# Patient Record
Sex: FEMALE | Race: WHITE | NOT HISPANIC OR LATINO | Employment: OTHER | URBAN - METROPOLITAN AREA
[De-identification: names, ages, dates, MRNs, and addresses within clinical notes are randomized per-mention and may not be internally consistent; named-entity substitution may affect disease eponyms.]

---

## 2018-04-02 ENCOUNTER — RECORDS - HEALTHEAST (OUTPATIENT)
Dept: LAB | Facility: CLINIC | Age: 78
End: 2018-04-02

## 2018-04-02 LAB
ALBUMIN SERPL-MCNC: 3.6 G/DL (ref 3.5–5)
ALP SERPL-CCNC: 104 U/L (ref 45–120)
ALT SERPL W P-5'-P-CCNC: 10 U/L (ref 0–45)
ANION GAP SERPL CALCULATED.3IONS-SCNC: 11 MMOL/L (ref 5–18)
AST SERPL W P-5'-P-CCNC: 16 U/L (ref 0–40)
BILIRUB SERPL-MCNC: 0.3 MG/DL (ref 0–1)
BUN SERPL-MCNC: 19 MG/DL (ref 8–28)
CALCIUM SERPL-MCNC: 10.1 MG/DL (ref 8.5–10.5)
CHLORIDE BLD-SCNC: 103 MMOL/L (ref 98–107)
CHOLEST SERPL-MCNC: 205 MG/DL
CO2 SERPL-SCNC: 24 MMOL/L (ref 22–31)
CREAT SERPL-MCNC: 0.76 MG/DL (ref 0.6–1.1)
FASTING STATUS PATIENT QL REPORTED: ABNORMAL
GFR SERPL CREATININE-BSD FRML MDRD: >60 ML/MIN/1.73M2
GLUCOSE BLD-MCNC: 90 MG/DL (ref 70–125)
HDLC SERPL-MCNC: 62 MG/DL
LDLC SERPL CALC-MCNC: 116 MG/DL
POTASSIUM BLD-SCNC: 4.8 MMOL/L (ref 3.5–5)
PROT SERPL-MCNC: 7.3 G/DL (ref 6–8)
SODIUM SERPL-SCNC: 138 MMOL/L (ref 136–145)
TRIGL SERPL-MCNC: 135 MG/DL

## 2018-04-03 LAB — BACTERIA SPEC CULT: NO GROWTH

## 2018-04-16 ENCOUNTER — COMMUNICATION - HEALTHEAST (OUTPATIENT)
Dept: SCHEDULING | Facility: CLINIC | Age: 78
End: 2018-04-16

## 2018-06-18 ENCOUNTER — RECORDS - HEALTHEAST (OUTPATIENT)
Dept: ADMINISTRATIVE | Facility: OTHER | Age: 78
End: 2018-06-18

## 2018-06-18 ENCOUNTER — RECORDS - HEALTHEAST (OUTPATIENT)
Dept: LAB | Facility: CLINIC | Age: 78
End: 2018-06-18

## 2018-06-18 LAB — POTASSIUM BLD-SCNC: 5.8 MMOL/L (ref 3.5–5)

## 2018-06-20 LAB — BACTERIA SPEC CULT: ABNORMAL

## 2018-06-21 ENCOUNTER — ANESTHESIA - HEALTHEAST (OUTPATIENT)
Dept: SURGERY | Facility: AMBULATORY SURGERY CENTER | Age: 78
End: 2018-06-21

## 2018-06-21 ASSESSMENT — MIFFLIN-ST. JEOR
SCORE: 981.64
SCORE: 981.64

## 2018-06-25 ENCOUNTER — RECORDS - HEALTHEAST (OUTPATIENT)
Dept: ADMINISTRATIVE | Facility: OTHER | Age: 78
End: 2018-06-25

## 2018-06-25 ENCOUNTER — RECORDS - HEALTHEAST (OUTPATIENT)
Dept: LAB | Facility: CLINIC | Age: 78
End: 2018-06-25

## 2018-06-25 LAB — POTASSIUM BLD-SCNC: 5.5 MMOL/L (ref 3.5–5)

## 2018-06-27 ENCOUNTER — HOSPITAL ENCOUNTER (OUTPATIENT)
Dept: SURGERY | Facility: AMBULATORY SURGERY CENTER | Age: 78
Discharge: HOME | End: 2018-06-27
Attending: OBSTETRICS & GYNECOLOGY | Admitting: OBSTETRICS & GYNECOLOGY

## 2018-06-27 ENCOUNTER — SURGERY - HEALTHEAST (OUTPATIENT)
Dept: SURGERY | Facility: AMBULATORY SURGERY CENTER | Age: 78
End: 2018-06-27

## 2018-06-27 DIAGNOSIS — R10.2 PELVIC PAIN: ICD-10-CM

## 2018-06-27 LAB
HGB BLD-MCNC: 13.3 G/DL
POTASSIUM BLD-SCNC: 4.4 MMOL/L (ref 3.5–5)

## 2018-06-27 ASSESSMENT — MIFFLIN-ST. JEOR
SCORE: 981.64
SCORE: 981.64

## 2018-06-28 ENCOUNTER — RECORDS - HEALTHEAST (OUTPATIENT)
Dept: ADMINISTRATIVE | Facility: OTHER | Age: 78
End: 2018-06-28

## 2018-07-07 ENCOUNTER — NURSE TRIAGE (OUTPATIENT)
Dept: NURSING | Facility: CLINIC | Age: 78
End: 2018-07-07

## 2018-07-08 NOTE — TELEPHONE ENCOUNTER
Patient called reporting that starting yesterday the outside of her right foot has bruising with swelling. Patient is unsure how it happened and denies an injury to her foot. Patient reports it does not hurt it just feels tight when she tries to bend her toes. Denies redness, warm to the touch, pain, dizziness, lightheaded, raised bruise, and fever. Patient reports she is on a blood thinner. Reviewed guidelines below and advised for patient to be seen within 24 hours by PCP. Patient agreed with plan. RN advised to call back with any changes, worsening of symptoms, and questions or concerns.     Reason for Disposition    Taking Coumadin (warfarin) or other strong blood thinner, or known bleeding disorder (e.g., thrombocytopenia)    Additional Information    Negative: Shock suspected (e.g., cold/pale/clammy skin, too weak to stand, low BP, rapid pulse)    Negative: Sounds like a life-threatening emergency to the triager    Negative: Bruises with fever    Negative: Tiny bruises (spots or dots) of unknown cause    Negative: Bruise(s) of forehead or head    Negative: Bruise(s) of face or jaw    Negative: Followed an injury, and triager doesn't know which injury guideline to use first    Negative: Post-operative bruising    Negative: Dizziness or lightheadedness    Negative: [1] Bruise on head/face, chest, or abdomen AND [2]  taking Coumadin (warfarin) or other strong blood thinner, or known bleeding disorder (e.g., thrombocytopenia)    Negative: Unexplained bleeding from another site (e.g., gums, nose, urine) as well    Negative: Patient sounds very sick or weak to the triager    Negative: [1] Not caused by an injury AND [2] 5 or more bruises now    Negative: [1] Raised bruise AND [2] size > 2 inches (5 cm) AND [3] expanding    Negative: [1] SEVERE pain AND [2] not improved 2 hours after pain medicine/ice packs    Negative: Suspicious history for the injury    Protocols used: BRUISES-ADULT-AH    Holly Reeves RN/[facility]

## 2019-05-31 ENCOUNTER — RECORDS - HEALTHEAST (OUTPATIENT)
Dept: ADMINISTRATIVE | Facility: OTHER | Age: 79
End: 2019-05-31

## 2019-06-14 ENCOUNTER — RECORDS - HEALTHEAST (OUTPATIENT)
Dept: ADMINISTRATIVE | Facility: OTHER | Age: 79
End: 2019-06-14

## 2019-06-14 ENCOUNTER — RECORDS - HEALTHEAST (OUTPATIENT)
Dept: LAB | Facility: CLINIC | Age: 79
End: 2019-06-14

## 2019-06-14 LAB — URATE SERPL-MCNC: 4.9 MG/DL (ref 2–7.5)

## 2019-07-10 ENCOUNTER — RECORDS - HEALTHEAST (OUTPATIENT)
Dept: ADMINISTRATIVE | Facility: OTHER | Age: 79
End: 2019-07-10

## 2019-07-11 ENCOUNTER — RECORDS - HEALTHEAST (OUTPATIENT)
Dept: ADMINISTRATIVE | Facility: OTHER | Age: 79
End: 2019-07-11

## 2019-07-19 ENCOUNTER — COMMUNICATION - HEALTHEAST (OUTPATIENT)
Dept: LAB | Facility: CLINIC | Age: 79
End: 2019-07-19

## 2019-07-19 ENCOUNTER — OFFICE VISIT - HEALTHEAST (OUTPATIENT)
Dept: RHEUMATOLOGY | Facility: CLINIC | Age: 79
End: 2019-07-19

## 2019-07-19 DIAGNOSIS — M25.50 POLYARTHRALGIA: ICD-10-CM

## 2019-07-19 DIAGNOSIS — M15.0 PRIMARY GENERALIZED (OSTEO)ARTHRITIS: ICD-10-CM

## 2019-07-19 DIAGNOSIS — M1A.3721 CHRONIC GOUT DUE TO RENAL IMPAIRMENT INVOLVING TOE OF LEFT FOOT WITH TOPHUS: ICD-10-CM

## 2019-07-19 DIAGNOSIS — M15.0 PRIMARY OSTEOARTHRITIS INVOLVING MULTIPLE JOINTS: ICD-10-CM

## 2019-07-19 ASSESSMENT — MIFFLIN-ST. JEOR: SCORE: 992.52

## 2019-07-24 ENCOUNTER — AMBULATORY - HEALTHEAST (OUTPATIENT)
Dept: LAB | Facility: CLINIC | Age: 79
End: 2019-07-24

## 2019-07-24 DIAGNOSIS — M25.50 POLYARTHRALGIA: ICD-10-CM

## 2019-07-24 DIAGNOSIS — M1A.3721 CHRONIC GOUT DUE TO RENAL IMPAIRMENT INVOLVING TOE OF LEFT FOOT WITH TOPHUS: ICD-10-CM

## 2019-07-24 DIAGNOSIS — M15.0 PRIMARY OSTEOARTHRITIS INVOLVING MULTIPLE JOINTS: ICD-10-CM

## 2019-07-24 LAB
ALBUMIN SERPL-MCNC: 3.6 G/DL (ref 3.5–5)
ALT SERPL W P-5'-P-CCNC: 12 U/L (ref 0–45)
BASOPHILS # BLD AUTO: 0.1 THOU/UL (ref 0–0.2)
BASOPHILS NFR BLD AUTO: 0 % (ref 0–2)
CREAT SERPL-MCNC: 0.84 MG/DL (ref 0.6–1.1)
EOSINOPHIL # BLD AUTO: 0.1 THOU/UL (ref 0–0.4)
EOSINOPHIL NFR BLD AUTO: 1 % (ref 0–6)
ERYTHROCYTE [DISTWIDTH] IN BLOOD BY AUTOMATED COUNT: 16.7 % (ref 11–14.5)
GFR SERPL CREATININE-BSD FRML MDRD: >60 ML/MIN/1.73M2
HCT VFR BLD AUTO: 40.4 % (ref 35–47)
HGB BLD-MCNC: 12.9 G/DL (ref 12–16)
LYMPHOCYTES # BLD AUTO: 1.6 THOU/UL (ref 0.8–4.4)
LYMPHOCYTES NFR BLD AUTO: 11 % (ref 20–40)
MCH RBC QN AUTO: 29.7 PG (ref 27–34)
MCHC RBC AUTO-ENTMCNC: 31.9 G/DL (ref 32–36)
MCV RBC AUTO: 93 FL (ref 80–100)
MONOCYTES # BLD AUTO: 0.8 THOU/UL (ref 0–0.9)
MONOCYTES NFR BLD AUTO: 5 % (ref 2–10)
NEUTROPHILS # BLD AUTO: 12.7 THOU/UL (ref 2–7.7)
NEUTROPHILS NFR BLD AUTO: 83 % (ref 50–70)
PLATELET # BLD AUTO: 390 THOU/UL (ref 140–440)
PMV BLD AUTO: 10.2 FL (ref 8.5–12.5)
RBC # BLD AUTO: 4.35 MILL/UL (ref 3.8–5.4)
RHEUMATOID FACT SERPL-ACNC: <15 IU/ML (ref 0–30)
URATE SERPL-MCNC: 5.5 MG/DL (ref 2–7.5)
WBC: 15.4 THOU/UL (ref 4–11)

## 2019-07-25 LAB — CCP AB SER IA-ACNC: <0.5 U/ML

## 2019-08-02 ENCOUNTER — OFFICE VISIT - HEALTHEAST (OUTPATIENT)
Dept: RHEUMATOLOGY | Facility: CLINIC | Age: 79
End: 2019-08-02

## 2019-08-02 DIAGNOSIS — M1A.0721 CHRONIC IDIOPATHIC GOUT INVOLVING TOE OF LEFT FOOT WITH TOPHUS: ICD-10-CM

## 2019-08-02 DIAGNOSIS — M15.0 PRIMARY OSTEOARTHRITIS INVOLVING MULTIPLE JOINTS: ICD-10-CM

## 2019-08-02 DIAGNOSIS — M25.50 POLYARTHRALGIA: ICD-10-CM

## 2019-08-02 ASSESSMENT — MIFFLIN-ST. JEOR: SCORE: 995.24

## 2019-09-23 ENCOUNTER — RECORDS - HEALTHEAST (OUTPATIENT)
Dept: LAB | Facility: CLINIC | Age: 79
End: 2019-09-23

## 2019-09-24 LAB — BACTERIA SPEC CULT: NO GROWTH

## 2019-10-01 ENCOUNTER — OFFICE VISIT - HEALTHEAST (OUTPATIENT)
Dept: SURGERY | Facility: CLINIC | Age: 79
End: 2019-10-01

## 2019-10-01 DIAGNOSIS — R10.32 LLQ ABDOMINAL PAIN: ICD-10-CM

## 2019-10-01 ASSESSMENT — MIFFLIN-ST. JEOR: SCORE: 992.52

## 2019-11-01 ENCOUNTER — AMBULATORY - HEALTHEAST (OUTPATIENT)
Dept: LAB | Facility: CLINIC | Age: 79
End: 2019-11-01

## 2019-11-01 DIAGNOSIS — M25.50 POLYARTHRALGIA: ICD-10-CM

## 2019-11-01 LAB
ALT SERPL W P-5'-P-CCNC: 9 U/L (ref 0–45)
CREAT SERPL-MCNC: 0.84 MG/DL (ref 0.6–1.1)
ERYTHROCYTE [DISTWIDTH] IN BLOOD BY AUTOMATED COUNT: 11.9 % (ref 11–14.5)
GFR SERPL CREATININE-BSD FRML MDRD: >60 ML/MIN/1.73M2
HCT VFR BLD AUTO: 43.7 % (ref 35–47)
HGB BLD-MCNC: 14.2 G/DL (ref 12–16)
MCH RBC QN AUTO: 30.1 PG (ref 27–34)
MCHC RBC AUTO-ENTMCNC: 32.5 G/DL (ref 32–36)
MCV RBC AUTO: 93 FL (ref 80–100)
PLATELET # BLD AUTO: 362 THOU/UL (ref 140–440)
PMV BLD AUTO: 7 FL (ref 7–10)
RBC # BLD AUTO: 4.72 MILL/UL (ref 3.8–5.4)
URATE SERPL-MCNC: 5.3 MG/DL (ref 2–7.5)
WBC: 6.7 THOU/UL (ref 4–11)

## 2019-11-05 ENCOUNTER — OFFICE VISIT - HEALTHEAST (OUTPATIENT)
Dept: RHEUMATOLOGY | Facility: CLINIC | Age: 79
End: 2019-11-05

## 2019-11-05 DIAGNOSIS — M15.0 PRIMARY OSTEOARTHRITIS INVOLVING MULTIPLE JOINTS: ICD-10-CM

## 2019-11-05 DIAGNOSIS — M1A.0721 CHRONIC IDIOPATHIC GOUT INVOLVING TOE OF LEFT FOOT WITH TOPHUS: ICD-10-CM

## 2019-11-05 ASSESSMENT — MIFFLIN-ST. JEOR: SCORE: 990.71

## 2020-05-05 ENCOUNTER — COMMUNICATION - HEALTHEAST (OUTPATIENT)
Dept: RHEUMATOLOGY | Facility: CLINIC | Age: 80
End: 2020-05-05

## 2020-07-27 ENCOUNTER — RECORDS - HEALTHEAST (OUTPATIENT)
Dept: ADMINISTRATIVE | Facility: OTHER | Age: 80
End: 2020-07-27

## 2020-09-03 ENCOUNTER — COMMUNICATION - HEALTHEAST (OUTPATIENT)
Dept: ADMINISTRATIVE | Facility: CLINIC | Age: 80
End: 2020-09-03

## 2020-09-03 DIAGNOSIS — M10.9 GOUT: ICD-10-CM

## 2020-09-08 ENCOUNTER — AMBULATORY - HEALTHEAST (OUTPATIENT)
Dept: LAB | Facility: CLINIC | Age: 80
End: 2020-09-08

## 2020-09-08 DIAGNOSIS — M10.9 GOUT: ICD-10-CM

## 2020-09-08 LAB
ALBUMIN SERPL-MCNC: 3.4 G/DL (ref 3.5–5)
ALT SERPL W P-5'-P-CCNC: 17 U/L (ref 0–45)
CREAT SERPL-MCNC: 1.16 MG/DL (ref 0.6–1.1)
ERYTHROCYTE [DISTWIDTH] IN BLOOD BY AUTOMATED COUNT: 12.4 % (ref 11–14.5)
GFR SERPL CREATININE-BSD FRML MDRD: 45 ML/MIN/1.73M2
HCT VFR BLD AUTO: 38 % (ref 35–47)
HGB BLD-MCNC: 12.5 G/DL (ref 12–16)
MCH RBC QN AUTO: 29.1 PG (ref 27–34)
MCHC RBC AUTO-ENTMCNC: 32.8 G/DL (ref 32–36)
MCV RBC AUTO: 89 FL (ref 80–100)
PLATELET # BLD AUTO: 464 THOU/UL (ref 140–440)
PMV BLD AUTO: 6.9 FL (ref 7–10)
RBC # BLD AUTO: 4.29 MILL/UL (ref 3.8–5.4)
URATE SERPL-MCNC: 5.5 MG/DL (ref 2–7.5)
WBC: 8 THOU/UL (ref 4–11)

## 2020-09-11 ENCOUNTER — COMMUNICATION - HEALTHEAST (OUTPATIENT)
Dept: RHEUMATOLOGY | Facility: CLINIC | Age: 80
End: 2020-09-11

## 2020-09-11 DIAGNOSIS — M1A.0721 CHRONIC IDIOPATHIC GOUT INVOLVING TOE OF LEFT FOOT WITH TOPHUS: ICD-10-CM

## 2020-09-28 ENCOUNTER — OFFICE VISIT - HEALTHEAST (OUTPATIENT)
Dept: RHEUMATOLOGY | Facility: CLINIC | Age: 80
End: 2020-09-28

## 2020-09-28 DIAGNOSIS — M15.0 PRIMARY OSTEOARTHRITIS INVOLVING MULTIPLE JOINTS: ICD-10-CM

## 2020-09-28 DIAGNOSIS — M11.89 CALCIUM PYROPHOSPHATE ARTHROPATHY OF MULTIPLE SITES: ICD-10-CM

## 2020-09-28 DIAGNOSIS — M1A.0721 CHRONIC IDIOPATHIC GOUT INVOLVING TOE OF LEFT FOOT WITH TOPHUS: ICD-10-CM

## 2020-09-28 DIAGNOSIS — M25.80 PERIARTICULAR CALCIFICATION: ICD-10-CM

## 2020-10-16 ENCOUNTER — COMMUNICATION - HEALTHEAST (OUTPATIENT)
Dept: ADMINISTRATIVE | Facility: CLINIC | Age: 80
End: 2020-10-16

## 2020-10-28 ENCOUNTER — RECORDS - HEALTHEAST (OUTPATIENT)
Dept: LAB | Facility: CLINIC | Age: 80
End: 2020-10-28

## 2020-10-28 LAB
ALBUMIN SERPL-MCNC: 3.7 G/DL (ref 3.5–5)
ALP SERPL-CCNC: 96 U/L (ref 45–120)
ALT SERPL W P-5'-P-CCNC: 9 U/L (ref 0–45)
ANION GAP SERPL CALCULATED.3IONS-SCNC: 10 MMOL/L (ref 5–18)
AST SERPL W P-5'-P-CCNC: 15 U/L (ref 0–40)
BILIRUB SERPL-MCNC: 0.3 MG/DL (ref 0–1)
BUN SERPL-MCNC: 18 MG/DL (ref 8–28)
CALCIUM SERPL-MCNC: 9.7 MG/DL (ref 8.5–10.5)
CHLORIDE BLD-SCNC: 106 MMOL/L (ref 98–107)
CHOLEST SERPL-MCNC: 176 MG/DL
CO2 SERPL-SCNC: 24 MMOL/L (ref 22–31)
CREAT SERPL-MCNC: 0.84 MG/DL (ref 0.6–1.1)
FASTING STATUS PATIENT QL REPORTED: NORMAL
GFR SERPL CREATININE-BSD FRML MDRD: >60 ML/MIN/1.73M2
GLUCOSE BLD-MCNC: 92 MG/DL (ref 70–125)
HDLC SERPL-MCNC: 55 MG/DL
LDLC SERPL CALC-MCNC: 98 MG/DL
POTASSIUM BLD-SCNC: 4.5 MMOL/L (ref 3.5–5)
PROT SERPL-MCNC: 7 G/DL (ref 6–8)
SODIUM SERPL-SCNC: 140 MMOL/L (ref 136–145)
TRIGL SERPL-MCNC: 116 MG/DL

## 2020-10-29 LAB — BACTERIA SPEC CULT: NO GROWTH

## 2020-10-29 ASSESSMENT — MIFFLIN-ST. JEOR
SCORE: 965.77
SCORE: 965.77

## 2020-10-31 ENCOUNTER — SURGERY - HEALTHEAST (OUTPATIENT)
Dept: SURGERY | Facility: HOSPITAL | Age: 80
End: 2020-10-31

## 2020-10-31 ENCOUNTER — COMMUNICATION - HEALTHEAST (OUTPATIENT)
Dept: SCHEDULING | Facility: CLINIC | Age: 80
End: 2020-10-31

## 2020-10-31 ENCOUNTER — ANESTHESIA - HEALTHEAST (OUTPATIENT)
Dept: SURGERY | Facility: HOSPITAL | Age: 80
End: 2020-10-31

## 2020-10-31 ASSESSMENT — MIFFLIN-ST. JEOR: SCORE: 986.18

## 2020-11-01 ASSESSMENT — MIFFLIN-ST. JEOR: SCORE: 1007.5

## 2020-11-02 ASSESSMENT — MIFFLIN-ST. JEOR: SCORE: 1009.77

## 2020-11-03 ASSESSMENT — MIFFLIN-ST. JEOR
SCORE: 989.81
SCORE: 989.35

## 2020-11-04 ASSESSMENT — MIFFLIN-ST. JEOR: SCORE: 978.47

## 2020-11-05 ENCOUNTER — COMMUNICATION - HEALTHEAST (OUTPATIENT)
Dept: ONCOLOGY | Facility: HOSPITAL | Age: 80
End: 2020-11-05

## 2020-11-09 ENCOUNTER — AMBULATORY - HEALTHEAST (OUTPATIENT)
Dept: ONCOLOGY | Facility: CLINIC | Age: 80
End: 2020-11-09

## 2020-11-09 DIAGNOSIS — C18.7 MALIGNANT NEOPLASM OF SIGMOID COLON (H): ICD-10-CM

## 2020-11-10 ENCOUNTER — OFFICE VISIT - HEALTHEAST (OUTPATIENT)
Dept: SURGERY | Facility: CLINIC | Age: 80
End: 2020-11-10

## 2020-11-10 ENCOUNTER — COMMUNICATION - HEALTHEAST (OUTPATIENT)
Dept: ONCOLOGY | Facility: HOSPITAL | Age: 80
End: 2020-11-10

## 2020-11-10 DIAGNOSIS — C18.7 MALIGNANT NEOPLASM OF SIGMOID COLON (H): ICD-10-CM

## 2020-11-10 LAB — CEA SERPL-MCNC: 3.8 NG/ML (ref 0–3)

## 2020-11-12 ENCOUNTER — RECORDS - HEALTHEAST (OUTPATIENT)
Dept: RADIOLOGY | Facility: CLINIC | Age: 80
End: 2020-11-12

## 2020-11-18 ENCOUNTER — HOSPITAL ENCOUNTER (OUTPATIENT)
Dept: PET IMAGING | Facility: HOSPITAL | Age: 80
Discharge: HOME | End: 2020-11-18
Attending: INTERNAL MEDICINE

## 2020-11-18 DIAGNOSIS — C18.7 MALIGNANT NEOPLASM OF SIGMOID COLON (H): ICD-10-CM

## 2020-11-18 LAB — GLUCOSE BLDC GLUCOMTR-MCNC: 82 MG/DL (ref 70–139)

## 2020-11-18 ASSESSMENT — MIFFLIN-ST. JEOR: SCORE: 947.62

## 2020-11-24 ENCOUNTER — RECORDS - HEALTHEAST (OUTPATIENT)
Dept: ADMINISTRATIVE | Facility: OTHER | Age: 80
End: 2020-11-24

## 2020-11-24 ENCOUNTER — OFFICE VISIT - HEALTHEAST (OUTPATIENT)
Dept: ONCOLOGY | Facility: HOSPITAL | Age: 80
End: 2020-11-24

## 2020-11-24 DIAGNOSIS — C18.7 MALIGNANT NEOPLASM OF SIGMOID COLON (H): ICD-10-CM

## 2020-11-24 ASSESSMENT — MIFFLIN-ST. JEOR: SCORE: 936.28

## 2020-11-25 ENCOUNTER — AMBULATORY - HEALTHEAST (OUTPATIENT)
Dept: SURGERY | Facility: CLINIC | Age: 80
End: 2020-11-25

## 2020-11-25 ENCOUNTER — COMMUNICATION - HEALTHEAST (OUTPATIENT)
Dept: SURGERY | Facility: CLINIC | Age: 80
End: 2020-11-25

## 2020-11-25 ENCOUNTER — COMMUNICATION - HEALTHEAST (OUTPATIENT)
Dept: ONCOLOGY | Facility: HOSPITAL | Age: 80
End: 2020-11-25

## 2020-11-25 DIAGNOSIS — C18.7 MALIGNANT NEOPLASM OF SIGMOID COLON (H): ICD-10-CM

## 2020-11-25 DIAGNOSIS — Z11.59 ENCOUNTER FOR SCREENING FOR OTHER VIRAL DISEASES: ICD-10-CM

## 2020-11-29 ENCOUNTER — AMBULATORY - HEALTHEAST (OUTPATIENT)
Dept: FAMILY MEDICINE | Facility: CLINIC | Age: 80
End: 2020-11-29

## 2020-11-29 DIAGNOSIS — Z11.59 ENCOUNTER FOR SCREENING FOR OTHER VIRAL DISEASES: ICD-10-CM

## 2020-11-30 ENCOUNTER — COMMUNICATION - HEALTHEAST (OUTPATIENT)
Dept: SCHEDULING | Facility: CLINIC | Age: 80
End: 2020-11-30

## 2020-11-30 LAB
SARS-COV-2 PCR COMMENT: NORMAL
SARS-COV-2 RNA SPEC QL NAA+PROBE: NEGATIVE
SARS-COV-2 VIRUS SPECIMEN SOURCE: NORMAL

## 2020-12-01 ENCOUNTER — ANESTHESIA - HEALTHEAST (OUTPATIENT)
Dept: SURGERY | Facility: AMBULATORY SURGERY CENTER | Age: 80
End: 2020-12-01

## 2020-12-01 ASSESSMENT — MIFFLIN-ST. JEOR: SCORE: 936.28

## 2020-12-02 ENCOUNTER — SURGERY - HEALTHEAST (OUTPATIENT)
Dept: SURGERY | Facility: AMBULATORY SURGERY CENTER | Age: 80
End: 2020-12-02

## 2020-12-08 ENCOUNTER — INFUSION - HEALTHEAST (OUTPATIENT)
Dept: INFUSION THERAPY | Facility: HOSPITAL | Age: 80
End: 2020-12-08

## 2020-12-08 ENCOUNTER — OFFICE VISIT - HEALTHEAST (OUTPATIENT)
Dept: ONCOLOGY | Facility: HOSPITAL | Age: 80
End: 2020-12-08

## 2020-12-08 ENCOUNTER — AMBULATORY - HEALTHEAST (OUTPATIENT)
Dept: INFUSION THERAPY | Facility: HOSPITAL | Age: 80
End: 2020-12-08

## 2020-12-08 DIAGNOSIS — C18.7 MALIGNANT NEOPLASM OF SIGMOID COLON (H): ICD-10-CM

## 2020-12-08 LAB
ALBUMIN SERPL-MCNC: 3.2 G/DL (ref 3.5–5)
ALP SERPL-CCNC: 89 U/L (ref 45–120)
ALT SERPL W P-5'-P-CCNC: <9 U/L (ref 0–45)
ANION GAP SERPL CALCULATED.3IONS-SCNC: 4 MMOL/L (ref 5–18)
AST SERPL W P-5'-P-CCNC: 15 U/L (ref 0–40)
BASOPHILS # BLD AUTO: 0 THOU/UL (ref 0–0.2)
BASOPHILS NFR BLD AUTO: 0 % (ref 0–2)
BILIRUB SERPL-MCNC: 0.3 MG/DL (ref 0–1)
BUN SERPL-MCNC: 15 MG/DL (ref 8–28)
CALCIUM SERPL-MCNC: 8.7 MG/DL (ref 8.5–10.5)
CHLORIDE BLD-SCNC: 108 MMOL/L (ref 98–107)
CO2 SERPL-SCNC: 28 MMOL/L (ref 22–31)
CREAT SERPL-MCNC: 0.74 MG/DL (ref 0.6–1.1)
EOSINOPHIL # BLD AUTO: 0.2 THOU/UL (ref 0–0.4)
EOSINOPHIL NFR BLD AUTO: 3 % (ref 0–6)
ERYTHROCYTE [DISTWIDTH] IN BLOOD BY AUTOMATED COUNT: 17 % (ref 11–14.5)
GFR SERPL CREATININE-BSD FRML MDRD: >60 ML/MIN/1.73M2
GLUCOSE BLD-MCNC: 155 MG/DL (ref 70–125)
HCT VFR BLD AUTO: 36.1 % (ref 35–47)
HGB BLD-MCNC: 11.4 G/DL (ref 12–16)
IMM GRANULOCYTES # BLD: 0 THOU/UL
IMM GRANULOCYTES NFR BLD: 0 %
LYMPHOCYTES # BLD AUTO: 1.6 THOU/UL (ref 0.8–4.4)
LYMPHOCYTES NFR BLD AUTO: 23 % (ref 20–40)
MAGNESIUM SERPL-MCNC: 1.8 MG/DL (ref 1.8–2.6)
MCH RBC QN AUTO: 28.6 PG (ref 27–34)
MCHC RBC AUTO-ENTMCNC: 31.6 G/DL (ref 32–36)
MCV RBC AUTO: 91 FL (ref 80–100)
MONOCYTES # BLD AUTO: 0.9 THOU/UL (ref 0–0.9)
MONOCYTES NFR BLD AUTO: 12 % (ref 2–10)
NEUTROPHILS # BLD AUTO: 4.2 THOU/UL (ref 2–7.7)
NEUTROPHILS NFR BLD AUTO: 61 % (ref 50–70)
PLATELET # BLD AUTO: 381 THOU/UL (ref 140–440)
PMV BLD AUTO: 8.8 FL (ref 8.5–12.5)
POTASSIUM BLD-SCNC: 4.1 MMOL/L (ref 3.5–5)
PROT SERPL-MCNC: 6.4 G/DL (ref 6–8)
RBC # BLD AUTO: 3.99 MILL/UL (ref 3.8–5.4)
SODIUM SERPL-SCNC: 140 MMOL/L (ref 136–145)
WBC: 6.9 THOU/UL (ref 4–11)

## 2020-12-10 ENCOUNTER — INFUSION - HEALTHEAST (OUTPATIENT)
Dept: INFUSION THERAPY | Facility: HOSPITAL | Age: 80
End: 2020-12-10

## 2020-12-10 DIAGNOSIS — C18.7 MALIGNANT NEOPLASM OF SIGMOID COLON (H): ICD-10-CM

## 2020-12-22 ENCOUNTER — AMBULATORY - HEALTHEAST (OUTPATIENT)
Dept: INFUSION THERAPY | Facility: HOSPITAL | Age: 80
End: 2020-12-22

## 2020-12-22 ENCOUNTER — INFUSION - HEALTHEAST (OUTPATIENT)
Dept: INFUSION THERAPY | Facility: HOSPITAL | Age: 80
End: 2020-12-22

## 2020-12-22 ENCOUNTER — OFFICE VISIT - HEALTHEAST (OUTPATIENT)
Dept: ONCOLOGY | Facility: HOSPITAL | Age: 80
End: 2020-12-22

## 2020-12-22 ENCOUNTER — COMMUNICATION - HEALTHEAST (OUTPATIENT)
Dept: ONCOLOGY | Facility: HOSPITAL | Age: 80
End: 2020-12-22

## 2020-12-22 DIAGNOSIS — C18.7 MALIGNANT NEOPLASM OF SIGMOID COLON (H): ICD-10-CM

## 2020-12-22 LAB
ALBUMIN SERPL-MCNC: 3.1 G/DL (ref 3.5–5)
ALP SERPL-CCNC: 87 U/L (ref 45–120)
ALT SERPL W P-5'-P-CCNC: 11 U/L (ref 0–45)
ANION GAP SERPL CALCULATED.3IONS-SCNC: 7 MMOL/L (ref 5–18)
AST SERPL W P-5'-P-CCNC: 16 U/L (ref 0–40)
BASOPHILS # BLD AUTO: 0 THOU/UL (ref 0–0.2)
BASOPHILS NFR BLD AUTO: 1 % (ref 0–2)
BILIRUB SERPL-MCNC: 0.2 MG/DL (ref 0–1)
BUN SERPL-MCNC: 14 MG/DL (ref 8–28)
CALCIUM SERPL-MCNC: 8.6 MG/DL (ref 8.5–10.5)
CHLORIDE BLD-SCNC: 108 MMOL/L (ref 98–107)
CO2 SERPL-SCNC: 25 MMOL/L (ref 22–31)
CREAT SERPL-MCNC: 0.78 MG/DL (ref 0.6–1.1)
EOSINOPHIL # BLD AUTO: 0.2 THOU/UL (ref 0–0.4)
EOSINOPHIL NFR BLD AUTO: 3 % (ref 0–6)
ERYTHROCYTE [DISTWIDTH] IN BLOOD BY AUTOMATED COUNT: 16.8 % (ref 11–14.5)
GFR SERPL CREATININE-BSD FRML MDRD: >60 ML/MIN/1.73M2
GLUCOSE BLD-MCNC: 108 MG/DL (ref 70–125)
HCT VFR BLD AUTO: 35.8 % (ref 35–47)
HGB BLD-MCNC: 11.6 G/DL (ref 12–16)
IMM GRANULOCYTES # BLD: 0 THOU/UL
IMM GRANULOCYTES NFR BLD: 0 %
LYMPHOCYTES # BLD AUTO: 2 THOU/UL (ref 0.8–4.4)
LYMPHOCYTES NFR BLD AUTO: 31 % (ref 20–40)
MAGNESIUM SERPL-MCNC: 1.9 MG/DL (ref 1.8–2.6)
MCH RBC QN AUTO: 29.1 PG (ref 27–34)
MCHC RBC AUTO-ENTMCNC: 32.4 G/DL (ref 32–36)
MCV RBC AUTO: 90 FL (ref 80–100)
MONOCYTES # BLD AUTO: 0.7 THOU/UL (ref 0–0.9)
MONOCYTES NFR BLD AUTO: 11 % (ref 2–10)
NEUTROPHILS # BLD AUTO: 3.4 THOU/UL (ref 2–7.7)
NEUTROPHILS NFR BLD AUTO: 54 % (ref 50–70)
PLATELET # BLD AUTO: 348 THOU/UL (ref 140–440)
PMV BLD AUTO: 9 FL (ref 8.5–12.5)
POTASSIUM BLD-SCNC: 4.2 MMOL/L (ref 3.5–5)
PROT SERPL-MCNC: 6.2 G/DL (ref 6–8)
RBC # BLD AUTO: 3.99 MILL/UL (ref 3.8–5.4)
SODIUM SERPL-SCNC: 140 MMOL/L (ref 136–145)
WBC: 6.3 THOU/UL (ref 4–11)

## 2020-12-24 ENCOUNTER — INFUSION - HEALTHEAST (OUTPATIENT)
Dept: INFUSION THERAPY | Facility: HOSPITAL | Age: 80
End: 2020-12-24

## 2020-12-24 DIAGNOSIS — C18.7 MALIGNANT NEOPLASM OF SIGMOID COLON (H): ICD-10-CM

## 2020-12-28 ENCOUNTER — OFFICE VISIT - HEALTHEAST (OUTPATIENT)
Dept: RHEUMATOLOGY | Facility: CLINIC | Age: 80
End: 2020-12-28

## 2020-12-28 DIAGNOSIS — C18.7 MALIGNANT NEOPLASM OF SIGMOID COLON (H): ICD-10-CM

## 2020-12-28 DIAGNOSIS — M11.89 CALCIUM PYROPHOSPHATE ARTHROPATHY OF MULTIPLE SITES: ICD-10-CM

## 2020-12-28 DIAGNOSIS — M1A.00X1 IDIOPATHIC CHRONIC GOUT WITH TOPHUS, UNSPECIFIED SITE: ICD-10-CM

## 2020-12-28 DIAGNOSIS — M15.0 PRIMARY OSTEOARTHRITIS INVOLVING MULTIPLE JOINTS: ICD-10-CM

## 2021-01-05 ENCOUNTER — OFFICE VISIT - HEALTHEAST (OUTPATIENT)
Dept: ONCOLOGY | Facility: HOSPITAL | Age: 81
End: 2021-01-05

## 2021-01-05 ENCOUNTER — AMBULATORY - HEALTHEAST (OUTPATIENT)
Dept: ONCOLOGY | Facility: HOSPITAL | Age: 81
End: 2021-01-05

## 2021-01-05 ENCOUNTER — INFUSION - HEALTHEAST (OUTPATIENT)
Dept: INFUSION THERAPY | Facility: HOSPITAL | Age: 81
End: 2021-01-05

## 2021-01-05 DIAGNOSIS — C18.7 MALIGNANT NEOPLASM OF SIGMOID COLON (H): ICD-10-CM

## 2021-01-05 LAB
ALBUMIN SERPL-MCNC: 3.2 G/DL (ref 3.5–5)
ALP SERPL-CCNC: 77 U/L (ref 45–120)
ALT SERPL W P-5'-P-CCNC: 10 U/L (ref 0–45)
ANION GAP SERPL CALCULATED.3IONS-SCNC: 8 MMOL/L (ref 5–18)
AST SERPL W P-5'-P-CCNC: 15 U/L (ref 0–40)
BASOPHILS # BLD AUTO: 0.1 THOU/UL (ref 0–0.2)
BASOPHILS NFR BLD AUTO: 1 % (ref 0–2)
BILIRUB SERPL-MCNC: 0.2 MG/DL (ref 0–1)
BUN SERPL-MCNC: 15 MG/DL (ref 8–28)
CALCIUM SERPL-MCNC: 8.9 MG/DL (ref 8.5–10.5)
CHLORIDE BLD-SCNC: 108 MMOL/L (ref 98–107)
CO2 SERPL-SCNC: 26 MMOL/L (ref 22–31)
CREAT SERPL-MCNC: 0.77 MG/DL (ref 0.6–1.1)
EOSINOPHIL # BLD AUTO: 0.1 THOU/UL (ref 0–0.4)
EOSINOPHIL NFR BLD AUTO: 2 % (ref 0–6)
ERYTHROCYTE [DISTWIDTH] IN BLOOD BY AUTOMATED COUNT: 17.5 % (ref 11–14.5)
GFR SERPL CREATININE-BSD FRML MDRD: >60 ML/MIN/1.73M2
GLUCOSE BLD-MCNC: 106 MG/DL (ref 70–125)
HCT VFR BLD AUTO: 34.5 % (ref 35–47)
HGB BLD-MCNC: 10.9 G/DL (ref 12–16)
IMM GRANULOCYTES # BLD: 0 THOU/UL
IMM GRANULOCYTES NFR BLD: 0 %
LYMPHOCYTES # BLD AUTO: 1.8 THOU/UL (ref 0.8–4.4)
LYMPHOCYTES NFR BLD AUTO: 28 % (ref 20–40)
MAGNESIUM SERPL-MCNC: 1.9 MG/DL (ref 1.8–2.6)
MCH RBC QN AUTO: 28.5 PG (ref 27–34)
MCHC RBC AUTO-ENTMCNC: 31.6 G/DL (ref 32–36)
MCV RBC AUTO: 90 FL (ref 80–100)
MONOCYTES # BLD AUTO: 0.7 THOU/UL (ref 0–0.9)
MONOCYTES NFR BLD AUTO: 11 % (ref 2–10)
NEUTROPHILS # BLD AUTO: 3.6 THOU/UL (ref 2–7.7)
NEUTROPHILS NFR BLD AUTO: 58 % (ref 50–70)
PLATELET # BLD AUTO: 262 THOU/UL (ref 140–440)
PMV BLD AUTO: 8.7 FL (ref 8.5–12.5)
POTASSIUM BLD-SCNC: 4.4 MMOL/L (ref 3.5–5)
PROT SERPL-MCNC: 6.5 G/DL (ref 6–8)
RBC # BLD AUTO: 3.82 MILL/UL (ref 3.8–5.4)
SODIUM SERPL-SCNC: 142 MMOL/L (ref 136–145)
WBC: 6.3 THOU/UL (ref 4–11)

## 2021-01-07 ENCOUNTER — INFUSION - HEALTHEAST (OUTPATIENT)
Dept: INFUSION THERAPY | Facility: HOSPITAL | Age: 81
End: 2021-01-07

## 2021-01-07 DIAGNOSIS — C18.7 MALIGNANT NEOPLASM OF SIGMOID COLON (H): ICD-10-CM

## 2021-01-19 ENCOUNTER — INFUSION - HEALTHEAST (OUTPATIENT)
Dept: INFUSION THERAPY | Facility: HOSPITAL | Age: 81
End: 2021-01-19

## 2021-01-19 ENCOUNTER — AMBULATORY - HEALTHEAST (OUTPATIENT)
Dept: INFUSION THERAPY | Facility: HOSPITAL | Age: 81
End: 2021-01-19

## 2021-01-19 ENCOUNTER — OFFICE VISIT - HEALTHEAST (OUTPATIENT)
Dept: ONCOLOGY | Facility: HOSPITAL | Age: 81
End: 2021-01-19

## 2021-01-19 DIAGNOSIS — C18.7 MALIGNANT NEOPLASM OF SIGMOID COLON (H): ICD-10-CM

## 2021-01-19 LAB
ALBUMIN SERPL-MCNC: 3.4 G/DL (ref 3.5–5)
ALP SERPL-CCNC: 82 U/L (ref 45–120)
ALT SERPL W P-5'-P-CCNC: 14 U/L (ref 0–45)
ANION GAP SERPL CALCULATED.3IONS-SCNC: 10 MMOL/L (ref 5–18)
AST SERPL W P-5'-P-CCNC: 19 U/L (ref 0–40)
BASOPHILS # BLD AUTO: 0 THOU/UL (ref 0–0.2)
BASOPHILS NFR BLD AUTO: 1 % (ref 0–2)
BILIRUB SERPL-MCNC: 0.3 MG/DL (ref 0–1)
BUN SERPL-MCNC: 11 MG/DL (ref 8–28)
CALCIUM SERPL-MCNC: 8.6 MG/DL (ref 8.5–10.5)
CHLORIDE BLD-SCNC: 109 MMOL/L (ref 98–107)
CO2 SERPL-SCNC: 21 MMOL/L (ref 22–31)
CREAT SERPL-MCNC: 0.74 MG/DL (ref 0.6–1.1)
EOSINOPHIL # BLD AUTO: 0.2 THOU/UL (ref 0–0.4)
EOSINOPHIL NFR BLD AUTO: 3 % (ref 0–6)
ERYTHROCYTE [DISTWIDTH] IN BLOOD BY AUTOMATED COUNT: 18.4 % (ref 11–14.5)
GFR SERPL CREATININE-BSD FRML MDRD: >60 ML/MIN/1.73M2
GLUCOSE BLD-MCNC: 96 MG/DL (ref 70–125)
HCT VFR BLD AUTO: 35 % (ref 35–47)
HGB BLD-MCNC: 11.2 G/DL (ref 12–16)
IMM GRANULOCYTES # BLD: 0 THOU/UL
IMM GRANULOCYTES NFR BLD: 0 %
LYMPHOCYTES # BLD AUTO: 1.9 THOU/UL (ref 0.8–4.4)
LYMPHOCYTES NFR BLD AUTO: 30 % (ref 20–40)
MAGNESIUM SERPL-MCNC: 1.8 MG/DL (ref 1.8–2.6)
MCH RBC QN AUTO: 28.8 PG (ref 27–34)
MCHC RBC AUTO-ENTMCNC: 32 G/DL (ref 32–36)
MCV RBC AUTO: 90 FL (ref 80–100)
MONOCYTES # BLD AUTO: 0.8 THOU/UL (ref 0–0.9)
MONOCYTES NFR BLD AUTO: 12 % (ref 2–10)
NEUTROPHILS # BLD AUTO: 3.3 THOU/UL (ref 2–7.7)
NEUTROPHILS NFR BLD AUTO: 54 % (ref 50–70)
PLATELET # BLD AUTO: 264 THOU/UL (ref 140–440)
PMV BLD AUTO: 8.9 FL (ref 8.5–12.5)
POTASSIUM BLD-SCNC: 4.2 MMOL/L (ref 3.5–5)
PROT SERPL-MCNC: 6.7 G/DL (ref 6–8)
RBC # BLD AUTO: 3.89 MILL/UL (ref 3.8–5.4)
SODIUM SERPL-SCNC: 140 MMOL/L (ref 136–145)
WBC: 6.1 THOU/UL (ref 4–11)

## 2021-01-20 ENCOUNTER — COMMUNICATION - HEALTHEAST (OUTPATIENT)
Dept: ONCOLOGY | Facility: HOSPITAL | Age: 81
End: 2021-01-20

## 2021-01-21 ENCOUNTER — INFUSION - HEALTHEAST (OUTPATIENT)
Dept: INFUSION THERAPY | Facility: HOSPITAL | Age: 81
End: 2021-01-21

## 2021-01-21 ENCOUNTER — AMBULATORY - HEALTHEAST (OUTPATIENT)
Dept: OTHER | Facility: CLINIC | Age: 81
End: 2021-01-21

## 2021-01-21 ENCOUNTER — DOCUMENTATION ONLY (OUTPATIENT)
Dept: OTHER | Facility: CLINIC | Age: 81
End: 2021-01-21

## 2021-01-21 DIAGNOSIS — C18.7 MALIGNANT NEOPLASM OF SIGMOID COLON (H): ICD-10-CM

## 2021-02-01 ENCOUNTER — OFFICE VISIT - HEALTHEAST (OUTPATIENT)
Dept: ONCOLOGY | Facility: HOSPITAL | Age: 81
End: 2021-02-01

## 2021-02-01 ENCOUNTER — AMBULATORY - HEALTHEAST (OUTPATIENT)
Dept: INFUSION THERAPY | Facility: HOSPITAL | Age: 81
End: 2021-02-01

## 2021-02-01 ENCOUNTER — INFUSION - HEALTHEAST (OUTPATIENT)
Dept: INFUSION THERAPY | Facility: HOSPITAL | Age: 81
End: 2021-02-01

## 2021-02-01 DIAGNOSIS — C18.7 MALIGNANT NEOPLASM OF SIGMOID COLON (H): ICD-10-CM

## 2021-02-01 LAB
ALBUMIN SERPL-MCNC: 3.1 G/DL (ref 3.5–5)
ALP SERPL-CCNC: 76 U/L (ref 45–120)
ALT SERPL W P-5'-P-CCNC: 10 U/L (ref 0–45)
ANION GAP SERPL CALCULATED.3IONS-SCNC: 7 MMOL/L (ref 5–18)
AST SERPL W P-5'-P-CCNC: 15 U/L (ref 0–40)
BASOPHILS # BLD AUTO: 0 THOU/UL (ref 0–0.2)
BASOPHILS NFR BLD AUTO: 1 % (ref 0–2)
BILIRUB SERPL-MCNC: 0.3 MG/DL (ref 0–1)
BUN SERPL-MCNC: 13 MG/DL (ref 8–28)
CALCIUM SERPL-MCNC: 8.7 MG/DL (ref 8.5–10.5)
CHLORIDE BLD-SCNC: 108 MMOL/L (ref 98–107)
CO2 SERPL-SCNC: 25 MMOL/L (ref 22–31)
CREAT SERPL-MCNC: 0.76 MG/DL (ref 0.6–1.1)
EOSINOPHIL # BLD AUTO: 0.1 THOU/UL (ref 0–0.4)
EOSINOPHIL NFR BLD AUTO: 2 % (ref 0–6)
ERYTHROCYTE [DISTWIDTH] IN BLOOD BY AUTOMATED COUNT: 19.1 % (ref 11–14.5)
GFR SERPL CREATININE-BSD FRML MDRD: >60 ML/MIN/1.73M2
GLUCOSE BLD-MCNC: 104 MG/DL (ref 70–125)
HCT VFR BLD AUTO: 34.1 % (ref 35–47)
HGB BLD-MCNC: 10.9 G/DL (ref 12–16)
IMM GRANULOCYTES # BLD: 0 THOU/UL
IMM GRANULOCYTES NFR BLD: 0 %
LYMPHOCYTES # BLD AUTO: 1.6 THOU/UL (ref 0.8–4.4)
LYMPHOCYTES NFR BLD AUTO: 25 % (ref 20–40)
MAGNESIUM SERPL-MCNC: 1.8 MG/DL (ref 1.8–2.6)
MCH RBC QN AUTO: 29.2 PG (ref 27–34)
MCHC RBC AUTO-ENTMCNC: 32 G/DL (ref 32–36)
MCV RBC AUTO: 91 FL (ref 80–100)
MONOCYTES # BLD AUTO: 0.9 THOU/UL (ref 0–0.9)
MONOCYTES NFR BLD AUTO: 14 % (ref 2–10)
NEUTROPHILS # BLD AUTO: 3.7 THOU/UL (ref 2–7.7)
NEUTROPHILS NFR BLD AUTO: 58 % (ref 50–70)
PLATELET # BLD AUTO: 244 THOU/UL (ref 140–440)
PMV BLD AUTO: 9 FL (ref 8.5–12.5)
POTASSIUM BLD-SCNC: 4.4 MMOL/L (ref 3.5–5)
PROT SERPL-MCNC: 6.1 G/DL (ref 6–8)
RBC # BLD AUTO: 3.73 MILL/UL (ref 3.8–5.4)
SODIUM SERPL-SCNC: 140 MMOL/L (ref 136–145)
WBC: 6.3 THOU/UL (ref 4–11)

## 2021-02-04 ENCOUNTER — RECORDS - HEALTHEAST (OUTPATIENT)
Dept: ADMINISTRATIVE | Facility: OTHER | Age: 81
End: 2021-02-04

## 2021-02-09 ENCOUNTER — COMMUNICATION - HEALTHEAST (OUTPATIENT)
Dept: INFUSION THERAPY | Facility: HOSPITAL | Age: 81
End: 2021-02-09

## 2021-02-10 ENCOUNTER — INFUSION - HEALTHEAST (OUTPATIENT)
Dept: INFUSION THERAPY | Facility: HOSPITAL | Age: 81
End: 2021-02-10

## 2021-02-10 DIAGNOSIS — C18.7 MALIGNANT NEOPLASM OF SIGMOID COLON (H): ICD-10-CM

## 2021-02-10 LAB
ALBUMIN SERPL-MCNC: 3.3 G/DL (ref 3.5–5)
ALP SERPL-CCNC: 84 U/L (ref 45–120)
ALT SERPL W P-5'-P-CCNC: 10 U/L (ref 0–45)
ANION GAP SERPL CALCULATED.3IONS-SCNC: 8 MMOL/L (ref 5–18)
AST SERPL W P-5'-P-CCNC: 14 U/L (ref 0–40)
BASOPHILS # BLD AUTO: 0 THOU/UL (ref 0–0.2)
BASOPHILS NFR BLD AUTO: 1 % (ref 0–2)
BILIRUB SERPL-MCNC: 0.3 MG/DL (ref 0–1)
BUN SERPL-MCNC: 13 MG/DL (ref 8–28)
CALCIUM SERPL-MCNC: 8.9 MG/DL (ref 8.5–10.5)
CHLORIDE BLD-SCNC: 107 MMOL/L (ref 98–107)
CO2 SERPL-SCNC: 24 MMOL/L (ref 22–31)
CREAT SERPL-MCNC: 0.79 MG/DL (ref 0.6–1.1)
EOSINOPHIL # BLD AUTO: 0.1 THOU/UL (ref 0–0.4)
EOSINOPHIL NFR BLD AUTO: 1 % (ref 0–6)
ERYTHROCYTE [DISTWIDTH] IN BLOOD BY AUTOMATED COUNT: 19.9 % (ref 11–14.5)
GFR SERPL CREATININE-BSD FRML MDRD: >60 ML/MIN/1.73M2
GLUCOSE BLD-MCNC: 93 MG/DL (ref 70–125)
HCT VFR BLD AUTO: 34.3 % (ref 35–47)
HGB BLD-MCNC: 11.2 G/DL (ref 12–16)
IMM GRANULOCYTES # BLD: 0 THOU/UL
IMM GRANULOCYTES NFR BLD: 1 %
LYMPHOCYTES # BLD AUTO: 1.6 THOU/UL (ref 0.8–4.4)
LYMPHOCYTES NFR BLD AUTO: 29 % (ref 20–40)
MAGNESIUM SERPL-MCNC: 1.8 MG/DL (ref 1.8–2.6)
MCH RBC QN AUTO: 30 PG (ref 27–34)
MCHC RBC AUTO-ENTMCNC: 32.7 G/DL (ref 32–36)
MCV RBC AUTO: 92 FL (ref 80–100)
MONOCYTES # BLD AUTO: 0.9 THOU/UL (ref 0–0.9)
MONOCYTES NFR BLD AUTO: 15 % (ref 2–10)
NEUTROPHILS # BLD AUTO: 3 THOU/UL (ref 2–7.7)
NEUTROPHILS NFR BLD AUTO: 53 % (ref 50–70)
PLATELET # BLD AUTO: 291 THOU/UL (ref 140–440)
PMV BLD AUTO: 8.8 FL (ref 8.5–12.5)
POTASSIUM BLD-SCNC: 3.9 MMOL/L (ref 3.5–5)
PROT SERPL-MCNC: 6.1 G/DL (ref 6–8)
RBC # BLD AUTO: 3.73 MILL/UL (ref 3.8–5.4)
SODIUM SERPL-SCNC: 139 MMOL/L (ref 136–145)
WBC: 5.6 THOU/UL (ref 4–11)

## 2021-02-11 ENCOUNTER — AMBULATORY - HEALTHEAST (OUTPATIENT)
Dept: ADMINISTRATIVE | Facility: CLINIC | Age: 81
End: 2021-02-11

## 2021-02-11 DIAGNOSIS — D12.6 SERRATED ADENOMA OF COLON: ICD-10-CM

## 2021-02-12 ENCOUNTER — INFUSION - HEALTHEAST (OUTPATIENT)
Dept: INFUSION THERAPY | Facility: HOSPITAL | Age: 81
End: 2021-02-12

## 2021-02-12 DIAGNOSIS — C18.7 MALIGNANT NEOPLASM OF SIGMOID COLON (H): ICD-10-CM

## 2021-02-17 ENCOUNTER — OFFICE VISIT - HEALTHEAST (OUTPATIENT)
Dept: ONCOLOGY | Facility: HOSPITAL | Age: 81
End: 2021-02-17

## 2021-02-17 DIAGNOSIS — C18.7 MALIGNANT NEOPLASM OF SIGMOID COLON (H): ICD-10-CM

## 2021-02-22 ENCOUNTER — INFUSION - HEALTHEAST (OUTPATIENT)
Dept: INFUSION THERAPY | Facility: HOSPITAL | Age: 81
End: 2021-02-22

## 2021-02-22 ENCOUNTER — OFFICE VISIT - HEALTHEAST (OUTPATIENT)
Dept: ONCOLOGY | Facility: HOSPITAL | Age: 81
End: 2021-02-22

## 2021-02-22 ENCOUNTER — AMBULATORY - HEALTHEAST (OUTPATIENT)
Dept: INFUSION THERAPY | Facility: HOSPITAL | Age: 81
End: 2021-02-22

## 2021-02-22 ENCOUNTER — COMMUNICATION - HEALTHEAST (OUTPATIENT)
Dept: ADMINISTRATIVE | Facility: HOSPITAL | Age: 81
End: 2021-02-22

## 2021-02-22 DIAGNOSIS — C18.7 MALIGNANT NEOPLASM OF SIGMOID COLON (H): ICD-10-CM

## 2021-02-22 DIAGNOSIS — Z86.0100 HISTORY OF COLONIC POLYPS: ICD-10-CM

## 2021-02-22 LAB
ALBUMIN SERPL-MCNC: 3.2 G/DL (ref 3.5–5)
ALP SERPL-CCNC: 83 U/L (ref 45–120)
ALT SERPL W P-5'-P-CCNC: <9 U/L (ref 0–45)
ANION GAP SERPL CALCULATED.3IONS-SCNC: 8 MMOL/L (ref 5–18)
AST SERPL W P-5'-P-CCNC: 15 U/L (ref 0–40)
BASOPHILS # BLD AUTO: 0 THOU/UL (ref 0–0.2)
BASOPHILS NFR BLD AUTO: 0 % (ref 0–2)
BILIRUB SERPL-MCNC: 0.3 MG/DL (ref 0–1)
BUN SERPL-MCNC: 13 MG/DL (ref 8–28)
CALCIUM SERPL-MCNC: 9 MG/DL (ref 8.5–10.5)
CHLORIDE BLD-SCNC: 106 MMOL/L (ref 98–107)
CO2 SERPL-SCNC: 25 MMOL/L (ref 22–31)
CREAT SERPL-MCNC: 0.81 MG/DL (ref 0.6–1.1)
EOSINOPHIL # BLD AUTO: 0.1 THOU/UL (ref 0–0.4)
EOSINOPHIL NFR BLD AUTO: 1 % (ref 0–6)
ERYTHROCYTE [DISTWIDTH] IN BLOOD BY AUTOMATED COUNT: 19.5 % (ref 11–14.5)
GFR SERPL CREATININE-BSD FRML MDRD: >60 ML/MIN/1.73M2
GLUCOSE BLD-MCNC: 96 MG/DL (ref 70–125)
HCT VFR BLD AUTO: 33.1 % (ref 35–47)
HGB BLD-MCNC: 10.6 G/DL (ref 12–16)
IMM GRANULOCYTES # BLD: 0 THOU/UL
IMM GRANULOCYTES NFR BLD: 1 %
LYMPHOCYTES # BLD AUTO: 1.4 THOU/UL (ref 0.8–4.4)
LYMPHOCYTES NFR BLD AUTO: 25 % (ref 20–40)
MAGNESIUM SERPL-MCNC: 2 MG/DL (ref 1.8–2.6)
MCH RBC QN AUTO: 30.2 PG (ref 27–34)
MCHC RBC AUTO-ENTMCNC: 32 G/DL (ref 32–36)
MCV RBC AUTO: 94 FL (ref 80–100)
MONOCYTES # BLD AUTO: 0.7 THOU/UL (ref 0–0.9)
MONOCYTES NFR BLD AUTO: 12 % (ref 2–10)
NEUTROPHILS # BLD AUTO: 3.5 THOU/UL (ref 2–7.7)
NEUTROPHILS NFR BLD AUTO: 61 % (ref 50–70)
PLATELET # BLD AUTO: 300 THOU/UL (ref 140–440)
PMV BLD AUTO: 9.1 FL (ref 8.5–12.5)
POTASSIUM BLD-SCNC: 4.2 MMOL/L (ref 3.5–5)
PROT SERPL-MCNC: 6.4 G/DL (ref 6–8)
RBC # BLD AUTO: 3.51 MILL/UL (ref 3.8–5.4)
SODIUM SERPL-SCNC: 139 MMOL/L (ref 136–145)
WBC: 5.7 THOU/UL (ref 4–11)

## 2021-02-22 ASSESSMENT — MIFFLIN-ST. JEOR: SCORE: 946.71

## 2021-02-24 ENCOUNTER — INFUSION - HEALTHEAST (OUTPATIENT)
Dept: INFUSION THERAPY | Facility: HOSPITAL | Age: 81
End: 2021-02-24

## 2021-02-24 DIAGNOSIS — C18.7 MALIGNANT NEOPLASM OF SIGMOID COLON (H): ICD-10-CM

## 2021-03-09 ENCOUNTER — AMBULATORY - HEALTHEAST (OUTPATIENT)
Dept: INFUSION THERAPY | Facility: HOSPITAL | Age: 81
End: 2021-03-09

## 2021-03-09 ENCOUNTER — INFUSION - HEALTHEAST (OUTPATIENT)
Dept: INFUSION THERAPY | Facility: HOSPITAL | Age: 81
End: 2021-03-09

## 2021-03-09 ENCOUNTER — OFFICE VISIT - HEALTHEAST (OUTPATIENT)
Dept: ONCOLOGY | Facility: HOSPITAL | Age: 81
End: 2021-03-09

## 2021-03-09 DIAGNOSIS — C18.7 MALIGNANT NEOPLASM OF SIGMOID COLON (H): ICD-10-CM

## 2021-03-09 LAB
ALBUMIN SERPL-MCNC: 3.3 G/DL (ref 3.5–5)
ALP SERPL-CCNC: 85 U/L (ref 45–120)
ALT SERPL W P-5'-P-CCNC: <9 U/L (ref 0–45)
ANION GAP SERPL CALCULATED.3IONS-SCNC: 6 MMOL/L (ref 5–18)
AST SERPL W P-5'-P-CCNC: 18 U/L (ref 0–40)
BASOPHILS # BLD AUTO: 0 THOU/UL (ref 0–0.2)
BASOPHILS NFR BLD AUTO: 1 % (ref 0–2)
BILIRUB SERPL-MCNC: 0.3 MG/DL (ref 0–1)
BUN SERPL-MCNC: 12 MG/DL (ref 8–28)
CALCIUM SERPL-MCNC: 8.8 MG/DL (ref 8.5–10.5)
CHLORIDE BLD-SCNC: 112 MMOL/L (ref 98–107)
CO2 SERPL-SCNC: 24 MMOL/L (ref 22–31)
CREAT SERPL-MCNC: 0.8 MG/DL (ref 0.6–1.1)
EOSINOPHIL # BLD AUTO: 0.1 THOU/UL (ref 0–0.4)
EOSINOPHIL NFR BLD AUTO: 1 % (ref 0–6)
ERYTHROCYTE [DISTWIDTH] IN BLOOD BY AUTOMATED COUNT: 20 % (ref 11–14.5)
GFR SERPL CREATININE-BSD FRML MDRD: >60 ML/MIN/1.73M2
GLUCOSE BLD-MCNC: 107 MG/DL (ref 70–125)
HCT VFR BLD AUTO: 33.9 % (ref 35–47)
HGB BLD-MCNC: 10.8 G/DL (ref 12–16)
IMM GRANULOCYTES # BLD: 0 THOU/UL
IMM GRANULOCYTES NFR BLD: 0 %
LYMPHOCYTES # BLD AUTO: 1.5 THOU/UL (ref 0.8–4.4)
LYMPHOCYTES NFR BLD AUTO: 27 % (ref 20–40)
MAGNESIUM SERPL-MCNC: 1.9 MG/DL (ref 1.8–2.6)
MCH RBC QN AUTO: 30.6 PG (ref 27–34)
MCHC RBC AUTO-ENTMCNC: 31.9 G/DL (ref 32–36)
MCV RBC AUTO: 96 FL (ref 80–100)
MONOCYTES # BLD AUTO: 0.7 THOU/UL (ref 0–0.9)
MONOCYTES NFR BLD AUTO: 12 % (ref 2–10)
NEUTROPHILS # BLD AUTO: 3.2 THOU/UL (ref 2–7.7)
NEUTROPHILS NFR BLD AUTO: 59 % (ref 50–70)
PLATELET # BLD AUTO: 221 THOU/UL (ref 140–440)
PMV BLD AUTO: 8.8 FL (ref 8.5–12.5)
POTASSIUM BLD-SCNC: 3.9 MMOL/L (ref 3.5–5)
PROT SERPL-MCNC: 6.4 G/DL (ref 6–8)
RBC # BLD AUTO: 3.53 MILL/UL (ref 3.8–5.4)
SODIUM SERPL-SCNC: 142 MMOL/L (ref 136–145)
WBC: 5.4 THOU/UL (ref 4–11)

## 2021-03-11 ENCOUNTER — INFUSION - HEALTHEAST (OUTPATIENT)
Dept: INFUSION THERAPY | Facility: HOSPITAL | Age: 81
End: 2021-03-11

## 2021-03-11 DIAGNOSIS — C18.7 MALIGNANT NEOPLASM OF SIGMOID COLON (H): ICD-10-CM

## 2021-03-19 ENCOUNTER — RECORDS - HEALTHEAST (OUTPATIENT)
Dept: LAB | Facility: CLINIC | Age: 81
End: 2021-03-19

## 2021-03-19 LAB
CHOLEST SERPL-MCNC: 203 MG/DL
FASTING STATUS PATIENT QL REPORTED: ABNORMAL
HDLC SERPL-MCNC: 73 MG/DL
LDLC SERPL CALC-MCNC: 106 MG/DL
TRIGL SERPL-MCNC: 118 MG/DL

## 2021-03-23 ENCOUNTER — OFFICE VISIT - HEALTHEAST (OUTPATIENT)
Dept: ONCOLOGY | Facility: HOSPITAL | Age: 81
End: 2021-03-23

## 2021-03-23 ENCOUNTER — AMBULATORY - HEALTHEAST (OUTPATIENT)
Dept: INFUSION THERAPY | Facility: HOSPITAL | Age: 81
End: 2021-03-23

## 2021-03-23 ENCOUNTER — INFUSION - HEALTHEAST (OUTPATIENT)
Dept: INFUSION THERAPY | Facility: HOSPITAL | Age: 81
End: 2021-03-23

## 2021-03-23 DIAGNOSIS — C18.7 MALIGNANT NEOPLASM OF SIGMOID COLON (H): ICD-10-CM

## 2021-03-23 LAB
ALBUMIN SERPL-MCNC: 3.2 G/DL (ref 3.5–5)
ALP SERPL-CCNC: 75 U/L (ref 45–120)
ALT SERPL W P-5'-P-CCNC: 10 U/L (ref 0–45)
ANION GAP SERPL CALCULATED.3IONS-SCNC: 8 MMOL/L (ref 5–18)
AST SERPL W P-5'-P-CCNC: 17 U/L (ref 0–40)
BASOPHILS # BLD AUTO: 0 THOU/UL (ref 0–0.2)
BASOPHILS NFR BLD AUTO: 1 % (ref 0–2)
BILIRUB SERPL-MCNC: 0.3 MG/DL (ref 0–1)
BUN SERPL-MCNC: 15 MG/DL (ref 8–28)
CALCIUM SERPL-MCNC: 9 MG/DL (ref 8.5–10.5)
CHLORIDE BLD-SCNC: 108 MMOL/L (ref 98–107)
CO2 SERPL-SCNC: 26 MMOL/L (ref 22–31)
CREAT SERPL-MCNC: 0.91 MG/DL (ref 0.6–1.1)
EOSINOPHIL # BLD AUTO: 0.1 THOU/UL (ref 0–0.4)
EOSINOPHIL NFR BLD AUTO: 2 % (ref 0–6)
ERYTHROCYTE [DISTWIDTH] IN BLOOD BY AUTOMATED COUNT: 20.2 % (ref 11–14.5)
GFR SERPL CREATININE-BSD FRML MDRD: 60 ML/MIN/1.73M2
GLUCOSE BLD-MCNC: 155 MG/DL (ref 70–125)
HCT VFR BLD AUTO: 32.9 % (ref 35–47)
HGB BLD-MCNC: 10.6 G/DL (ref 12–16)
IMM GRANULOCYTES # BLD: 0 THOU/UL
IMM GRANULOCYTES NFR BLD: 0 %
LYMPHOCYTES # BLD AUTO: 1.5 THOU/UL (ref 0.8–4.4)
LYMPHOCYTES NFR BLD AUTO: 30 % (ref 20–40)
MAGNESIUM SERPL-MCNC: 1.8 MG/DL (ref 1.8–2.6)
MCH RBC QN AUTO: 31.1 PG (ref 27–34)
MCHC RBC AUTO-ENTMCNC: 32.2 G/DL (ref 32–36)
MCV RBC AUTO: 97 FL (ref 80–100)
MONOCYTES # BLD AUTO: 0.8 THOU/UL (ref 0–0.9)
MONOCYTES NFR BLD AUTO: 15 % (ref 2–10)
NEUTROPHILS # BLD AUTO: 2.6 THOU/UL (ref 2–7.7)
NEUTROPHILS NFR BLD AUTO: 52 % (ref 50–70)
PLATELET # BLD AUTO: 234 THOU/UL (ref 140–440)
PMV BLD AUTO: 9.3 FL (ref 8.5–12.5)
POTASSIUM BLD-SCNC: 4.1 MMOL/L (ref 3.5–5)
PROT SERPL-MCNC: 6.2 G/DL (ref 6–8)
RBC # BLD AUTO: 3.41 MILL/UL (ref 3.8–5.4)
SODIUM SERPL-SCNC: 142 MMOL/L (ref 136–145)
WBC: 5 THOU/UL (ref 4–11)

## 2021-03-25 ENCOUNTER — INFUSION - HEALTHEAST (OUTPATIENT)
Dept: INFUSION THERAPY | Facility: HOSPITAL | Age: 81
End: 2021-03-25

## 2021-03-25 DIAGNOSIS — C18.7 MALIGNANT NEOPLASM OF SIGMOID COLON (H): ICD-10-CM

## 2021-04-06 ENCOUNTER — OFFICE VISIT - HEALTHEAST (OUTPATIENT)
Dept: ONCOLOGY | Facility: HOSPITAL | Age: 81
End: 2021-04-06

## 2021-04-06 ENCOUNTER — INFUSION - HEALTHEAST (OUTPATIENT)
Dept: INFUSION THERAPY | Facility: HOSPITAL | Age: 81
End: 2021-04-06

## 2021-04-06 ENCOUNTER — AMBULATORY - HEALTHEAST (OUTPATIENT)
Dept: INFUSION THERAPY | Facility: HOSPITAL | Age: 81
End: 2021-04-06

## 2021-04-06 DIAGNOSIS — C18.7 MALIGNANT NEOPLASM OF SIGMOID COLON (H): ICD-10-CM

## 2021-04-06 LAB
ALBUMIN SERPL-MCNC: 3.2 G/DL (ref 3.5–5)
ALP SERPL-CCNC: 75 U/L (ref 45–120)
ALT SERPL W P-5'-P-CCNC: 12 U/L (ref 0–45)
ANION GAP SERPL CALCULATED.3IONS-SCNC: 9 MMOL/L (ref 5–18)
AST SERPL W P-5'-P-CCNC: 20 U/L (ref 0–40)
BASOPHILS # BLD AUTO: 0 THOU/UL (ref 0–0.2)
BASOPHILS NFR BLD AUTO: 0 % (ref 0–2)
BILIRUB SERPL-MCNC: 0.4 MG/DL (ref 0–1)
BUN SERPL-MCNC: 13 MG/DL (ref 8–28)
CALCIUM SERPL-MCNC: 8.5 MG/DL (ref 8.5–10.5)
CHLORIDE BLD-SCNC: 109 MMOL/L (ref 98–107)
CO2 SERPL-SCNC: 23 MMOL/L (ref 22–31)
CREAT SERPL-MCNC: 0.73 MG/DL (ref 0.6–1.1)
EOSINOPHIL # BLD AUTO: 0.1 THOU/UL (ref 0–0.4)
EOSINOPHIL NFR BLD AUTO: 1 % (ref 0–6)
ERYTHROCYTE [DISTWIDTH] IN BLOOD BY AUTOMATED COUNT: 19.9 % (ref 11–14.5)
GFR SERPL CREATININE-BSD FRML MDRD: >60 ML/MIN/1.73M2
GLUCOSE BLD-MCNC: 129 MG/DL (ref 70–125)
HCT VFR BLD AUTO: 32.3 % (ref 35–47)
HGB BLD-MCNC: 10.3 G/DL (ref 12–16)
IMM GRANULOCYTES # BLD: 0 THOU/UL
IMM GRANULOCYTES NFR BLD: 0 %
LYMPHOCYTES # BLD AUTO: 1.5 THOU/UL (ref 0.8–4.4)
LYMPHOCYTES NFR BLD AUTO: 26 % (ref 20–40)
MAGNESIUM SERPL-MCNC: 1.7 MG/DL (ref 1.8–2.6)
MCH RBC QN AUTO: 31.6 PG (ref 27–34)
MCHC RBC AUTO-ENTMCNC: 31.9 G/DL (ref 32–36)
MCV RBC AUTO: 99 FL (ref 80–100)
MONOCYTES # BLD AUTO: 0.9 THOU/UL (ref 0–0.9)
MONOCYTES NFR BLD AUTO: 15 % (ref 2–10)
NEUTROPHILS # BLD AUTO: 3.4 THOU/UL (ref 2–7.7)
NEUTROPHILS NFR BLD AUTO: 58 % (ref 50–70)
PLATELET # BLD AUTO: 179 THOU/UL (ref 140–440)
PMV BLD AUTO: 8.7 FL (ref 8.5–12.5)
POTASSIUM BLD-SCNC: 3.8 MMOL/L (ref 3.5–5)
PROT SERPL-MCNC: 6 G/DL (ref 6–8)
RBC # BLD AUTO: 3.26 MILL/UL (ref 3.8–5.4)
SODIUM SERPL-SCNC: 141 MMOL/L (ref 136–145)
WBC: 5.9 THOU/UL (ref 4–11)

## 2021-04-08 ENCOUNTER — INFUSION - HEALTHEAST (OUTPATIENT)
Dept: INFUSION THERAPY | Facility: HOSPITAL | Age: 81
End: 2021-04-08

## 2021-04-08 DIAGNOSIS — C18.7 MALIGNANT NEOPLASM OF SIGMOID COLON (H): ICD-10-CM

## 2021-04-20 ENCOUNTER — AMBULATORY - HEALTHEAST (OUTPATIENT)
Dept: INFUSION THERAPY | Facility: HOSPITAL | Age: 81
End: 2021-04-20

## 2021-04-20 ENCOUNTER — INFUSION - HEALTHEAST (OUTPATIENT)
Dept: INFUSION THERAPY | Facility: HOSPITAL | Age: 81
End: 2021-04-20

## 2021-04-20 ENCOUNTER — OFFICE VISIT - HEALTHEAST (OUTPATIENT)
Dept: ONCOLOGY | Facility: HOSPITAL | Age: 81
End: 2021-04-20

## 2021-04-20 DIAGNOSIS — C18.7 MALIGNANT NEOPLASM OF SIGMOID COLON (H): ICD-10-CM

## 2021-04-20 LAB
ALBUMIN SERPL-MCNC: 3.1 G/DL (ref 3.5–5)
ALP SERPL-CCNC: 78 U/L (ref 45–120)
ALT SERPL W P-5'-P-CCNC: 13 U/L (ref 0–45)
ANION GAP SERPL CALCULATED.3IONS-SCNC: 9 MMOL/L (ref 5–18)
AST SERPL W P-5'-P-CCNC: 20 U/L (ref 0–40)
BASOPHILS # BLD AUTO: 0 THOU/UL (ref 0–0.2)
BASOPHILS NFR BLD AUTO: 1 % (ref 0–2)
BILIRUB SERPL-MCNC: 0.3 MG/DL (ref 0–1)
BUN SERPL-MCNC: 14 MG/DL (ref 8–28)
CALCIUM SERPL-MCNC: 8.9 MG/DL (ref 8.5–10.5)
CHLORIDE BLD-SCNC: 108 MMOL/L (ref 98–107)
CO2 SERPL-SCNC: 26 MMOL/L (ref 22–31)
CREAT SERPL-MCNC: 0.8 MG/DL (ref 0.6–1.1)
EOSINOPHIL # BLD AUTO: 0.1 THOU/UL (ref 0–0.4)
EOSINOPHIL NFR BLD AUTO: 2 % (ref 0–6)
ERYTHROCYTE [DISTWIDTH] IN BLOOD BY AUTOMATED COUNT: 18.2 % (ref 11–14.5)
GFR SERPL CREATININE-BSD FRML MDRD: >60 ML/MIN/1.73M2
GLUCOSE BLD-MCNC: 153 MG/DL (ref 70–125)
HCT VFR BLD AUTO: 32.7 % (ref 35–47)
HGB BLD-MCNC: 10.7 G/DL (ref 12–16)
IMM GRANULOCYTES # BLD: 0 THOU/UL
IMM GRANULOCYTES NFR BLD: 0 %
LYMPHOCYTES # BLD AUTO: 1.5 THOU/UL (ref 0.8–4.4)
LYMPHOCYTES NFR BLD AUTO: 32 % (ref 20–40)
MAGNESIUM SERPL-MCNC: 1.8 MG/DL (ref 1.8–2.6)
MCH RBC QN AUTO: 32.7 PG (ref 27–34)
MCHC RBC AUTO-ENTMCNC: 32.7 G/DL (ref 32–36)
MCV RBC AUTO: 100 FL (ref 80–100)
MONOCYTES # BLD AUTO: 0.6 THOU/UL (ref 0–0.9)
MONOCYTES NFR BLD AUTO: 13 % (ref 2–10)
NEUTROPHILS # BLD AUTO: 2.5 THOU/UL (ref 2–7.7)
NEUTROPHILS NFR BLD AUTO: 52 % (ref 50–70)
PLATELET # BLD AUTO: 207 THOU/UL (ref 140–440)
PMV BLD AUTO: 8.9 FL (ref 8.5–12.5)
POTASSIUM BLD-SCNC: 3.8 MMOL/L (ref 3.5–5)
PROT SERPL-MCNC: 6.2 G/DL (ref 6–8)
RBC # BLD AUTO: 3.27 MILL/UL (ref 3.8–5.4)
SODIUM SERPL-SCNC: 143 MMOL/L (ref 136–145)
WBC: 4.8 THOU/UL (ref 4–11)

## 2021-04-22 ENCOUNTER — INFUSION - HEALTHEAST (OUTPATIENT)
Dept: INFUSION THERAPY | Facility: HOSPITAL | Age: 81
End: 2021-04-22

## 2021-04-22 DIAGNOSIS — C18.7 MALIGNANT NEOPLASM OF SIGMOID COLON (H): ICD-10-CM

## 2021-05-06 ENCOUNTER — COMMUNICATION - HEALTHEAST (OUTPATIENT)
Dept: ONCOLOGY | Facility: HOSPITAL | Age: 81
End: 2021-05-06

## 2021-05-06 DIAGNOSIS — L50.9 HIVES: ICD-10-CM

## 2021-05-25 ENCOUNTER — RECORDS - HEALTHEAST (OUTPATIENT)
Dept: ADMINISTRATIVE | Facility: CLINIC | Age: 81
End: 2021-05-25

## 2021-05-26 ENCOUNTER — RECORDS - HEALTHEAST (OUTPATIENT)
Dept: ADMINISTRATIVE | Facility: CLINIC | Age: 81
End: 2021-05-26

## 2021-05-27 VITALS
TEMPERATURE: 98.1 F | OXYGEN SATURATION: 94 % | DIASTOLIC BLOOD PRESSURE: 62 MMHG | SYSTOLIC BLOOD PRESSURE: 132 MMHG | HEART RATE: 75 BPM

## 2021-05-27 VITALS
DIASTOLIC BLOOD PRESSURE: 62 MMHG | SYSTOLIC BLOOD PRESSURE: 131 MMHG | OXYGEN SATURATION: 99 % | TEMPERATURE: 97.9 F | HEART RATE: 74 BPM

## 2021-05-27 VITALS
HEART RATE: 80 BPM | OXYGEN SATURATION: 99 % | DIASTOLIC BLOOD PRESSURE: 64 MMHG | SYSTOLIC BLOOD PRESSURE: 136 MMHG | TEMPERATURE: 97.7 F

## 2021-05-27 VITALS
DIASTOLIC BLOOD PRESSURE: 64 MMHG | HEART RATE: 79 BPM | TEMPERATURE: 98.6 F | SYSTOLIC BLOOD PRESSURE: 134 MMHG | OXYGEN SATURATION: 98 %

## 2021-05-27 VITALS
SYSTOLIC BLOOD PRESSURE: 140 MMHG | HEART RATE: 74 BPM | OXYGEN SATURATION: 98 % | DIASTOLIC BLOOD PRESSURE: 63 MMHG | TEMPERATURE: 98 F

## 2021-05-27 VITALS
DIASTOLIC BLOOD PRESSURE: 58 MMHG | HEART RATE: 74 BPM | OXYGEN SATURATION: 98 % | SYSTOLIC BLOOD PRESSURE: 117 MMHG | TEMPERATURE: 98.5 F

## 2021-05-27 VITALS
HEART RATE: 73 BPM | TEMPERATURE: 97.6 F | DIASTOLIC BLOOD PRESSURE: 68 MMHG | SYSTOLIC BLOOD PRESSURE: 135 MMHG | OXYGEN SATURATION: 95 %

## 2021-05-27 VITALS
OXYGEN SATURATION: 98 % | HEART RATE: 83 BPM | TEMPERATURE: 98.3 F | SYSTOLIC BLOOD PRESSURE: 136 MMHG | DIASTOLIC BLOOD PRESSURE: 67 MMHG

## 2021-05-27 VITALS
DIASTOLIC BLOOD PRESSURE: 66 MMHG | TEMPERATURE: 98.8 F | SYSTOLIC BLOOD PRESSURE: 145 MMHG | OXYGEN SATURATION: 99 % | HEART RATE: 83 BPM

## 2021-05-27 VITALS
DIASTOLIC BLOOD PRESSURE: 60 MMHG | TEMPERATURE: 99.1 F | SYSTOLIC BLOOD PRESSURE: 124 MMHG | OXYGEN SATURATION: 97 % | HEART RATE: 63 BPM

## 2021-05-27 VITALS
DIASTOLIC BLOOD PRESSURE: 61 MMHG | OXYGEN SATURATION: 98 % | HEART RATE: 81 BPM | TEMPERATURE: 98.4 F | SYSTOLIC BLOOD PRESSURE: 128 MMHG

## 2021-05-28 ENCOUNTER — RECORDS - HEALTHEAST (OUTPATIENT)
Dept: ADMINISTRATIVE | Facility: CLINIC | Age: 81
End: 2021-05-28

## 2021-05-29 ENCOUNTER — RECORDS - HEALTHEAST (OUTPATIENT)
Dept: ADMINISTRATIVE | Facility: CLINIC | Age: 81
End: 2021-05-29

## 2021-05-30 ENCOUNTER — RECORDS - HEALTHEAST (OUTPATIENT)
Dept: ADMINISTRATIVE | Facility: CLINIC | Age: 81
End: 2021-05-30

## 2021-05-30 NOTE — TELEPHONE ENCOUNTER
Reentered lab orders as future orders.     Please call pt to come back from lab only appt as pt left before having these drawn.

## 2021-05-30 NOTE — PROGRESS NOTES
"ASSESSMENT AND PLAN:  Katya Butler 79 y.o. female is seen here on 07/19/19 for evaluation of polyarthralgias.  She has several indicators of a crystalline arthropathy.  We discussed gout and pseudogout.  It would appear that she has had recurrent gout in her right first MTP.  X-rays of the feet were taken today.  Personally reviewed the films, findings: She has osteoarthritic, calcification around the right first MTP area, linear.  No definite gouty erosions.  She has chondrocalcinosis which was discovered in 2010 when she was in her late 60s.  We discussed pseudogout.  Today we reviewed the options including corticosteroid injection in the right first MTP.  Again with the prednisone she feels it has been sufficient improvement.  We both agreed to not proceed with that.  She is going to return for follow-up here in the next couple of weeks.  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  She reports that her first inkling of joint pain that happened nearly 40 years ago.  She recalls to date that she woke up with severe pain in her right foot, to the point where even the sheets used to bother her, she could not walk ended up going to the emergency she was given \"a gout pill, it took away the symptoms within a few days and she was back to normal self.  Her next " such episode was 15 years later in the same foot.  The main reason that she is here now is the recurrent pain she has had over the past 6 weeks in her right foot in the same area.  She has been taking prednisone.  She is feeling a bit better.  She does not feel that there is significant pain now.  However during the past few weeks she has been unable to go off.  In 2010 she recalls having had swelling pain which came on fairly quickly in her right knee.  She went to the aftercare clinic.  She tried to tell them that she wonders if this is gout but they told her otherwise though couple of days later they called her and did inform her that this may well have been gout.  I have reviewed those data.  She was found to have chondrocalcinosis on the x-rays of the knees on 7/6/2010.  Then January of this year her right wrist swelled up.  Again this was a sudden onset, lasted several days and then got better back to normal self.  She describes herself otherwise in good health.  There is no history of psoriasis ulcerative colitis Crohn's disease.  There is no family history the same.  She has not had kidney stones.  She is a smoker alcohol up to 12 drinks per week.  She has a history of hypertension.Further historical information and ADL limitations as noted in the multidimensional health assessment questionnaire attached in the EMR. Rest of the 13 system ROS is negative.     ALLERGIES:Dye; Ct [iohexol]; and Telithromycin    PAST MEDICAL/ACTIVE PROBLEMS/MEDICATION/ FAMILY HISTORY/SOCIAL DATA:  The patient has a family history of  Past Medical History:   Diagnosis Date     HTN (hypertension)      Social History     Tobacco Use   Smoking Status Current Every Day Smoker   Smokeless Tobacco Never Used     There is no problem list on file for this patient.    Current Outpatient Medications   Medication Sig Dispense Refill     amLODIPine (NORVASC) 5 MG tablet Take 5 mg by mouth 2 (two) times a day.       clopidogrel (PLAVIX) 75 mg  "tablet Take 75 mg by mouth daily.       lovastatin (ALTOPREV) 20 MG 24 hr tablet Take 60 mg by mouth at bedtime.       predniSONE (DELTASONE) 5 MG tablet Take 10 mg daily for 2 weeks and descrease to 5 mg daily for 2 weeks and stop. .       No current facility-administered medications for this visit.        COMPREHENSIVE EXAMINATION:  Vitals:    07/19/19 0934   BP: 130/64   Pulse: 72   Weight: 117 lb 6.4 oz (53.3 kg)   Height: 5' 4\" (1.626 m)     A well appearing alert oriented female. Vital data as noted above. Her eyes without inflammation/scleromalacia. ENTwithout oral mucositis, thrush, nasal deformity, external ear redness, deformity. Her neck is without lymphadenopathy and supple. Lungs normal sounds, no pleural rub. Heart auscultation normal rate, rhythm; no pericardial rub and murmurs. Abdomen soft, non tender, no organomegaly. Skin examined for heliotrope, malar area eruption, lupus pernio, periungual erythema, sclerodactyly, papules, erythema nodosum, purpura, nail pitting, onycholysis, and obvious psoriasis lesion. Neurological examination shows normal alertness, speech, facial symmetry, tone and power in upper and lower extremities. The joint examination is performed for swelling, tenderness, warmth, erythema, and range of motion in the following joints: DIPs, PIPs, MCPs, wrists, first CMC's, elbows, shoulders, hips, knees, ankles, feet; spine for range of motion and paraspinal muscles for tenderness. The salient  findings are: She has mild tenderness in her right first MTP which has subtle swelling compared with the left.  She has Heberden's, more prominent such as in the right ring finger right index.  She has minimal JLT of the knees.  She does not have dactylitis enthesitis none of the rest of the palpable joints of upper or lower extremities show signs of acute inflammation.  There are no visible tophi.    LAB / IMAGING DATA:  ALT   Date Value Ref Range Status   04/02/2018 10 0 - 45 U/L Final "   02/10/2016 10 0 - 45 U/L Final   01/20/2015 13 0 - 45 U/L Final     Albumin   Date Value Ref Range Status   04/02/2018 3.6 3.5 - 5.0 g/dL Final   02/10/2016 3.9 3.5 - 5.0 g/dL Final   01/20/2015 3.5 3.5 - 5.0 g/dL Final     Creatinine   Date Value Ref Range Status   04/02/2018 0.76 0.60 - 1.10 mg/dL Final   02/10/2016 0.70 0.60 - 1.10 mg/dL Final   01/20/2015 0.72 0.60 - 1.10 mg/dL Final       WBC   Date Value Ref Range Status   12/17/2009 7.0 4.0 - 11.0 thou/uL Final     Hemoglobin   Date Value Ref Range Status   09/07/2012 11.3 (L) 12.0 - 16.0 g/dL Final   09/06/2012 12.1 12.0 - 16.0 g/dL Final   09/06/2012 14.1 12.0 - 16.0 g/dL Final     Platelets   Date Value Ref Range Status   09/06/2012 351 140 - 440 thou/uL Final   03/15/2010 329 140 - 440 thou/uL Final   12/17/2009 354 140 - 440 thou/uL Final       No results found for: GAGANDEEP, RF, SEDRATE

## 2021-05-31 ENCOUNTER — RECORDS - HEALTHEAST (OUTPATIENT)
Dept: ADMINISTRATIVE | Facility: CLINIC | Age: 81
End: 2021-05-31

## 2021-05-31 NOTE — PROGRESS NOTES
"ASSESSMENT AND PLAN:  Katya Butler 79 y.o. female is seen here on 08/02/19 for evaluation of polyarthralgias.  While she has osteoarthritis there is some signal to suggest that she may have a crystalline arthropathy such as gout/pseudogout, her serum urate has been persistently normal to be checked again prior to her next visit.  Once she runs out of the course of prednisone 1 of the options is to make further observations, take colchicine for a short while part given the recurrence of pain recently, and need for further observations to further characterize the inflammatory arthropathy.  She is going to take 0.6 mg daily.  Will meet here in 3 months.  In the interim should there be flareup she will call for an urgent appointment.         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  Dispense: 90 tablet; Refill: 0    Primary osteoarthritis involving multiple joints    Polyarthralgia  -     Uric Acid; Future; Expected date: 11/02/2019  -     HM2(CBC w/o Differential); Future; Expected date: 11/02/2019  -     Creatinine; Future; Expected date: 11/02/2019  -     ALT (SGPT); Future; Expected date: 11/02/2019      HISTORY OF PRESENTING ILLNESS:  Katya Butler, 79 y.o., female is here for follow-up.  She has not had recurrence of symptoms.  She is on the prednisone taper.  She noted pain level to be 0 now.   She reports that her first inkling of joint pain that happened nearly 40 years ago.  She recalls to date that she woke up with severe pain in her right foot, to the point where even the sheets used to bother her, she could not walk ended up going to the emergency she was given \"a gout pill, it took away the symptoms within a few days and she was back to normal self.  Her next such episode was 15 years later in the same foot.  The main reason that she is here now is " the recurrent pain she has had over the past 6 weeks in her right foot in the same area.  She has been taking prednisone.  She is feeling a bit better.  She does not feel that there is significant pain now.  However during the past few weeks she has been unable to go off.  In 2010 she recalls having had swelling pain which came on fairly quickly in her right knee.  She went to the aftercare clinic.  She tried to tell them that she wonders if this is gout but they told her otherwise though couple of days later they called her and did inform her that this may well have been gout.  I have reviewed those data.  She was found to have chondrocalcinosis on the x-rays of the knees on 7/6/2010.  Then January of this year her right wrist swelled up.  Again this was a sudden onset, lasted several days and then got better back to normal self.  She describes herself otherwise in good health.  There is no history of psoriasis ulcerative colitis Crohn's disease.  There is no family history the same.  She has not had kidney stones.  She is a smoker alcohol up to 12 drinks per week.  She has a history of hypertension.Further historical information and ADL limitations as noted in the multidimensional health assessment questionnaire attached in the EMR.  He has not had any aLLERGIES:Dye; Ct [iohexol]; and Telithromycin    PAST MEDICAL/ACTIVE PROBLEMS/MEDICATION/ FAMILY HISTORY/SOCIAL DATA:  The patient has a family history of  Past Medical History:   Diagnosis Date     HTN (hypertension)      Social History     Tobacco Use   Smoking Status Current Every Day Smoker   Smokeless Tobacco Never Used     There is no problem list on file for this patient.    Current Outpatient Medications   Medication Sig Dispense Refill     amLODIPine (NORVASC) 5 MG tablet Take 5 mg by mouth 2 (two) times a day.       clopidogrel (PLAVIX) 75 mg tablet Take 75 mg by mouth daily.       lovastatin (ALTOPREV) 20 MG 24 hr tablet Take 60 mg by mouth at bedtime.    "    predniSONE (DELTASONE) 5 MG tablet Take 10 mg daily for 2 weeks and descrease to 5 mg daily for 2 weeks and stop. .       No current facility-administered medications for this visit.      DETAILED EXAMINATION  08/02/19  :  Vitals:    08/02/19 1213   BP: 102/64   Weight: 118 lb (53.5 kg)   Height: 5' 4\" (1.626 m)     Alert oriented. Head including the face is examined for malar rash, heliotropes, scarring, lupus pernio. Eyes examined for redness such as in episcleritis/scleritis, periorbital lesions.   Neck/ Face examined for parotid gland swelling, range of motion of neck.  Left upper and lower and right upper and lower extremities examined for tenderness, swelling, warmth of the appendicular joints, range of motion, edema, rash.  Some of the important findings included: she does not have evidence of synovitis in the palpable joints of the upper extremities or the ankles or feet.  No significant deformities of the digits.  Mild Heberden nodes.  Range of motion of the shoulders show full abduction.  No JLT effusion or warmth of the knees.     There are no visible tophi.    LAB / IMAGING DATA:  ALT   Date Value Ref Range Status   07/24/2019 12 0 - 45 U/L Final   04/02/2018 10 0 - 45 U/L Final   02/10/2016 10 0 - 45 U/L Final     Albumin   Date Value Ref Range Status   07/24/2019 3.6 3.5 - 5.0 g/dL Final   04/02/2018 3.6 3.5 - 5.0 g/dL Final   02/10/2016 3.9 3.5 - 5.0 g/dL Final     Creatinine   Date Value Ref Range Status   07/24/2019 0.84 0.60 - 1.10 mg/dL Final   04/02/2018 0.76 0.60 - 1.10 mg/dL Final   02/10/2016 0.70 0.60 - 1.10 mg/dL Final       WBC   Date Value Ref Range Status   07/24/2019 15.4 (H) 4.0 - 11.0 thou/uL Final   12/17/2009 7.0 4.0 - 11.0 thou/uL Final     Hemoglobin   Date Value Ref Range Status   07/24/2019 12.9 12.0 - 16.0 g/dL Final   09/07/2012 11.3 (L) 12.0 - 16.0 g/dL Final   09/06/2012 12.1 12.0 - 16.0 g/dL Final     Platelets   Date Value Ref Range Status   07/24/2019 390 140 - 440 " thou/uL Final   09/06/2012 351 140 - 440 thou/uL Final   03/15/2010 329 140 - 440 thou/uL Final       Lab Results   Component Value Date    RF <15.0 07/24/2019

## 2021-06-01 ENCOUNTER — HOSPITAL ENCOUNTER (OUTPATIENT)
Dept: CT IMAGING | Facility: HOSPITAL | Age: 81
Discharge: HOME | End: 2021-06-01
Attending: SPECIALIST
Payer: COMMERCIAL

## 2021-06-01 ENCOUNTER — RECORDS - HEALTHEAST (OUTPATIENT)
Dept: ADMINISTRATIVE | Facility: CLINIC | Age: 81
End: 2021-06-01

## 2021-06-01 VITALS
HEIGHT: 64 IN | HEIGHT: 64 IN | BODY MASS INDEX: 19.63 KG/M2 | BODY MASS INDEX: 19.63 KG/M2 | WEIGHT: 115 LBS | WEIGHT: 115 LBS

## 2021-06-01 DIAGNOSIS — C18.7 MALIGNANT NEOPLASM OF SIGMOID COLON (H): ICD-10-CM

## 2021-06-01 NOTE — PROGRESS NOTES
History of Present Illness:  This is a 79 y.o. y/o female who I am asked to see by Keith Bui MD for evaluation of possible left inguinal hernia.  The patient states that she has been complaining about low back pain and lower abdominal pain with her physician for about a week or so.  She called him yesterday complaining of what she describes as a bulge in her lower abdomen and then she was for evaluation of a possible hernia.  She has had previous hysterectomy and laparoscopic ovarian cystectomy.  She has had what sounds to be an incisional hernia repair in the past also.  She indicates her bulge to be her whole lower abdomen not just a small bulge in the groin.  She does state that she is felt a bit constipated but is still passing gas and having bowel movements although she states they have been very small.    PAST MEDICAL HISTORY:  Past Medical History:   Diagnosis Date     HTN (hypertension)      Primary osteoarthritis involving multiple joints 8/2/2019      has a past surgical history that includes Hysterectomy; Hernia repair; and Laparoscopic ovarian cystectomy (Bilateral, 6/27/2018).    Medications:    Current Outpatient Medications:      amLODIPine (NORVASC) 5 MG tablet, Take 5 mg by mouth 2 (two) times a day., Disp: , Rfl:      clopidogrel (PLAVIX) 75 mg tablet, Take 75 mg by mouth daily., Disp: , Rfl:      colchicine (COLCRYS) 0.6 mg tablet, Take 1 tablet (0.6 mg total) by mouth daily., Disp: 90 tablet, Rfl: 0     lovastatin (ALTOPREV) 20 MG 24 hr tablet, Take 60 mg by mouth at bedtime., Disp: , Rfl:      predniSONE (DELTASONE) 5 MG tablet, Take 10 mg daily for 2 weeks and descrease to 5 mg daily for 2 weeks and stop. ., Disp: , Rfl:     Allergies:  Allergies   Allergen Reactions     Dye Hives     All Red Dyes     Ct [Iohexol] Hives     Telithromycin Unknown       Social History:   reports that she has been smoking. She has never used smokeless tobacco. She reports current alcohol use. She reports  that she does not use drugs.    ROS:  A 12 point comprehensive review of systems was negative except as noted.    PHYSICAL EXAM:  Vitals:    10/01/19 0918   BP: 136/70   Pulse: 88   Resp: 14   SpO2: 95%     General: alert, appears stated age and cooperative  Lungs:  clear to auscultation bilaterally  Abdomen: soft, non-tender, without masses or organomegaly.  She does seem a little bit bloated he but there is no palpable mass.  There is definitely no areas of guarding or rebound.      Impression: Abdominal pain of unclear etiology.  She definitely does not have a clear-cut inguinal hernia.  Where she points her to her bulge, it is her whole left lower quadrant.  It is possible this is just obstipation.  However with her symptoms and the fact that they have been worse over the last few days and even week, I think a CT scan is warranted to make sure she does not have some underlying pathology.    Plan: We will get a CT scan of the abdomen and pelvis today.  I will get back to her with the results once they are available.    Addendum: Her CT scan does show a segment of mural thickening of her sigmoid colon consistent with past episodes of diverticulitis.  This has caused some stool burden behind it.  Tumor is less likely.    I called the patient with these results.  I suggested she start using MiraLAX on a daily basis.  I also feel that she needs a follow-up colonoscopy at some point just to be sure there is nothing else going on.  She should contact Dr. Bui to have that set up.  No evidence of active diverticulitis so there is no role for an.  She is not completely obstructed so stool softeners such as MiraLAX should help her out.

## 2021-06-02 ENCOUNTER — RECORDS - HEALTHEAST (OUTPATIENT)
Dept: ADMINISTRATIVE | Facility: CLINIC | Age: 81
End: 2021-06-02

## 2021-06-03 VITALS
WEIGHT: 117 LBS | BODY MASS INDEX: 19.97 KG/M2 | SYSTOLIC BLOOD PRESSURE: 136 MMHG | DIASTOLIC BLOOD PRESSURE: 70 MMHG | HEIGHT: 64 IN | HEART RATE: 88 BPM

## 2021-06-03 VITALS — BODY MASS INDEX: 20.14 KG/M2 | WEIGHT: 118 LBS | HEIGHT: 64 IN

## 2021-06-03 VITALS
RESPIRATION RATE: 14 BRPM | HEIGHT: 64 IN | BODY MASS INDEX: 20.04 KG/M2 | WEIGHT: 117.4 LBS | SYSTOLIC BLOOD PRESSURE: 136 MMHG | HEART RATE: 88 BPM | DIASTOLIC BLOOD PRESSURE: 70 MMHG | OXYGEN SATURATION: 95 %

## 2021-06-03 VITALS — HEIGHT: 64 IN | WEIGHT: 117.4 LBS | BODY MASS INDEX: 20.04 KG/M2

## 2021-06-03 NOTE — PROGRESS NOTES
ASSESSMENT AND PLAN:  Katya Butler 79 y.o. female is seen here on 11/05/19 for follow-up.  She has had recurrent episodes of arthralgias likely secondary to inflammatory polyarthritis the whole of the summer, however since starting colchicine she has not had a flareup.  1 of the differentials remains crystalline arthropathy such as gout or pseudogout.  Serum urate however remains steadfastly within the normal range away from her acute episodes.  She has osteoarthritis management principles of which were reviewed.  Of the various options the following plan is obtained.  She is going to continue colchicine 0.6 mg.  I will reduce her frequency down to every other day.  Should she start getting flareups again we may have to go back to once daily.  Will meet here in 6 months or sooner.              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  Dispense: 45 tablet; Refill: 0    Primary osteoarthritis involving multiple joints      HISTORY OF PRESENTING ILLNESS:  Katya Butler, 79 y.o., female is here for follow-up. She has had recurrent episodes of inflammatory polyarthritis the whole of the summer, however since starting colchicine she has not had a flareup.  1 of the differentials remains crystalline arthropathy such as gout or pseudogout.  Serum urate however remains steadfastly within the normal range away from her acute episodes.  She has osteoarthritis management principles of which were reviewed.  Of the various options the following plan is obtained.  She is going to continue colchicine 0.6 mg.  I will reduce her frequency down to every other day.  Should she start getting flareups again we may have to go back to once daily.  She is no longer on prednisone.  She noted pain level to be 0 now.   She reports that her first inkling of joint pain that  "happened nearly 40 years ago.  She recalls to date that she woke up with severe pain in her right foot, to the point where even the sheets used to bother her, she could not walk ended up going to the emergency she was given \"a gout pill, it took away the symptoms within a few days and she was back to normal self.  Her next such episode was 15 years later in the same foot.  The main reason that she is here now is the recurrent pain she has had over the past 6 weeks in her right foot in the same area.  She has been taking prednisone.  She is feeling a bit better.  She does not feel that there is significant pain now.  However during the past few weeks she has been unable to go off.  In 2010 she recalls having had swelling pain which came on fairly quickly in her right knee.  She went to the aftercare clinic.  She tried to tell them that she wonders if this is gout but they told her otherwise though couple of days later they called her and did inform her that this may well have been gout.  I have reviewed those data.  She was found to have chondrocalcinosis on the x-rays of the knees on 7/6/2010.  Then January of this year her right wrist swelled up.  Again this was a sudden onset, lasted several days and then got better back to normal self.  She describes herself otherwise in good health.  There is no history of psoriasis ulcerative colitis Crohn's disease.  There is no family history the same.  She has not had kidney stones.  She is a smoker alcohol up to 12 drinks per week.  She has a history of hypertension.Further historical information and ADL limitations as noted in the multidimensional health assessment questionnaire attached in the EMR.  He has not had any aLLERGIES:Dye; Ct [iohexol]; and Telithromycin    PAST MEDICAL/ACTIVE PROBLEMS/MEDICATION/ FAMILY HISTORY/SOCIAL DATA:  The patient has a family history of  Past Medical History:   Diagnosis Date     HTN (hypertension)      Primary osteoarthritis involving " "multiple joints 8/2/2019     Social History     Tobacco Use   Smoking Status Current Every Day Smoker   Smokeless Tobacco Never Used     Patient Active Problem List   Diagnosis     Primary osteoarthritis involving multiple joints     Current Outpatient Medications   Medication Sig Dispense Refill     amLODIPine (NORVASC) 5 MG tablet Take 5 mg by mouth 2 (two) times a day.       clopidogrel (PLAVIX) 75 mg tablet Take 75 mg by mouth daily.       lovastatin (ALTOPREV) 20 MG 24 hr tablet Take 60 mg by mouth at bedtime.       predniSONE (DELTASONE) 5 MG tablet Take 10 mg daily for 2 weeks and descrease to 5 mg daily for 2 weeks and stop. .       colchicine (COLCRYS) 0.6 mg tablet Take 1 tablet (0.6 mg total) by mouth daily. 90 tablet 0     No current facility-administered medications for this visit.      DETAILED EXAMINATION  11/05/19  :  Vitals:    11/05/19 1116   BP: 136/70   Patient Site: Right Arm   Patient Position: Sitting   Cuff Size: Adult Regular   Pulse: 88   Weight: 117 lb (53.1 kg)   Height: 5' 4\" (1.626 m)     Alert oriented. Head including the face is examined for malar rash, heliotropes, scarring, lupus pernio. Eyes examined for redness such as in episcleritis/scleritis, periorbital lesions.   Neck/ Face examined for parotid gland swelling, range of motion of neck.  Left upper and lower and right upper and lower extremities examined for tenderness, swelling, warmth of the appendicular joints, range of motion, edema, rash.  Some of the important findings included: No synovitis in any of the palpable joints of upper and lower extremities, early Heberden's.  Range of motion of the shoulders show full abduction.  No JLT effusion or warmth of the knees.     There are no visible tophi.    LAB / IMAGING DATA:  ALT   Date Value Ref Range Status   11/01/2019 9 0 - 45 U/L Final   07/24/2019 12 0 - 45 U/L Final   04/02/2018 10 0 - 45 U/L Final     Albumin   Date Value Ref Range Status   07/24/2019 3.6 3.5 - 5.0 g/dL " Final   04/02/2018 3.6 3.5 - 5.0 g/dL Final   02/10/2016 3.9 3.5 - 5.0 g/dL Final     Creatinine   Date Value Ref Range Status   11/01/2019 0.84 0.60 - 1.10 mg/dL Final   07/24/2019 0.84 0.60 - 1.10 mg/dL Final   04/02/2018 0.76 0.60 - 1.10 mg/dL Final       WBC   Date Value Ref Range Status   11/01/2019 6.7 4.0 - 11.0 thou/uL Final   07/24/2019 15.4 (H) 4.0 - 11.0 thou/uL Final     Hemoglobin   Date Value Ref Range Status   11/01/2019 14.2 12.0 - 16.0 g/dL Final   07/24/2019 12.9 12.0 - 16.0 g/dL Final   09/07/2012 11.3 (L) 12.0 - 16.0 g/dL Final     Platelets   Date Value Ref Range Status   11/01/2019 362 140 - 440 thou/uL Final   07/24/2019 390 140 - 440 thou/uL Final   09/06/2012 351 140 - 440 thou/uL Final       Lab Results   Component Value Date    RF <15.0 07/24/2019

## 2021-06-04 VITALS — BODY MASS INDEX: 20.87 KG/M2 | WEIGHT: 117.8 LBS | HEIGHT: 63 IN

## 2021-06-04 VITALS — BODY MASS INDEX: 19.67 KG/M2 | HEIGHT: 63 IN | WEIGHT: 111 LBS

## 2021-06-05 VITALS
BODY MASS INDEX: 20.85 KG/M2 | TEMPERATURE: 98.2 F | DIASTOLIC BLOOD PRESSURE: 72 MMHG | SYSTOLIC BLOOD PRESSURE: 123 MMHG | HEART RATE: 75 BPM | OXYGEN SATURATION: 97 % | WEIGHT: 114 LBS

## 2021-06-05 VITALS
WEIGHT: 113.6 LBS | BODY MASS INDEX: 20.78 KG/M2 | HEART RATE: 76 BPM | DIASTOLIC BLOOD PRESSURE: 77 MMHG | TEMPERATURE: 98.2 F | SYSTOLIC BLOOD PRESSURE: 179 MMHG | OXYGEN SATURATION: 96 %

## 2021-06-05 VITALS
DIASTOLIC BLOOD PRESSURE: 67 MMHG | TEMPERATURE: 98.3 F | OXYGEN SATURATION: 99 % | SYSTOLIC BLOOD PRESSURE: 143 MMHG | HEART RATE: 78 BPM | WEIGHT: 112 LBS | BODY MASS INDEX: 20.49 KG/M2

## 2021-06-05 VITALS
BODY MASS INDEX: 20.61 KG/M2 | DIASTOLIC BLOOD PRESSURE: 87 MMHG | SYSTOLIC BLOOD PRESSURE: 143 MMHG | HEIGHT: 62 IN | TEMPERATURE: 98.3 F | WEIGHT: 112 LBS | OXYGEN SATURATION: 98 % | HEART RATE: 80 BPM

## 2021-06-05 VITALS
TEMPERATURE: 98.5 F | SYSTOLIC BLOOD PRESSURE: 133 MMHG | HEART RATE: 75 BPM | OXYGEN SATURATION: 98 % | WEIGHT: 113 LBS | DIASTOLIC BLOOD PRESSURE: 60 MMHG | BODY MASS INDEX: 20.67 KG/M2

## 2021-06-05 VITALS — HEIGHT: 62 IN | WEIGHT: 112 LBS | BODY MASS INDEX: 20.61 KG/M2

## 2021-06-05 VITALS
HEART RATE: 76 BPM | TEMPERATURE: 98.3 F | OXYGEN SATURATION: 94 % | DIASTOLIC BLOOD PRESSURE: 63 MMHG | WEIGHT: 113.3 LBS | SYSTOLIC BLOOD PRESSURE: 139 MMHG | BODY MASS INDEX: 20.72 KG/M2

## 2021-06-05 VITALS
BODY MASS INDEX: 20.81 KG/M2 | HEART RATE: 76 BPM | TEMPERATURE: 98 F | OXYGEN SATURATION: 99 % | SYSTOLIC BLOOD PRESSURE: 186 MMHG | DIASTOLIC BLOOD PRESSURE: 79 MMHG | WEIGHT: 113.8 LBS

## 2021-06-05 VITALS
HEART RATE: 72 BPM | SYSTOLIC BLOOD PRESSURE: 138 MMHG | TEMPERATURE: 98.6 F | DIASTOLIC BLOOD PRESSURE: 65 MMHG | BODY MASS INDEX: 19.84 KG/M2 | WEIGHT: 108.5 LBS | OXYGEN SATURATION: 97 %

## 2021-06-05 VITALS
TEMPERATURE: 98.7 F | WEIGHT: 113 LBS | SYSTOLIC BLOOD PRESSURE: 139 MMHG | BODY MASS INDEX: 20.67 KG/M2 | DIASTOLIC BLOOD PRESSURE: 65 MMHG | HEART RATE: 72 BPM | OXYGEN SATURATION: 97 %

## 2021-06-05 VITALS
OXYGEN SATURATION: 96 % | DIASTOLIC BLOOD PRESSURE: 65 MMHG | TEMPERATURE: 98.2 F | SYSTOLIC BLOOD PRESSURE: 142 MMHG | HEART RATE: 80 BPM | BODY MASS INDEX: 21.04 KG/M2 | HEIGHT: 62 IN | WEIGHT: 114.3 LBS

## 2021-06-07 NOTE — TELEPHONE ENCOUNTER
Staff has been trying to get a hold of the pt. However, the phone # is invalid. appt today is canceled.     Janine Rojas CMA [facility] 5/5/2020 7:50 AM

## 2021-06-08 ENCOUNTER — AMBULATORY - HEALTHEAST (OUTPATIENT)
Dept: INFUSION THERAPY | Facility: HOSPITAL | Age: 81
End: 2021-06-08

## 2021-06-08 ENCOUNTER — COMMUNICATION - HEALTHEAST (OUTPATIENT)
Dept: ONCOLOGY | Facility: HOSPITAL | Age: 81
End: 2021-06-08

## 2021-06-08 ENCOUNTER — OFFICE VISIT - HEALTHEAST (OUTPATIENT)
Dept: ONCOLOGY | Facility: HOSPITAL | Age: 81
End: 2021-06-08

## 2021-06-08 DIAGNOSIS — C18.7 MALIGNANT NEOPLASM OF SIGMOID COLON (H): ICD-10-CM

## 2021-06-08 DIAGNOSIS — T45.1X5A CHEMOTHERAPY-INDUCED NEUROPATHY (H): ICD-10-CM

## 2021-06-08 DIAGNOSIS — G62.0 CHEMOTHERAPY-INDUCED NEUROPATHY (H): ICD-10-CM

## 2021-06-08 LAB
ALBUMIN SERPL-MCNC: 3.5 G/DL (ref 3.5–5)
ALP SERPL-CCNC: 94 U/L (ref 45–120)
ALT SERPL W P-5'-P-CCNC: 9 U/L (ref 0–45)
ANION GAP SERPL CALCULATED.3IONS-SCNC: 9 MMOL/L (ref 5–18)
AST SERPL W P-5'-P-CCNC: 16 U/L (ref 0–40)
BASOPHILS # BLD AUTO: 0 THOU/UL (ref 0–0.2)
BASOPHILS NFR BLD AUTO: 0 % (ref 0–2)
BILIRUB SERPL-MCNC: 0.3 MG/DL (ref 0–1)
BUN SERPL-MCNC: 17 MG/DL (ref 8–28)
CALCIUM SERPL-MCNC: 8.9 MG/DL (ref 8.5–10.5)
CEA SERPL-MCNC: 3.7 NG/ML (ref 0–3)
CHLORIDE BLD-SCNC: 108 MMOL/L (ref 98–107)
CO2 SERPL-SCNC: 23 MMOL/L (ref 22–31)
CREAT SERPL-MCNC: 0.81 MG/DL (ref 0.6–1.1)
EOSINOPHIL # BLD AUTO: 0.1 THOU/UL (ref 0–0.4)
EOSINOPHIL NFR BLD AUTO: 1 % (ref 0–6)
ERYTHROCYTE [DISTWIDTH] IN BLOOD BY AUTOMATED COUNT: 15.1 % (ref 11–14.5)
GFR SERPL CREATININE-BSD FRML MDRD: >60 ML/MIN/1.73M2
GLUCOSE BLD-MCNC: 91 MG/DL (ref 70–125)
HCT VFR BLD AUTO: 36.9 % (ref 35–47)
HGB BLD-MCNC: 12 G/DL (ref 12–16)
IMM GRANULOCYTES # BLD: 0 THOU/UL
IMM GRANULOCYTES NFR BLD: 0 %
LYMPHOCYTES # BLD AUTO: 2.1 THOU/UL (ref 0.8–4.4)
LYMPHOCYTES NFR BLD AUTO: 31 % (ref 20–40)
MCH RBC QN AUTO: 32.5 PG (ref 27–34)
MCHC RBC AUTO-ENTMCNC: 32.5 G/DL (ref 32–36)
MCV RBC AUTO: 100 FL (ref 80–100)
MONOCYTES # BLD AUTO: 0.8 THOU/UL (ref 0–0.9)
MONOCYTES NFR BLD AUTO: 11 % (ref 2–10)
NEUTROPHILS # BLD AUTO: 3.8 THOU/UL (ref 2–7.7)
NEUTROPHILS NFR BLD AUTO: 56 % (ref 50–70)
PLATELET # BLD AUTO: 315 THOU/UL (ref 140–440)
PMV BLD AUTO: 9.2 FL (ref 8.5–12.5)
POTASSIUM BLD-SCNC: 4.4 MMOL/L (ref 3.5–5)
PROT SERPL-MCNC: 6.7 G/DL (ref 6–8)
RBC # BLD AUTO: 3.69 MILL/UL (ref 3.8–5.4)
SODIUM SERPL-SCNC: 140 MMOL/L (ref 136–145)
WBC: 6.8 THOU/UL (ref 4–11)

## 2021-06-09 ENCOUNTER — COMMUNICATION - HEALTHEAST (OUTPATIENT)
Dept: ONCOLOGY | Facility: HOSPITAL | Age: 81
End: 2021-06-09

## 2021-06-09 DIAGNOSIS — G62.0 CHEMOTHERAPY-INDUCED NEUROPATHY (H): ICD-10-CM

## 2021-06-09 DIAGNOSIS — T45.1X5A CHEMOTHERAPY-INDUCED NEUROPATHY (H): ICD-10-CM

## 2021-06-11 NOTE — TELEPHONE ENCOUNTER
Refilled per Rheum RN refill protocol  Dr Raghav phillips'lidia 30 day supply but pt needs to be seen within next 30 days for future refills. Please call to schedule last week of Sept.

## 2021-06-11 NOTE — TELEPHONE ENCOUNTER
Pt is due for labs and follow up prior to med refills, please call to schedule lab and f/u appt with Dr Avilez    Lab orders entered in chart.

## 2021-06-11 NOTE — PROGRESS NOTES
"Katya Butler is a 80 y.o. female who is being evaluated via a billable video visit.      The patient has been notified of following:     \"This video visit will be conducted via a call between you and your physician/provider. We have found that certain health care needs can be provided without the need for an in-person physical exam.  This service lets us provide the care you need with a video conversation.  If a prescription is necessary we can send it directly to your pharmacy.  If lab work is needed we can place an order for that and you can then stop by our lab to have the test done at a later time.    Video visits are billed at different rates depending on your insurance coverage. Please reach out to your insurance provider with any questions.    If during the course of the call the physician/provider feels a video visit is not appropriate, you will not be charged for this service.\"    Patient has given verbal consent to a Video visit? Yes  How would you like to obtain your AVS? AVS Preference: [employer].  If dropped by the video visit, the video invitation should be sent to: Text to cell phone: [phone]  Will anyone else be joining your video visit? No      Video-Visit Details    Type of service:  Video Visit    Originating Location (pt. 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  Dispense: 45 tablet; Refill: 0    Calcium pyrophosphate arthropathy of multiple sites  -     XR Knees Bilateral 1 Or 2 VWS; Future; Expected date: " 09/28/2020  -     XR Knees Bilateral 1 Or 2 VWS    Periarticular calcification          HISTORY OF PRESENTING ILLNESS:  Katya Butler 80 y.o. is evaluated here via video link.  This patient with polyarthralgia in association with osteoarthritis, generalized, inflammatory joint disease low very likely crystalline such as calcium pyrophosphate disease, is here for follow-up.  She is on Colcrys.  She has not had an acute flareup.  However over the past several weeks she has been hurting.  This is in her knees.  This is bilateral.  This is associated with swelling.  She has been taking ibuprofen.  Recent labs reviewed.  She has renal impairment.  This is new.  This is discussed with her.  I have suggested that we meet in person check x-rays of the knees, consider corticosteroid injections.  We will do this in the near future.  Meanwhile continue Colcrys at the reduced dose.  We reviewed the difference being gout and pseudogout, I would appear that she has more likely the latter.  She is going to stop taking ibuprofen or other nonsteroidals.  Check labs in the next few weeks.  Acetaminophen instead meanwhile.   ROS enquiry held for fever, ocular symptoms, rash, headache,  GI issues.  Today we also discussed the issues related to the current pandemic, the pros and cons of the current treatment plan, the CDC guidelines such as social distancing washing the hands covering the cough.  ALLERGIES:Dye; Ct [iohexol]; and Telithromycin    PAST MEDICAL/ACTIVE PROBLEMS/MEDICATION/SOCIAL DATA  Past Medical History:   Diagnosis Date     HTN (hypertension)      Primary osteoarthritis involving multiple joints 8/2/2019     Social History     Tobacco Use   Smoking Status Current Every Day Smoker   Smokeless Tobacco Never Used     Patient Active Problem List   Diagnosis     Primary osteoarthritis involving multiple joints     Current Outpatient Medications   Medication Sig Dispense Refill     amLODIPine (NORVASC) 5 MG tablet Take 5 mg  by mouth 2 (two) times a day.       clopidogrel (PLAVIX) 75 mg tablet Take 75 mg by mouth daily.       colchicine (COLCRYS) 0.6 mg tablet Take 1 tablet (0.6 mg total) by mouth every other day. 15 tablet 0     lovastatin (ALTOPREV) 20 MG 24 hr tablet Take 60 mg by mouth at bedtime.       predniSONE (DELTASONE) 5 MG tablet Take 10 mg daily for 2 weeks and descrease to 5 mg daily for 2 weeks and stop. .       No current facility-administered medications for this visit.          EXAMINATION:    Using the audio and video link as best as possible the constitutional, neck, neurologic, psych, skin, both upper extremities areas/organ system were evaluated during this assessment.  Some of the important findings: Alert oriented speech is fluent, examination of the digits without swelling dactylitis fully able to flex the digit to fists, abduction of the shoulder is normal.      LAB / IMAGING DATA:  ALT   Date Value Ref Range Status   09/08/2020 17 0 - 45 U/L Final   11/01/2019 9 0 - 45 U/L Final   07/24/2019 12 0 - 45 U/L Final     Albumin   Date Value Ref Range Status   09/08/2020 3.4 (L) 3.5 - 5.0 g/dL Final   07/24/2019 3.6 3.5 - 5.0 g/dL Final   04/02/2018 3.6 3.5 - 5.0 g/dL Final     Creatinine   Date Value Ref Range Status   09/08/2020 1.16 (H) 0.60 - 1.10 mg/dL Final   11/01/2019 0.84 0.60 - 1.10 mg/dL Final   07/24/2019 0.84 0.60 - 1.10 mg/dL Final       WBC   Date Value Ref Range Status   09/08/2020 8.0 4.0 - 11.0 thou/uL Final   11/01/2019 6.7 4.0 - 11.0 thou/uL Final     Hemoglobin   Date Value Ref Range Status   09/08/2020 12.5 12.0 - 16.0 g/dL Final   11/01/2019 14.2 12.0 - 16.0 g/dL Final   07/24/2019 12.9 12.0 - 16.0 g/dL Final     Platelets   Date Value Ref Range Status   09/08/2020 464 (H) 140 - 440 thou/uL Final   11/01/2019 362 140 - 440 thou/uL Final   07/24/2019 390 140 - 440 thou/uL Final       Lab Results   Component Value Date    RF <15.0 07/24/2019     Duration of the call:12  Minutes  Call start:  1103am  Call end:   [id number]

## 2021-06-11 NOTE — TELEPHONE ENCOUNTER
Date: 9/28/2020 Status: [facility]   Time: 10:45 AM Length: 15   Visit Type: VIDEO VISIT RETURN [1703] Copay: $0.00   Provider: Nimco Avilez MBBS

## 2021-06-11 NOTE — TELEPHONE ENCOUNTER
I did call and speak with Rand to see if she could help me find some information from her visit yesterday. She did say that she will try and then she will call me back.   Please send a refill for her gout med to [facility] in [place].

## 2021-06-11 NOTE — TELEPHONE ENCOUNTER
Date: 9/8/2020 Status: [facility]   Time: 2:50 PM Length: 10   Visit Type: LAB [[id number]] Copay: $0.00   Provider: [facility]       Pt was heading out of the door, coundlt schedule ofv

## 2021-06-12 ENCOUNTER — RECORDS - HEALTHEAST (OUTPATIENT)
Dept: SCHEDULING | Facility: CLINIC | Age: 81
End: 2021-06-12

## 2021-06-12 NOTE — ANESTHESIA POSTPROCEDURE EVALUATION
Patient: Katya Butler  Procedure(s):  COLECTOMY, SIGMOID, OPEN  OOPHORECTOMY, OPEN (Left)  Anesthesia type: general    Patient location: PACU  Last vitals:   Vitals Value Taken Time   BP 94/68 10/31/20 1945   Temp 36.7  C (98.1  F) 10/31/20 1945   Pulse 78 10/31/20 1945   Resp 16 10/31/20 1945   SpO2 96 % 10/31/20 1945     Post vital signs: stable  Level of consciousness: awake and responds to simple questions  Post-anesthesia pain: pain controlled  Post-anesthesia nausea and vomiting: no  Pulmonary: unassisted, return to baseline  Cardiovascular: stable and blood pressure at baseline  Hydration: adequate  Anesthetic events: no    QCDR Measures:  ASA# 11 - Azucena-op Cardiac Arrest: ASA11B - Patient did NOT experience unanticipated cardiac arrest  ASA# 12 - Azucena-op Mortality Rate: ASA12B - Patient did NOT die  ASA# 13 - PACU Re-Intubation Rate: ASA13B - Patient did NOT require a new airway mgmt  ASA# 10 - Composite Anes Safety: ASA10A - No serious adverse event    Additional Notes:

## 2021-06-12 NOTE — ANESTHESIA CARE TRANSFER NOTE
Last vitals:   Vitals:    10/31/20 1615   BP: (P) 116/62   Pulse: (P) 80   Resp: (P) 16   Temp:    SpO2:      Patient's level of consciousness is drowsy  Spontaneous respirations: yes  Maintains airway independently: yes  Dentition unchanged: yes  Oropharynx: oropharynx clear of all foreign objects    QCDR Measures:  ASA# 20 - Surgical Safety Checklist: WHO surgical safety checklist completed prior to induction    PQRS# 430 - Adult PONV Prevention: 4558F - Pt received => 2 anti-emetic agents (different classes) preop & intraop  ASA# 8 - Peds PONV Prevention: NA - Not pediatric patient, not GA or 2 or more risk factors NOT present  PQRS# 424 - Azucena-op Temp Management: 4559F - At least one body temp DOCUMENTED => 35.5C or 95.9F within required timeframe  PQRS# 426 - PACU Transfer Protocol: - Transfer of care checklist used  ASA# 14 - Acute Post-op Pain: ASA14B - Patient did NOT experience pain >= 7 out of 10

## 2021-06-12 NOTE — TELEPHONE ENCOUNTER
Pt notified that Dr Avilez reviewed xray of knees- shows chondrocalcinosis and narrowing of joint spaces. Pt should continue Colcrys as rx'd. Pt verbalized understanding.

## 2021-06-12 NOTE — ANESTHESIA PROCEDURE NOTES
Peripheral Block    Patient location during procedure: OR  Start time: 10/31/2020 3:52 PM  End time: 10/31/2020 3:56 PM  post-op analgesia per surgeon order as noted in medical record  Staffing:  Performing  Anesthesiologist: Barbara Martinez MD  Preanesthetic Checklist  Completed: patient identified, site marked, risks, benefits, and alternatives discussed, timeout performed, consent obtained, airway assessed, oxygen available, suction available, emergency drugs available and hand hygiene performed  Peripheral Block  Block type: other, TAP  Prep: ChloraPrep  Patient position: supine  Patient monitoring: cardiac monitor, continuous pulse oximetry, heart rate and blood pressure  Anesthesia laterality: B/L.  Injection technique: ultrasound guided    Ultrasound used to visualize needle placement in proximity to nerve being blocked: yes   US used to visualize anesthetic spread  Visualized anatomic structures normal  No Pathological Findings  Permanent ultrasound image captured for medical record  Sterile gel and probe cover used for ultrasound.  Needle  Needle type: echogenic   Needle gauge: 20G  Needle length: 4 in  no peripheral nerve catheter placed  Assessment  Injection assessment: no difficulty with injection, negative aspiration for heme, no paresthesia on injection and incremental injection  Additional Notes      Confirmed amount of local anesthetic given by surgery team. Confirmed request for TAP block by surgery team. Bilateral transversus abdominis plane (TAP) blocks with plain bupivacaine 0.25% 10 cc per side, liposomal bupivacaine 0.65% 20 cc per side. Patient tolerated procedure well.    Barbara Martinez MD  Anesthesiologist  [facility]

## 2021-06-12 NOTE — TELEPHONE ENCOUNTER
..New Patient Oncology Nurse Navigator Note     Referring provider: Viry Villegas MD Surgeon     Referring Clinic/Organization: [facility] Surgery (PCP Keith Méndez MD; [facility], [facility].)     Referred to (specialty): Med Onc     Requested provider (if applicable):      Date Referral Received: 2020-11-05     Evaluation for: Colon Cancer perforated into Left Ovary     Clinical History (per Nurse review of records provided):       Encounter Location Date Diagnosis   [facility] [address] [place], [place] [id number] 30 Oct, 2020     [facility] [address] [place], [place] [id number] 29 Oct, 2020 Abdominal pain, acute, left lower quadrant R10.32   [facility] [address] [place], [place] [id number] 28 Oct, 2020     [facility] [address] [place], [place] [id number] 28 Oct, 2020 Abdominal pain, acute, left lower quadrant R10.32 ; Tobacco abuse counseling Z71.6 and Hyperlipidemia, unspecified E78.5     2020-10-29:  [facility]:  ED PROVIDER: Ludy Aranda M.D.  FINAL IMPRESSION:  1. Intra-abdominal abscess (H)    2. Nausea and vomiting, intractability of vomiting not specified, unspecified vomiting type    3. Lower abdominal pain      2020-10-29:  Db Madden MD   Physician   Hospitalist     Katya Butler is a 80 y.o. old female with a pertinent history of diverticulitis who presents with 5 days of left lower quadrant and left lower back pain.  She has a history of diverticulitis approximately a year ago and symptoms are very similar.  She was seen in clinic yesterday and started on Augmentin.  Since that time, she has been unable to tolerate any oral liquids, solids, or her medications.  They switched her antibiotics to ciprofloxacin and Flagyl thinking that perhaps it was the antibiotics causing the nausea/vomiting.  However, she has noticed no change in her ability to tolerate p.o.  She feels 10 out of  10 left lower quadrant abdominal pain.  She denies diarrhea or blood in her stool.  Her last bowel movement was yesterday and she states it was normal.  She has not noted any blood in her emesis.  She denies chest pain or shortness of breath.  She denies fever.  She has had a hysterectomy and a laparoscopic ovarian cyst removal.  She still has her appendix and gallbladder.    2020-10-30:   EXAM: CT ABDOMEN PELVIS W ORAL WO IV CONTRAST  LOCATION: [facility]  DATE/TIME: 10/30/2020 5:55 PM     INDICATION: Abdominal pain, acute, nonlocalized Question of an abscess;  COMPARISON: CT abdomen and pelvis 10/29/2020  TECHNIQUE: CT scan of the abdomen and pelvis was performed without IV contrast. Multiplanar reformats were obtained. Dose reduction techniques were used.  IMPRESSION:   1.  Complex left pelvic mass with central collection of gas may represent an abscess secondary to sigmoid diverticulitis. The lesion remains suboptimally characterized due to the lack of IV contrast. The relationship of the mass within the left ovary   remains indeterminate.  2.  Severe atherosclerotic disease lower thoracic and abdominal aorta with 3.1 x 2.4 cm abdominal aortic aneurysm.    2020-10-31:  Name:  Katya Butler  PCP:  Keith Bui MD  Procedure Date:  10/29/2020 - 10/31/2020  Procedure:    COLECTOMY, SIGMOID, OPEN  Pre-Procedure Diagnosis:    Mass of colon [K63.89]   Post-Procedure Diagnosis:      Same    Surgeon(s):    Viry Villegas MD  Findings:    Stricture of the sigmoid colon caused by either perforated diverticulitis or a perforated colon cancer.    2020-10-31:  Final Diagnosis   A) SIGMOID COLON, SIGMOID COLECTOMY:        1) INVASIVE MODERATELY DIFFERENTIATED ADENOCARCINOMA (SEE SYNOPTIC REPORT)            a) CONSTRICTING ULCERATED 3.5 X 2.5 X 1.3-CM TUMOR            b) TUMOR INVADES THROUGH MUSCULARIS PROPRIA AND INTO SUBSEROSAL ADIPOSE TISSUE;                EXTENSION TO INKED DEEP AND  "CIRCUMFERENTIAL MARGINS NOT IDENTIFIED            c) TUMOR 5.5 AND 6.5 CM FROM PROXIMAL AND DISTAL SURGICAL MARGINS AND 1.3 CM FROM                CIRCUMFERENTIAL MARGIN            d) NO EVIDENCE OF DYSPLASIA OR MALIGNANCY AT PROXIMAL AND DISTAL SURGICAL MARGINS;                VIABLE-APPEARING PROXIMAL AND DISTAL SURGICAL MARGINS            e) METASTATIC CARCINOMA IN FOUR OF 18 MESENTERIC LYMPH NODES (4 OF 18)            f) MISMATCH REPAIR PROTEIN ANALYSIS: INTACT            g)  STAGE: pT4b pN2a        2) PROXIMAL COLONIC DILATATION, CONSISTENT WITH OBSTRUCTION     B) LEFT OVARY AND FALLOPIAN TUBE, LEFT OOPHORECTOMY:        1) MODERATELY DIFFERENTIATED ADENOCARCINOMA, CONSISTENT WITH COLONIC PRIMARY SOURCE            a) 3.5 X 2 X 2-CM OVARIAN MASS            b) TUMOR INVOLVES OVARY AND PARAOVARIAN SOFT TISSUE            c) FOCAL TUMOR PRESENT AT PARAOVARIAN SOFT TISSUE MARGIN            d) IMMUNOHISTOCHEMICAL PROFILE AND MORPHOLOGY SIMILAR TO THAT SEEN IN COLONIC                PRIMARY        2) MODERATE CHRONIC SALPINGITIS AND CONGESTION OF FALLOPIAN TUBE        Clinical Assessment / Barriers to Care (Per Nurse):   Patient was reluctant to schedule appt with oncology provider, as she referred to \"waiting 4 - 6 weeks before doing anything\" after surgery. She indicated that she would like to wait until after her f/u appt with surgeon, Dr. Villegas, next Tuesday 11/10/20 and have navigator call her back following that.     Then, she gave phone to her . Writer offered to call back on Tuesday afternoon, but additionally indicated that this appt can be made for 2- 3 weeks out, and it is a consult - for information only. There will not be chemo or any other treatment happening at this appt. Justin reflected that this is probably best to know sooner than later, and agreed to set appointment for 2 1/2 weeks out. Writer assured him that this can be postponed or canceled if needed. Also, indicated that it is possible " that cancer doctor will want pt to get PET scan or other test prior to this appointment. He verbalized understanding.      Records Location (Care Everywhere, Media, etc.): PCP is at [facility]. Records in Care Everywhere and within [facility]     Records Needed: none aware of     Additional testing needed prior to consult: Possible PET scan, per Dr. Merrill.

## 2021-06-12 NOTE — ANESTHESIA PREPROCEDURE EVALUATION
Anesthesia Evaluation      Patient summary reviewed   No history of anesthetic complications     Airway   Mallampati: II  Neck ROM: full   Pulmonary     breath sounds clear to auscultation  (+) a smoker (>40 pk yrs)                         Cardiovascular   Exercise tolerance: > or = 4 METS  (+) hypertension, CABG/stent (Likely stent in 2009 - pt unsure of exact procedure, available documents from CV lab at that time but exact procedure unknown), ,     (-) angina  ECG reviewed (Unchanged since 2009)  Rhythm: regular   murmur Location:upper left sternal border      Neuro/Psych - negative ROS     Endo/Other    (+) arthritis,      GI/Hepatic/Renal      Comments: Mass on colon. Not causing obstruction.     Other findings:     NPO > 8 hrs     Results for LENORA LATIF (MRN [id number]) as of 10/31/2020    10/29/2020 22:57  SARS-CoV-2 PCR Result: NEGATIVE      Results for LENORA LATIF (MRN [id number]) as of 10/31/2020    10/29/2020 19:14  Sodium: 140  Potassium: 4.2  Chloride: 106  CO2: 23  Anion Gap, Calculation: 11  BUN: 14  Creatinine: 0.78  GFR MDRD Af Amer: >60  GFR MDRD Non Af Amer: >60  Calcium: 9.3  AST: 13  ALT: <9  ALBUMIN: 3.5  Protein, Total: 7.5  Alkaline Phosphatase: 87  Bilirubin, Total: 0.3  Bilirubin, Direct: 0.1  Glucose: 133 (H)  Lipase: 21  WBC: 13.0 (H)  RBC: 4.63  Hemoglobin: 13.3  Hematocrit: 41.2  MCV: 89  MCH: 28.7  MCHC: 32.3  RDW: 15.4 (H)  Platelets: 426        Dental    (+) poor dentition    Comment: No teeth on top                       Anesthesia Plan  Planned anesthetic: general endotracheal and peripheral nerve block      Mag gtt  Ketamine  Esmolol  Robinul  Zofran, decadron 10 mg  TAP blocks for post-op pain as requested by surgeon  Toradol if ok with surgeon        ASA 3 - emergent   Induction: intravenous   Anesthetic plan and risks discussed with: patient and spouse  Anesthesia plan special considerations: antiemetics, IV therapy two IVs,   Post-op plan: routine recovery

## 2021-06-13 NOTE — TELEPHONE ENCOUNTER
[facility] referral placed by NP on 12/8/20. Is faxed to [facility] on 12/22/20 at 11:09am  Ph: [phone]  Fax:[phone]

## 2021-06-13 NOTE — TELEPHONE ENCOUNTER
Left a voicemail for Katya to schedule her surgery with Nelly. Provided direct line for Katya to call back.    Barbie Green  Surgery Scheduler  [facility]  Surgery [facility]  Direct line: [phone]   Main Line: [phone]

## 2021-06-13 NOTE — TELEPHONE ENCOUNTER
Spoke with Katya's  Justin and went over surgery details:    We've received instruction to get you scheduled for port placement with Dr Villegas. We have that arranged as follows:     Surgery Date: Wednesday 12/2    Location: [facility]                 3rd Floor, [facility]                  [address] [place] [address]     Arrival Time: 8:00 AM (unless instructed otherwise by the preop nurse)    Prep:     1. COVID19 testing is required within 4 days of surgery. We have this scheduled for you at [facility], [address] [place] [address] on Sunday 11/29 at 2:00 PM. Follow the specific instructions you receive by Angelica. If your test is positive, your surgery will be canceled.     2. Nothing to eat or drink for 8 hours before surgery unless instructed differently by the preop nurse.    3. No blood thinners including aspirin for one week prior to surgery. Verify this is safe for you with your primary care doctor before stopping.     4. You need an adult to drive you home and stay with you 24 hours after surgery.     5. No visitors are allowed at the facility or in the building. Family/Friends who accompany the patient will need to wait in their vehicle or return home to be called when patient is ready for .    6. When you arrive to the surgery center, you will be screened for COVID19 symptoms. If you screen positive, your surgery will need to be postponed for your safety.    7. If the community sees a new surge in COVID19 hospital admissions, your procedure may need to be postponed. We will contact you if this happens.    8. We always encourage you to notify your insurance any time you have something scheduled including surgery. The number is usually right on the back of your insurance card.     Call our office if you have any questions! Thank you!       Barbie Green  Surgery Scheduler  [facility]  Surgery Clinic -  [place]  Direct line: [phone]   Main Line: [phone]

## 2021-06-13 NOTE — CONSULTS
[facility] Hematology and Oncology Consult Note    Patient: Katya Butler  MRN: [id number]  Date of Service: 11/24/2020      Reason for Visit:    1.  Colon cancer    Assessment/Plan:    1.  Adenocarcinoma of the sigmoid colon: I reviewed the pathologic findings with the patient.  She had direct invasion of the left ovary.  Also had 4 nodes positive.  I reviewed with her that she has stage III disease based on these findings.  We reviewed her PET scan images which did not show evidence of metastatic disease.  She is recovering pretty well from her surgery.  We talked about the role of adjuvant endocrine therapy and stage III colon cancer.  I am recommending that she received 12 cycles of FOLFOX chemotherapy.  We reviewed the schedule of this regimen.  We reviewed some of the more common side effects which include, but are not limited to, fatigue, loss of appetite, peripheral neuropathy, diarrhea, cold sensitivity, and cytopenias.  She was given written information on these medications to take, and review.  I also gave her some written information on colon cancer to review.  We talked about Port-A-Cath placement as well.  We will ask Dr. Villegas to follow him.  We will likely have her start a few weeks, December 8.  She had an opportunity have her questions answered.  We will see her back in clinic on cycle 1 day 1 of treatment.  She should also have a complete colonoscopy in the near future to make sure there are no synchronous colon tumors.    2.  Constipation: I asked her to hold her stool softeners.  She probably will not need them anymore.  She can restart if she develops or constipation.    ECOG Performance   ECOG Performance Status: 1    Distress Assessment  Distress Assessment Score: No distress    Problem List:    1. Malignant neoplasm of sigmoid colon (H)       Staging History:    Cancer Staging  Malignant neoplasm of sigmoid colon (H)  Staging form: Colon And Rectum, AJCC 8th Edition  - Clinical: No stage  assigned - Unsigned  - Pathologic stage from 11/24/2020: Stage IIIC (pT4b, pN2, cM0) - Signed by Walt Merrill MD on 11/24/2020    History:    Kirill is a 80-year-old woman who was recently found to have a colon cancer.  She states that she initially developed some symptoms a few months ago that were similar to previous episodes of diverticulitis.  She was treated with antibiotics with no improvement.  She then started vomiting and presented to the emergency room.  Imaging suggested an area of thickening in the sigmoid colon.  It was adherent to the left ovary.  She was taken to the operating room on October 31 and underwent an open sigmoid colectomy.  There is evidence of a small abscess and perforation of the colon at surgery.  Pathology showed a invasive moderately differentiated adenocarcinoma.  Constricting ulcerated measuring 35 x 25 x 13 mm.  4 of 18 lymph nodes were positive.  Mismatch repair intact.  There was some proximal colonic dilatation, consistent with obstruction.  Left ovary showed moderately differentiated adenocarcinoma.  Focal positive margin at the paraovarian soft tissue margin.  She presents today to discuss adjuvant therapy.  Overall she is feeling okay.  Minimal pain.  Appetite is okay.  She has 3 bowel movements a day but is continuing to take a stool softener.    Past History:    Past Medical History:   Diagnosis Date     HTN (hypertension)      Malignant neoplasm of sigmoid colon (H) 11/24/2020     Primary osteoarthritis involving multiple joints 8/2/2019    Family History   Problem Relation Age of Onset     Breast cancer Maternal Aunt      Breast cancer Cousin      Breast cancer Cousin      Breast cancer Cousin      Uterine cancer Mother 38     Lymphoma Son 53      [unfilled] Social History     Socioeconomic History     Marital status: Single     Spouse name: Not on file     Number of children: Not on file     Years of education: Not on file     Highest education level: Not on file    Occupational History     Not on file   Social Needs     Financial resource strain: Not on file     Food insecurity     Worry: Not on file     Inability: Not on file     Transportation needs     Medical: Not on file     Non-medical: Not on file   Tobacco Use     Smoking status: Current Every Day Smoker     Packs/day: 0.25     Smokeless tobacco: Never Used   Substance and Sexual Activity     Alcohol use: Yes     Alcohol/week: 5.0 standard drinks     Types: 5 Cans of beer per week     Drug use: No     Sexual activity: Not Currently   Lifestyle     Physical activity     Days per week: Not on file     Minutes per session: Not on file     Stress: Not on file   Relationships     Social connections     Talks on phone: Not on file     Gets together: Not on file     Attends Temple service: Not on file     Active member of club or organization: Not on file     Attends meetings of clubs or organizations: Not on file     Relationship status: Not on file     Intimate partner violence     Fear of current or ex partner: Not on file     Emotionally abused: Not on file     Physically abused: Not on file     Forced sexual activity: Not on file   Other Topics Concern     Not on file   Social History Narrative     Not on file        Allergies:    Allergies   Allergen Reactions     Dye Hives     All Red Dyes     Ct [Iohexol] Hives     Aspirin Other (See Comments)     Cephalexin Hives     Telithromycin Unknown     Review of Systems:    General  General (WDL): Exceptions to WDL  Weight Loss: Yes - Recent (Greater than 3 months)(4lbs)  ENT  ENT (WDL): Exceptions to WDL  Changes in vision: Yes - Recent (Less than 3 months)  Glasses or Contacts: Yes - Chronic (Greater than 3 months)  Dentures: Yes - Chronic (Greater than 3 months)  Respiratory  Respiratory (WDL): All respiratory elements are within defined limits  Cardiovascular  Cardiovascular (WDL): All cardiovascular elements are within defined limits  Endocrine  Endocrine (WDL): All  "endocrine elements are within defined limits  Gastrointestinal  Gastrointestinal (WDL): Exceptions to WDL  Constipation: Yes - Chronic (Greater than 3 months)  Musculoskeletal  Musculoskeletal (WDL): All musculoskeletal elements are within defined limits  Neurological  Neurological (WDL): Exceptions to WDL  Dominant Hand: Right  Psychological/Emotional  Psychological/Emotional (WDL): All psychological/emotional elements are within defined limits  Hematological/Lymphatic  Hematological/Lymphatic (WDL): All hematological/lymphatic elements are within defined limits  Dermatological  Dermatologic (WDL): Exceptions to WDL  Healing Incision: Yes - Recent (Less than 3 months)  Genitourinary/Reproductive  Genitourinary/Reproductive (WDL): All genitourinary/reproductive elements are within defined limits  Reproductive (Females only)     Pain  Currently in Pain: Yes  Pain Frequency: Intermittent  Location: abdomen, surgery area  Pain Characteristics : Aching  Pain Intervention(s): Medication (See MAR)  Response to Interventions: helpful    Physical Exam:    Recent Vitals 11/24/2020   Height 5' 2\"   Weight 112 lbs   BSA (m2) 1.49 m2   /87   Pulse 80   Temp 98.3   Temp src 1   SpO2 98   Some recent data might be hidden     General: patient appears stated age of 80 y.o.. Nontoxic and in no distress.   HEENT: Head: atraumatic, normocephalic. Sclerae anicteric.  Chest:  Normal respiratory effort  Cardiac:  No edema.   Abdomen: abdomen is non-distended  Extremities: normal tone and muscle bulk.  Skin: no lesions or rash. Warm and dry.   CNS: alert and oriented. Grossly non-focal.   Psychiatric: normal mood and affect.     Lab Results:    Recent Results (from the past 168 hour(s))   POCT Glucose    Specimen: Capillary; Blood   Result Value Ref Range    Glucose 82 70 - 139 mg/dL     Imaging Results:    I personally reviewed the PET scan images which showed no evidence of disease.    Ct Abdomen Pelvis Without Oral Without Iv " Contrast    Result Date: 10/29/2020  EXAM: CT ABDOMEN PELVIS WO ORAL WO IV CONTRAST LOCATION: [facility] DATE/TIME: 10/29/2020 9:09 PM INDICATION: Abdominal pain, acute, nonlocalized abdominal distension. History of diverticulitis. Status post hysterectomy and laparoscopic ovarian cyst removal. COMPARISON: 10/01/2019 TECHNIQUE: CT scan of the abdomen and pelvis was performed without oral or IV contrast. Multiplanar reformats were obtained. Dose reduction techniques were used. CONTRAST: None. FINDINGS: LOWER CHEST: There is atherosclerotic disease including coronary artery disease. HEPATOBILIARY: Normal. PANCREAS: Normal. SPLEEN: Normal. ADRENAL GLANDS: Normal. KIDNEY/BLADDER: Normal. BOWEL: Normal stomach. Normal caliber of the small bowel. Normal appendix. There is a 4.2 x 4.0 x 4.9 cm soft tissue mass in the left pelvis adjacent to the proximal sigmoid colon which exhibits mild adjacent stranding (series 2 image 138). There is an air locule in this soft tissue. LYMPH NODES: Normal. VASCULATURE: Atherosclerotic disease. The intra-abdominal aorta is 2.9 cm in maximum AP dimension (no significant change). PELVIC ORGANS: Hysterectomy. The left pelvic mass adjacent to the sigmoid colon lies in the region of the left ovary. MUSCULOSKELETAL: Normal.     1.  There is a soft tissue lesion in the left pelvis which exhibits mild adjacent stranding and contains an internal air locule. This abuts the proximal sigmoid colon and could represent a sigmoid abscess. Communication with the left ovary is suspected. This is not well assessed for suitability of drainage on this study due to lack of IV contrast. CT pelvis with contrast could be obtained to assess for an internal fluid pocket.    Ct Abdomen Pelvis With Oral Without Iv Contrast    Result Date: 10/30/2020  EXAM: CT ABDOMEN PELVIS W ORAL WO IV CONTRAST LOCATION: [facility] DATE/TIME: 10/30/2020 5:55 PM INDICATION: Abdominal  "pain, acute, nonlocalized Question of an abscess; COMPARISON: CT abdomen and pelvis 10/29/2020 TECHNIQUE: CT scan of the abdomen and pelvis was performed without IV contrast. Multiplanar reformats were obtained. Dose reduction techniques were used. CONTRAST: Oral contrast only. IV contrast was not administered. FINDINGS: LOWER CHEST: Linear fibrotic changes or atelectasis both lower lobes. Coronary artery calcifications. Severe atherosclerotic disease lower thoracic aorta. HEPATOBILIARY: Normal. PANCREAS: Normal. SPLEEN: Normal. ADRENAL GLANDS: Normal. KIDNEYS/BLADDER: Normal. BOWEL: Complex soft tissue mass again seen within the left pelvis measuring 3.4 x 3.7 cm on image 61 of series 2 with central small collection of gas. This abuts the adjacent sigmoid colon where there is some mild stranding and diverticula. No evidence for bowel obstruction. LYMPH NODES: Normal. VASCULATURE: Severe atherosclerotic disease abdominal aorta and iliac arteries. Mild aneurysmal dilatation infrarenal abdominal aorta measuring 3.1 x 2.4 cm. PELVIC ORGANS: Hysterectomy and right salpingooophorectomy. No ascites. MUSCULOSKELETAL: Normal.     1.  Complex left pelvic mass with central collection of gas may represent an abscess secondary to sigmoid diverticulitis. The lesion remains suboptimally characterized due to the lack of IV contrast. The relationship of the mass within the left ovary remains indeterminate. 2.  Severe atherosclerotic disease lower thoracic and abdominal aorta with 3.1 x 2.4 cm abdominal aortic aneurysm.     Poc Us Guidance Needle Placement    Result Date: 10/31/2020  Ultrasound was performed as guidance to an anesthesia procedure.  Click \"PACS images\" hyperlink below to view any stored images.  For specific procedure details, view procedure note authored by anesthesia.    Nm Pet Ct Skull To Mid Thigh    Result Date: 11/18/2020  EXAM: NM PET CT SKULL TO MID THIGH LOCATION: [facility] " DATE/TIME: 11/18/2020 10:06 AM INDICATION: Initial treatment planning and staging for malignant neoplasm of sigmoid colon. COMPARISON: CT of the abdomen pelvis dated 10/30/2020. TECHNIQUE: Serum glucose level 82 mg/dL. One hour post intravenous administration of 9.5 mCi F-18 FDG, PET imaging was performed from the skull base to the mid thighs utilizing attenuation correction with concurrent axial CT and PET/CT image fusion. Dose  reduction techniques were used. FINDINGS: Mildly FDG avid inflammatory stranding in the anterior abdominal wall and mesenteric fat likely representing inflammatory change from recent sigmoid colon resection without convincing evidence of active neoplasm. The remaining FDG uptake is physiologic from the skull base to mid thigh. Mild to moderate senescent intracranial changes. Moderate carotid artery bifurcation calcification. Mild coronary artery calcium. Sigmoid diverticulosis. Hysterectomy. Pelvic phleboliths. Multilevel degenerative changes of the spine.     No metabolic evidence of active neoplasm.      Signed by: Walt Merrill MD

## 2021-06-13 NOTE — TELEPHONE ENCOUNTER
Records in Epic / images in Nil.    Epic Notes  11/5/2020 - Discharge Summary / Admission 10/29/2020.                   - 11/5, Progress Notes, Dr. Villegas.                   - 11/4, Progress Notes, Ko Street, Hospitalist.                   - 11/2, Progress Notes, Ambrocio Reed, Hospitalist.                   - 11/1, Progress Notes, Alberto Hightower PA-C, Gastroenterology.                   - 10/31, Op Note / Colectomy, Dr. Villegas.                   - 10/31, Progress Note, Alberto Hightower PA-C, Gastroenterology.                   - 10/30, Consults, Tio Bosch DO, Gastroenterology.                   - 10/30, Consults, Dr. Walt Covarrubias.                   - 10/29, ED Provider Notes, Dr. Ludy Aranda.    Labs  Inpatient labs  10/31/2020 - Surgical Pathology.    Imaging  11/18/2020 - NM PET CT.  10/31/2020 - POC US guidance needle placement.  10/30/2020 - CT Abdomen & pelvis.  10/29/2020 - CT Abdomen & pelvis.    Media  7/10/2019 - Progress Note Ext./ Mo Mendes.  Writer has requested Mo record updates.

## 2021-06-13 NOTE — PROGRESS NOTES
Katya came to chemo infusion following port lab draw and NP appointments for cycle 2 using Folfox chemotherapy.  She was educated on her treatment plan and each medication given today was reviewed prior to administration.  Katya received treatment as ordered and tolerated it well while in clinic.  She received her bolus dose of 5FU then was connected to a Cadd pump and her 46 hour home infusion was started.  Katya left clinic ambulatory.  She will return at noon for pump removal.

## 2021-06-13 NOTE — ANESTHESIA POSTPROCEDURE EVALUATION
Patient: Katya Butler  Procedure(s):  Port Placement  Anesthesia type: MAC    Patient location: PACU  Last vitals:   Vitals Value Taken Time   /61 12/02/20 1131   Temp 36.2  C (97.2  F) 12/02/20 1045   Pulse 82 12/02/20 1135   Resp 16 12/02/20 1052   SpO2 94 % 12/02/20 1135   Vitals shown include unvalidated device data.  Post vital signs: stable  Level of consciousness: awake and responds to simple questions  Post-anesthesia pain: pain controlled  Post-anesthesia nausea and vomiting: no  Pulmonary: unassisted, return to baseline  Cardiovascular: stable and blood pressure at baseline  Hydration: adequate  Anesthetic events: no    QCDR Measures:  ASA# 11 - Azucena-op Cardiac Arrest: ASA11B - Patient did NOT experience unanticipated cardiac arrest  ASA# 12 - Azucena-op Mortality Rate: ASA12B - Patient did NOT die  ASA# 13 - PACU Re-Intubation Rate: ASA13B - Patient did NOT require a new airway mgmt  ASA# 10 - Composite Anes Safety: ASA10A - No serious adverse event    Additional Notes:

## 2021-06-13 NOTE — PROGRESS NOTES
[facility] Hematology and Oncology Consult Note    Patient: Katya Butler  MRN: [id number]  Date of Service: 12/22/2020      Reason for Visit:    1.  Colon cancer, resected    Assessment/Plan:    1.  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  Labwork satisfactory.    Discussed going up a bit on the dose given her tolerance, but she would like to keep doses the same for now and revisit if she continues to have minimal side effects.    Return in 2 weeks ahead of cycle 3.    Planning 12 cycles, pending tolerance.    She should also have a complete colonoscopy in the near future to make sure there are no synchronous colon tumors. Referral was placed at her last visit but she has not been contacted to schedule, we will follow-up on status.    2.  Constipation:   Improved. Miralax and stool softeners, as needed.    ECOG Performance   ECOG Performance Status: 0    Distress Assessment  Distress Assessment Score: No distress     Staging History:    Cancer Staging  Malignant neoplasm of sigmoid colon (H)  Staging form: Colon And Rectum, AJCC 8th Edition  - Clinical: No stage assigned - Unsigned  - Pathologic stage from 11/24/2020: Stage IIIC (pT4b, pN2, cM0) - Signed by Walt Merrill MD on 11/24/2020    Oncologic History:  October, 2020: Stage IIIC colon cancer  -presented with a few months of symptoms consistent with prior diverticulitis  No improvement with course of antibiotics. Progressed to vomiting.  -Imaging suggested an area of thickening in the sigmoid colon, adherent to the left ovary.    -Surgical path (10/31/20):  invasive moderately differentiated adenocarcinoma.  Constricting ulcerated measuring 35 x 25 x 13 mm.  4 of 18 lymph nodes were positive.  Left ovary showed moderately differentiated adenocarcinoma.  Focal positive margin at the paraovarian soft tissue margin.   -Mismatch repair intact.    -PET  (post-resection): no evidence of residual or metastatic disease    10/31/2020: open sigmoid colectomy.  There is evidence of a small abscess and perforation of the colon at surgery.  There was some proximal colonic dilatation, consistent with obstruction.      12/8/2020: initiated adjuvant FOLFOX (25% dose-reduction for age). Plan 12 cycles, pending tolerance.     Interim History:  Katya returns for cycle 2 FOLFOX.   Tolerated the first cycle very well with no side effects per patient.    Staying active around her home.  Appetite is good, weight stable. No nausea. A few days of constipation after chemo, managed with Miralax and softener. No neuropathy. Mild cold sensitivity in feet.      Review of Systems:  Constitutional  Constitutional (WDL): Exceptions to WDL  Fatigue: Fatigue relieved by rest  Neurosensory  Neurosensory (WDL): All neurosensory elements are within defined limits  Eye   Eye Disorder (WDL): Assessment not pertinent to visit  Ear  Ear Disorder (WDL): Assessment not pertinent to visit  Cardiovascular  Cardiovascular (WDL): All cardiovascular elements are within defined limits  Pulmonary  Respiratory (WDL): Within Defined Limits  Gastrointestinal  Gastrointestinal (WDL): All gastrointestinal elements are within defined limits  Genitourinary  Genitourinary (WDL): All genitourinary elements are within defined limits  Lymphatic  Lymph (WDL): Assessment not pertinent to visit  Musculoskeletal and Connective Tissue  Musculoskeletal and Connetive Tissue Disorders (WDL): Assessment not pertinent to visit  Integumentary  Integumentary (WDL): All integumentary elements are within defined limits  Patient Coping  Patient Coping: Accepting  Distress Assessment  Distress Assessment Score: No distress  Accompanied by  Accompanied by: Alone        Physical Exam:    Recent Vitals 12/22/2020   Height -   Weight 113 lbs   BSA (m2) 1.5 m2   /65   Pulse 72   Temp 98.7   Temp src 1   SpO2 97   Some recent data might  be hidden     General: patient appears stated age of 80 y.o.. Nontoxic and in no distress. Alone.   HEENT: Head: atraumatic, normocephalic. Sclerae anicteric.  Chest:  Normal respiratory effort. Clear lungs bilaterally. Left port site.  Cardiac:  No edema.   Abdomen: abdomen is non-distended. Mid-line incision well-healed.  Extremities: normal tone and muscle bulk.  Skin: no lesions or rash. Warm and dry.   CNS: alert and oriented. Grossly non-focal.   Psychiatric: normal mood and affect.     Lab Results:    Recent Results (from the past 168 hour(s))   Magnesium   Result Value Ref Range    Magnesium 1.9 1.8 - 2.6 mg/dL   Comprehensive Metabolic Panel   Result Value Ref Range    Sodium 140 136 - 145 mmol/L    Potassium 4.2 3.5 - 5.0 mmol/L    Chloride 108 (H) 98 - 107 mmol/L    CO2 25 22 - 31 mmol/L    Anion Gap, Calculation 7 5 - 18 mmol/L    Glucose 108 70 - 125 mg/dL    BUN 14 8 - 28 mg/dL    Creatinine 0.78 0.60 - 1.10 mg/dL    GFR MDRD Af Amer >60 >60 mL/min/1.73m2    GFR MDRD Non Af Amer >60 >60 mL/min/1.73m2    Bilirubin, Total 0.2 0.0 - 1.0 mg/dL    Calcium 8.6 8.5 - 10.5 mg/dL    Protein, Total 6.2 6.0 - 8.0 g/dL    Albumin 3.1 (L) 3.5 - 5.0 g/dL    Alkaline Phosphatase 87 45 - 120 U/L    AST 16 0 - 40 U/L    ALT 11 0 - 45 U/L   HM1 (CBC with Diff)   Result Value Ref Range    WBC 6.3 4.0 - 11.0 thou/uL    RBC 3.99 3.80 - 5.40 mill/uL    Hemoglobin 11.6 (L) 12.0 - 16.0 g/dL    Hematocrit 35.8 35.0 - 47.0 %    MCV 90 80 - 100 fL    MCH 29.1 27.0 - 34.0 pg    MCHC 32.4 32.0 - 36.0 g/dL    RDW 16.8 (H) 11.0 - 14.5 %    Platelets 348 140 - 440 thou/uL    MPV 9.0 8.5 - 12.5 fL    Neutrophils % 54 50 - 70 %    Lymphocytes % 31 20 - 40 %    Monocytes % 11 (H) 2 - 10 %    Eosinophils % 3 0 - 6 %    Basophils % 1 0 - 2 %    Immature Granulocyte % 0 <=0 %    Neutrophils Absolute 3.4 2.0 - 7.7 thou/uL    Lymphocytes Absolute 2.0 0.8 - 4.4 thou/uL    Monocytes Absolute 0.7 0.0 - 0.9 thou/uL    Eosinophils Absolute 0.2  0.0 - 0.4 thou/uL    Basophils Absolute 0.0 0.0 - 0.2 thou/uL    Immature Granulocyte Absolute 0.0 <=0.0 thou/uL        Imaging Results:      No results found.     Total time: 20 min; 15 min towards counseling/coordination of care    Signed by: Argenis Charles, CNP

## 2021-06-13 NOTE — TELEPHONE ENCOUNTER
Navigator made wellbeing call to patient following new consult, at request of Dr. Merrill. Spoke briefly with pt's , and then her. Offered to hear about her appointment and address any questions she may have.     She understood when her upcoming appointments were, but did have several questions about the portacath, about the chemo, how long her appointment is for that, how the take-home pump works, if she should eat before the chemo, etc.    Writer explained that her appt on Tuesday 12/8 is 3.5 hours long, after the visit with TONY Charles. Then explained that she will take the pump home and return on Thursday,12/10 to have the pump disconnected. Writer suggested she eat something small that feels comforting to her prior to the chemo, so as not to be hungry the whole time; and that we do not know if she will feel side effects at the time, or a day or two later. Reminded her that chemo is usually started at not 100% dose, to see how she does.      Pt verbalized appreciation for the call. Writer will call her in a week or two, and continue to follow.

## 2021-06-13 NOTE — ANESTHESIA PREPROCEDURE EVALUATION
Anesthesia Evaluation      Patient summary reviewed   No history of anesthetic complications     Airway   Mallampati: II  Neck ROM: full   Pulmonary     breath sounds clear to auscultation  (+) a smoker (>40 pk yrs)                         Cardiovascular   Exercise tolerance: > or = 4 METS  (+) hypertension, CABG/stent (Likely stent in 2009 - pt unsure of exact procedure, available documents from CV lab at that time but exact procedure unknown), ,     (-) angina  ECG reviewed (Unchanged since 2009)  Rhythm: regular   murmur Location:upper left sternal border      Neuro/Psych - negative ROS     Endo/Other    (+) arthritis,      GI/Hepatic/Renal      Comments: Mass on colon. Not causing obstruction.     Other findings:     NPO > 8 hrs   COVID negative 11/29/20    Na 139  K 3.4  BUN/Cr 6/0.63  Hbg 10.7  Plt 473          Dental    (+) poor dentition    Comment: No teeth on top                         Anesthesia Plan  Planned anesthetic: MAC  Fentanyl/ketamine prn  Dexamethasone 4 mg, ondansetron 4 mg   Avoid benzodiazepines, anticholinergics, diphenhydramine and meperidine 2/2 to age and risk for post-op delirium       ASA 3     Anesthetic plan and risks discussed with: patient    Post-op plan: routine recovery

## 2021-06-13 NOTE — PROGRESS NOTES
[facility] Hematology and Oncology Consult Note    Patient: Katya Butler  MRN: [id number]  Date of Service: 12/08/2020      Reason for Visit:    1.  Colon cancer, resected    Assessment/Plan:    1.  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 Merrill is recommending that she received 12 cycles of FOLFOX chemotherapy.  Regimen has been reviewed and printed material was given to her. Consent signed today.     Port-A-Cath placed.     Here to start cycle 1 today. Will start with 25% dose-reduction given her age. We can increase dose if she tolerates this very well.  Reviewed use of dexamethasone post-chemo and compazine, as needed.    Return in 2 weeks ahead of cycle 2.    She should also have a complete colonoscopy in the near future to make sure there are no synchronous colon tumors. Referral placed.    2.  Constipation:   Improved. Stool softeners, as needed.    ECOG Performance   ECOG Performance Status: 1    Distress Assessment  Distress Assessment Score: 2  Staging History:    Cancer Staging  Malignant neoplasm of sigmoid colon (H)  Staging form: Colon And Rectum, AJCC 8th Edition  - Clinical: No stage assigned - Unsigned  - Pathologic stage from 11/24/2020: Stage IIIC (pT4b, pN2, cM0) - Signed by Walt Merrill MD on 11/24/2020    Oncologic History:  October, 2020: Stage IIIC colon cancer  -presented with a few months of symptoms consistent with prior diverticulitis  No improvement with course of antibiotics. Progressed to vomiting.  -Imaging suggested an area of thickening in the sigmoid colon, adherent to the left ovary.    -Surgical path (10/31/20):  invasive moderately differentiated adenocarcinoma.  Constricting ulcerated measuring 35 x 25 x 13 mm.  4 of 18 lymph nodes were positive.  Left ovary showed moderately differentiated adenocarcinoma.  Focal positive margin at the paraovarian soft tissue margin.   -Mismatch repair intact.    -PET (post-resection): no evidence of residual or metastatic  disease    10/31/2020: open sigmoid colectomy.  There is evidence of a small abscess and perforation of the colon at surgery.  There was some proximal colonic dilatation, consistent with obstruction.      12/8/2020: initiated adjuvant FOLFOX (25% dose-reduction for age). Plan 12 cycles, pending tolerance.     Interim History:  Recovered well after surgery.   Overall she is feeling good. Mild fatigue, active around the home.  Minimal pain.  Appetite is okay. Bowels are regular.       Review of Systems:  Constitutional  Constitutional (WDL): All constitutional elements are within defined limits  Neurosensory  Neurosensory (WDL): All neurosensory elements are within defined limits  Eye   Eye Disorder (WDL): All eye disorder elements are within defined limits  Ear  Ear Disorder (WDL): All ear disorder elements are within defined limits  Cardiovascular  Cardiovascular (WDL): All cardiovascular elements are within defined limits  Pulmonary  Respiratory (WDL): Within Defined Limits  Gastrointestinal  Gastrointestinal (WDL): All gastrointestinal elements are within defined limits  Genitourinary  Genitourinary (WDL): All genitourinary elements are within defined limits  Lymphatic  Lymph (WDL): All lymph disorder elements are within defined limits  Musculoskeletal and Connective Tissue  Musculoskeletal and Connetive Tissue Disorders (WDL): All Musculoskeletal and Connetive Tissue Disorder elements are within defined limits  Integumentary  Integumentary (WDL): All integumentary elements are within defined limits  Patient Coping  Patient Coping: Open/discussion  Distress Assessment  Distress Assessment Score: 2  Accompanied by  Accompanied by: Alone        Physical Exam:    Recent Vitals 12/8/2020   Height -   Weight 113 lbs 10 oz   BSA (m2) 1.5 m2   /77   Pulse 76   Temp 98.2   Temp src 1   SpO2 96   Some recent data might be hidden     General: patient appears stated age of 80 y.o.. Nontoxic and in no distress. Alone.    HEENT: Head: atraumatic, normocephalic. Sclerae anicteric.  Chest:  Normal respiratory effort. Clear lungs bilaterally. Left port site.  Cardiac:  No edema.   Abdomen: abdomen is non-distended. Mid-line incision well-healed.  Extremities: normal tone and muscle bulk.  Skin: no lesions or rash. Warm and dry.   CNS: alert and oriented. Grossly non-focal.   Psychiatric: normal mood and affect.     Lab Results:    Recent Results (from the past 168 hour(s))   Magnesium   Result Value Ref Range    Magnesium 1.8 1.8 - 2.6 mg/dL   Comprehensive Metabolic Panel   Result Value Ref Range    Sodium 140 136 - 145 mmol/L    Potassium 4.1 3.5 - 5.0 mmol/L    Chloride 108 (H) 98 - 107 mmol/L    CO2 28 22 - 31 mmol/L    Anion Gap, Calculation 4 (L) 5 - 18 mmol/L    Glucose 155 (H) 70 - 125 mg/dL    BUN 15 8 - 28 mg/dL    Creatinine 0.74 0.60 - 1.10 mg/dL    GFR MDRD Af Amer >60 >60 mL/min/1.73m2    GFR MDRD Non Af Amer >60 >60 mL/min/1.73m2    Bilirubin, Total 0.3 0.0 - 1.0 mg/dL    Calcium 8.7 8.5 - 10.5 mg/dL    Protein, Total 6.4 6.0 - 8.0 g/dL    Albumin 3.2 (L) 3.5 - 5.0 g/dL    Alkaline Phosphatase 89 45 - 120 U/L    AST 15 0 - 40 U/L    ALT <9 0 - 45 U/L   HM1 (CBC with Diff)   Result Value Ref Range    WBC 6.9 4.0 - 11.0 thou/uL    RBC 3.99 3.80 - 5.40 mill/uL    Hemoglobin 11.4 (L) 12.0 - 16.0 g/dL    Hematocrit 36.1 35.0 - 47.0 %    MCV 91 80 - 100 fL    MCH 28.6 27.0 - 34.0 pg    MCHC 31.6 (L) 32.0 - 36.0 g/dL    RDW 17.0 (H) 11.0 - 14.5 %    Platelets 381 140 - 440 thou/uL    MPV 8.8 8.5 - 12.5 fL    Neutrophils % 61 50 - 70 %    Lymphocytes % 23 20 - 40 %    Monocytes % 12 (H) 2 - 10 %    Eosinophils % 3 0 - 6 %    Basophils % 0 0 - 2 %    Immature Granulocyte % 0 <=0 %    Neutrophils Absolute 4.2 2.0 - 7.7 thou/uL    Lymphocytes Absolute 1.6 0.8 - 4.4 thou/uL    Monocytes Absolute 0.9 0.0 - 0.9 thou/uL    Eosinophils Absolute 0.2 0.0 - 0.4 thou/uL    Basophils Absolute 0.0 0.0 - 0.2 thou/uL    Immature Granulocyte  Absolute 0.0 <=0.0 thou/uL        Imaging Results:      Nm Pet Ct Skull To Mid Thigh    Result Date: 11/18/2020  EXAM: NM PET CT SKULL TO MID THIGH LOCATION: [facility] DATE/TIME: 11/18/2020 10:06 AM INDICATION: Initial treatment planning and staging for malignant neoplasm of sigmoid colon. COMPARISON: CT of the abdomen pelvis dated 10/30/2020. TECHNIQUE: Serum glucose level 82 mg/dL. One hour post intravenous administration of 9.5 mCi F-18 FDG, PET imaging was performed from the skull base to the mid thighs utilizing attenuation correction with concurrent axial CT and PET/CT image fusion. Dose  reduction techniques were used. FINDINGS: Mildly FDG avid inflammatory stranding in the anterior abdominal wall and mesenteric fat likely representing inflammatory change from recent sigmoid colon resection without convincing evidence of active neoplasm. The remaining FDG uptake is physiologic from the skull base to mid thigh. Mild to moderate senescent intracranial changes. Moderate carotid artery bifurcation calcification. Mild coronary artery calcium. Sigmoid diverticulosis. Hysterectomy. Pelvic phleboliths. Multilevel degenerative changes of the spine.     No metabolic evidence of active neoplasm.    Total time: 30 min; 20 min towards counseling/coordination of care    Signed by: Argenis Charles, CNP

## 2021-06-13 NOTE — PROGRESS NOTES
Patient here for labs, MD visit and cycle 1 day 1 Folfox. Patient met treatment parameters. Patient tolerated treatment without difficulty. Patient was educated on side effects and given afterhours phone number for side effects. Was given printed copy of education folder and reviewed with patient. Patient states understanding of treatment plan and will come back on 12/10/20 at 1315 for pump disconnect. Patient ambulatory to [facility].

## 2021-06-13 NOTE — TELEPHONE ENCOUNTER
I rec'd an in-basket message from Dr. Merrill that he would like to get a PET CT scan on Katya prior to her appointment with him on 11-24.  I called and spoke to Katya and her , Justin.  I explained the PET CT scan and purpose.  She is scheduled for Wednesday, 11-18.  I gave all the information to Justin and the prep.  He voiced his understanding.

## 2021-06-13 NOTE — ANESTHESIA CARE TRANSFER NOTE
Last vitals:   Vitals:    12/02/20 1044   BP: 106/72   Pulse: 70   Resp: 14   Temp: 36.8  C (98.2  F)   SpO2: 100%     Patient's level of consciousness is drowsy  Spontaneous respirations: yes  Maintains airway independently: yes  Dentition unchanged: yes  Oropharynx: oropharynx clear of all foreign objects    QCDR Measures:  ASA# 20 - Surgical Safety Checklist: WHO surgical safety checklist completed prior to induction    PQRS# 430 - Adult PONV Prevention: 4558F - Pt received => 2 anti-emetic agents (different classes) preop & intraop  ASA# 8 - Peds PONV Prevention: NA - Not pediatric patient, not GA or 2 or more risk factors NOT present  PQRS# 424 - Azucena-op Temp Management: 4559F - At least one body temp DOCUMENTED => 35.5C or 95.9F within required timeframe  PQRS# 426 - PACU Transfer Protocol: - Transfer of care checklist used  ASA# 14 - Acute Post-op Pain: ASA14B - Patient did NOT experience pain >= 7 out of 10

## 2021-06-13 NOTE — PROGRESS NOTES
Katya is in for follow-up of a sigmoid colon resection with a left oophorectomy.   She is now just a little less than 2 weeks out from surgery.  Pain is well controlled. No fevers. Eating fairly well.  Still just feels somewhat tired and weak.    Physical exam:  General: She is moving around well with no signs of discomfort.  Abdomen: Soft minimal tenderness.  The incision(s) is healing up nicely with no signs of infection.  Removed all of her staples today.    Pathology: Adenocarcinoma of the colon which is through the wall of the colon and into the ovary.  4 of 18 lymph nodes were involved.    Impression: Doing well postoperatively.  No signs of complications.  Reviewed her pathology with her again.  She seemed a little bit confused about the fact that she had cancer.  I had talked her about this in the hospital but it is unclear that she really remembered it.  She has an appointment set up for a PET scan and then follow-up with oncology.  I am going to repeat her CEA today to see if that has changed with her resection.    Plan: Encouraged  to slowly get back to normal activities.  Told that it would be at least another 4-6 weeks before getting back to normal.  Follow-up with me will be as needed.

## 2021-06-14 NOTE — PROGRESS NOTES
"Katya Butler is a 80 y.o. female who is being evaluated via a billable video visit.      The patient has been notified of following:     \"This video visit will be conducted via a call between you and your physician/provider. We have found that certain health care needs can be provided without the need for an in-person physical exam.  This service lets us provide the care you need with a video conversation.  If a prescription is necessary we can send it directly to your pharmacy.  If lab work is needed we can place an order for that and you can then stop by our lab to have the test done at a later time.    Video visits are billed at different rates depending on your insurance coverage. Please reach out to your insurance provider with any questions.    If during the course of the call the physician/provider feels a video visit is not appropriate, you will not be charged for this service.\"    Patient has given verbal consent to a Video visit? Yes  How would you like to obtain your AVS? AVS Preference: MyChart.  If dropped by the video visit, the video invitation should be sent to: Text to cell phone: [phone]  Will anyone else be joining your video visit? No          Video-Visit Details    Type of service:  Video Visit    Originating Location (pt. Location): Home    Distant Location (provider location):  [facility]     Platform used for Video Visit: DoxPharnext      This document was created using a software with less than 100% fidelity, at times resulting in unintended, even erroneous syntax and grammar.  The reader is advised to keep this under consideration while reviewing, interpreting this note.    ASSESSMENT AND PLAN:    Diagnoses and all orders for this visit:    Calcium pyrophosphate arthropathy of multiple sites  -     colchicine 0.6 mg tablet; Take 1 tablet (0.6 mg total) by mouth every other day.  Dispense:  ; Refill: 0    Idiopathic chronic gout with tophus, unspecified site  -  " "   colchicine 0.6 mg tablet; Take 1 tablet (0.6 mg total) by mouth every other day.  Dispense:  ; Refill: 0    Primary osteoarthritis involving multiple joints    Malignant neoplasm of sigmoid colon (H)          HISTORY OF PRESENTING ILLNESS:  Katya Butler 80 y.o. is evaluated here via video link.  This is for follow-up.  She has very likely pseudogout, had inflammatory joint disease, she has osteoarthritis.  And she had in the past had a first MTP, acute monoarthritis, with morphology of crystalline arthropathy, gout was suspected to her serum urate has \"always\" been in the low range as noted.  She has chondrocalcinosis.  She is never had effusion to be tapped.  Meanwhile since her previous visit she has been found to have colon cancer, sigmoid, she does not want to take colchicine anymore I merrily because she has not had a flareup for a long time and also because she is already taking so many medications as she puts it for her colon cancer chemotherapy that she wants a break from there we discussed pros and cons.  We will meet here in 6 months.  Management principles of OA reviewed.  ROS enquiry held for fever, ocular symptoms, rash, headache,  GI issues.  Today we also discussed the issues related to the current pandemic, the pros and cons of the current treatment plan, the CDC guidelines such as social distancing washing the hands covering the cough.  ALLERGIES:Dye, Ct [iohexol], Aspirin, Cephalexin, and Telithromycin    PAST MEDICAL/ACTIVE PROBLEMS/MEDICATION/SOCIAL DATA  Past Medical History:   Diagnosis Date     HTN (hypertension)      Malignant neoplasm of sigmoid colon (H) 11/24/2020     Primary osteoarthritis involving multiple joints 8/2/2019     Social History     Tobacco Use   Smoking Status Current Every Day Smoker     Packs/day: 0.25   Smokeless Tobacco Never Used     Patient Active Problem List   Diagnosis     Primary osteoarthritis involving multiple joints     Calcium pyrophosphate arthropathy " of multiple sites     Periarticular calcification     Tobacco use disorder     Hyperlipidemia     Intra-abdominal abscess (H)     Colonic diverticular abscess     Accelerated hypertension     Idiopathic chronic gout with tophus, unspecified site     Postoperative ileus (H)     Malignant neoplasm of sigmoid colon (H)     Current Outpatient Medications   Medication Sig Dispense Refill     amLODIPine (NORVASC) 5 MG tablet Take 5 mg by mouth 2 (two) times a day.       clopidogrel (PLAVIX) 75 mg tablet Take 75 mg by mouth daily.       colchicine 0.6 mg tablet Take 0.6 mg by mouth every other day.       dexAMETHasone (DECADRON) 4 MG tablet Take 8mg daily in the morning for 2 days, starting the day after chemotherapy.. 24 tablet 0     ibuprofen (ADVIL,MOTRIN) 200 MG tablet Take 400 mg by mouth every 6 (six) hours as needed for pain.       lovastatin (ALTOPREV) 20 MG 24 hr tablet Take 60 mg by mouth at bedtime. Patient takes 20mg +40mg tablet= 60mg total       polyvinyl alcohol (LIQUIFILM TEARS) 1.4 % ophthalmic solution Administer 1 drop to both eyes as needed for dry eyes.       prochlorperazine (COMPAZINE) 10 MG tablet Take 1 tablet (10 mg total) by mouth every 6 (six) hours as needed (For breakthrough nausea/vomiting). 30 tablet 1     No current facility-administered medications for this visit.          EXAMINATION:    Using the audio and video link as best as possible the constitutional, neck, neurologic, psych, skin, both upper extremities areas/organ system were evaluated during this assessment.  Some of the important findings: Alert, oriented, speech fluent.   Able to fully flex the digits, into fists bilaterally, wrist and elbow range of motion appear normal, abduction of the shoulder is normal.      LAB / IMAGING DATA:  ALT   Date Value Ref Range Status   12/22/2020 11 0 - 45 U/L Final   12/08/2020 <9 0 - 45 U/L Final   10/29/2020 <9 0 - 45 U/L Final     Albumin   Date Value Ref Range Status   12/22/2020 3.1 (L)  3.5 - 5.0 g/dL Final   12/08/2020 3.2 (L) 3.5 - 5.0 g/dL Final   10/29/2020 3.5 3.5 - 5.0 g/dL Final     Creatinine   Date Value Ref Range Status   12/22/2020 0.78 0.60 - 1.10 mg/dL Final   12/08/2020 0.74 0.60 - 1.10 mg/dL Final   11/05/2020 0.63 0.60 - 1.10 mg/dL Final       WBC   Date Value Ref Range Status   12/22/2020 6.3 4.0 - 11.0 thou/uL Final   12/08/2020 6.9 4.0 - 11.0 thou/uL Final     Hemoglobin   Date Value Ref Range Status   12/22/2020 11.6 (L) 12.0 - 16.0 g/dL Final   12/08/2020 11.4 (L) 12.0 - 16.0 g/dL Final   11/05/2020 10.7 (L) 12.0 - 16.0 g/dL Final     Platelets   Date Value Ref Range Status   12/22/2020 348 140 - 440 thou/uL Final   12/08/2020 381 140 - 440 thou/uL Final   11/05/2020 473 (H) 140 - 440 thou/uL Final       Lab Results   Component Value Date    RF <15.0 07/24/2019     Duration of the call:6  Minutes  Call start: 1120 am  Call end:   1126 am

## 2021-06-14 NOTE — PROGRESS NOTES
No treatment today as pt is having a colonoscopy Thursday. Port flushed and deaccessed. Pt will return next week for labs and treatment.

## 2021-06-14 NOTE — PROGRESS NOTES
[facility] Hematology and Oncology Consult Note    Patient: Katya Butler  MRN: [id number]  Date of Service: 01/05/2021      Reason for Visit:    1.  Colon cancer, resected    Assessment/Plan:    1.  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 CBC satisfactory and CMP pending.    We have discussed going up a bit on the dose given her tolerance (90% dose), but she would like to keep doses the same for now to keep her side effects minimal and we can revisit if she continues to have minimal side effects.    Return in 2 weeks ahead of cycle 4.    Planning 12 cycles, pending tolerance.    She should also have a complete colonoscopy in the near future to make sure there are no synchronous colon tumors. Referral was placed and she's working on getting scheduled. If we need to move her chemo appt back 1-2 days to accommodate, that is fine.    2.  Constipation:   Improved. Miralax and stool softeners, as needed.    ECOG Performance  0    Distress Assessment  0    Staging History:    Cancer Staging  Malignant neoplasm of sigmoid colon (H)  Staging form: Colon And Rectum, AJCC 8th Edition  - Clinical: No stage assigned - Unsigned  - Pathologic stage from 11/24/2020: Stage IIIC (pT4b, pN2, cM0) - Signed by Walt Merrill MD on 11/24/2020    Oncologic History:  October, 2020: Stage IIIC colon cancer  -presented with a few months of symptoms consistent with prior diverticulitis  No improvement with course of antibiotics. Progressed to vomiting.  -Imaging suggested an area of thickening in the sigmoid colon, adherent to the left ovary.    -Surgical path (10/31/20):  invasive moderately differentiated adenocarcinoma.  Constricting ulcerated measuring 35 x 25 x 13 mm.  4 of 18 lymph nodes were positive.  Left ovary showed moderately differentiated adenocarcinoma.  Focal positive margin at the paraovarian soft tissue margin.   -Mismatch  repair intact.    -PET (post-resection): no evidence of residual or metastatic disease    10/31/2020: open sigmoid colectomy.  There is evidence of a small abscess and perforation of the colon at surgery.  There was some proximal colonic dilatation, consistent with obstruction.      12/8/2020: initiated adjuvant FOLFOX (25% dose-reduction for age). Plan 12 cycles, pending tolerance.     Interim History:  Katya returns for cycle 3 FOLFOX. She was assessed in infusion area before treatment.     Tolerating chemo very well with minimal  side effects per patient.    Staying active around her home.  Appetite is good, weight stable. No nausea. A few days of constipation after chemo, managed with Miralax and softener. No neuropathy. Mild cold sensitivity in feet and hands.      Physical Exam:    Recent Vitals 12/24/2020   Height -   Weight -   BSA (m2) -   /64   Pulse 79   Temp 98.6   Temp src 1   SpO2 98   Some recent data might be hidden     General: patient appears stated age of 80 y.o.. Nontoxic and in no distress. Alone. Seen in infusion room.  HEENT: Head: atraumatic, normocephalic. Sclerae anicteric.  Chest:  Normal respiratory effort. Clear lungs bilaterally. Left port site.  Cardiac:  No edema.   Abdomen: abdomen is non-distended. Mid-line incision well-healed.  Extremities: normal tone and muscle bulk.  Skin: no lesions or rash. Warm and dry.   CNS: alert and oriented. Grossly non-focal.   Psychiatric: normal mood and affect.     Lab Results:    Recent Results (from the past 168 hour(s))   HM1 (CBC with Diff)   Result Value Ref Range    WBC 6.3 4.0 - 11.0 thou/uL    RBC 3.82 3.80 - 5.40 mill/uL    Hemoglobin 10.9 (L) 12.0 - 16.0 g/dL    Hematocrit 34.5 (L) 35.0 - 47.0 %    MCV 90 80 - 100 fL    MCH 28.5 27.0 - 34.0 pg    MCHC 31.6 (L) 32.0 - 36.0 g/dL    RDW 17.5 (H) 11.0 - 14.5 %    Platelets 262 140 - 440 thou/uL    MPV 8.7 8.5 - 12.5 fL    Neutrophils % 58 50 - 70 %    Lymphocytes % 28 20 - 40 %     Monocytes % 11 (H) 2 - 10 %    Eosinophils % 2 0 - 6 %    Basophils % 1 0 - 2 %    Immature Granulocyte % 0 <=0 %    Neutrophils Absolute 3.6 2.0 - 7.7 thou/uL    Lymphocytes Absolute 1.8 0.8 - 4.4 thou/uL    Monocytes Absolute 0.7 0.0 - 0.9 thou/uL    Eosinophils Absolute 0.1 0.0 - 0.4 thou/uL    Basophils Absolute 0.1 0.0 - 0.2 thou/uL    Immature Granulocyte Absolute 0.0 <=0.0 thou/uL        Imaging Results:      No results found.     Total time: 18 min; including review of EMR/ordering diagnostics and treatment plan    Signed by: Argenis Charles, TONY

## 2021-06-14 NOTE — PROGRESS NOTES
Pt here for pump d.c  CADD pump stopped and port flushed and deaccessed. Pt denies new complaint and d.c ambulatory to lobby alone.

## 2021-06-14 NOTE — PROGRESS NOTES
Pt arrived at [facility] to see Argenis SMITH. Pt has Colon Cancer and is here for treatment.

## 2021-06-14 NOTE — TELEPHONE ENCOUNTER
Called patient to let her know that Dr. Bui's office [phone] option #4 called back to let us know that she should hold her Plavix 5 days prior to her colonoscopy.  Patient verbalized understanding.

## 2021-06-14 NOTE — PROGRESS NOTES
[facility] Hematology and Oncology Consult Note    Patient: Katya Butler  MRN: [id number]  Date of Service: 01/19/2021      Reason for Visit:    1.  Colon cancer, resected    Assessment/Plan:    1.  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   Labwork satisfactory.    We discussed going up to 90% dose given her excellent tolerance. She is open to trying this dose for this cycle and if more side effects, she would likely desire to go back to the 75% dose with subsequent cycles.     Return in 2 weeks ahead of cycle 5.    Planning 12 cycles, pending tolerance.    She is due for a complete colonoscopy to ensure there are no synchronous colon tumors. She has this scheduled for 2/4, her chemo schedule is worked around this. She is on Plavix (for cardiac stents 8 yrs ago). We will verify with her PCP that it is okay she hold this ahead of her colonoscopy in case any biopsies are needed and let her know.    2.  Constipation:   Improved, mild after each cycle of chemo. Miralax and stool softeners, as needed.    ECOG Performance  0    Distress Assessment  0    Staging History:    Cancer Staging  Malignant neoplasm of sigmoid colon (H)  Staging form: Colon And Rectum, AJCC 8th Edition  - Clinical: No stage assigned - Unsigned  - Pathologic stage from 11/24/2020: Stage IIIC (pT4b, pN2, cM0) - Signed by Walt Merrill MD on 11/24/2020    Oncologic History:  October, 2020: Stage IIIC colon cancer  -presented with a few months of symptoms consistent with prior diverticulitis  No improvement with course of antibiotics. Progressed to vomiting.  -Imaging suggested an area of thickening in the sigmoid colon, adherent to the left ovary.    -Surgical path (10/31/20):  invasive moderately differentiated adenocarcinoma.  Constricting ulcerated measuring 35 x 25 x 13 mm.  4 of 18 lymph nodes were positive.  Left ovary showed moderately  differentiated adenocarcinoma.  Focal positive margin at the paraovarian soft tissue margin.   -Mismatch repair intact.    -PET (post-resection): no evidence of residual or metastatic disease    10/31/2020: open sigmoid colectomy.  There is evidence of a small abscess and perforation of the colon at surgery.  There was some proximal colonic dilatation, consistent with obstruction.      12/8/2020: initiated adjuvant FOLFOX (25% dose-reduction for age). Plan 12 cycles, pending tolerance.     Interim History:  Katya returns for cycle 4 FOLFOX.     Tolerating chemo very well with minimal side effects per patient.    Staying active around her home.  Appetite is good, weight stable. No nausea. A few days of constipation after chemo, managed with Miralax and softener. No neuropathy. Mild cold sensitivity in feet and hands.  Intermittently has small blood clots when blowing her nose, no epistaxis. On plavix for cardiac stents 8 years ago.    Physical Exam:    Recent Vitals 1/7/2021   Height -   Weight -   BSA (m2) -   /64   Pulse 80   Temp 97.7   Temp src 1   SpO2 99   Some recent data might be hidden     General: patient appears stated age of 80 y.o.. Nontoxic and in no distress. Alone.   HEENT: Head: atraumatic, normocephalic. Sclerae anicteric.  Chest:  Normal respiratory effort. Clear lungs bilaterally. Left port site.  Cardiac:  No edema.   Abdomen: abdomen is non-distended. Mid-line incision well-healed.  Extremities: normal tone and muscle bulk.  Skin: no lesions or rash. Warm and dry.   CNS: alert and oriented. Grossly non-focal.   Psychiatric: normal mood and affect.     Lab Results:    No results found for this or any previous visit (from the past 168 hour(s)).     Imaging Results:      No results found.     Total time: 35 min; including review of EMR/ordering diagnostics and treatment plan    Signed by: Argenis Charles CNP

## 2021-06-14 NOTE — PROGRESS NOTES
Pt came into infusion clinic for Day 1, Cycle 4 of her treatment . Labs Reviewed. Medications explained to pt who verbalized understanding. Port patent throughout infusion. Pt tolerated infusion with no complications. Pt hooked up to cont 5fu infusion pump. Pt verbalized understanding of this. Pt left infusion clinic via ambulatory and will RTC 1/21 at 1330 for pump DC.

## 2021-06-14 NOTE — PROGRESS NOTES
[facility] Hematology and Oncology Consult Note    Patient: Katya Butler  MRN: [id number]  Date of Service: 02/01/2021      Reason for Visit:    1.  Colon cancer, resected    Assessment/Plan:    1.  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 She recovered well, but would like to stay on the lower dose to minimize her side effects.  Labwork satisfactory.    We will go back to 75% dosing on subsequent cycles. She is due for cycle 5 today, but has a colonoscopy planned this week and would like to defer until that's done. We will defer cycle 5 one week.    Return to see provider 2 weeks later, ahead of cycle 6.    Planning 12 cycles, pending tolerance.    She is due for a complete colonoscopy to ensure there are no synchronous colon tumors. She has this scheduled this week. Her PCP was comfortable with her holding her Plavix 5 days prior to procedure. She can resume the day after colonoscopy, unless advised differently from the GI team.    2.  Constipation:   Improved, mild after each cycle of chemo. Miralax and stool softeners, as needed.     3.  Nasal congestion  Recommended humidifier. Occasional blood clots in morning, no significant epistaxis. Platelets have always recovered well, but may be lower at starla and also on Plavix. Monitor.    ECOG Performance  0    Distress Assessment  0    Staging History:    Cancer Staging  Malignant neoplasm of sigmoid colon (H)  Staging form: Colon And Rectum, AJCC 8th Edition  - Clinical: No stage assigned - Unsigned  - Pathologic stage from 11/24/2020: Stage IIIC (pT4b, pN2, cM0) - Signed by Walt Merrill MD on 11/24/2020    Oncologic History:  October, 2020: Stage IIIC colon cancer  -presented with a few months of symptoms consistent with prior diverticulitis  No improvement with course of antibiotics. Progressed to  vomiting.  -Imaging suggested an area of thickening in the sigmoid colon, adherent to the left ovary.    -Surgical path (10/31/20):  invasive moderately differentiated adenocarcinoma.  Constricting ulcerated measuring 35 x 25 x 13 mm.  4 of 18 lymph nodes were positive.  Left ovary showed moderately differentiated adenocarcinoma.  Focal positive margin at the paraovarian soft tissue margin.   -Mismatch repair intact.    -PET (post-resection): no evidence of residual or metastatic disease    10/31/2020: open sigmoid colectomy.  There is evidence of a small abscess and perforation of the colon at surgery.  There was some proximal colonic dilatation, consistent with obstruction.      12/8/2020: initiated adjuvant FOLFOX (25% dose-reduction for age). Plan 12 cycles, pending tolerance.   -cycle 4 dose increased to 90% given excellent tolerance on lower dose. Had more fatigue and cold-sensitivity, so patient desired returning to prior dose with subsequent cycles.    Interim History:  Katya returns for cycle 5 FOLFOX.     With the last cycle, we increased dose some. She had more notable fatigue most of the two weeks and noted more severe cold-sensitivity/neuropathy in her hands on this dose. No persistent neuropathy between cold exposure. No diarrhea. No nausea. Appetite lower, but weight stable. Nasal congestion with occasional blood clots in morning, so epistaxis. On plavix.    Physical Exam:    Recent Vitals 2/1/2021   Weight 114 lbs   BSA (m2) 1.5 m2   /72   Pulse 75   Temp 98.2   Temp src 1   SpO2 97   Some recent data might be hidden     General: patient appears stated age of 80 y.o.. Nontoxic and in no distress. Alone.   HEENT: Head: atraumatic, normocephalic. Sclerae anicteric.  Chest:  Normal respiratory effort. Clear lungs bilaterally. Left port site.  Cardiac:  No edema.   Abdomen: abdomen is non-distended. Mid-line incision well-healed.  Extremities: normal tone and muscle bulk.  Skin: no lesions or rash.  Warm and dry.   CNS: alert and oriented. Grossly non-focal.   Psychiatric: normal mood and affect.     Lab Results:    Recent Results (from the past 168 hour(s))   Magnesium   Result Value Ref Range    Magnesium 1.8 1.8 - 2.6 mg/dL   Comprehensive Metabolic Panel   Result Value Ref Range    Sodium 140 136 - 145 mmol/L    Potassium 4.4 3.5 - 5.0 mmol/L    Chloride 108 (H) 98 - 107 mmol/L    CO2 25 22 - 31 mmol/L    Anion Gap, Calculation 7 5 - 18 mmol/L    Glucose 104 70 - 125 mg/dL    BUN 13 8 - 28 mg/dL    Creatinine 0.76 0.60 - 1.10 mg/dL    GFR MDRD Af Amer >60 >60 mL/min/1.73m2    GFR MDRD Non Af Amer >60 >60 mL/min/1.73m2    Bilirubin, Total 0.3 0.0 - 1.0 mg/dL    Calcium 8.7 8.5 - 10.5 mg/dL    Protein, Total 6.1 6.0 - 8.0 g/dL    Albumin 3.1 (L) 3.5 - 5.0 g/dL    Alkaline Phosphatase 76 45 - 120 U/L    AST 15 0 - 40 U/L    ALT 10 0 - 45 U/L   HM1 (CBC with Diff)   Result Value Ref Range    WBC 6.3 4.0 - 11.0 thou/uL    RBC 3.73 (L) 3.80 - 5.40 mill/uL    Hemoglobin 10.9 (L) 12.0 - 16.0 g/dL    Hematocrit 34.1 (L) 35.0 - 47.0 %    MCV 91 80 - 100 fL    MCH 29.2 27.0 - 34.0 pg    MCHC 32.0 32.0 - 36.0 g/dL    RDW 19.1 (H) 11.0 - 14.5 %    Platelets 244 140 - 440 thou/uL    MPV 9.0 8.5 - 12.5 fL    Neutrophils % 58 50 - 70 %    Lymphocytes % 25 20 - 40 %    Monocytes % 14 (H) 2 - 10 %    Eosinophils % 2 0 - 6 %    Basophils % 1 0 - 2 %    Immature Granulocyte % 0 <=0 %    Neutrophils Absolute 3.7 2.0 - 7.7 thou/uL    Lymphocytes Absolute 1.6 0.8 - 4.4 thou/uL    Monocytes Absolute 0.9 0.0 - 0.9 thou/uL    Eosinophils Absolute 0.1 0.0 - 0.4 thou/uL    Basophils Absolute 0.0 0.0 - 0.2 thou/uL    Immature Granulocyte Absolute 0.0 <=0.0 thou/uL        Imaging Results:      No results found.    Total time: 30 min; including review of EMR/ordering diagnostics and treatment plan    Signed by: Argenis Charles, TONY

## 2021-06-14 NOTE — PROGRESS NOTES
Katya Butler, 80 y.o., female arrived to clinic at 1205 for CADD pump disconnect upon completion of her 46 hour home infusion of 5-FU. Patient assessed and vital signs stable  Port had good blood return. Port was flushed with ns, heparinized then deaccessed. Site covered with gauze and paper tape. Katya Butler discharged from clinic ambulatory at 1220.

## 2021-06-14 NOTE — PROGRESS NOTES
Katya Butler, 80 y.o., female arrived to clinic at 1215 for CADD pump disconnect upon completion of her 46 hour home infusion of 5-FU. Pt assessed and vital signs stable. Port had good blood return. Port was flushed with ns, heparinized then deaccessed. Site covered with gauze and paper tape. Katya Butler discharged from clinic ambulatory.

## 2021-06-14 NOTE — PROGRESS NOTES
Katya came to chemo infusion following port lab draw and NP appointments for cycle 3 using Folfox chemotherapy.  She was educated on her treatment plan and each medication given today was reviewed prior to administration.  Katya received treatment as ordered and tolerated it well while in clinic.  She received her bolus dose of 5FU then was connected to a Cadd pump and her 46 hour home infusion was started.  Katya left clinic ambulatory.  She will return Thursday at 1215 for pump removal.

## 2021-06-15 NOTE — PROGRESS NOTES
Katya Butler is a 80 y.o. female who is being evaluated via a billable telephone visit for colon cancer follow up.      What phone number would you like to be contacted at? [phone]  How would you like to obtain your AVS? AVS Preference: Mail a copy.  Phone call duration: 10 minutes      [facility] Hematology and Oncology Progress Note    Patient: Katya Butler  MRN: [id number]  Date of Service: 02/17/2021      Assessment and Plan:    Cancer Staging  Malignant neoplasm of sigmoid colon (H)  Staging form: Colon And Rectum, AJCC 8th Edition  - Clinical: No stage assigned - Unsigned  - Pathologic stage from 11/24/2020: Stage IIIC (pT4b, pN2, cM0) - Signed by Walt Merrill MD on 11/24/2020    1.  Colon cancer: She has had 4 of a planned 12 cycles of adjuvant FOLFOX.  We will keep her at a 25% dose reduction.  Continue at 14-day cycles for now.  She will be seen next week for cycle 6.  Patient seems to be tolerating her therapy generally well with minimal side effects.    2.  Chemotherapy-induced peripheral neuropathy: Appears to be grade 1 by the report.  If there is any worsening would recommend holding the oxaliplatin until improved and then restarting at a 50% dose reduction.    3.  Decreased appetite: Her son was wondering about medical cannabis.  We can further discuss this at her next visit.  It might make more sense to try Megace first.    4.  Large cecal polyp found on colonoscopy: It measured approximately 25 to 30 mm.  Sessile polyp.  Biopsy shows high-grade dysplasia.  Surgical consult was recommended by gastroenterology.  I reviewed this at length with the patient and her son.  Her son had multiple questions about surgical options.  We also discussed the patient's likely recovery course should she have surgery.  They would like to meet with a colorectal surgeon.  We will facilitate that consultation.    Total time spent on the phone was 17 minutes.    ECOG Performance   ECOG Performance Status:  1    Distress Assessment  Distress Assessment Score: No distress    Pain  Currently in Pain: No/denies    Diagnosis:    1.  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  At initial surgery there was evidence of a small abscess and perforation of the colon.  Pathology showed a invasive moderately differentiated adenocarcinoma.  Constricting ulcerated measuring 35 x 25 x 13 mm.  4 of 18 lymph nodes were positive.  Mismatch repair intact.  There was some proximal colonic dilatation, consistent with obstruction.  Left ovary showed moderately differentiated adenocarcinoma, direct extension.  Focal positive margin at the paraovarian soft tissue margin.      Treatment:    Surgical resection on October 31, 2020.  Adjuvant chemotherapy with FOLFOX was initiated on December 8, 2020.  25% initial dose reduction.    Interim History:    Katya was contacted today for a telephone visit at her request.  She recently had a colonoscopy and wanted to discuss the findings.  Her son is on the phone today and does most of the communicating.  He has multiple questions about the treatment approach to the cecal polyp found on colonoscopy.  He also reports that Kirill is having a little more neuropathy in her hands and feet.  Slight decrease in her appetite.  Overall though, he states she is tolerating her chemotherapy well so far.    Review of Systems:    Constitutional  Constitutional (WDL): Exceptions to WDL  Fatigue: Fatigue relieved by rest  Neurosensory  Neurosensory (WDL): Exceptions to WDL  Peripheral Sensory Neuropathy: Moderate symptoms, limiting instrumental ADL(fingers and toes)  Cardiovascular  Cardiovascular (WDL): All cardiovascular elements are within defined limits  Pulmonary  Respiratory (WDL): Exceptions to WDL  Dyspnea: Shortness of breath with minimal exertion, limiting instrumental ADL  Gastrointestinal  Gastrointestinal (WDL): Exceptions to WDL  Anorexia: Loss of appetite without alteration in eating  habits  Constipation: Occasional or intermittent symptoms, occasional use of stool softeners, laxatives, dietary modification, or enema  Nausea: Loss of appetite without alteration in eating habits  Dysgeusia: Altered taste but no change in diet  Dry Mouth: Symptomatic (e.g., dry or thick saliva) without significant dietary alteration, unstimulated saliva flow >0.2 ml/min  Genitourinary  Genitourinary (WDL): All genitourinary elements are within defined limits  Integumentary  Integumentary (WDL): Exceptions to WDL  Alopecia: Hair loss of up to 50% of normal for that individual that is not obvious from a distance but only on close inspection, a different hair style may be required to cover the hair loss but it does not require a wig or hair piece to camouflage  Patient Coping  Patient Coping: Accepting  Accompanied by  Accompanied by: Family Member(luma - son)    Past History:    Past Medical History:   Diagnosis Date     HTN (hypertension)      Malignant neoplasm of sigmoid colon (H) 11/24/2020     Primary osteoarthritis involving multiple joints 8/2/2019     Physical Exam:    Recent Vitals 2/12/2021   Weight -   BSA (m2) -   /62   Pulse 75   Temp 98.1   Temp src -   SpO2 94   Some recent data might be hidden     Lab Results:    No results found for this or any previous visit (from the past 168 hour(s)).     Imaging:    No results found.      Signed by: Walt Merrill MD

## 2021-06-15 NOTE — PROGRESS NOTES
[facility] Hematology and Oncology Consult Note    Patient: Katya Butler  MRN: [id number]  Date of Service: 02/22/2021      Reason for Visit:    1.  Colon cancer, resected    Assessment/Plan:    1.  Stage IIIC adenocarcinoma of the sigmoid colon:  Katya tolerated cycle 5 FOLFOX with 25% dose-reduction fairly well. A bit more neuropathy (gr 1) we will not to monitor.  Labwork satisfactory.    Will proceed with cycle 6 today at same dose.  Return in 2 weeks ahead of cycle 7.    Planning 12 cycles, pending tolerance.    2.  Constipation:   Improved, mild after each cycle of chemo. Miralax and stool softeners, as needed.     3.  Nasal congestion  Recommended humidifier. Occasional blood clots in morning, no significant epistaxis. Platelets have always recovered well, but may be lower at starla and also on Plavix. Monitor.    4.  Appetite loss  Megace was discussed by Dr. Merrill, but she is holding a stable weight and prefers not starting that for now.    5.  Large cecal colon polyp  25-30 mm sessile polyp with high-grade dysplasia was not amenable to endoscopic resection. She will meet with Colorectal surgeon (referral placed, pending appt).    Resection can be deferred until she's completed/recovered from her adjuvant chemo.  She will need a one-year repeat colonoscopy.    ECOG Performance  0    Distress Assessment  0    Staging History:    Cancer Staging  Malignant neoplasm of sigmoid colon (H)  Staging form: Colon And Rectum, AJCC 8th Edition  - Clinical: No stage assigned - Unsigned  - Pathologic stage from 11/24/2020: Stage IIIC (pT4b, pN2, cM0) - Signed by Walt Merrill MD on 11/24/2020    Oncologic History:  October, 2020: Stage IIIC colon cancer  -presented with a few months of symptoms consistent with prior diverticulitis  No improvement with course of antibiotics. Progressed to vomiting.  -Imaging suggested an area of thickening in the sigmoid colon, adherent to the left ovary.    -Surgical path  (10/31/20):  invasive moderately differentiated adenocarcinoma.  Constricting ulcerated measuring 35 x 25 x 13 mm.  4 of 18 lymph nodes were positive.  Left ovary showed moderately differentiated adenocarcinoma.  Focal positive margin at the paraovarian soft tissue margin.   -Mismatch repair intact.    -PET (post-resection): no evidence of residual or metastatic disease    10/31/2020: open sigmoid colectomy.  There is evidence of a small abscess and perforation of the colon at surgery.  There was some proximal colonic dilatation, consistent with obstruction.      12/8/2020: initiated adjuvant FOLFOX (25% dose-reduction for age). Plan 12 cycles, pending tolerance.   -cycle 4 dose increased to 90% given excellent tolerance on lower dose. Had more fatigue and cold-sensitivity, so patient desired returning to prior dose with subsequent cycles.    Interim History:  Katya returns for cycle 6 FOLFOX.     Her last cycle was delayed a week to allow for her colonoscopy. Several (6) polyps were resected and there is a larger 25-30 mm cecal sessile polyp with high-grade dysplasia that cannot be removed endoscopically, which she will meet with a surgeon about.    She is tolerated chemo well. Cold-sensitivity. Mild intermittent neuropathy hands>feet outside of cold exposure a bit more notable; not painful and not interfering with ADLs. No diarrhea. No nausea. Appetite lower, but weight stable.  Nasal congestion with occasional blood clots in morning, so epistaxis. On plavix.    Physical Exam:    Recent Vitals 2/12/2021   Weight -   BSA (m2) -   /62   Pulse 75   Temp 98.1   Temp src -   SpO2 94   Some recent data might be hidden     General: patient appears stated age of 80 y.o.. Nontoxic and in no distress. Alone.   HEENT: Head: atraumatic, normocephalic. Sclerae anicteric.  Chest:  Normal respiratory effort. Clear lungs bilaterally. Left port site.  Cardiac:  No edema.   Abdomen: abdomen is non-distended. Mid-line incision  well-healed.  Extremities: normal tone and muscle bulk.  Skin: no lesions or rash. Warm and dry.   CNS: alert and oriented. Grossly non-focal.   Psychiatric: normal mood and affect.     Lab Results:    No results found for this or any previous visit (from the past 168 hour(s)).     Imaging Results:      No results found.    Total time: 30 min; including review of EMR/ordering diagnostics and treatment plan    Signed by: Argenis Charles CNP

## 2021-06-15 NOTE — PROGRESS NOTES
Pt arrived ambulatory to clinic for Cycle # 6 Day # 1 of her chemotherapy regimen.  Port was accessed using aseptic technique without difficulties with excellent blood return.  Administered premedications and chemotherapy per MD order. Pt tolerated infusion well, no s/s of infusion reaction.  Pt was placed on pump at 1415, instructed pt to return to clinic Wednesday at 1215.  Pt verbalized understanding of plan of care and return to clinic.

## 2021-06-15 NOTE — PROGRESS NOTES
Pt arrived ambulatory to clinic for Cycle # 5 Day # 3 of her chemotherapy regimen.  Port flushed easily with excellent blood return.  Pump was empty at time of disconnect.  Port was flushed with NS and Heparin then de-accessed using 2x2 and papertape.  Pt verbalized understanding of plan of care and return to clinic.

## 2021-06-15 NOTE — PROGRESS NOTES
[facility] Hematology and Oncology Progress Note    Patient: Katya Butler  MRN: [id number]  Date of Service: 03/09/2021      Assessment and Plan:    Cancer Staging  Malignant neoplasm of sigmoid colon (H)  Staging form: Colon And Rectum, AJCC 8th Edition  - Clinical: No stage assigned - Unsigned  - Pathologic stage from 11/24/2020: Stage IIIC (pT4b, pN2, cM0) - Signed by Walt Merrill MD on 11/24/2020    1.  Colon cancer: She is here today for her seventh dose of FOLFOX.  Sounds like she is having a fair amount of fatigue and decreased stamina.  We talked about spacing to 21-day cycles but she her not to do this as she wants to finish up as soon as possible.  I reviewed with her that we may not be able to get through all 12 cycles.  We will go cycle by cycle and see how things are going I would have a low threshold to stop anytime after cycle 8.    2.  Chemotherapy-induced peripheral neuropathy: Seems to be persistent but is not interfering with her function.  If there is any worsening going forward we will have to hold the chemotherapy for 2 weeks and then reinitiate at a further dose reduction of oxaliplatin, assuming there is improvement in the neuropathy.     3.  Large cecal polyp found on colonoscopy: Surgery is recommended.  We will first complete chemotherapy.  Take some time to recover.  Then will proceed with surgery.      ECOG Performance   ECOG Performance Status: 1    Distress Assessment  Distress Assessment Score: 1    Pain  Currently in Pain: No/denies    Diagnosis:    1.  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  At initial surgery there was evidence of a small abscess and perforation of the colon.  Pathology showed a invasive moderately differentiated adenocarcinoma.  Constricting ulcerated measuring 35 x 25 x 13 mm.  4 of 18 lymph nodes were positive.  Mismatch repair intact.  There was some proximal colonic dilatation, consistent with obstruction.  Left ovary showed  moderately differentiated adenocarcinoma, direct extension.  Focal positive margin at the paraovarian soft tissue margin.      Treatment:    Surgical resection on October 31, 2020.  Adjuvant chemotherapy with FOLFOX was initiated on December 8, 2020.  25% initial dose reduction of all medications.    Interim History:    Katya was seen in clinic today for follow-up visit.  She has had 6 cycles of chemotherapy so far.  Today she notes that she is having a lot of fatigue.  Decreased energy and stamina.  She is very concerned that she will not be able to play golf this summer.  Difficult for her to do much physical activity at all.  Still has tingling in her hands and feet.  Appetite seems okay.  No nausea, vomiting, or diarrhea.     Review of Systems:    Constitutional  Constitutional (WDL): Exceptions to WDL  Fatigue: Fatigue relieved by rest  Weight Loss: to <10% from baseline, intervention not indicated(2lb)  Neurosensory  Neurosensory (WDL): Exceptions to WDL  Peripheral Sensory Neuropathy: Moderate symptoms, limiting instrumental ADL  Cardiovascular  Cardiovascular (WDL): All cardiovascular elements are within defined limits  Pulmonary  Respiratory (WDL): Exceptions to WDL  Dyspnea: Shortness of breath with moderate exertion  Gastrointestinal  Gastrointestinal (WDL): Exceptions to WDL  Anorexia: Loss of appetite without alteration in eating habits  Nausea: Loss of appetite without alteration in eating habits  Dysgeusia: Altered taste but no change in diet  Dry Mouth: Symptomatic (e.g., dry or thick saliva) without significant dietary alteration, unstimulated saliva flow >0.2 ml/min  Genitourinary  Genitourinary (WDL): All genitourinary elements are within defined limits  Integumentary  Integumentary (WDL): Exceptions to WDL  Alopecia: Hair loss of up to 50% of normal for that individual that is not obvious from a distance but only on close inspection, a different hair style may be required to cover the hair loss  but it does not require a wig or hair piece to camouflage  Patient Coping  Patient Coping: Accepting  Accompanied by  Accompanied by: Alone    Past History:    Past Medical History:   Diagnosis Date     HTN (hypertension)      Malignant neoplasm of sigmoid colon (H) 11/24/2020     Primary osteoarthritis involving multiple joints 8/2/2019     Physical Exam:    Recent Vitals 3/9/2021   Height -   Weight 112 lbs   BSA (m2) 1.49 m2   /67   Pulse 78   Temp 98.3   Temp src 1   SpO2 99   Some recent data might be hidden     General: patient appears stated age of 80 y.o.. Nontoxic and in no distress.   HEENT: Head: atraumatic, normocephalic. Sclerae anicteric.  Chest:  Normal respiratory effort  Cardiac:  No edema.   Abdomen: abdomen is non-distended  Extremities: normal tone and muscle bulk.  Skin: no lesions or rash. Warm and dry.   CNS: alert and oriented. Grossly non-focal.   Psychiatric: normal mood and affect.     Lab Results:    Recent Results (from the past 168 hour(s))   Magnesium   Result Value Ref Range    Magnesium 1.9 1.8 - 2.6 mg/dL   Comprehensive Metabolic Panel   Result Value Ref Range    Sodium 142 136 - 145 mmol/L    Potassium 3.9 3.5 - 5.0 mmol/L    Chloride 112 (H) 98 - 107 mmol/L    CO2 24 22 - 31 mmol/L    Anion Gap, Calculation 6 5 - 18 mmol/L    Glucose 107 70 - 125 mg/dL    BUN 12 8 - 28 mg/dL    Creatinine 0.80 0.60 - 1.10 mg/dL    GFR MDRD Af Amer >60 >60 mL/min/1.73m2    GFR MDRD Non Af Amer >60 >60 mL/min/1.73m2    Bilirubin, Total 0.3 0.0 - 1.0 mg/dL    Calcium 8.8 8.5 - 10.5 mg/dL    Protein, Total 6.4 6.0 - 8.0 g/dL    Albumin 3.3 (L) 3.5 - 5.0 g/dL    Alkaline Phosphatase 85 45 - 120 U/L    AST 18 0 - 40 U/L    ALT <9 0 - 45 U/L   HM1 (CBC with Diff)   Result Value Ref Range    WBC 5.4 4.0 - 11.0 thou/uL    RBC 3.53 (L) 3.80 - 5.40 mill/uL    Hemoglobin 10.8 (L) 12.0 - 16.0 g/dL    Hematocrit 33.9 (L) 35.0 - 47.0 %    MCV 96 80 - 100 fL    MCH 30.6 27.0 - 34.0 pg    MCHC 31.9 (L) 32.0  - 36.0 g/dL    RDW 20.0 (H) 11.0 - 14.5 %    Platelets 221 140 - 440 thou/uL    MPV 8.8 8.5 - 12.5 fL    Neutrophils % 59 50 - 70 %    Lymphocytes % 27 20 - 40 %    Monocytes % 12 (H) 2 - 10 %    Eosinophils % 1 0 - 6 %    Basophils % 1 0 - 2 %    Immature Granulocyte % 0 <=0 %    Neutrophils Absolute 3.2 2.0 - 7.7 thou/uL    Lymphocytes Absolute 1.5 0.8 - 4.4 thou/uL    Monocytes Absolute 0.7 0.0 - 0.9 thou/uL    Eosinophils Absolute 0.1 0.0 - 0.4 thou/uL    Basophils Absolute 0.0 0.0 - 0.2 thou/uL    Immature Granulocyte Absolute 0.0 <=0.0 thou/uL      Imaging:    No results found.      Signed by: Walt Merrill MD

## 2021-06-15 NOTE — PROGRESS NOTES
Pt came into infusion clinic for D1C7 of her treatment. Labs Reviewed. Medications explained to pt who verbalized understanding. Port patent throughout infusion. Pt tolerated infusion with no complications. Pt hooked up to cont 46hr 5fu pump. Pt verbalized understanding of this. Pt left infusion clinic via ambulatory and will RTC 3/11 at 11am for pump DC.

## 2021-06-15 NOTE — TELEPHONE ENCOUNTER
Pt called clinic today because she did not sleep at all, has stomach pain, and nausea.  Pt denies any vomiting, but feels that she can't come into clinic today.  Instructed pt to push fluids take antiemetics, and we can change her appt to tomorrow.  Instructed pt that she should come tomorrow regardless of how she is feeling.  Argenis was updated and agreed with plan.  Argenis thinks pt might need to be seen in infusion chair tomorrow if pt is still having issues.  Pt should call triage with any further issues.  Pt verbalized understanding of plan of care.

## 2021-06-15 NOTE — PROGRESS NOTES
Pt arrived for infusin D1 C5. Pt cancelled yesterdays infusion as she had diarrhea, cramping and nausea. Her diarrhea stopped yesterday at 3 pm and pt denies cramping or nausea. This was reported to Dr Merrill and he was ok with proceeding with treatment today. Port accessed with good blood return. Treatment given today at 75% dosing. CADD pump attached to pt and 5 Follow-up at 5 ml/hr started. Pt instructed to come on 2/12/21 at 10 am for pump disconnect.

## 2021-06-15 NOTE — PROGRESS NOTES
Katya Butler, 80 y.o., female arrived to clinic at 1205 for CADD pump disconnect upon completion of her 46 hour home infusion of 5-FU. Patient assessed and vital signs stable. Port had good blood return. Port was flushed with ns, heparinized then deaccessed. Site covered with gauze and paper tape. Katya Butler discharged from clinic ambulatory.

## 2021-06-16 NOTE — PROGRESS NOTES
Pt arrived at infusion clinic for her pump dicontinue as ordered. Pt reports no issues with pump. Pt does report increased Neuropathy that is more bothersome this round. Pt instructed to monitor this over the next couple of weeks and update us at her next treatment apt. Pt informed that this is a common side effect but can be dose limiting so we will want to keep an eye on it. Pump discontinued and port de-accessed. Pt left infusion clinic via ambulatory and will RTC as sched.

## 2021-06-16 NOTE — PROGRESS NOTES
Pt arrived ambulatory to clinic for Cycle # 10 Day # 3 of her chemotherapy regimen.  Port flushed easily with excellent blood return.  Pump was empty at time of disconnect.  Port was flushed with NS and Heparin then de-accessed using 2x2 and papertape.  Instructed pt that she should have Port flushed every 2 months while she keeps it in.  Pt verbalized understanding of plan of care and return to clinic.

## 2021-06-16 NOTE — PROGRESS NOTES
Katya came to chemo infusion this afternoon upon completion of her 46 hour home infusion of 5FU for pump removal.  VSS.  Pt assessed.  She has been constipated.  She also has some hemmroid discomfort from the straining.  She has started preparation H for this. She did have a hard painful BM prior to coming to clinic today.  She has tried dulcolax tablets as well as some mag citrate.  We discussed starting Senna S and continuing to help prevent constipation rather than just treating after treatment once the constipation has started.  She will try this and instructions given to her in her AVS.  Pump was removed.  Port had good blood return.  Port was flushed with ns, heparinized then de-accessed and site covered.  Katya left clinic ambulatory.

## 2021-06-16 NOTE — PROGRESS NOTES
[facility] Progress Note    Patient: Katya Butler  MRN: [id number]  Date of Service: 04/20/2021        Reason for Visit    Resected stage IIIC colon cancer    Assessment and Plan  Cancer Staging  Malignant neoplasm of sigmoid colon (H)  Staging form: Colon And Rectum, AJCC 8th Edition  - Clinical: No stage assigned - Unsigned  - Pathologic stage from 11/24/2020: Stage IIIC (pT4b, pN2, cM0) - Signed by Walt Merrill MD on 11/24/2020    1. Colon cancer, stage IIIc:   Currently getting adjuvant chemo with FOLFOX. Today is cycle 10. At 25% reduction.  Labwork is satisfactory.     Patient and Dr. Merrill elected to complete 10 cycles and stop, so this is her last cycle.    Will have her return in 6-8 weeks for new baseline CT and labs. She has IV contrast allergy, but previously tolerated oral contrast last Fall (it does not appear she had steroid premeds). I've ordered this as oral contrast but no IV contrast.     2. Anemia:   Chemo induced and usually cumulative. Overall asymptomatic except fatigue and KLEIN which is upsetting to pt. She wants to be able to golf. Continue to monitor, should improve over time after chemo.    3. Fatigue:   Likely chemo induced and is cumulative. Encourage good hydration, healthy diet with good protein. Also encourage daily exercise and to be as active as possible.     4.  Large cecal colon polyp  Early Feb, 2021 colonoscopy showed a  25-30 mm sessile polyp with high-grade dysplasia was not amenable to endoscopic resection.   Resection deferred until she's completed/recovered from her adjuvant chemo. She plans to defer this to later Summer/early Fall.  She will need a one-year repeat colonoscopy.    ECOG Performance   ECOG Performance Status: 1    Distress Assessment  Distress Assessment Score: 8: Declined referral for psychotherapy at this time.    Pain    none      CC: Keith Bui,  MD    ______________________________________________________________________________    History of Present Illness    DIAGNOSIS:  1.  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  At initial surgery there was evidence of a small abscess and perforation of the colon.  Pathology showed a invasive moderately differentiated adenocarcinoma.  Constricting ulcerated measuring 35 x 25 x 13 mm.  4 of 18 lymph nodes were positive.  Mismatch repair intact.  There was some proximal colonic dilatation, consistent with obstruction.  Left ovary showed moderately differentiated adenocarcinoma, direct extension.  Focal positive margin at the paraovarian soft tissue margin    TREATMENT:   Surgical resection on October 31, 2020.    Adjuvant chemotherapy with FOLFOX was initiated on December 8, 2020.  25% initial dose reduction of all medications.    INTERIM HISTORY:   Pt is here today to continue chemo. Ongoing fatigue and dyspnea on exertion. 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 Intermittent constipation, managed with Senna as needed. No blood in stools.     Unfortunately, her significant other of 19 years left her last month and she is grieving this. Her son has moved in with her.     Review of Systems    Constitutional  Constitutional (WDL): Exceptions to WDL  Fatigue: Fatigue not relieved by rest - Limiting instrumental ADL  Weight Loss: to <10% from baseline, intervention not indicated(down 5 lbs)  Neurosensory  Neurosensory (WDL): Exceptions to WDL  Peripheral Sensory Neuropathy: Asymptomatic, loss of deep tendon reflexes or paresthesia(fingertips/ cold sensitivity worse post TX)  Eye   Eye Disorder (WDL): Exceptions to WDL(glasses)  Dry Eye: Asymptomatic, clinical or diagnostic observations only, mild symptoms relieved by lubricants  Watering Eyes: Intervention not indicated  Ear  Ear Disorder (WDL): All ear disorder elements are within defined limits  Cardiovascular  Cardiovascular  (WDL): All cardiovascular elements are within defined limits  Pulmonary  Respiratory (WDL): Exceptions to WDL  Dyspnea: Shortness of breath with moderate exertion  Gastrointestinal  Gastrointestinal (WDL): Exceptions to WDL  Anorexia: Loss of appetite without alteration in eating habits  Constipation: Occasional or intermittent symptoms, occasional use of stool softeners, laxatives, dietary modification, or enema(post TX)  Dysgeusia: Altered taste with change in diet (e.g., oral supplements), noxious or unpleasant taste, loss of taste(no taste)  Dry Mouth: Symptomatic (e.g., dry or thick saliva) without significant dietary alteration, unstimulated saliva flow >0.2 ml/min  Genitourinary  Genitourinary (WDL): All genitourinary elements are within defined limits  Lymphatic  Lymph (WDL): All lymph disorder elements are within defined limits  Musculoskeletal and Connective Tissue  Musculoskeletal and Connetive Tissue Disorders (WDL): All Musculoskeletal and Connetive Tissue Disorder elements are within defined limits  Integumentary  Integumentary (WDL): Exceptions to WDL(bruising)  Patient Coping  Patient Coping: Accepting;Sadness  Distress Assessment  Distress Assessment Score: 8  Accompanied by  Accompanied by: Alone  Oral Chemo Adherence       Past History  Past Medical History:   Diagnosis Date     HTN (hypertension)      Malignant neoplasm of sigmoid colon (H) 11/24/2020     Primary osteoarthritis involving multiple joints 8/2/2019         Past Surgical History:   Procedure Laterality Date     HERNIA REPAIR       HYSTERECTOMY       LAPAROSCOPIC OVARIAN CYSTECTOMY Bilateral 6/27/2018    Procedure: DIAGNOSTIC LAPAROSCOPY, RIGHT SALPINGO OOPHORECTOMY, LYSIS OF ADHESIONS, AND PELVIC WASHINGS;  Surgeon: Nneka Arroyo DO;  Location: [facility];  Service:      CO INSJ [facility] CTR VAD W/SUBQ PORT AGE 5 YR/> N/A 12/2/2020    Procedure: Port Placement;  Surgeon: Viry Villegas MD;  Location: [facility];   Service: General     [place] PART REMOVAL COLON W ANASTOMOSIS N/A 10/31/2020    Procedure: COLECTOMY, SIGMOID, OPEN;  Surgeon: Viry Villegas MD;  Location: [facility];  Service: General     WV REMOVAL OF OVARY(S) N/A 10/31/2020    Procedure: OOPHORECTOMY, OPEN;  Surgeon: Viry Villegas MD;  Location: [facility];  Service: General       PHYSICAL EXAM  /65   Pulse 72   Temp 98.6  F (37  C) (Oral)   Wt 108 lb 8 oz (49.2 kg)   SpO2 97%   BMI 19.84 kg/m      GENERAL: no acute distress. Cooperative in conversation. Here alone due to visitor restrictions. Mask on  RESP: Regular respiratory rate. No expiratory wheezes   ABD: Soft, non-tender.  MUSCULOSKELETAL: no bilateral leg swelling  NEURO: non focal. Alert and oriented x3.   PSYCH: within normal limits. No depression or anxiety.  SKIN: exposed skin is dry intact.       Lab Results    Recent Results (from the past 168 hour(s))   Magnesium   Result Value Ref Range    Magnesium 1.8 1.8 - 2.6 mg/dL   Comprehensive Metabolic Panel   Result Value Ref Range    Sodium 143 136 - 145 mmol/L    Potassium 3.8 3.5 - 5.0 mmol/L    Chloride 108 (H) 98 - 107 mmol/L    CO2 26 22 - 31 mmol/L    Anion Gap, Calculation 9 5 - 18 mmol/L    Glucose 153 (H) 70 - 125 mg/dL    BUN 14 8 - 28 mg/dL    Creatinine 0.80 0.60 - 1.10 mg/dL    GFR MDRD Af Amer >60 >60 mL/min/1.73m2    GFR MDRD Non Af Amer >60 >60 mL/min/1.73m2    Bilirubin, Total 0.3 0.0 - 1.0 mg/dL    Calcium 8.9 8.5 - 10.5 mg/dL    Protein, Total 6.2 6.0 - 8.0 g/dL    Albumin 3.1 (L) 3.5 - 5.0 g/dL    Alkaline Phosphatase 78 45 - 120 U/L    AST 20 0 - 40 U/L    ALT 13 0 - 45 U/L   HM1 (CBC with Diff)   Result Value Ref Range    WBC 4.8 4.0 - 11.0 thou/uL    RBC 3.27 (L) 3.80 - 5.40 mill/uL    Hemoglobin 10.7 (L) 12.0 - 16.0 g/dL    Hematocrit 32.7 (L) 35.0 - 47.0 %     80 - 100 fL    MCH 32.7 27.0 - 34.0 pg    MCHC 32.7 32.0 - 36.0 g/dL    RDW 18.2 (H) 11.0 - 14.5 %    Platelets 207 140 - 440 thou/uL     MPV 8.9 8.5 - 12.5 fL    Neutrophils % 52 50 - 70 %    Lymphocytes % 32 20 - 40 %    Monocytes % 13 (H) 2 - 10 %    Eosinophils % 2 0 - 6 %    Basophils % 1 0 - 2 %    Immature Granulocyte % 0 <=0 %    Neutrophils Absolute 2.5 2.0 - 7.7 thou/uL    Lymphocytes Absolute 1.5 0.8 - 4.4 thou/uL    Monocytes Absolute 0.6 0.0 - 0.9 thou/uL    Eosinophils Absolute 0.1 0.0 - 0.4 thou/uL    Basophils Absolute 0.0 0.0 - 0.2 thou/uL    Immature Granulocyte Absolute 0.0 <=0.0 thou/uL       Imaging    No results found.    Total time: 35 min, including review of EMR, diagnostics, documentation and ordering/coordination of care    Signed by: Argenis Charles, CNP

## 2021-06-16 NOTE — PROGRESS NOTES
Pt arrived ambulatory to clinic for Cycle # 9 Day # 1 of her chemotherapy regimen.  Port was accessed using aseptic technique without difficulties with positional blood return.  Obtained blood return when pt was lying flat in the bed.  Administered premedications and chemotherapy per MD order. Pt tolerated infusion well, no s/s of infusion reaction.  Pt was placed on pump at 1345, instructed pt to return to clinic Thursday at 1145.  Pt verbalized understanding of plan of care and return to clinic.

## 2021-06-16 NOTE — PATIENT INSTRUCTIONS - HE
Start Senna S for constipation.  Start with taking one in the morning and one at night.  Consider increasing this the day prior to and a few days after treatment to 2 pills twice daily.

## 2021-06-16 NOTE — PROGRESS NOTES
Katya arrived for Day 1 Cycle 10 of her chemotherapy regimen. Port accessed using aseptic technique with good blood return, Lab drawn. She came over for treatment after lab out and MD review. Lab reviewed and she met parameter for chemo regimen.  Pre med given, Chemotherapy regimen administered with no reaction noted. Home pump with Fluorouracil set after the fluorouracil push. Pump set at and Katya will return on Thursday 4/22/21 at 12:00  for pump disconnect. She left at about 14:00 ambulatory in a stable condition and verbalized understanding of return to clinic.

## 2021-06-16 NOTE — PROGRESS NOTES
Pt here for treatment after seeing NP. No problem with infusion as directed. CADD pump checked and reviewed with pt. She is aware to return Thursday at 100 for pump d.c pt d.c ambulatory to lobby to meet her ride.

## 2021-06-16 NOTE — PROGRESS NOTES
"[facility] Hematology and Oncology Progress Note    Patient: Katya Butler  MRN: [id number]  Date of Service: 03/23/2021        Reason for Visit    Chief Complaint   Patient presents with     HE Cancer     Colorectal Cancer       Assessment and Plan  Cancer Staging  Malignant neoplasm of sigmoid colon (H)  Staging form: Colon And Rectum, AJCC 8th Edition  - Clinical: No stage assigned - Unsigned  - Pathologic stage from 11/24/2020: Stage IIIC (pT4b, pN2, cM0) - Signed by Walt Merrill MD on 11/24/2020    1. Colon cancer, stage IIIc: currently getting adjuvant chemo with FOLFOX. Today is cycle 8. At 25% reduction. No change with that today. She will return in 2 weeks for cycle 9. Pt is starting to have a lot of side effects so we may have to stop early.     2. Anemia: chemo induced and usually cumulative. Overall asymptomatic except fatigue and KLEIN which is upsetting to pt. She wants to be able to golf. Continue to monitor.     3. Fatigue: likely chemo induced and is cumulative. Encourage good hydration, healthy diet with good protein. Also encourage daily exercise and to be as active as possible.     ECOG Performance   ECOG Performance Status: 1    Distress Assessment  Distress Assessment Score: 8(\"I just want to be done\"): she is agreeable to continue for now.     Pain    none        Problem List    1. Malignant neoplasm of sigmoid colon (H)  CC OFFICE VISIT LONG        CC: Keith Bui MD    ______________________________________________________________________________    History of Present Illness    DIAGNOSIS:  1.  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  At initial surgery there was evidence of a small abscess and perforation of the colon.  Pathology showed a invasive moderately differentiated adenocarcinoma.  Constricting ulcerated measuring 35 x 25 x 13 mm.  4 of 18 lymph nodes were positive.  Mismatch repair intact.  There was some proximal colonic dilatation, " consistent with obstruction.  Left ovary showed moderately differentiated adenocarcinoma, direct extension.  Focal positive margin at the paraovarian soft tissue margin    TREATMENT:   Surgical resection on October 31, 2020.  Adjuvant chemotherapy with FOLFOX was initiated on December 8, 2020.  25% initial dose reduction of all medications.    INTERIM HISTORY:   Pt is here today to continue chemo. She is feeling more tired and shortness of breath. She is noticing KLEIN. Cannot get everything she wants to get done at once and has to rest. Mild neuropathy in hands. None in feet. Cold sensitivities. Hair is thinning.       Review of Systems    Constitutional  Constitutional (WDL): Exceptions to WDL  Fatigue: Fatigue relieved by rest  Neurosensory  Neurosensory (WDL): Exceptions to WDL  Peripheral Sensory Neuropathy: Asymptomatic, loss of deep tendon reflexes or paresthesia(fingers, cold sensitivity)  Eye   Eye Disorder (WDL): Exceptions to WDL(glasses)  Blurred Vision: Intervention not indicated  Watering Eyes: Intervention not indicated  Ear  Ear Disorder (WDL): All ear disorder elements are within defined limits  Cardiovascular  Cardiovascular (WDL): All cardiovascular elements are within defined limits  Pulmonary  Respiratory (WDL): Exceptions to WDL(smoker runny nose)  Cough: Mild symptoms, nonprescription intervention indicated(last night sinus drainage)  Dyspnea: Shortness of breath with moderate exertion  Gastrointestinal  Gastrointestinal (WDL): Exceptions to WDL  Constipation: Occasional or intermittent symptoms, occasional use of stool softeners, laxatives, dietary modification, or enema  Dysgeusia: Altered taste but no change in diet  Dry Mouth: Symptomatic (e.g., dry or thick saliva) without significant dietary alteration, unstimulated saliva flow >0.2 ml/min  Genitourinary  Genitourinary (WDL): All genitourinary elements are within defined limits  Lymphatic  Lymph (WDL): All lymph disorder elements are  "within defined limits  Musculoskeletal and Connective Tissue  Musculoskeletal and Connetive Tissue Disorders (WDL): All Musculoskeletal and Connetive Tissue Disorder elements are within defined limits  Integumentary  Integumentary (WDL): Exceptions to WDL  Alopecia: Hair loss of up to 50% of normal for that individual that is not obvious from a distance but only on close inspection, a different hair style may be required to cover the hair loss but it does not require a wig or hair piece to camouflage  Patient Coping  Patient Coping: Accepting;Sadness(tearful)  Distress Assessment  Distress Assessment Score: 8(\"I just want to be done\")  Accompanied by  Accompanied by: Alone  Oral Chemo Adherence       Past History  Past Medical History:   Diagnosis Date     HTN (hypertension)      Malignant neoplasm of sigmoid colon (H) 11/24/2020     Primary osteoarthritis involving multiple joints 8/2/2019         Past Surgical History:   Procedure Laterality Date     HERNIA REPAIR       HYSTERECTOMY       LAPAROSCOPIC OVARIAN CYSTECTOMY Bilateral 6/27/2018    Procedure: DIAGNOSTIC LAPAROSCOPY, RIGHT SALPINGO OOPHORECTOMY, LYSIS OF ADHESIONS, AND PELVIC WASHINGS;  Surgeon: Nneka Arroyo DO;  Location: [facility];  Service:      ME INSJ [facility] CTR VAD W/SUBQ PORT AGE 6 YR/> N/A 12/2/2020    Procedure: Port Placement;  Surgeon: Viry Villegas MD;  Location: [facility];  Service: General     ME PART REMOVAL COLON W ANASTOMOSIS N/A 10/31/2020    Procedure: COLECTOMY, SIGMOID, OPEN;  Surgeon: Viry Villegas MD;  Location: [facility];  Service: General     ME REMOVAL OF OVARY(S) N/A 10/31/2020    Procedure: OOPHORECTOMY, OPEN;  Surgeon: Viry Villegas MD;  Location: [facility];  Service: General       PHYSICAL EXAM  /63   Pulse 76   Temp 98.3  F (36.8  C) (Oral)   Wt 113 lb 4.8 oz (51.4 kg)   SpO2 94%   BMI 20.72 kg/m      GENERAL: no acute distress. Cooperative in conversation. Here " alone due to visitor restrictions. Mask on  RESP: Regular respiratory rate. No expiratory wheezes   MUSCULOSKELETAL: no bilateral leg swelling  NEURO: non focal. Alert and oriented x3.   PSYCH: within normal limits. No depression or anxiety.  SKIN: exposed skin is dry intact.         Lab Results    Recent Results (from the past 168 hour(s))   Lipid Cascade   Result Value Ref Range    Cholesterol 203 (H) <=199 mg/dL    Triglycerides 118 <=149 mg/dL    HDL Cholesterol 73 >=50 mg/dL    LDL Calculated 106 <=129 mg/dL    Patient Fasting > 8hrs? Unknown    Magnesium   Result Value Ref Range    Magnesium 1.8 1.8 - 2.6 mg/dL   Comprehensive Metabolic Panel   Result Value Ref Range    Sodium 142 136 - 145 mmol/L    Potassium 4.1 3.5 - 5.0 mmol/L    Chloride 108 (H) 98 - 107 mmol/L    CO2 26 22 - 31 mmol/L    Anion Gap, Calculation 8 5 - 18 mmol/L    Glucose 155 (H) 70 - 125 mg/dL    BUN 15 8 - 28 mg/dL    Creatinine 0.91 0.60 - 1.10 mg/dL    GFR MDRD Af Amer >60 >60 mL/min/1.73m2    GFR MDRD Non Af Amer 60 (L) >60 mL/min/1.73m2    Bilirubin, Total 0.3 0.0 - 1.0 mg/dL    Calcium 9.0 8.5 - 10.5 mg/dL    Protein, Total 6.2 6.0 - 8.0 g/dL    Albumin 3.2 (L) 3.5 - 5.0 g/dL    Alkaline Phosphatase 75 45 - 120 U/L    AST 17 0 - 40 U/L    ALT 10 0 - 45 U/L   HM1 (CBC with Diff)   Result Value Ref Range    WBC 5.0 4.0 - 11.0 thou/uL    RBC 3.41 (L) 3.80 - 5.40 mill/uL    Hemoglobin 10.6 (L) 12.0 - 16.0 g/dL    Hematocrit 32.9 (L) 35.0 - 47.0 %    MCV 97 80 - 100 fL    MCH 31.1 27.0 - 34.0 pg    MCHC 32.2 32.0 - 36.0 g/dL    RDW 20.2 (H) 11.0 - 14.5 %    Platelets 234 140 - 440 thou/uL    MPV 9.3 8.5 - 12.5 fL       Imaging    No results found.      Signed by: Jody Tolentino, CNP

## 2021-06-17 NOTE — TELEPHONE ENCOUNTER
Katya is calling to let us know she has hives from her head to her knees and is itchy all over.  Patient is wondering if it is the chemotherapy she had 2 weeks ago.  Per Jody SMITH it is highly unlikely the chemotherapy is causing this as she has had this several times.  Patient states she has had this in the past with dyes.  Prescription for a medrol dose pac sent to her pharmacy.  Patient will call if she does not get relief from the steroid.

## 2021-06-18 NOTE — ANESTHESIA CARE TRANSFER NOTE
Last vitals:   Vitals:    06/27/18 1054   BP: 149/67   Pulse: 69   Resp: 16   Temp: 36.4  C (97.5  F)   SpO2: 100%     Patient's level of consciousness is drowsy  Spontaneous respirations: yes  Maintains airway independently: yes  Dentition unchanged: yes  Oropharynx: oropharynx clear of all foreign objects    QCDR Measures:  ASA# 20 - Surgical Safety Checklist: WHO surgical safety checklist completed prior to induction  PQRS# 430 - Adult PONV Prevention: 4558F - Pt received => 2 anti-emetic agents (different classes) preop & intraop  ASA# 8 - Peds PONV Prevention: NA - Not pediatric patient, not GA or 2 or more risk factors NOT present  PQRS# 424 - Azucena-op Temp Management: 4559F - At least one body temp DOCUMENTED => 35.5C or 95.9F within required timeframe  PQRS# 426 - PACU Transfer Protocol: - Transfer of care checklist used  ASA# 14 - Acute Post-op Pain: ASA14B - Patient did NOT experience pain >= 7 out of 10

## 2021-06-18 NOTE — ANESTHESIA POSTPROCEDURE EVALUATION
Patient: Katya Butler  DIAGNOSTIC LAPAROSCOPY, RIGHT SALPINGO OOPHORECTOMY, LYSIS OF ADHESIONS, AND PELVIC WASHINGS  Anesthesia type: No value filed.    Patient location: PACU  Last vitals:   Vitals:    06/27/18 1200   BP: 123/61   Pulse: 62   Resp:    Temp:    SpO2: 92%     Post vital signs: stable  Level of consciousness: awake and responds to simple questions  Post-anesthesia pain: pain controlled  Post-anesthesia nausea and vomiting: no  Pulmonary: unassisted, return to baseline  Cardiovascular: stable and blood pressure at baseline  Hydration: adequate  Anesthetic events: no    QCDR Measures:  ASA# 11 - Azucena-op Cardiac Arrest: ASA11B - Patient did NOT experience unanticipated cardiac arrest  ASA# 12 - Azucena-op Mortality Rate: ASA12B - Patient did NOT die  ASA# 13 - PACU Re-Intubation Rate: ASA13B - Patient did NOT require a new airway mgmt  ASA# 10 - Composite Anes Safety: ASA10A - No serious adverse event    Additional Notes:

## 2021-06-18 NOTE — PATIENT INSTRUCTIONS - HE
Patient Instructions by Walt Merrill MD at 11/24/2020 10:45 AM     Author: Walt Merrill MD Service: -- Author Type: Physician    Filed: 11/24/2020 12:04 PM Encounter Date: 11/24/2020 Status: Signed    : Walt Merrill MD (Physician)       Patient Education   Patient Education   Patient Education     Vascular Access Port Implantation   Port implantation is surgery to place (implant) a port under the skin. For vascular access, it's placed into a vein. The port allows medicines or nutrition to be sent right into your bloodstream. Blood can also be taken or given through the port. During the procedure, a long, thin tube (catheter) is threaded into one of your large veins. The tube is then attached to the port. This usually sits under the skin of your chest and causes a small bump. To use the port, a special needle is passed through your skin and into the port. The needle can stay in your skin for up to 7 days, if needed. A port can stay in place for weeks or months or longer.      Why is a vascular access port needed?  A vascular access port may allow healthcare providers to give you:    Chemotherapy or other cancer-fighting medicines    IV treatments, such as antibiotics or nutrition    Hemodialysis for kidney failure  The port may also be used to draw blood.  Before the procedure  Follow any instructions you are given on how to get ready.  Tell your provider about any medicines you are taking. This includes:    All prescription medicines    Over-the-counter medicines such as aspirin or ibuprofen    Herbs, vitamins, and other supplements  Also be sure your provider knows:    If you are pregnant or think you may be pregnant    If you are allergic to any medicines or substances, especially local anesthesia or iodine    Your full health history, including why you will need the port. Also tell your provider if you have a condition that make your blood more likely to form clots.    If you plan on doing  any contact sports  During the procedure    Before the procedure, an IV may be put into a vein in your arm or hand. This gives you fluids and medicines. You may be given medicine through the IV to help you relax during the procedure. This is called sedation. But some surgeons place ports using general anesthesia.    The chest is used most often for the port. In some cases, your belly (abdomen) or arm will be used instead.    The skin over the insertion area is numbed with local anesthesia.    Ultrasound or X-rays are used to help the healthcare provider guide the catheter into the correct place during the procedure.    A cut (incision) is made in the skin where the port will be placed. A small pocket for the port is formed under the skin.    A second small incision is made in the skin near the first incision. A tunnel under the skin is created. The catheter is put through the tunnel and into the blood vessel.    The skin is closed over the port. It's held shut with stitches or surgical glue or tape. The second small incision is also closed.    A chest X-ray may be done to make sure the port is placed correctly.    After the procedure  You may be taken to a recovery room to recover from the sedation. Nurses will check on you as you rest. If you have pain, nurses can give you medicine. If you are not staying in the hospital overnight, you will be sent home a few hours after the procedure is done. A healthcare provider will tell you when you can go home. An adult family member or friend will need to drive you home.  Recovering at home    Take pain medicine as directed by your healthcare provider.    Take it easy for 24 hours after the procedure. Don't do any physical activity or heavy lifting until your healthcare provider says its OK.    Keep the port clean and dry. Ask when you can shower again. You will need to keep the port dry by covering it when you shower.    Care for the insertion site as you are  directed.    Dont swim, bathe, or do other activities that cause water to cover the insertion site.    Your port needs to be flushed at least every 4 to 6 weeks to keep it from getting blocked with blood clots. Talk with your healthcare provider about who will do this for you.    In some cases, you may be taught how to flush it yourself.    Risks and possible complications of implantation    Bleeding    Infection of the insertion site    Damage to a blood vessel    Nerve injury or irritation     Skin breakdown over the port    Rarely, collapsed lung (for chest port placements)    Risks and possible complications of having a port    Blocked  port or catheter    Leakage or breakage of the port or catheter    The port moves out of position    Blood clot    Skin or bloodstream infection    Skin breakdown over the port    When to seek medical care  Call your healthcare provider right away if you have any of the following:    A fever of 100.4 F (38.0 C) or higher, or as directed by your healthcare provider    Swelling, bleeding, or a collection of blood (hematoma) around the port insertion site    The skin near the port is red, warm, swollen, or broken    Shoulder pain or arm numbness, weakness, or tingling on the side where the port is located    You feel a heart flutter or racing heart    Swollen arm, if the port is placed in your arm    Cough, shortness of breath, or trouble taking a full, deep breath  Date Last Reviewed: 1/1/2019 2000-2019 [employer]. [address] [place] [address]. All rights reserved. This information is not intended as a substitute for professional medical care. Always follow your healthcare professional's instructions.         Oxaliplatin Solution for injection  What is this medicine?  OXALIPLATIN (ox AL i AFSANEH tin) is a chemotherapy drug. It targets fast dividing cells, like cancer cells, and causes these cells to die. This medicine is used to treat cancers of the  colon and rectum, and many other cancers.  This medicine may be used for other purposes; ask your health care provider or pharmacist if you have questions.  What should I tell my health care provider before I take this medicine?  They need to know if you have any of these conditions:    kidney disease    an unusual or allergic reaction to oxaliplatin, other chemotherapy, other medicines, foods, dyes, or preservatives    pregnant or trying to get pregnant    breast-feeding  How should I use this medicine?  This drug is given as an infusion into a vein. It is administered in a hospital or clinic by a specially trained health care professional.  Talk to your pediatrician regarding the use of this medicine in children. Special care may be needed.  Overdosage: If you think you have taken too much of this medicine contact a poison control center or emergency room at once.  NOTE: This medicine is only for you. Do not share this medicine with others.  What if I miss a dose?  It is important not to miss a dose. Call your doctor or health care professional if you are unable to keep an appointment.  What may interact with this medicine?    medicines to increase blood counts like filgrastim, pegfilgrastim, sargramostim    probenecid    some antibiotics like amikacin, gentamicin, neomycin, polymyxin B, streptomycin, tobramycin    zalcitabine  Talk to your doctor or health care professional before taking any of these medicines:    acetaminophen    aspirin    ibuprofen    ketoprofen    naproxen  This list may not describe all possible interactions. Give your health care provider a list of all the medicines, herbs, non-prescription drugs, or dietary supplements you use. Also tell them if you smoke, drink alcohol, or use illegal drugs. Some items may interact with your medicine.  What should I watch for while using this medicine?  Your condition will be monitored carefully while you are receiving this medicine. You will need  important blood work done while you are taking this medicine.  This medicine can make you more sensitive to cold. Do not drink cold drinks or use ice. Cover exposed skin before coming in contact with cold temperatures or cold objects. When out in cold weather wear warm clothing and cover your mouth and nose to warm the air that goes into your lungs. Tell your doctor if you get sensitive to the cold.  This drug may make you feel generally unwell. This is not uncommon, as chemotherapy can affect healthy cells as well as cancer cells. Report any side effects. Continue your course of treatment even though you feel ill unless your doctor tells you to stop.  In some cases, you may be given additional medicines to help with side effects. Follow all directions for their use.  Call your doctor or health care professional for advice if you get a fever, chills or sore throat, or other symptoms of a cold or flu. Do not treat yourself. This drug decreases your body's ability to fight infections. Try to avoid being around people who are sick.  This medicine may increase your risk to bruise or bleed. Call your doctor or health care professional if you notice any unusual bleeding.  Be careful brushing and flossing your teeth or using a toothpick because you may get an infection or bleed more easily. If you have any dental work done, tell your dentist you are receiving this medicine.  Avoid taking products that contain aspirin, acetaminophen, ibuprofen, naproxen, or ketoprofen unless instructed by your doctor. These medicines may hide a fever.  Do not become pregnant while taking this medicine. Women should inform their doctor if they wish to become pregnant or think they might be pregnant. There is a potential for serious side effects to an unborn child. Talk to your health care professional or pharmacist for more information. Do not breast-feed an infant while taking this medicine.  Call your doctor or health care professional if  you get diarrhea. Do not treat yourself.  What side effects may I notice from receiving this medicine?  Side effects that you should report to your doctor or health care professional as soon as possible:    allergic reactions like skin rash, itching or hives, swelling of the face, lips, or tongue    low blood counts - This drug may decrease the number of white blood cells, red blood cells and platelets. You may be at increased risk for infections and bleeding.    signs of infection - fever or chills, cough, sore throat, pain or difficulty passing urine    signs of decreased platelets or bleeding - bruising, pinpoint red spots on the skin, black, tarry stools, nosebleeds    signs of decreased red blood cells - unusually weak or tired, fainting spells, lightheadedness    breathing problems    chest pain, pressure    cough    diarrhea    jaw tightness    mouth sores    nausea and vomiting    pain, swelling, redness or irritation at the injection site    pain, tingling, numbness in the hands or feet    problems with balance, talking, walking    redness, blistering, peeling or loosening of the skin, including inside the mouth    trouble passing urine or change in the amount of urine  Side effects that usually do not require medical attention (report to your doctor or health care professional if they continue or are bothersome):    changes in vision    constipation    hair loss    loss of appetite    metallic taste in the mouth or changes in taste    stomach pain  This list may not describe all possible side effects. Call your doctor for medical advice about side effects. You may report side effects to FDA at 2-466-FDA-9341.  Where should I keep my medicine?  This drug is given in a hospital or clinic and will not be stored at home.  NOTE:This sheet is a summary. It may not cover all possible information. If you have questions about this medicine, talk to your doctor, pharmacist, or health care provider. Copyright  2015  [employer]           Leucovorin injection  What is this medicine?  LEUCOVORIN (loo koe VOR in) is used to prevent or treat the harmful effects of some medicines. This medicine is used to treat anemia caused by a low amount of folic acid in the body. It is also used with 5-fluorouracil (5-FU) to treat colon cancer.  How should I use this medicine?  This medicine is for injection into a muscle or into a vein. It is given by a health care professional in a hospital or clinic setting.  Talk to your pediatrician regarding the use of this medicine in children. Special care may be needed.  What side effects may I notice from receiving this medicine?  Side effects that you should report to your doctor or health care professional as soon as possible:    allergic reactions like skin rash, itching or hives, swelling of the face, lips, or tongue    breathing problems    fever, infection    mouth sores    unusual bleeding or bruising    unusually weak or tired  Side effects that usually do not require medical attention (report to your doctor or health care professional if they continue or are bothersome):    constipation or diarrhea    loss of appetite    nausea, vomiting  What may interact with this medicine?    capecitabine    fluorouracil    phenobarbital    phenytoin    primidone    trimethoprim-sulfamethoxazole    What if I miss a dose?  This does not apply.  Where should I keep my medicine?  This drug is given in a hospital or clinic and will not be stored at home.  What should I tell my health care provider before I take this medicine?  They need to know if you have any of these conditions:    anemia from low levels of vitamin B-12 in the blood    an unusual or allergic reaction to leucovorin, folic acid, other medicines, foods, dyes, or preservatives    pregnant or trying to get pregnant    breast-feeding  What should I watch for while using this medicine?  Your condition will be monitored carefully while you are  receiving this medicine.  This medicine may increase the side effects of 5-fluorouracil, 5-FU. Tell your doctor or health care professional if you have diarrhea or mouth sores that do not get better or that get worse.  NOTE:This sheet is a summary. It may not cover all possible information. If you have questions about this medicine, talk to your doctor, pharmacist, or health care provider. Copyright  2018 [employer]           Fluorouracil, 5-FU injection  Brand Name: Adrucil  What is this medicine?  FLUOROURACIL, 5-FU (flure oh YOOR a christal) is a chemotherapy drug. It slows the growth of cancer cells. This medicine is used to treat many types of cancer like breast cancer, colon or rectal cancer, pancreatic cancer, and stomach cancer.  How should I use this medicine?  This drug is given as an infusion or injection into a vein. It is administered in a hospital or clinic by a specially trained health care professional.  Talk to your pediatrician regarding the use of this medicine in children. Special care may be needed.  What side effects may I notice from receiving this medicine?  Side effects that you should report to your doctor or health care professional as soon as possible:    allergic reactions like skin rash, itching or hives, swelling of the face, lips, or tongue    low blood counts - this medicine may decrease the number of white blood cells, red blood cells and platelets. You may be at increased risk for infections and bleeding.    signs of infection - fever or chills, cough, sore throat, pain or difficulty passing urine    signs of decreased platelets or bleeding - bruising, pinpoint red spots on the skin, black, tarry stools, blood in the urine    signs of decreased red blood cells - unusually weak or tired, fainting spells, lightheadedness    breathing problems    changes in vision    chest pain    mouth sores    nausea and vomiting    pain, swelling, redness at site where injected    pain, tingling, numbness  in the hands or feet    redness, swelling, or sores on hands or feet    stomach pain    unusual bleeding  Side effects that usually do not require medical attention (report to your doctor or health care professional if they continue or are bothersome):    changes in finger or toe nails    diarrhea    dry or itchy skin    hair loss    headache    loss of appetite    sensitivity of eyes to the light    stomach upset    unusually teary eyes  What may interact with this medicine?    allopurinol    cimetidine    dapsone    digoxin    hydroxyurea    leucovorin    levamisole    medicines for seizures like ethotoin, fosphenytoin, phenytoin    medicines to increase blood counts like filgrastim, pegfilgrastim, sargramostim    medicines that treat or prevent blood clots like warfarin, enoxaparin, and dalteparin    methotrexate    metronidazole    pyrimethamine    some other chemotherapy drugs like busulfan, cisplatin, estramustine, vinblastine    trimethoprim    trimetrexate    vaccines  Talk to your doctor or health care professional before taking any of these medicines:    acetaminophen    aspirin    ibuprofen    ketoprofen    naproxen  What if I miss a dose?  It is important not to miss your dose. Call your doctor or health care professional if you are unable to keep an appointment.  Where should I keep my medicine?  This drug is given in a hospital or clinic and will not be stored at home.  What should I tell my health care provider before I take this medicine?  They need to know if you have any of these conditions:    blood disorders    dihydropyrimidine dehydrogenase (DPD) deficiency    infection (especially a virus infection such as chickenpox, cold sores, or herpes)    kidney disease    liver disease    malnourished, poor nutrition    recent or ongoing radiation therapy    an unusual or allergic reaction to fluorouracil, other chemotherapy, other medicines, foods, dyes, or preservatives    pregnant or trying to get  pregnant    breast-feeding  What should I watch for while using this medicine?  Visit your doctor for checks on your progress. This drug may make you feel generally unwell. This is not uncommon, as chemotherapy can affect healthy cells as well as cancer cells. Report any side effects. Continue your course of treatment even though you feel ill unless your doctor tells you to stop.  In some cases, you may be given additional medicines to help with side effects. Follow all directions for their use.  Call your doctor or health care professional for advice if you get a fever, chills or sore throat, or other symptoms of a cold or flu. Do not treat yourself. This drug decreases your body's ability to fight infections. Try to avoid being around people who are sick.  This medicine may increase your risk to bruise or bleed. Call your doctor or health care professional if you notice any unusual bleeding.  Be careful brushing and flossing your teeth or using a toothpick because you may get an infection or bleed more easily. If you have any dental work done, tell your dentist you are receiving this medicine.  Avoid taking products that contain aspirin, acetaminophen, ibuprofen, naproxen, or ketoprofen unless instructed by your doctor. These medicines may hide a fever.  Do not become pregnant while taking this medicine. Women should inform their doctor if they wish to become pregnant or think they might be pregnant. There is a potential for serious side effects to an unborn child. Talk to your health care professional or pharmacist for more information. Do not breast-feed an infant while taking this medicine.  Men should inform their doctor if they wish to father a child. This medicine may lower sperm counts.  Do not treat diarrhea with over the counter products. Contact your doctor if you have diarrhea that lasts more than 2 days or if it is severe and watery.  This medicine can make you more sensitive to the sun. Keep out of  the sun. If you cannot avoid being in the sun, wear protective clothing and use sunscreen. Do not use sun lamps or tanning beds/booths.  NOTE:This sheet is a summary. It may not cover all possible information. If you have questions about this medicine, talk to your doctor, pharmacist, or health care provider. Copyright  2018 [employer]

## 2021-06-18 NOTE — ANESTHESIA PREPROCEDURE EVALUATION
Anesthesia Evaluation      Patient summary reviewed   No history of anesthetic complications     Airway   Mallampati: II  Neck ROM: full   Pulmonary - negative ROS    breath sounds clear to auscultation  (+) a smoker                         Cardiovascular   Exercise tolerance: > or = 4 METS  (+) , PVD    Rhythm: regular  Rate: normal,         Neuro/Psych - negative ROS     Endo/Other - negative ROS      GI/Hepatic/Renal - negative ROS      Other findings: Smokes, subclavian stenosis,      Dental    (+) upper dentures and lower dentures                       Anesthesia Plan      ASA 3   Induction: intravenous       Post-op plan: routine recovery

## 2021-06-21 NOTE — LETTER
Letter by Karen Reed RN at      Author: Karen Reed RN Service: -- Author Type: --    Filed:  Encounter Date: 11/5/2020 Status: (Other)       Dear Katya,     Thank you for choosing [facility] for your care.  We are committed to providing you with the highest quality and compassionate healthcare services.  The following information pertains to your first appointment with our clinic.     Date/Time of appointment: Tuesday, November 24, 2020 at 11:00 AM, arriving at 10:30 AM.     Note: This allows time to complete forms, possible labs and for your nursing assessment.     Name of your Physician: Walt Merrill MD    What to bring to your appointment:    Completed Patient History/Initial Nursing Assessment (this form was sent to you) and a Medication/Allergy List .    Any paperwork or films from your physician that we have asked you to bring.    Your current insurance card(s) and photo ID.    Parking:    Please refer to the map included to direct you.  The [facility] is located at the [address] end of [facility] in [place], [place].      After turning onto [address] from [facility], take a right turn at the first stop sign.  We have designated parking on the left, identified as parking for Cancer Care patients (Lot D).     The Code to Enter Lot D is: 1101. This code changes monthly and will always coincide with the current month followed by 01. For example August will be 0801.  The month will continue to change but the 01 will remain constant.  If lot D is full please use Parking Lot A, directly across the street.    Please enter the [facility] on the north end of the [facility].  You will see a sign on the building.        For Medical Oncology or Hematology appointments, please take the elevator to the second floor to check in.     Also please note appointments can last 1.5-2 hours.      We hope these instructions are helpful to you.  If you have  any questions or concerns, please call us at [phone].  It is our pleasure to assist you.    Warm Regards,  Karen Reed RN  Nurse Navigator  [phone]

## 2021-06-25 NOTE — TELEPHONE ENCOUNTER
Per Dr. Merrill, he does not think this is likely a side effect of the Cymbalta however if the patient does not want to take it we will discontinue it off of her list.  He states that she can try Lyrica 50 mg twice daily.  This medication has been teed up for Dr. Merrill to sign off on.  It will be sent to the [facility] in [place] on the [address] per patient request as her son drives by their on his way to and from work.    Cata Perez RN

## 2021-06-25 NOTE — TELEPHONE ENCOUNTER
Patient calls in to let us know that the duloxetine 30 mg daily that she was prescribed yesterday is not going to work for her.  She states that she took the first pill last evening around 10:30 PM.  She states she woke up at 1 AM with diarrhea.  After that she went to go lay back down and almost immediately got back up with nausea and vomiting.  A couple hours later she states she woke again feeling like she needed to throw up but she had nothing left in her stomach.  She states that she then had a dry heaves.  She was then able to sleep the rest of the night.  She states that she has had a loss of appetite today and has had the chills.  She is pushing fluids and forcing herself to eat.  She has not had any further nausea, vomiting or diarrhea today.  She would like us to discontinue this medication and prescribe something else for her neuropathy.  I let her know that I would discuss this with Dr. Merrill and give her a call back.  She verbalized understanding.    Cata Perez RN

## 2021-06-25 NOTE — TELEPHONE ENCOUNTER
Navigator called patient for wellbeing check. No answer, left message offering support for financial, emotional, informational aspects. Pt is being seen in clinic today.     Will follow up.

## 2021-06-25 NOTE — TELEPHONE ENCOUNTER
"Patient calling regarding RX   colchicine (COLCRYS) 0.6 mg tablet  15 tablet 0 9/11/2020 9/28/2020 No   Sig - Route: Take 1 tablet (0.6 mg total) by mouth every other day     \"I have had this RX for years and I take it when I have a gout flare up but I can't remember how to take it.\"  Gave per epic and offered triage or UC if needed.  COVID 19 Nurse Triage Plan/Patient Instructions    Please be aware that novel coronavirus (COVID-19) may be circulating in the community. If you develop symptoms such as fever, cough, or SOB or if you have concerns about the presence of another infection including coronavirus (COVID-19), please contact your health care provider or visit  [email or web address].    Disposition/Instructions    Home care recommended. Follow home care protocol based instructions.    Thank you for taking steps to prevent the spread of this virus.  o Limit your contact with others.  o Wear a simple mask to cover your cough.  o Wash your hands well and often.    Resources    [facility]: About COVID-19: [email or web address]    CDC: What to Do If You're Sick: www.cdc.gov/coronavirus/2019-ncov/about/steps-when-sick.html    CDC: Ending Home Isolation: www.cdc.gov/coronavirus/2019-ncov/hcp/disposition-in-home-patients.html     CDC: Caring for Someone: www.cdc.gov/coronavirus/2019-ncov/if-you-are-sick/care-for-someone.html     [facility]: Interim Guidance for Hospital Discharge to Home: www.health.[facility].[place]/diseases/coronavirus/hcp/hospdischarge.pdf    [facility] clinical trials (COVID-19 research studies): [email or web address].[email or web address].edu/umn-clinical-trials     Below are the COVID-19 hotlines at the [place] Department of Health ([facility]). Interpreters are available.   o For health questions: Call 256-671-7570 or 1-878.551.4346 (7 a.m. to 7 p.m.)  o For questions about schools and childcare: Call 495-288-9911 or 1-350.157.5450 (7 a.m. to 7 p.m.)     "

## 2021-06-26 NOTE — PROGRESS NOTES
[facility] Hematology and Oncology Progress Note    Patient: Katya Butler  MRN: [id number]  Date of Service: 04/06/2021      Assessment and Plan:    Cancer Staging  Malignant neoplasm of sigmoid colon (H)  Staging form: Colon And Rectum, AJCC 8th Edition  - Clinical: No stage assigned - Unsigned  - Pathologic stage from 11/24/2020: Stage IIIC (pT4b, pN2, cM0) - Signed by Walt Merrill MD on 11/24/2020    1.  Colon cancer: She will get cycle 9 of chemotherapy today.  We will keep her dose levels same.  Still planning for 12 cycles.  We will see how things go with her peripheral neuropathy.    2.  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  We will continue to follow with each dose.  May need to stop chemotherapy early.     3.  Large cecal polyp found on colonoscopy: Surgery is recommended.  We will first complete chemotherapy.    ECOG Performance   ECOG Performance Status: 1    Distress Assessment  Distress Assessment Score: 7    Pain  Currently in Pain: No/denies    Diagnosis:    1.  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  At initial surgery there was evidence of a small abscess and perforation of the colon.  Pathology showed a invasive moderately differentiated adenocarcinoma.  Constricting ulcerated measuring 35 x 25 x 13 mm.  4 of 18 lymph nodes were positive.  Mismatch repair intact.  There was some proximal colonic dilatation, consistent with obstruction.  Left ovary showed moderately differentiated adenocarcinoma, direct extension.  Focal positive margin at the paraovarian soft tissue margin.      Treatment:    Surgical resection on October 31, 2020.  Adjuvant chemotherapy with FOLFOX was initiated on December 8, 2020.  25% initial dose reduction of all medications.    Interim History:    Katya was seen in clinic today for follow-up visit.  Having some mild dyspnea on exertion.  A little more constipation and cold sensitivity.  She takes Senokot for that.  Peripheral  neuropathy is generally stable.    Review of Systems:    Constitutional  Constitutional (WDL): Exceptions to WDL  Fatigue: Fatigue relieved by rest  Neurosensory  Neurosensory (WDL): Exceptions to WDL  Ataxia: Asymptomatic, clinical or diagnostic observations only, intervention not indicated  Peripheral Sensory Neuropathy: Asymptomatic, loss of deep tendon reflexes or paresthesia  Cardiovascular  Cardiovascular (WDL): All cardiovascular elements are within defined limits  Pulmonary  Respiratory (WDL): Exceptions to WDL  Cough: Mild symptoms, nonprescription intervention indicated  Dyspnea: Shortness of breath with moderate exertion  Gastrointestinal  Gastrointestinal (WDL): Exceptions to WDL  Anorexia: Loss of appetite without alteration in eating habits  Constipation: Occasional or intermittent symptoms, occasional use of stool softeners, laxatives, dietary modification, or enema  Dysgeusia: Altered taste but no change in diet  Dry Mouth: Symptomatic (e.g., dry or thick saliva) without significant dietary alteration, unstimulated saliva flow >0.2 ml/min  Genitourinary  Genitourinary (WDL): All genitourinary elements are within defined limits  Integumentary  Integumentary (WDL): Exceptions to WDL  Alopecia: Hair loss of up to 50% of normal for that individual that is not obvious from a distance but only on close inspection, a different hair style may be required to cover the hair loss but it does not require a wig or hair piece to camouflage  Patient Coping     Accompanied by  Accompanied by: Alone    Past History:    Past Medical History:   Diagnosis Date     HTN (hypertension)      Malignant neoplasm of sigmoid colon (H) 11/24/2020     Primary osteoarthritis involving multiple joints 8/2/2019     Physical Exam:    Recent Vitals 4/22/2021   Weight -   BSA (m2) -   /67   Pulse 83   Temp 98.3   Temp src 1   SpO2 98   Some recent data might be hidden     General: patient appears stated age of 80 y.o.. Nontoxic  and in no distress.   HEENT: Head: atraumatic, normocephalic. Sclerae anicteric.  Chest:  Normal respiratory effort  Cardiac:  No edema.   Abdomen: abdomen is non-distended  Extremities: normal tone and muscle bulk.  Skin: no lesions or rash. Warm and dry.   CNS: alert and oriented. Grossly non-focal.   Psychiatric: normal mood and affect.     Lab Results:    No results found for this or any previous visit (from the past 168 hour(s)).     Imaging:      Signed by: Walt Merrill MD

## 2021-07-03 NOTE — ADDENDUM NOTE
Addendum Note by Shara Rahman RN at 7/19/2019  9:40 AM     Author: Shara Rahman RN Service: -- Author Type: Registered Nurse    Filed: 7/19/2019  2:47 PM Encounter Date: 7/19/2019 Status: Signed    : Shara Rahman RN (Registered Nurse)    Addended by: SHARA RAHMAN on: 7/19/2019 02:47 PM        Modules accepted: Orders

## 2021-07-03 NOTE — ADDENDUM NOTE
Addendum Note by Michelle Wiseman CRNA at 6/27/2018  3:05 PM     Author: Michelle Wiseman CRNA Service: -- Author Type: Nurse Anesthetist    Filed: 6/27/2018  3:05 PM Date of Service: 6/27/2018  3:05 PM Status: Signed    : Michelle Wiseman CRNA (Nurse Anesthetist)       Addendum  created 06/27/18 1505 by Michelle Wiseman CRNA    Anesthesia Intra Meds edited, Orders acknowledged in Narrator

## 2021-07-03 NOTE — ADDENDUM NOTE
Addendum Note by Ana Maria Moran RN at 12/28/2020 10:45 AM     Author: Ana Maria Moran RN Service: -- Author Type: Registered Nurse    Filed: 1/11/2021  2:17 PM Encounter Date: 12/28/2020 Status: Signed    : Ana Maria Moran RN (Registered Nurse)    Addended by: ANA MARIA MORAN on: 1/11/2021 02:17 PM        Modules accepted: Orders

## 2021-07-04 NOTE — PATIENT INSTRUCTIONS - HE
Patient Instructions by Walt Merrill MD at 6/8/2021  2:45 PM     Author: aWlt Merrill MD Service: -- Author Type: Physician    Filed: 6/8/2021  3:23 PM Encounter Date: 6/8/2021 Status: Signed    : Walt Merrill MD (Physician)       Patient Education     Duloxetine Delayed Release Capsule 30 mg  Uses  This medicine is used for the following purposes:    anxiety    depression    eating disorders    muscle aches    pain  Instructions  Swallow the medicine without crushing or chewing it.  This medicine may be taken with or without food.  It is very important that you take the medicine at about the same time every day. It will work best if you do this.  Keep the medicine at room temperature. Avoid heat and direct light.  It may take several weeks for this medicine to fully work.  It is important that you keep taking each dose of this medicine on time even if you are feeling well.  If you forget to take a dose on time, take it as soon as you remember. If it is almost time for the next dose, do not take the missed dose. Return to your normal dosing schedule. Do not take 2 doses of this medicine at one time.  Please tell your doctor and pharmacist about all the medicines you take. Include both prescription and over-the-counter medicines. Also tell them about any vitamins, herbal medicines, or anything else you take for your health.  Do not suddenly stop taking this medicine. Check with your doctor before stopping.  Cautions  Tell your doctor and pharmacist if you ever had an allergic reaction to a medicine. Symptoms of an allergic reaction can include trouble breathing, skin rash, itching, swelling, or severe dizziness.  Do not use the medication any more than instructed.  This medicine may cause dizziness or fainting, especially after exercising or in hot weather. Be very careful when standing or sitting up quickly.  Your ability to stay alert or to react quickly may be impaired by this medicine. Do not  drive or operate machinery until you know how this medicine will affect you.  Please check with your doctor before drinking alcohol while on this medicine.  Family should check on the patient often. Call the doctor if patient becomes more depressed, has thoughts of suicide, or shows changes in behavior.  Call the doctor if there are any signs of confusion or unusual changes in behavior.  Tell the doctor or pharmacist if you are pregnant, planning to be pregnant, or breastfeeding.  Ask your pharmacist if this medicine can interact with any of your other medicines. Be sure to tell them about all the medicines you take.  Do not start or stop any other medicines without first speaking to your doctor or pharmacist.  Do not share this medicine with anyone who has not been prescribed this medicine.  This medicine can cause serious side effects in some patients. Important information from the U[place] Food and Drug Administration (FDA) is available from your pharmacist. Please review it carefully with your pharmacist to understand the risks associated with this medicine.  Side Effects  The following is a list of some common side effects from this medicine. Please speak with your doctor about what you should do if you experience these or other side effects.    decreased appetite    constipation    dizziness    drowsiness or sedation    dry mouth    lack of energy and tiredness    nausea    stomach upset or abdominal pain    sweating    vomiting  Call your doctor or get medical help right away if you notice any of these more serious side effects:    agitated feeling or trouble sleeping    confusion    coughing up blood or vomit that looks like coffee grounds    fainting    hallucinations (unusual thoughts, seeing or hearing things that are not real)    rapid heartbeat    symptoms of liver damage (such as yellowing of skin or eyes, dark urine, unusual tiredness or weakness; severe stomach or back pain)    muscle aches, spasms or  abnormal movements    muscle weakness    dilation of the pupils    seizures    problems with sexual functions or desire    shakiness    blood in stool    dark, tarry stool    blurring or changes of vision    severe or persistent vomiting  A few people may have an allergic reactions to this medicine. Symptoms can include difficulty breathing, skin rash, itching, swelling, or severe dizziness. If you notice any of these symptoms, seek medical help quickly.  Extra  Please speak with your doctor, nurse, or pharmacist if you have any questions about this medicine.  [email or web address]  IMPORTANT NOTE: This document tells you briefly how to take your medicine, but it does not tell you all there is to know about it.Your doctor or pharmacist may give you other documents about your medicine. Please talk to them if you have any questions.Always follow their advice. There is a more complete description of this medicine available in English.Scan this code on your smartphone or tablet or use the web address below. You can also ask your pharmacist for a printout. If you have any questions, please ask your pharmacist.     2021 [employer].

## 2021-07-06 VITALS
WEIGHT: 108 LBS | TEMPERATURE: 98.3 F | SYSTOLIC BLOOD PRESSURE: 153 MMHG | HEART RATE: 70 BPM | DIASTOLIC BLOOD PRESSURE: 70 MMHG | BODY MASS INDEX: 19.75 KG/M2 | OXYGEN SATURATION: 98 %

## 2021-07-22 NOTE — PROGRESS NOTES
"Oncology Rooming Note    06/08/21 2:56 PM    Katya Butler is a 80 y.o. female who presents for:    Chief Complaint   Patient presents with     HE Cancer     colon cancer       Initial Vitals: /70   Pulse 70   Temp 98.3  F (36.8  C) (Oral)   Wt 108 lb (49 kg)   SpO2 98%   BMI 19.75 kg/m       Estimated body mass index is 19.75 kg/m  as calculated from the following:    Height as of 2/22/21: 5' 2\" (1.575 m).    Weight as of this encounter: 108 lb (49 kg).     Body surface area is 1.46 meters squared.      Allergies reviewed: Yes  Medications reviewed: Yes    Refills needed: No      Clinical concerns: tingling in toes and fingertips worse, gets jolts of pain in fingertips      Ramona Tovar RN      "

## 2021-09-07 ENCOUNTER — ONCOLOGY VISIT (OUTPATIENT)
Dept: ONCOLOGY | Facility: HOSPITAL | Age: 81
End: 2021-09-07
Attending: INTERNAL MEDICINE
Payer: COMMERCIAL

## 2021-09-07 ENCOUNTER — LAB (OUTPATIENT)
Dept: INFUSION THERAPY | Facility: HOSPITAL | Age: 81
End: 2021-09-07
Attending: INTERNAL MEDICINE
Payer: COMMERCIAL

## 2021-09-07 VITALS
OXYGEN SATURATION: 96 % | BODY MASS INDEX: 20.17 KG/M2 | HEIGHT: 62 IN | SYSTOLIC BLOOD PRESSURE: 167 MMHG | RESPIRATION RATE: 12 BRPM | HEART RATE: 88 BPM | WEIGHT: 109.6 LBS | TEMPERATURE: 98.7 F | DIASTOLIC BLOOD PRESSURE: 74 MMHG

## 2021-09-07 DIAGNOSIS — T45.1X5A CHEMOTHERAPY-INDUCED NEUROPATHY (H): Primary | ICD-10-CM

## 2021-09-07 DIAGNOSIS — C18.7 MALIGNANT NEOPLASM OF SIGMOID COLON (H): Primary | ICD-10-CM

## 2021-09-07 DIAGNOSIS — C18.7 MALIGNANT NEOPLASM OF SIGMOID COLON (H): ICD-10-CM

## 2021-09-07 DIAGNOSIS — G62.0 CHEMOTHERAPY-INDUCED NEUROPATHY (H): ICD-10-CM

## 2021-09-07 DIAGNOSIS — T45.1X5A CHEMOTHERAPY-INDUCED NEUROPATHY (H): ICD-10-CM

## 2021-09-07 DIAGNOSIS — G62.0 CHEMOTHERAPY-INDUCED NEUROPATHY (H): Primary | ICD-10-CM

## 2021-09-07 LAB
ANION GAP SERPL CALCULATED.3IONS-SCNC: 5 MMOL/L (ref 5–18)
BUN SERPL-MCNC: 20 MG/DL (ref 8–28)
CALCIUM SERPL-MCNC: 9.8 MG/DL (ref 8.5–10.5)
CHLORIDE BLD-SCNC: 109 MMOL/L (ref 98–107)
CO2 SERPL-SCNC: 28 MMOL/L (ref 22–31)
CREAT SERPL-MCNC: 1.39 MG/DL (ref 0.6–1.1)
ERYTHROCYTE [DISTWIDTH] IN BLOOD BY AUTOMATED COUNT: 15.1 % (ref 10–15)
GFR SERPL CREATININE-BSD FRML MDRD: 36 ML/MIN/1.73M2
GLUCOSE BLD-MCNC: 87 MG/DL (ref 70–125)
HCT VFR BLD AUTO: 41.2 % (ref 35–47)
HGB BLD-MCNC: 13.3 G/DL (ref 11.7–15.7)
MCH RBC QN AUTO: 30.6 PG (ref 26.5–33)
MCHC RBC AUTO-ENTMCNC: 32.3 G/DL (ref 31.5–36.5)
MCV RBC AUTO: 95 FL (ref 78–100)
PLATELET # BLD AUTO: 354 10E3/UL (ref 150–450)
POTASSIUM BLD-SCNC: 4.4 MMOL/L (ref 3.5–5)
RBC # BLD AUTO: 4.35 10E6/UL (ref 3.8–5.2)
SODIUM SERPL-SCNC: 142 MMOL/L (ref 136–145)
WBC # BLD AUTO: 7.3 10E3/UL (ref 4–11)

## 2021-09-07 PROCEDURE — 82378 CARCINOEMBRYONIC ANTIGEN: CPT | Performed by: NURSE PRACTITIONER

## 2021-09-07 PROCEDURE — 85027 COMPLETE CBC AUTOMATED: CPT

## 2021-09-07 PROCEDURE — 36415 COLL VENOUS BLD VENIPUNCTURE: CPT | Performed by: NURSE PRACTITIONER

## 2021-09-07 PROCEDURE — 250N000011 HC RX IP 250 OP 636: Performed by: INTERNAL MEDICINE

## 2021-09-07 PROCEDURE — 99214 OFFICE O/P EST MOD 30 MIN: CPT | Performed by: NURSE PRACTITIONER

## 2021-09-07 PROCEDURE — 80048 BASIC METABOLIC PNL TOTAL CA: CPT

## 2021-09-07 PROCEDURE — G0463 HOSPITAL OUTPT CLINIC VISIT: HCPCS

## 2021-09-07 RX ORDER — HEPARIN SODIUM (PORCINE) LOCK FLUSH IV SOLN 100 UNIT/ML 100 UNIT/ML
5 SOLUTION INTRAVENOUS
Status: DISCONTINUED | OUTPATIENT
Start: 2021-09-07 | End: 2021-09-07 | Stop reason: HOSPADM

## 2021-09-07 RX ORDER — CLOPIDOGREL BISULFATE 75 MG/1
75 TABLET ORAL DAILY
COMMUNITY

## 2021-09-07 RX ORDER — AMLODIPINE BESYLATE 5 MG/1
5 TABLET ORAL 2 TIMES DAILY
COMMUNITY

## 2021-09-07 RX ORDER — HEPARIN SODIUM (PORCINE) LOCK FLUSH IV SOLN 100 UNIT/ML 100 UNIT/ML
5 SOLUTION INTRAVENOUS
Status: CANCELLED | OUTPATIENT
Start: 2021-09-07

## 2021-09-07 RX ADMIN — HEPARIN 5 ML: 100 SYRINGE at 15:46

## 2021-09-07 ASSESSMENT — MIFFLIN-ST. JEOR: SCORE: 920.39

## 2021-09-07 ASSESSMENT — PAIN SCALES - GENERAL: PAINLEVEL: NO PAIN (0)

## 2021-09-07 NOTE — LETTER
"    9/7/2021         RE: Katya Butler  [address] [facility] [address]        Dear Colleague,    Thank you for referring your patient, Katya Butler, to the [facility]. Please see a copy of my visit note below.    ..  Oncology Rooming Note      09/07/21 3:07 PM       Katya Butler is a 81 year old female who presents for:      Chief Complaint   Patient presents with     Oncology Clinic Visit     Colon Cancer         Initial Vitals: BP (!) 167/74 (BP Location: Left arm, Patient Position: Sitting, Cuff Size: Adult Small)   Pulse 88   Temp 98.7  F (37.1  C) (Oral)   Resp 12   Ht 1.575 m (5' 2\")   Wt 49.7 kg (109 lb 9.6 oz)   SpO2 96%   BMI 20.05 kg/m   Estimated body mass index is 20.05 kg/m  as calculated from the following:    Height as of this encounter: 1.575 m (5' 2\").    Weight as of this encounter: 49.7 kg (109 lb 9.6 oz). Body surface area is 1.47 meters squared.      No Pain (0) Comment: Data Unavailable       No LMP recorded.      Allergies reviewed: Yes  Medications reviewed: Yes      Medications: Medication refills not needed today.  Pharmacy name entered into [employer]: [id number] - [place] [phone]    Accompanied by: alone  Clinical concerns:  concerns- neuropathy in hands and feet - very uncomfortable. Also how often she should be coming in.                            [facility] Hematology and Oncology Progress Note    Patient: Katya Butler  MRN: [id number]        Assessment and Plan:    Cancer Staging  Malignant neoplasm of sigmoid colon (H)  Staging form: Colon And Rectum, AJCC 8th Edition  - Clinical: No stage assigned - Unsigned  - Pathologic stage from 11/24/2020: Stage IIIC (pT4b, pN2, cM0) - Signed by Walt Merrill MD on 11/24/2020    1.  Colon cancer:   She completed 10 cycles of adjuvant chemotherapy 4.5 months ago. Due to worsening neuropathy is worsening, grade 2.      Clinically, without symptoms concerning for " progression.   CEA and labs pending.    Return in 3 months with CT and labs.     She's eager to get her port out, but we decided to defer until her 1-yr CT later this year.    2.  Chemotherapy-induced peripheral neuropathy: Grade 2.    Mainly numbness in feet, lesser degree in fingertips. Not painful. Trialed one dose of Cymbalta, with nausea so stopped. Trialed Lyrica a few dose but felt tired/imbalanced so stopped. She would prefer not treating it. Hopefully, with more time from chemo it will improve some.    3.  Large cecal polyp found on colonoscopy:   Surgical resection recommended. Patient had wanted to defer to the Fall, but in speaking with her today, she has now opted against pursuing surgery or follow-up colonoscopy. She has decided with her age she prefers no more invasive procedures. I explained the low-risk of the surgery vs the higher risk of this to evolve into a malignancy that could cause her more symptoms/more invasive treatments later. She understands but still opts against follow-up of this at this time. She will follow-up with surgery if she changes her mind.    ECOG Performance    0    Distress Assessment  Distress Assessment Score: 1    Pain  Currently in Pain: No/denies    Diagnosis:    1.  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  At initial surgery there was evidence of a small abscess and perforation of the colon.  Pathology showed a invasive moderately differentiated adenocarcinoma.  Constricting ulcerated measuring 35 x 25 x 13 mm.  4 of 18 lymph nodes were positive.  Mismatch repair intact.  There was some proximal colonic dilatation, consistent with obstruction.  Left ovary showed moderately differentiated adenocarcinoma, direct extension.  Focal positive margin at the paraovarian soft tissue margin.      Treatment:    Surgical resection on October 31, 2020.  Adjuvant chemotherapy with FOLFOX was initiated on December 8, 2020.  25% initial dose reduction of all  medications.  Cycle 10 completed on April 20, 2021.    Interim History:    Katya was seen in clinic today for 3-month follow-up visit. She completed adjuvant chemo about 4.5  months ago, stopped a few cycles early for neuropathy. Trialed duloxetine x 1 dose, felt nauseous so stopped. Also, did not tolerate Lyrica (felt fatigued, so stopped). Continues to have some neuropathy, mostly in feet (numb feeling, no pain) and lesser in fingers. Feels a little bit off balance.  Good appetite, gained a little weight. No abd pain. No bowel changes.     Past History:    Past Medical History:   Diagnosis Date     HTN (hypertension)      Malignant neoplasm of sigmoid colon (H) 11/24/2020     Primary osteoarthritis involving multiple joints 8/2/2019     Physical Exam:    General: patient appears stated age of 80 y.o.. Nontoxic and in no distress.   HEENT: Head: atraumatic, normocephalic. Sclerae anicteric.  Chest:  Normal respiratory effort  Cardiac:  No edema.   Abdomen: abdomen is non-distended, non-tender  Extremities: normal tone and muscle bulk.  Skin: no lesions or rash on visible skin. Warm and dry.   CNS: alert and oriented. Grossly non-focal.   Psychiatric: normal mood and affect.     Lab Results:    CBC, CMP, CEA pending.     Total time 35 minutes, to include face to face visit, review of EMR, ordering, documentation and coordination of care            Again, thank you for allowing me to participate in the care of your patient.        Sincerely,        Argenis Charles NP

## 2021-09-07 NOTE — PROGRESS NOTES
[facility] Hematology and Oncology Progress Note    Patient: Katya Butler  MRN: [id number]        Assessment and Plan:    Cancer Staging  Malignant neoplasm of sigmoid colon (H)  Staging form: Colon And Rectum, AJCC 8th Edition  - Clinical: No stage assigned - Unsigned  - Pathologic stage from 11/24/2020: Stage IIIC (pT4b, pN2, cM0) - Signed by Walt Merrill MD on 11/24/2020    1.  Colon cancer:   She completed 10 cycles of adjuvant chemotherapy 4.5 months ago. Due to worsening neuropathy is worsening, grade 2.      Clinically, without symptoms concerning for progression.   CEA and labs pending.    Return in 3 months with CT and labs.     She's eager to get her port out, but we decided to defer until her 1-yr CT later this year.    2.  Chemotherapy-induced peripheral neuropathy: Grade 2.    Mainly numbness in feet, lesser degree in fingertips. Not painful. Trialed one dose of Cymbalta, with nausea so stopped. Trialed Lyrica a few dose but felt tired/imbalanced so stopped. She would prefer not treating it. Hopefully, with more time from chemo it will improve some.    3.  Large cecal polyp found on colonoscopy:   Surgical resection recommended. Patient had wanted to defer to the Fall, but in speaking with her today, she has now opted against pursuing surgery or follow-up colonoscopy. She has decided with her age she prefers no more invasive procedures. I explained the low-risk of the surgery vs the higher risk of this to evolve into a malignancy that could cause her more symptoms/more invasive treatments later. She understands but still opts against follow-up of this at this time. She will follow-up with surgery if she changes her mind.    ECOG Performance    0    Distress Assessment  Distress Assessment Score: 1    Pain  Currently in Pain: No/denies    Diagnosis:    1.  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  At initial surgery there was evidence of a small abscess and perforation of  the colon.  Pathology showed a invasive moderately differentiated adenocarcinoma.  Constricting ulcerated measuring 35 x 25 x 13 mm.  4 of 18 lymph nodes were positive.  Mismatch repair intact.  There was some proximal colonic dilatation, consistent with obstruction.  Left ovary showed moderately differentiated adenocarcinoma, direct extension.  Focal positive margin at the paraovarian soft tissue margin.      Treatment:    Surgical resection on October 31, 2020.  Adjuvant chemotherapy with FOLFOX was initiated on December 8, 2020.  25% initial dose reduction of all medications.  Cycle 10 completed on April 20, 2021.    Interim History:    Katya was seen in clinic today for 3-month follow-up visit. She completed adjuvant chemo about 4.5  months ago, stopped a few cycles early for neuropathy. Trialed duloxetine x 1 dose, felt nauseous so stopped. Also, did not tolerate Lyrica (felt fatigued, so stopped). Continues to have some neuropathy, mostly in feet (numb feeling, no pain) and lesser in fingers. Feels a little bit off balance.  Good appetite, gained a little weight. No abd pain. No bowel changes.     Past History:    Past Medical History:   Diagnosis Date     HTN (hypertension)      Malignant neoplasm of sigmoid colon (H) 11/24/2020     Primary osteoarthritis involving multiple joints 8/2/2019     Physical Exam:    General: patient appears stated age of 80 y.o.. Nontoxic and in no distress.   HEENT: Head: atraumatic, normocephalic. Sclerae anicteric.  Chest:  Normal respiratory effort  Cardiac:  No edema.   Abdomen: abdomen is non-distended, non-tender  Extremities: normal tone and muscle bulk.  Skin: no lesions or rash on visible skin. Warm and dry.   CNS: alert and oriented. Grossly non-focal.   Psychiatric: normal mood and affect.     Lab Results:    CBC, CMP, CEA pending.     Total time 35 minutes, to include face to face visit, review of EMR, ordering, documentation and coordination of care

## 2021-09-07 NOTE — PROGRESS NOTES
"..  Oncology Rooming Note      09/07/21 3:07 PM       Katya Butler is a 81 year old female who presents for:      Chief Complaint   Patient presents with     Oncology Clinic Visit     Colon Cancer         Initial Vitals: BP (!) 167/74 (BP Location: Left arm, Patient Position: Sitting, Cuff Size: Adult Small)   Pulse 88   Temp 98.7  F (37.1  C) (Oral)   Resp 12   Ht 1.575 m (5' 2\")   Wt 49.7 kg (109 lb 9.6 oz)   SpO2 96%   BMI 20.05 kg/m   Estimated body mass index is 20.05 kg/m  as calculated from the following:    Height as of this encounter: 1.575 m (5' 2\").    Weight as of this encounter: 49.7 kg (109 lb 9.6 oz). Body surface area is 1.47 meters squared.      No Pain (0) Comment: Data Unavailable       No LMP recorded.      Allergies reviewed: Yes  Medications reviewed: Yes      Medications: Medication refills not needed today.  Pharmacy name entered into [employer]: [id number] - [place] [id number]    Accompanied by: alone  Clinical concerns:  concerns- neuropathy in hands and feet - very uncomfortable. Also how often she should be coming in.                          "

## 2021-09-08 ENCOUNTER — TELEPHONE (OUTPATIENT)
Dept: ONCOLOGY | Facility: HOSPITAL | Age: 81
End: 2021-09-08

## 2021-09-08 LAB — CEA SERPL-MCNC: 3.9 NG/ML (ref 0–3)

## 2021-09-08 NOTE — TELEPHONE ENCOUNTER
"Call placed to patient per Argenis Charles:  \"Please let Katya know her CEA and CBC looks great/stable. Her creatinine is up for her. Ensure she is drinking plenty of fluids/water and avoiding NSAIDs. She should have follow-up of that with her PCP - would recommend recheck in a few weeks\".  LM with this information for the patient and advised for her to call us if she has further concerns.      "

## 2021-10-01 ENCOUNTER — TELEPHONE (OUTPATIENT)
Dept: CARE COORDINATION | Facility: CLINIC | Age: 81
End: 2021-10-01

## 2021-10-01 NOTE — TELEPHONE ENCOUNTER
Cancer treatment summary and contact information mailed to patient.   Ute Carreno RN on 10/1/2021 at 10:06 AM     1 pair

## 2021-10-19 NOTE — TELEPHONE ENCOUNTER
Attempted to call patient. Left message to return call to writer. Return phone number left on message.   Ute Carreno RN 10/19/2021 1:26 PM

## 2021-10-22 NOTE — TELEPHONE ENCOUNTER
Patient returned writer's phone call. Katya stated she did receive cancer treatment summary in mail. Katya stated she reviewed summary. Katya gave her copy of summary to her PCP.   Writer will mail another copy to patient.   Katya stated she is doing well, no issues.   No need for any survivorship resources at this time.   Katya stated she has writer's phone number and will call if the need arise.  All questions answered.   Ute Carreno RN on 10/22/2021 at 3:08 PM

## 2021-10-22 NOTE — TELEPHONE ENCOUNTER
Attempted to call patient. Left message to return call to writer. Return phone number left on message.   Ute Carreno RN 10/22/2021 2:52 PM

## 2021-10-28 ENCOUNTER — LAB REQUISITION (OUTPATIENT)
Dept: LAB | Facility: CLINIC | Age: 81
End: 2021-10-28

## 2021-10-28 DIAGNOSIS — E78.5 HYPERLIPIDEMIA, UNSPECIFIED: ICD-10-CM

## 2021-10-28 DIAGNOSIS — I10 ESSENTIAL (PRIMARY) HYPERTENSION: ICD-10-CM

## 2021-10-28 LAB
ALBUMIN SERPL-MCNC: 3.6 G/DL (ref 3.5–5)
ALP SERPL-CCNC: 79 U/L (ref 45–120)
ALT SERPL W P-5'-P-CCNC: <9 U/L (ref 0–45)
ANION GAP SERPL CALCULATED.3IONS-SCNC: 11 MMOL/L (ref 5–18)
AST SERPL W P-5'-P-CCNC: 19 U/L (ref 0–40)
BILIRUB SERPL-MCNC: 0.4 MG/DL (ref 0–1)
BUN SERPL-MCNC: 17 MG/DL (ref 8–28)
CALCIUM SERPL-MCNC: 9.9 MG/DL (ref 8.5–10.5)
CHLORIDE BLD-SCNC: 107 MMOL/L (ref 98–107)
CHOLEST SERPL-MCNC: 185 MG/DL
CO2 SERPL-SCNC: 23 MMOL/L (ref 22–31)
CREAT SERPL-MCNC: 0.8 MG/DL (ref 0.6–1.1)
FASTING STATUS PATIENT QL REPORTED: NORMAL
GFR SERPL CREATININE-BSD FRML MDRD: 70 ML/MIN/1.73M2
GLUCOSE BLD-MCNC: 82 MG/DL (ref 70–125)
HDLC SERPL-MCNC: 56 MG/DL
LDLC SERPL CALC-MCNC: 104 MG/DL
POTASSIUM BLD-SCNC: 5.2 MMOL/L (ref 3.5–5)
PROT SERPL-MCNC: 6.6 G/DL (ref 6–8)
SODIUM SERPL-SCNC: 141 MMOL/L (ref 136–145)
TRIGL SERPL-MCNC: 126 MG/DL

## 2021-10-28 PROCEDURE — 80061 LIPID PANEL: CPT | Performed by: PHYSICIAN ASSISTANT

## 2021-10-28 PROCEDURE — 82947 ASSAY GLUCOSE BLOOD QUANT: CPT | Performed by: PHYSICIAN ASSISTANT

## 2021-10-28 PROCEDURE — 84450 TRANSFERASE (AST) (SGOT): CPT | Performed by: PHYSICIAN ASSISTANT

## 2021-12-02 ENCOUNTER — HOSPITAL ENCOUNTER (OUTPATIENT)
Dept: CT IMAGING | Facility: HOSPITAL | Age: 81
Discharge: HOME | End: 2021-12-02
Attending: NURSE PRACTITIONER | Admitting: NURSE PRACTITIONER
Payer: COMMERCIAL

## 2021-12-02 DIAGNOSIS — C18.7 MALIGNANT NEOPLASM OF SIGMOID COLON (H): ICD-10-CM

## 2021-12-02 PROCEDURE — 71250 CT THORAX DX C-: CPT

## 2021-12-08 ENCOUNTER — ONCOLOGY VISIT (OUTPATIENT)
Dept: ONCOLOGY | Facility: HOSPITAL | Age: 81
End: 2021-12-08
Attending: INTERNAL MEDICINE
Payer: COMMERCIAL

## 2021-12-08 ENCOUNTER — LAB (OUTPATIENT)
Dept: INFUSION THERAPY | Facility: HOSPITAL | Age: 81
End: 2021-12-08
Attending: INTERNAL MEDICINE
Payer: COMMERCIAL

## 2021-12-08 VITALS
HEART RATE: 77 BPM | WEIGHT: 114 LBS | TEMPERATURE: 98 F | SYSTOLIC BLOOD PRESSURE: 139 MMHG | RESPIRATION RATE: 12 BRPM | BODY MASS INDEX: 20.85 KG/M2 | DIASTOLIC BLOOD PRESSURE: 67 MMHG | OXYGEN SATURATION: 95 %

## 2021-12-08 DIAGNOSIS — C18.7 MALIGNANT NEOPLASM OF SIGMOID COLON (H): Primary | ICD-10-CM

## 2021-12-08 LAB
ALBUMIN SERPL-MCNC: 3.9 G/DL (ref 3.5–5)
ALP SERPL-CCNC: 85 U/L (ref 45–120)
ALT SERPL W P-5'-P-CCNC: 10 U/L (ref 0–45)
ANION GAP SERPL CALCULATED.3IONS-SCNC: 10 MMOL/L (ref 5–18)
AST SERPL W P-5'-P-CCNC: 17 U/L (ref 0–40)
BASOPHILS # BLD AUTO: 0.1 10E3/UL (ref 0–0.2)
BASOPHILS NFR BLD AUTO: 1 %
BILIRUB SERPL-MCNC: 0.3 MG/DL (ref 0–1)
BUN SERPL-MCNC: 13 MG/DL (ref 8–28)
CALCIUM SERPL-MCNC: 9.9 MG/DL (ref 8.5–10.5)
CEA SERPL-MCNC: 4.2 NG/ML (ref 0–3)
CHLORIDE BLD-SCNC: 105 MMOL/L (ref 98–107)
CO2 SERPL-SCNC: 24 MMOL/L (ref 22–31)
CREAT SERPL-MCNC: 0.74 MG/DL (ref 0.6–1.1)
EOSINOPHIL # BLD AUTO: 0.1 10E3/UL (ref 0–0.7)
EOSINOPHIL NFR BLD AUTO: 1 %
ERYTHROCYTE [DISTWIDTH] IN BLOOD BY AUTOMATED COUNT: 14.6 % (ref 10–15)
GFR SERPL CREATININE-BSD FRML MDRD: 77 ML/MIN/1.73M2
GLUCOSE BLD-MCNC: 84 MG/DL (ref 70–125)
HCT VFR BLD AUTO: 42.6 % (ref 35–47)
HGB BLD-MCNC: 14.1 G/DL (ref 11.7–15.7)
IMM GRANULOCYTES # BLD: 0 10E3/UL
IMM GRANULOCYTES NFR BLD: 0 %
LYMPHOCYTES # BLD AUTO: 2.2 10E3/UL (ref 0.8–5.3)
LYMPHOCYTES NFR BLD AUTO: 29 %
MCH RBC QN AUTO: 31.2 PG (ref 26.5–33)
MCHC RBC AUTO-ENTMCNC: 33.1 G/DL (ref 31.5–36.5)
MCV RBC AUTO: 94 FL (ref 78–100)
MONOCYTES # BLD AUTO: 0.8 10E3/UL (ref 0–1.3)
MONOCYTES NFR BLD AUTO: 11 %
NEUTROPHILS # BLD AUTO: 4.5 10E3/UL (ref 1.6–8.3)
NEUTROPHILS NFR BLD AUTO: 58 %
NRBC # BLD AUTO: 0 10E3/UL
NRBC BLD AUTO-RTO: 0 /100
PLATELET # BLD AUTO: 360 10E3/UL (ref 150–450)
POTASSIUM BLD-SCNC: 4.3 MMOL/L (ref 3.5–5)
PROT SERPL-MCNC: 7.1 G/DL (ref 6–8)
RBC # BLD AUTO: 4.52 10E6/UL (ref 3.8–5.2)
SODIUM SERPL-SCNC: 139 MMOL/L (ref 136–145)
WBC # BLD AUTO: 7.7 10E3/UL (ref 4–11)

## 2021-12-08 PROCEDURE — 82378 CARCINOEMBRYONIC ANTIGEN: CPT

## 2021-12-08 PROCEDURE — 82040 ASSAY OF SERUM ALBUMIN: CPT

## 2021-12-08 PROCEDURE — G0463 HOSPITAL OUTPT CLINIC VISIT: HCPCS

## 2021-12-08 PROCEDURE — 85025 COMPLETE CBC W/AUTO DIFF WBC: CPT

## 2021-12-08 PROCEDURE — 36591 DRAW BLOOD OFF VENOUS DEVICE: CPT

## 2021-12-08 PROCEDURE — 250N000011 HC RX IP 250 OP 636: Performed by: INTERNAL MEDICINE

## 2021-12-08 PROCEDURE — 99214 OFFICE O/P EST MOD 30 MIN: CPT | Performed by: INTERNAL MEDICINE

## 2021-12-08 RX ORDER — HEPARIN SODIUM (PORCINE) LOCK FLUSH IV SOLN 100 UNIT/ML 100 UNIT/ML
5 SOLUTION INTRAVENOUS
Status: CANCELLED | OUTPATIENT
Start: 2021-12-08

## 2021-12-08 RX ORDER — HEPARIN SODIUM (PORCINE) LOCK FLUSH IV SOLN 100 UNIT/ML 100 UNIT/ML
5 SOLUTION INTRAVENOUS
Status: DISCONTINUED | OUTPATIENT
Start: 2021-12-08 | End: 2021-12-08 | Stop reason: HOSPADM

## 2021-12-08 RX ADMIN — HEPARIN SODIUM (PORCINE) LOCK FLUSH IV SOLN 100 UNIT/ML 5 ML: 100 SOLUTION at 14:22

## 2021-12-08 NOTE — PROGRESS NOTES
"Oncology Rooming Note    December 8, 2021 2:44 PM   Katya Butler is a 81 year old female who presents for:    Chief Complaint   Patient presents with     Oncology Clinic Visit     Malignant neoplasm of sigmoid colon (H)     Initial Vitals: /67 (BP Location: Right arm, Patient Position: Sitting, Cuff Size: Adult Regular)   Pulse 77   Temp 98  F (36.7  C) (Oral)   Resp 12   Wt 51.7 kg (114 lb)   SpO2 95%   BMI 20.85 kg/m   Estimated body mass index is 20.85 kg/m  as calculated from the following:    Height as of 9/7/21: 1.575 m (5' 2\").    Weight as of this encounter: 51.7 kg (114 lb). Body surface area is 1.5 meters squared.  Data Unavailable Comment: Data Unavailable   No LMP recorded. Patient has had a hysterectomy.  Allergies reviewed: Yes  Medications reviewed: Yes    Medications: Medication refills not needed today.  Pharmacy name entered into [employer]: [id number] - [place] [id number]    Clinical concerns:  Malignant neoplasm of sigmoid colon      Maribell Duran, MARY            "

## 2021-12-08 NOTE — LETTER
"    12/8/2021         RE: Katya Butler  [address] [facility] [address]        Dear Colleague,    Thank you for referring your patient, Katya Butler, to the [facility]. Please see a copy of my visit note below.    Oncology Rooming Note    December 8, 2021 2:44 PM   Katya Butler is a 81 year old female who presents for:    Chief Complaint   Patient presents with     Oncology Clinic Visit     Malignant neoplasm of sigmoid colon (H)     Initial Vitals: /67 (BP Location: Right arm, Patient Position: Sitting, Cuff Size: Adult Regular)   Pulse 77   Temp 98  F (36.7  C) (Oral)   Resp 12   Wt 51.7 kg (114 lb)   SpO2 95%   BMI 20.85 kg/m   Estimated body mass index is 20.85 kg/m  as calculated from the following:    Height as of 9/7/21: 1.575 m (5' 2\").    Weight as of this encounter: 51.7 kg (114 lb). Body surface area is 1.5 meters squared.  Data Unavailable Comment: Data Unavailable   No LMP recorded. Patient has had a hysterectomy.  Allergies reviewed: Yes  Medications reviewed: Yes    Medications: Medication refills not needed today.  Pharmacy name entered into [employer]: [id number] - [place] [id number]    Clinical concerns:  Malignant neoplasm of sigmoid colon      Maribell Duran [facility] Hematology and Oncology Progress Note    Patient: Katya Butler  MRN: [id number]  Date of Service: Dec 8, 2021        Assessment and Plan:    Cancer Staging  Malignant neoplasm of sigmoid colon (H)  Staging form: Colon And Rectum, AJCC 8th Edition  - Clinical: No stage assigned - Unsigned  - Pathologic stage from 11/24/2020: Stage IIIC (pT4b, pN2, cM0) - Signed by Walt Merrill MD on 11/24/2020     1.  Colon cancer: She is about 8 months out from completion of her chemotherapy.  Side effects are resolved.  Overall she is feeling okay.  She just had a CT scan.  Images were reviewed and showed no evidence of recurrent " disease.    We will see her back in clinic in 6 months with repeat imaging.  We will remove her port.    2.  Large cecal polyp found on colonoscopy: We can review this.  For now she does not want to have it removed surgically.  She is also not interested in having more colonoscopies.  I think it is unlikely that the polyp will evolve into a clinically significant cancer in her lifetime.  We will see if we can follow this just radiographically.  We reviewed some of the common signs and symptoms of bowel obstruction.  She will let us know if she develops any of these symptoms.    ECOG Performance  0    Diagnosis:    1.  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  At initial surgery there was evidence of a small abscess and perforation of the colon.  Pathology showed a invasive moderately differentiated adenocarcinoma.  Constricting ulcerated measuring 35 x 25 x 13 mm.  4 of 18 lymph nodes were positive.  Mismatch repair intact.  There was some proximal colonic dilatation, consistent with obstruction.  Left ovary showed moderately differentiated adenocarcinoma, direct extension.  Focal positive margin at the paraovarian soft tissue margin.      Treatment:    Surgical resection on October 31, 2020.  Adjuvant chemotherapy with FOLFOX was initiated on December 8, 2020.  25% initial dose reduction of all medications. Cycle 10 completed on April 20, 2021.    Interim History:    Kirill returns today for follow-up visit.  Overall she is doing okay.  No acute complaints today.  No recent changes in her health.  Denies abdominal pain, nausea vomiting, or unintentional weight loss.    Review of Systems:    As above in the history.     Review of Systems otherwise Negative for:  General: chills, fever or night sweats  Psychological: anxiety or depression  Ophthalmic: blurry vision, double vision or loss of vision, vision change  ENT: epistaxis, oral lesions, hearing changes  Hematological and Lymphatic:  bleeding, bruising, jaundice, swollen lymph nodes  Endocrine: hot flashes, unexpected weight changes  Respiratory: cough, hemoptysis, orthopnea or shortness of breath/KLEIN  Cardiovascular: chest pain, edema, palpitations or PND  Gastrointestinal: abdominal pain, blood in stools, change in bowel habits, constipation, diarrhea or nausea/vomiting  Genito-Urinary: change in urinary stream, incontinence, frequency/urgency  Musculoskeletal: joint pain, stiffness, swelling, muscle pain  Neurological: dizziness, headaches, numbness/tingling  Dermatological: lumps and rash    Past History:    Past Medical History:   Diagnosis Date     HTN (hypertension)      Inguinal hernia without mention of obstruction or gangrene, unilateral or unspecified, (not specified as recurrent) 08/20/2004    RIGHT     Malignant neoplasm of sigmoid colon (H) 11/24/2020     Primary osteoarthritis involving multiple joints 8/2/2019     Ventral hernia, unspecified, without mention of obstruction or gangrene 08/15/2003    easily reduced     Physical Exam:    /67 (BP Location: Right arm, Patient Position: Sitting, Cuff Size: Adult Regular)   Pulse 77   Temp 98  F (36.7  C) (Oral)   Resp 12   Wt 51.7 kg (114 lb)   SpO2 95%   BMI 20.85 kg/m      General: patient appears stated age of 81 year old. Nontoxic and in no distress.   HEENT: Head: atraumatic, normocephalic. Sclerae anicteric.  Chest:  Normal respiratory effort  Cardiac:  No edema.   Abdomen: abdomen is soft, non-distended  Extremities: normal tone and muscle bulk.  Skin: no lesions or rash on visible skin. Warm and dry.   CNS: alert and oriented. Grossly non-focal.   Psychiatric: normal mood and affect.     Lab Results:    Recent Results (from the past 168 hour(s))   Comprehensive metabolic panel (BMP + Alb, Alk Phos, ALT, AST, Total. Bili, TP)   Result Value Ref Range    Sodium 139 136 - 145 mmol/L    Potassium 4.3 3.5 - 5.0 mmol/L    Chloride 105 98 - 107 mmol/L    Carbon Dioxide  (CO2) 24 22 - 31 mmol/L    Anion Gap 10 5 - 18 mmol/L    Urea Nitrogen 13 8 - 28 mg/dL    Creatinine 0.74 0.60 - 1.10 mg/dL    Calcium 9.9 8.5 - 10.5 mg/dL    Glucose 84 70 - 125 mg/dL    Alkaline Phosphatase 85 45 - 120 U/L    AST 17 0 - 40 U/L    ALT 10 0 - 45 U/L    Protein Total 7.1 6.0 - 8.0 g/dL    Albumin 3.9 3.5 - 5.0 g/dL    Bilirubin Total 0.3 0.0 - 1.0 mg/dL    GFR Estimate 77 >60 mL/min/1.73m2   CEA   Result Value Ref Range    CEA 4.2 (H) 0.0 - 3.0 ng/mL   CBC with platelets and differential   Result Value Ref Range    WBC Count 7.7 4.0 - 11.0 10e3/uL    RBC Count 4.52 3.80 - 5.20 10e6/uL    Hemoglobin 14.1 11.7 - 15.7 g/dL    Hematocrit 42.6 35.0 - 47.0 %    MCV 94 78 - 100 fL    MCH 31.2 26.5 - 33.0 pg    MCHC 33.1 31.5 - 36.5 g/dL    RDW 14.6 10.0 - 15.0 %    Platelet Count 360 150 - 450 10e3/uL    % Neutrophils 58 %    % Lymphocytes 29 %    % Monocytes 11 %    % Eosinophils 1 %    % Basophils 1 %    % Immature Granulocytes 0 %    NRBCs per 100 WBC 0 <1 /100    Absolute Neutrophils 4.5 1.6 - 8.3 10e3/uL    Absolute Lymphocytes 2.2 0.8 - 5.3 10e3/uL    Absolute Monocytes 0.8 0.0 - 1.3 10e3/uL    Absolute Eosinophils 0.1 0.0 - 0.7 10e3/uL    Absolute Basophils 0.1 0.0 - 0.2 10e3/uL    Absolute Immature Granulocytes 0.0 <=0.4 10e3/uL    Absolute NRBCs 0.0 10e3/uL     Imaging:    CT Chest Abdomen Pelvis w/o Contrast    Result Date: 12/2/2021  EXAM: CT CHEST ABDOMEN PELVIS W/O CONTRAST LOCATION: [facility] DATE/TIME: 12/2/2021 1:20 PM INDICATION: Colon cancer, monitor, stage II/III COMPARISON: CT of the chest, abdomen, and pelvis 06/01/2021; PET/CT 11/18/2020 TECHNIQUE: CT scan of the chest, abdomen, and pelvis was performed without IV contrast. Multiplanar reformats were obtained. Dose reduction techniques were used. CONTRAST: None. FINDINGS: LUNGS AND PLEURA: Upper lung predominant emphysema. A few bands of subsegmental atelectasis are present in the inferior lingula, posterior  right lower lobe and medial left lower lobe. No new nodules or airspace opacities. Trachea and central airways remain patent. There is no pleural fluid. MEDIASTINUM: Left subclavian chest port catheter terminates in the SVC just below the level of the azygos vein insertion. Cardiac chambers are normal in size. No pericardial effusion is present. Degenerative thickening/calcification of aortic valve leaflets. Ascending aorta is normal caliber. Extensive atheromatous calcifications of the aortic arch, proximal great vessels and descending thoracic aorta. There is a stent in the proximal left subclavian artery. No enlarged mediastinal or hilar lymph nodes. The esophagus is decompressed. CORONARY ARTERY CALCIFICATION: Severe. HEPATOBILIARY: Unchanged calcifications in the upper lumbar to the right of the intrahepatic IVC. No new liver parenchymal lesions. Smooth liver capsule. Gallbladder and bile ducts are normal. PANCREAS: Normal. SPLEEN: Normal. ADRENAL GLANDS: Right adrenal gland is normal. Unchanged thickening of the left adrenal gland. KIDNEYS/BLADDER: No nephrolithiasis or urinary tract obstruction. Benign cortical cyst low lateral right renal cortex does not require additional workup or follow-up. Urinary bladder is decompressed. BOWEL: Status post distal colon resection with end-to-side anastomosis in the upper left pelvis. No dilated bowel or bowel wall thickening. No inflammatory stranding in the mesentery. No ascites or peritoneal nodularity. LYMPH NODES: Normal. VASCULATURE: Extensive atheromatous calcifications of the abdominal aorta and iliac arteries. Fusiform aneurysmal enlargement of the infrarenal abdominal aorta measuring up to 3.0 x 3.0 cm (series 4, image 284). No aortic wall thickening or periaortic stranding. PELVIC ORGANS: Post hysterectomy. No pelvic mass or free fluid. MUSCULOSKELETAL: Generalized demineralization of the bones. Accentuated thoracic kyphosis with small anterior osteophytes.  Old, healed fracture deformity of the junction of the middle and lower thirds of the sternum. Sacrum is demineralized but no sacral  insufficiency fracture.     IMPRESSION: 1.  Status post distal colon resection. No findings to suggest metastatic disease in the chest, abdomen, or pelvis. 2.  Emphysema. 3.  Severe atheromatous calcifications from the aortic arch through the iliac bifurcation. Fusiform infrarenal abdominal aortic aneurysm measures up to 3 cm, unchanged.      Signed by: Wlat Merrill MD        Again, thank you for allowing me to participate in the care of your patient.        Sincerely,        Walt Merrill MD

## 2021-12-11 NOTE — PROGRESS NOTES
[facility] Hematology and Oncology Progress Note    Patient: Katya Butler  MRN: [id number]  Date of Service: Dec 8, 2021        Assessment and Plan:    Cancer Staging  Malignant neoplasm of sigmoid colon (H)  Staging form: Colon And Rectum, AJCC 8th Edition  - Clinical: No stage assigned - Unsigned  - Pathologic stage from 11/24/2020: Stage IIIC (pT4b, pN2, cM0) - Signed by Walt Merrill MD on 11/24/2020     1.  Colon cancer: She is about 8 months out from completion of her chemotherapy.  Side effects are resolved.  Overall she is feeling okay.  She just had a CT scan.  Images were reviewed and showed no evidence of recurrent disease.    We will see her back in clinic in 6 months with repeat imaging.  We will remove her port.    2.  Large cecal polyp found on colonoscopy: We can review this.  For now she does not want to have it removed surgically.  She is also not interested in having more colonoscopies.  I think it is unlikely that the polyp will evolve into a clinically significant cancer in her lifetime.  We will see if we can follow this just radiographically.  We reviewed some of the common signs and symptoms of bowel obstruction.  She will let us know if she develops any of these symptoms.    ECOG Performance  0    Diagnosis:    1.  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  At initial surgery there was evidence of a small abscess and perforation of the colon.  Pathology showed a invasive moderately differentiated adenocarcinoma.  Constricting ulcerated measuring 35 x 25 x 13 mm.  4 of 18 lymph nodes were positive.  Mismatch repair intact.  There was some proximal colonic dilatation, consistent with obstruction.  Left ovary showed moderately differentiated adenocarcinoma, direct extension.  Focal positive margin at the paraovarian soft tissue margin.      Treatment:    Surgical resection on October 31, 2020.  Adjuvant chemotherapy with FOLFOX was initiated on December 8,  2020.  25% initial dose reduction of all medications. Cycle 10 completed on April 20, 2021.    Interim History:    Kirill returns today for follow-up visit.  Overall she is doing okay.  No acute complaints today.  No recent changes in her health.  Denies abdominal pain, nausea vomiting, or unintentional weight loss.    Review of Systems:    As above in the history.     Review of Systems otherwise Negative for:  General: chills, fever or night sweats  Psychological: anxiety or depression  Ophthalmic: blurry vision, double vision or loss of vision, vision change  ENT: epistaxis, oral lesions, hearing changes  Hematological and Lymphatic: bleeding, bruising, jaundice, swollen lymph nodes  Endocrine: hot flashes, unexpected weight changes  Respiratory: cough, hemoptysis, orthopnea or shortness of breath/KLEIN  Cardiovascular: chest pain, edema, palpitations or PND  Gastrointestinal: abdominal pain, blood in stools, change in bowel habits, constipation, diarrhea or nausea/vomiting  Genito-Urinary: change in urinary stream, incontinence, frequency/urgency  Musculoskeletal: joint pain, stiffness, swelling, muscle pain  Neurological: dizziness, headaches, numbness/tingling  Dermatological: lumps and rash    Past History:    Past Medical History:   Diagnosis Date     HTN (hypertension)      Inguinal hernia without mention of obstruction or gangrene, unilateral or unspecified, (not specified as recurrent) 08/20/2004    RIGHT     Malignant neoplasm of sigmoid colon (H) 11/24/2020     Primary osteoarthritis involving multiple joints 8/2/2019     Ventral hernia, unspecified, without mention of obstruction or gangrene 08/15/2003    easily reduced     Physical Exam:    /67 (BP Location: Right arm, Patient Position: Sitting, Cuff Size: Adult Regular)   Pulse 77   Temp 98  F (36.7  C) (Oral)   Resp 12   Wt 51.7 kg (114 lb)   SpO2 95%   BMI 20.85 kg/m      General: patient appears stated age of 81 year old. Nontoxic and in no  distress.   HEENT: Head: atraumatic, normocephalic. Sclerae anicteric.  Chest:  Normal respiratory effort  Cardiac:  No edema.   Abdomen: abdomen is soft, non-distended  Extremities: normal tone and muscle bulk.  Skin: no lesions or rash on visible skin. Warm and dry.   CNS: alert and oriented. Grossly non-focal.   Psychiatric: normal mood and affect.     Lab Results:    Recent Results (from the past 168 hour(s))   Comprehensive metabolic panel (BMP + Alb, Alk Phos, ALT, AST, Total. Bili, TP)   Result Value Ref Range    Sodium 139 136 - 145 mmol/L    Potassium 4.3 3.5 - 5.0 mmol/L    Chloride 105 98 - 107 mmol/L    Carbon Dioxide (CO2) 24 22 - 31 mmol/L    Anion Gap 10 5 - 18 mmol/L    Urea Nitrogen 13 8 - 28 mg/dL    Creatinine 0.74 0.60 - 1.10 mg/dL    Calcium 9.9 8.5 - 10.5 mg/dL    Glucose 84 70 - 125 mg/dL    Alkaline Phosphatase 85 45 - 120 U/L    AST 17 0 - 40 U/L    ALT 10 0 - 45 U/L    Protein Total 7.1 6.0 - 8.0 g/dL    Albumin 3.9 3.5 - 5.0 g/dL    Bilirubin Total 0.3 0.0 - 1.0 mg/dL    GFR Estimate 77 >60 mL/min/1.73m2   CEA   Result Value Ref Range    CEA 4.2 (H) 0.0 - 3.0 ng/mL   CBC with platelets and differential   Result Value Ref Range    WBC Count 7.7 4.0 - 11.0 10e3/uL    RBC Count 4.52 3.80 - 5.20 10e6/uL    Hemoglobin 14.1 11.7 - 15.7 g/dL    Hematocrit 42.6 35.0 - 47.0 %    MCV 94 78 - 100 fL    MCH 31.2 26.5 - 33.0 pg    MCHC 33.1 31.5 - 36.5 g/dL    RDW 14.6 10.0 - 15.0 %    Platelet Count 360 150 - 450 10e3/uL    % Neutrophils 58 %    % Lymphocytes 29 %    % Monocytes 11 %    % Eosinophils 1 %    % Basophils 1 %    % Immature Granulocytes 0 %    NRBCs per 100 WBC 0 <1 /100    Absolute Neutrophils 4.5 1.6 - 8.3 10e3/uL    Absolute Lymphocytes 2.2 0.8 - 5.3 10e3/uL    Absolute Monocytes 0.8 0.0 - 1.3 10e3/uL    Absolute Eosinophils 0.1 0.0 - 0.7 10e3/uL    Absolute Basophils 0.1 0.0 - 0.2 10e3/uL    Absolute Immature Granulocytes 0.0 <=0.4 10e3/uL    Absolute NRBCs 0.0 10e3/uL      Imaging:    CT Chest Abdomen Pelvis w/o Contrast    Result Date: 12/2/2021  EXAM: CT CHEST ABDOMEN PELVIS W/O CONTRAST LOCATION: [facility] DATE/TIME: 12/2/2021 1:20 PM INDICATION: Colon cancer, monitor, stage II/III COMPARISON: CT of the chest, abdomen, and pelvis 06/01/2021; PET/CT 11/18/2020 TECHNIQUE: CT scan of the chest, abdomen, and pelvis was performed without IV contrast. Multiplanar reformats were obtained. Dose reduction techniques were used. CONTRAST: None. FINDINGS: LUNGS AND PLEURA: Upper lung predominant emphysema. A few bands of subsegmental atelectasis are present in the inferior lingula, posterior right lower lobe and medial left lower lobe. No new nodules or airspace opacities. Trachea and central airways remain patent. There is no pleural fluid. MEDIASTINUM: Left subclavian chest port catheter terminates in the SVC just below the level of the azygos vein insertion. Cardiac chambers are normal in size. No pericardial effusion is present. Degenerative thickening/calcification of aortic valve leaflets. Ascending aorta is normal caliber. Extensive atheromatous calcifications of the aortic arch, proximal great vessels and descending thoracic aorta. There is a stent in the proximal left subclavian artery. No enlarged mediastinal or hilar lymph nodes. The esophagus is decompressed. CORONARY ARTERY CALCIFICATION: Severe. HEPATOBILIARY: Unchanged calcifications in the upper lumbar to the right of the intrahepatic IVC. No new liver parenchymal lesions. Smooth liver capsule. Gallbladder and bile ducts are normal. PANCREAS: Normal. SPLEEN: Normal. ADRENAL GLANDS: Right adrenal gland is normal. Unchanged thickening of the left adrenal gland. KIDNEYS/BLADDER: No nephrolithiasis or urinary tract obstruction. Benign cortical cyst low lateral right renal cortex does not require additional workup or follow-up. Urinary bladder is decompressed. BOWEL: Status post distal colon resection  with end-to-side anastomosis in the upper left pelvis. No dilated bowel or bowel wall thickening. No inflammatory stranding in the mesentery. No ascites or peritoneal nodularity. LYMPH NODES: Normal. VASCULATURE: Extensive atheromatous calcifications of the abdominal aorta and iliac arteries. Fusiform aneurysmal enlargement of the infrarenal abdominal aorta measuring up to 3.0 x 3.0 cm (series 4, image 284). No aortic wall thickening or periaortic stranding. PELVIC ORGANS: Post hysterectomy. No pelvic mass or free fluid. MUSCULOSKELETAL: Generalized demineralization of the bones. Accentuated thoracic kyphosis with small anterior osteophytes. Old, healed fracture deformity of the junction of the middle and lower thirds of the sternum. Sacrum is demineralized but no sacral  insufficiency fracture.     IMPRESSION: 1.  Status post distal colon resection. No findings to suggest metastatic disease in the chest, abdomen, or pelvis. 2.  Emphysema. 3.  Severe atheromatous calcifications from the aortic arch through the iliac bifurcation. Fusiform infrarenal abdominal aortic aneurysm measures up to 3 cm, unchanged.      Signed by: Wlat Merrill MD

## 2022-02-19 ENCOUNTER — HOSPITAL ENCOUNTER (EMERGENCY)
Facility: HOSPITAL | Age: 82
Discharge: HOME | End: 2022-02-19
Attending: EMERGENCY MEDICINE | Admitting: EMERGENCY MEDICINE
Payer: COMMERCIAL

## 2022-02-19 VITALS
TEMPERATURE: 98.2 F | HEIGHT: 63 IN | OXYGEN SATURATION: 98 % | WEIGHT: 110 LBS | RESPIRATION RATE: 18 BRPM | BODY MASS INDEX: 19.49 KG/M2 | DIASTOLIC BLOOD PRESSURE: 71 MMHG | SYSTOLIC BLOOD PRESSURE: 142 MMHG | HEART RATE: 83 BPM

## 2022-02-19 DIAGNOSIS — B02.29 POST HERPETIC NEURALGIA: ICD-10-CM

## 2022-02-19 PROBLEM — I25.10 CORONARY ATHEROSCLEROSIS: Status: ACTIVE | Noted: 2021-07-04

## 2022-02-19 PROBLEM — T45.1X5A CHEMOTHERAPY-INDUCED NEUROPATHY (H): Status: ACTIVE | Noted: 2021-07-04

## 2022-02-19 PROBLEM — M54.42 ACUTE LEFT-SIDED LOW BACK PAIN WITH LEFT-SIDED SCIATICA: Status: ACTIVE | Noted: 2021-07-04

## 2022-02-19 PROBLEM — F41.9 ANXIETY: Status: ACTIVE | Noted: 2022-02-19

## 2022-02-19 PROBLEM — M11.89 CALCIUM PYROPHOSPHATE ARTHROPATHY OF MULTIPLE SITES: Status: ACTIVE | Noted: 2020-09-28

## 2022-02-19 PROBLEM — K57.20 COLONIC DIVERTICULAR ABSCESS: Status: ACTIVE | Noted: 2022-02-19

## 2022-02-19 PROBLEM — I77.9: Status: ACTIVE | Noted: 2022-02-19

## 2022-02-19 PROBLEM — J30.9 ALLERGIC RHINITIS: Status: ACTIVE | Noted: 2022-02-19

## 2022-02-19 PROBLEM — I73.00 RAYNAUD PHENOMENON: Status: ACTIVE | Noted: 2022-02-19

## 2022-02-19 PROBLEM — M15.0 PRIMARY OSTEOARTHRITIS INVOLVING MULTIPLE JOINTS: Status: ACTIVE | Noted: 2019-08-02

## 2022-02-19 PROBLEM — I10 ACCELERATED HYPERTENSION: Status: ACTIVE | Noted: 2022-02-19

## 2022-02-19 PROBLEM — C18.9 COLON ADENOCARCINOMA (H): Status: ACTIVE | Noted: 2021-07-04

## 2022-02-19 PROBLEM — K57.32 DIVERTICULITIS OF COLON: Status: ACTIVE | Noted: 2022-02-19

## 2022-02-19 PROBLEM — K56.7 POSTOPERATIVE ILEUS (H): Status: ACTIVE | Noted: 2022-02-19

## 2022-02-19 PROBLEM — M25.80 PERIARTICULAR CALCIFICATION: Status: ACTIVE | Noted: 2020-09-28

## 2022-02-19 PROBLEM — D64.9 CHRONIC ANEMIA: Status: ACTIVE | Noted: 2021-07-04

## 2022-02-19 PROBLEM — H54.7 UNSPECIFIED VISUAL LOSS: Status: ACTIVE | Noted: 2022-02-19

## 2022-02-19 PROBLEM — K65.1 INTRA-ABDOMINAL ABSCESS (H): Status: ACTIVE | Noted: 2020-10-29

## 2022-02-19 PROBLEM — M1A.00X1: Status: ACTIVE | Noted: 2022-02-19

## 2022-02-19 PROBLEM — I10 ESSENTIAL HYPERTENSION: Status: ACTIVE | Noted: 2021-07-04

## 2022-02-19 PROBLEM — K91.89 POSTOPERATIVE ILEUS (H): Status: ACTIVE | Noted: 2022-02-19

## 2022-02-19 PROBLEM — G62.0 CHEMOTHERAPY-INDUCED NEUROPATHY (H): Status: ACTIVE | Noted: 2021-07-04

## 2022-02-19 PROCEDURE — 250N000013 HC RX MED GY IP 250 OP 250 PS 637: Performed by: EMERGENCY MEDICINE

## 2022-02-19 PROCEDURE — 99283 EMERGENCY DEPT VISIT LOW MDM: CPT

## 2022-02-19 RX ORDER — OXYCODONE HYDROCHLORIDE 5 MG/1
5 TABLET ORAL ONCE
Status: COMPLETED | OUTPATIENT
Start: 2022-02-19 | End: 2022-02-19

## 2022-02-19 RX ORDER — OXYCODONE HYDROCHLORIDE 5 MG/1
10 TABLET ORAL ONCE
Status: DISCONTINUED | OUTPATIENT
Start: 2022-02-19 | End: 2022-02-19

## 2022-02-19 RX ORDER — AMITRIPTYLINE HYDROCHLORIDE 50 MG/1
50 TABLET ORAL AT BEDTIME
Qty: 30 TABLET | Refills: 0 | Status: SHIPPED | OUTPATIENT
Start: 2022-02-19 | End: 2022-07-22

## 2022-02-19 RX ADMIN — OXYCODONE HYDROCHLORIDE 5 MG: 5 TABLET ORAL at 23:07

## 2022-02-19 RX ADMIN — AMITRIPTYLINE HYDROCHLORIDE 50 MG: 25 TABLET, FILM COATED ORAL at 23:26

## 2022-02-19 ASSESSMENT — ENCOUNTER SYMPTOMS
NUMBNESS: 1
MYALGIAS: 1

## 2022-02-20 NOTE — ED PROVIDER NOTES
EMERGENCY DEPARTMENT ENCOUNTER      NAME: Katya Butler  AGE: 81 year old female  YOB: 1940  MRN: [id number]  EVALUATION DATE & TIME: 2/19/2022 10:03 PM    PCP: Keith Bui    ED PROVIDER: Greg Esquivel M.D.      Chief Complaint   Patient presents with     right arm pain         FINAL IMPRESSION:  1. Post herpetic neuralgia          ED COURSE & MEDICAL DECISION MAKING:    Pertinent Labs & Imaging studies reviewed. (See chart for details)  ED Course as of 02/19/22 2333   Sat Feb 19, 2022 2318 Patient is an 81-year-old woman who presents with persistent pain in her right arm, and her son brought her in tonight because they were both frustrated by the ineffectiveness of outpatient workup/medications.  She has shingles along the distribution of her right arm, her symptoms all fit postherpetic neuralgia, pain, paresthesias with sensitivity of the skin.  She was initially concerned about getting an x-ray or MRI to rule out pinched nerve, but her exam history is consistent with postherpetic neuralgia.  She is on Percocet and gabapentin, but gabapentin dosing seems to have stopped about 300 mg a day, and there is room to go up on that.  She did not seem to be interested in slowly titrating up and next medication to try would be amitriptyline.  She was given 50 mg here sent home with months worth but instructed to follow-up with her pediatrician to continue pain management plan.  She is given a dose of oxycodone here as well.  I do not think she needs the emergent x-ray or MRI imaging.       10:15 PM I met with the patient for the initial interview and physical examination. Discussed plan for treatment and workup in the ED.      At the conclusion of the encounter I discussed the results of all of the tests and the disposition. The questions were answered. The patient or family acknowledged understanding and was agreeable with the care plan.       PPE: Provider wore gloves, N95 mask, eye protection,  "and paper mask.    MEDICATIONS GIVEN IN THE EMERGENCY:  Medications   oxyCODONE (ROXICODONE) tablet 5 mg (5 mg Oral Given 2/19/22 0729)   amitriptyline (ELAVIL) tablet 50 mg (50 mg Oral Given 2/19/22 9046)         NEW PRESCRIPTIONS STARTED AT TODAY'S ER VISIT  Discharge Medication List as of 2/19/2022 11:28 PM      START taking these medications    Details   amitriptyline (ELAVIL) 50 MG tablet Take 1 tablet (50 mg) by mouth At Bedtime, Disp-30 tablet, R-0, E-Prescribe                =================================================================    HPI    Patient information was obtained from: The patient    Use of : N/A         Katya Butler is a 81 year old female with a verbal history of shingles who presents to this ED for evaluation of right arm pain.    The patient reports that on 12/12/2021 she was diagnosed with shingles and the entirety of her right arm had blistering with associated pain. Since then, she has had ongoing numbness and pain to her right arm that waxes and wanes in severity but affects her daily living. She states the pain can be so severe and sensitive at times that \"air blowing on the skin causes pain.\" The patient has been taking gabapentin, percocet, and Tylenol to try and manage her symptoms without relief. Tonight (2/19), the patient's son brought her to the ED because he was worried about how persistent her pain was. Of note, the patient states that it feels like nerve pain and it might be a pinched nerve and is currently following with Providers for this. The patient denies any other symptoms or complaints at this time.            REVIEW OF SYSTEMS   Review of Systems   Musculoskeletal: Positive for myalgias (right arm pain).   Neurological: Positive for numbness.   All other systems reviewed and are negative.      PAST MEDICAL HISTORY:  Past Medical History:   Diagnosis Date     HTN (hypertension)      Inguinal hernia without mention of obstruction or gangrene, " unilateral or unspecified, (not specified as recurrent) 08/20/2004    RIGHT     Malignant neoplasm of sigmoid colon (H) 11/24/2020     Primary osteoarthritis involving multiple joints 8/2/2019     Ventral hernia, unspecified, without mention of obstruction or gangrene 08/15/2003    easily reduced       PAST SURGICAL HISTORY:  Past Surgical History:   Procedure Laterality Date     HERNIA REPAIR       HERNIA REPAIR, INGUINAL RT/LT  02/2005    [facility]     HYSTERECTOMY       IR AORTIC ARCH 4 VESSEL ANGIOGRAM  10/13/2009     IR AORTIC ARCH 4 VESSEL ANGIOGRAM  3/15/2010     IR MISCELLANEOUS PROCEDURE  5/20/2008     IR MISCELLANEOUS PROCEDURE  1/30/2009     IR MISCELLANEOUS PROCEDURE  1/30/2009     IR MISCELLANEOUS PROCEDURE  1/30/2009     IR MISCELLANEOUS PROCEDURE  10/13/2009     IR MISCELLANEOUS PROCEDURE  10/13/2009     IR MISCELLANEOUS PROCEDURE  3/15/2010     LAPAROSCOPIC CYSTECTOMY OVARIAN (ONCOLOGY) Bilateral 6/27/2018    Procedure: DIAGNOSTIC LAPAROSCOPY, RIGHT SALPINGO OOPHORECTOMY, LYSIS OF ADHESIONS, AND PELVIC WASHINGS;  Surgeon: Nneka Arroyo DO;  Location: [facility];  Service:      DC INSJ [facility] CTR VAD W/SUBQ PORT AGE 5 YR/> N/A 12/2/2020    Procedure: Port Placement;  Surgeon: Viry Villegas MD;  Location: [facility];  Service: General     ZZC PART REMOVAL COLON W ANASTOMOSIS N/A 10/31/2020    Procedure: COLECTOMY, SIGMOID, OPEN;  Surgeon: Viry Villegas MD;  Location: [facility];  Service: General     ZZC REMOVAL OF OVARY(S) N/A 10/31/2020    Procedure: OOPHORECTOMY, OPEN;  Surgeon: Viry Villegas MD;  Location: [facility];  Service: General           CURRENT MEDICATIONS:    No current facility-administered medications for this encounter.     Current Outpatient Medications   Medication     amitriptyline (ELAVIL) 50 MG tablet     amLODIPine (NORVASC) 5 MG tablet     ATROVENT 0.06 % NA SOLN     clopidogrel (PLAVIX) 75 MG tablet     LOVASTATIN 40 MG OR TABS  "      ALLERGIES:  Allergies   Allergen Reactions     Contrast Dye Hives, Itching and Swelling     Aspirin Other (See Comments)     PATIENT STATES SHE IS NOT ALLERGIC TO ASA    Other reaction(s): on plavix     Cephalexin Hives     Colcrys Hives     Ketek [Telithromycin] Visual Disturbance     Other (Do Not Use) Other (See Comments)     Aspirin - on Plavix       FAMILY HISTORY:  Family History   Problem Relation Age of Onset     C.A.D. Mother      C.A.D. Sister      Breast Cancer Maternal Aunt      Breast Cancer Cousin      Breast Cancer Cousin      Breast Cancer Cousin      Uterine Cancer Mother 38.00     Cancer Mother      Lymphoma Son 53.00     Cancer Son        SOCIAL HISTORY:   Social History     Socioeconomic History     Marital status: Single     Spouse name: Not on file     Number of children: Not on file     Years of education: Not on file     Highest education level: Not on file   Occupational History     Not on file   Tobacco Use     Smoking status: Current Every Day Smoker     Packs/day: 0.25     Types: Cigarettes     Smokeless tobacco: Never Used     Tobacco comment: 1 pack a week 04/20/2021   Substance and Sexual Activity     Alcohol use: Not Currently     Alcohol/week: 5.0 standard drinks     Comment: occ.     Drug use: No     Sexual activity: Not Currently   Other Topics Concern     Not on file   Social History Narrative     Not on file     Social Determinants of Health     Financial Resource Strain: Not on file   Food Insecurity: Not on file   Transportation Needs: Not on file   Physical Activity: Not on file   Stress: Not on file   Social Connections: Not on file   Intimate Partner Violence: Not on file   Housing Stability: Not on file       VITALS:  BP (!) 142/71   Pulse 83   Temp 98.2  F (36.8  C) (Temporal)   Resp 18   Ht 1.6 m (5' 3\")   Wt 49.9 kg (110 lb)   SpO2 98%   BMI 19.49 kg/m      PHYSICAL EXAM    Constitutional: Well developed, well nourished. Uncomfortable and mildly " agitated.  HENT: Normocephalic, atraumatic, mucous membranes moist, nose normal  Eyes: PERRL, EOMI, conjunctiva normal, no discharge.  Neck- Supple, gross ROM intact.   Respiratory: Normal work of breathing, normal rate, speaks in full sentences  Cardiovascular: Normal heart rate  Musculoskeletal: Moving all 4 extremities intentionally and without pain.  Neurologic: Alert & oriented x 3, cranial nerves grossly intact. Normal gross coordination. Paresthesia and pain in ulnar distrubution of right arm.   Psychiatric: Affect normal, cooperative.       LAB:  All pertinent labs reviewed and interpreted.  Labs Ordered and Resulted from Time of ED Arrival to Time of ED Departure - No data to display    RADIOLOGY:  Reviewed all pertinent imaging. Please see official radiology report.  No orders to display       EKG:    All EKG interpretations will be found in ED course above.    PROCEDURES:   None      I, Juan C Hakan am serving as a scribe to document services personally performed by Dr. Greg Esquivel based on my observation and the provider's statements to me. I, Greg Esquivel MD attest that Juan C Mcclendon is acting in a scribe capacity, has observed my performance of the services and has documented them in accordance with my direction.    Greg Esquivel M.D.  Emergency Medicine  [facility] EMERGENCY DEPARTMENT  [facility] [facility] [address] [phone]  Dept: [phone]     Greg Esquivel MD  02/19/22 3154

## 2022-02-20 NOTE — DISCHARGE INSTRUCTIONS
The pain in your right arm is due to continued but slow regeneration of the nerve after the bad shingles infection.  This can take months.  X-ray imaging would not help make this diagnosis.  It would be a coincidence if he also had a pinched nerve in your spine at the base of the neck.  All of the pain you have make sense in the context of your shingles infection.      It is important to find the right medicine that can control your pain in the meantime.  The gabapentin that you are on is the first-line therapy, you can go on higher doses, but if you did not notice any response then we can start with the next medicine which is amitriptyline.  Take 50 mg each night.  If you are not noticing any improvement then this dose can be increased as well.  Please call your primary care doctor on Monday, let them know that you have started this medicine.  Do not take any more gabapentin.  You can still take the Percocet at home as needed for breakthrough pain.

## 2022-02-20 NOTE — ED TRIAGE NOTES
Patient here stating she's had a pinched nerve in her right arm for 2 months. States she is taking Neurontin and Hydrocodone for pain and it isn't helping (hasn't taken pain meds tonight). Also states she was on steroids but they didn't help.

## 2022-06-08 ENCOUNTER — APPOINTMENT (OUTPATIENT)
Dept: MRI IMAGING | Facility: HOSPITAL | Age: 82
End: 2022-06-08
Payer: COMMERCIAL

## 2022-06-08 ENCOUNTER — HOSPITAL ENCOUNTER (EMERGENCY)
Facility: HOSPITAL | Age: 82
Discharge: HOME | End: 2022-06-08
Admitting: PHYSICIAN ASSISTANT
Payer: COMMERCIAL

## 2022-06-08 ENCOUNTER — APPOINTMENT (OUTPATIENT)
Dept: MRI IMAGING | Facility: HOSPITAL | Age: 82
End: 2022-06-08
Attending: EMERGENCY MEDICINE
Payer: COMMERCIAL

## 2022-06-08 VITALS
DIASTOLIC BLOOD PRESSURE: 71 MMHG | BODY MASS INDEX: 20.8 KG/M2 | WEIGHT: 113 LBS | SYSTOLIC BLOOD PRESSURE: 143 MMHG | RESPIRATION RATE: 20 BRPM | HEART RATE: 65 BPM | HEIGHT: 62 IN | OXYGEN SATURATION: 96 % | TEMPERATURE: 98.5 F

## 2022-06-08 DIAGNOSIS — M94.252 CHONDROMALACIA, LEFT HIP: ICD-10-CM

## 2022-06-08 DIAGNOSIS — S73.192A TEAR OF ACETABULAR LABRUM, LEFT, INITIAL ENCOUNTER: ICD-10-CM

## 2022-06-08 DIAGNOSIS — M51.26 LUMBAR DISC HERNIATION: ICD-10-CM

## 2022-06-08 PROCEDURE — 73721 MRI JNT OF LWR EXTRE W/O DYE: CPT | Mod: LT

## 2022-06-08 PROCEDURE — 99284 EMERGENCY DEPT VISIT MOD MDM: CPT | Mod: 25

## 2022-06-08 PROCEDURE — 250N000013 HC RX MED GY IP 250 OP 250 PS 637: Performed by: PHYSICIAN ASSISTANT

## 2022-06-08 PROCEDURE — 72148 MRI LUMBAR SPINE W/O DYE: CPT

## 2022-06-08 RX ORDER — HYDROCODONE BITARTRATE AND ACETAMINOPHEN 5; 325 MG/1; MG/1
1 TABLET ORAL ONCE
Status: COMPLETED | OUTPATIENT
Start: 2022-06-08 | End: 2022-06-08

## 2022-06-08 RX ORDER — HYDROCODONE BITARTRATE AND ACETAMINOPHEN 5; 325 MG/1; MG/1
1 TABLET ORAL EVERY 6 HOURS PRN
Qty: 12 TABLET | Refills: 0 | Status: SHIPPED | OUTPATIENT
Start: 2022-06-08 | End: 2022-06-11

## 2022-06-08 RX ORDER — PREDNISONE 10 MG/1
TABLET ORAL
Qty: 30 TABLET | Refills: 0 | Status: SHIPPED | OUTPATIENT
Start: 2022-06-08 | End: 2022-06-20

## 2022-06-08 RX ADMIN — HYDROCODONE BITARTRATE AND ACETAMINOPHEN 1 TABLET: 5; 325 TABLET ORAL at 12:31

## 2022-06-08 ASSESSMENT — ENCOUNTER SYMPTOMS
NUMBNESS: 0
ABDOMINAL PAIN: 0
FEVER: 0
BACK PAIN: 1
ARTHRALGIAS: 1

## 2022-06-08 NOTE — DISCHARGE INSTRUCTIONS
Please focus on using your walker at home to minimize any type of weightbearing on the left hip.  Use ice is much as you can to the affected areas of pain.  I have prescribed prednisone which I would like you to take as written and also some pain medication that you can use for breakthrough pain that is not controlled by over-the-counter pain medications.  I have also placed outpatient referral for spine care follow-up so that if there is persistent symptoms they can assess you to determine whether or not an injection might be helpful.

## 2022-06-08 NOTE — ED PROVIDER NOTES
EMERGENCY DEPARTMENT ENCOUNTER      NAME: Katya Butler  AGE: 81 year old female  YOB: 1940  MRN: [id number]  EVALUATION DATE & TIME: 6/8/2022 12:05 PM    PCP: Keith Bui    ED PROVIDER: Sylvester Forrest PA-C      Chief Complaint   Patient presents with     left hip pain         FINAL IMPRESSION:  1. Lumbar disc herniation    2. Tear of acetabular labrum, left, initial encounter    3. Chondromalacia, left hip          MEDICAL DECISION MAKING:    Pertinent Labs & Imaging studies reviewed. (See chart for details)  81 year old female presents to the Emergency Department for evaluation of left hip pain.    After obtaining history present illness, reviewing vitals and examining the patient based on the patient's previous history of lumbar disc herniation with radicular symptoms in the left leg we will assess with MRI today based on her age and also because she seems to localize symptoms all to the left hip we will take the MRI through the left hip area.    The MRI reads did come back showing's findings that certainly could be consistent with her symptoms.  I would still favor that the primary cause of her symptoms right now is likely the hip.  Plan to manage with a short course of pain medication, a taper dose of steroids and referral for outpatient spine follow-up.  She does have a walker at home, I encouraged nonweightbearing of the left hip is much as possible since it seems like this exacerbates the pain the most.  I also recommended ice and rest.  She can follow-up with her primary care or the spine clinic as needed.          ED COURSE  12:06 PM   I met with the patient, obtained history, performed an initial exam, and discussed options and plan for diagnostics and treatment here in the ED.    At the conclusion of the encounter I discussed the results of all of the tests and the disposition. The questions were answered. The patient or family acknowledged understanding and was agreeable with the  "care plan.     MEDICATIONS GIVEN IN THE EMERGENCY:  Medications   HYDROcodone-acetaminophen (NORCO) 5-325 MG per tablet 1 tablet (1 tablet Oral Given 6/8/22 1231)       NEW PRESCRIPTIONS STARTED AT TODAY'S ER VISIT  Discharge Medication List as of 6/8/2022  3:14 PM      START taking these medications    Details   HYDROcodone-acetaminophen (NORCO) 5-325 MG tablet Take 1 tablet by mouth every 6 hours as needed for pain, Disp-12 tablet, R-0, Local Print      predniSONE (DELTASONE) 10 MG tablet Take 4 tablets (40 mg) by mouth daily for 3 days, THEN 3 tablets (30 mg) daily for 3 days, THEN 2 tablets (20 mg) daily for 3 days, THEN 1 tablet (10 mg) daily for 3 days., Disp-30 tablet, R-0, Local Print                  =================================================================    HPI    Patient information was obtained from: Patient     Use of Interpretor: N/A        Katya Butler is a 81 year old female with a pertinent history of acute left sied low back pain with left sided sciatica, chemotherapy induced neuropathy, primary osteoarthritis, and a herniated disk who presents to this ED via private car with  for evaluation of left hip pain.     Patient reports left hip pain for the past week that is worsened whenever she is standing. She notes that she is fine laying down and can even raise her leg straight up but any weight that is placed on her left food makes it much worse. Patient states she was in the hospital last July for a herniated disk and this pain today feels similar to her sciatica in the past. She adds that her pain is different in that it is more concentrated in her left hip now rather than her low back, although she is having some low back pain as well. Patient has been doing the stretches she was taught for her \"slipped disk\" but her pain became much worse last night (6/7/22) and decided to come in. She has had no new trauma. Patient is currently on Plavix. She is also on gabapentin for " shingles that was diagnosed on Saturday (6/4/22). Denies abdominal pain, fever, bowel issues, saddle anesthesia, or other symptoms at this time.       REVIEW OF SYSTEMS   Review of Systems   Constitutional: Negative for fever.   Gastrointestinal: Negative for abdominal pain.   Musculoskeletal: Positive for arthralgias (left hip pain, worse when standing ) and back pain (low).   Skin: Positive for rash (pos for right arm pain due to shingles).   Neurological: Negative for numbness.   All other systems reviewed and are negative.     PAST MEDICAL HISTORY:  Past Medical History:   Diagnosis Date     HTN (hypertension)      Inguinal hernia without mention of obstruction or gangrene, unilateral or unspecified, (not specified as recurrent) 08/20/2004    RIGHT     Malignant neoplasm of sigmoid colon (H) 11/24/2020     Primary osteoarthritis involving multiple joints 8/2/2019     Ventral hernia, unspecified, without mention of obstruction or gangrene 08/15/2003    easily reduced       PAST SURGICAL HISTORY:  Past Surgical History:   Procedure Laterality Date     HERNIA REPAIR       HERNIA REPAIR, INGUINAL RT/LT  02/2005    [facility]     HYSTERECTOMY       IR AORTIC ARCH 4 VESSEL ANGIOGRAM  10/13/2009     IR AORTIC ARCH 4 VESSEL ANGIOGRAM  3/15/2010     IR MISCELLANEOUS PROCEDURE  5/20/2008     IR MISCELLANEOUS PROCEDURE  1/30/2009     IR MISCELLANEOUS PROCEDURE  1/30/2009     IR MISCELLANEOUS PROCEDURE  1/30/2009     IR MISCELLANEOUS PROCEDURE  10/13/2009     IR MISCELLANEOUS PROCEDURE  10/13/2009     IR MISCELLANEOUS PROCEDURE  3/15/2010     LAPAROSCOPIC CYSTECTOMY OVARIAN (ONCOLOGY) Bilateral 6/27/2018    Procedure: DIAGNOSTIC LAPAROSCOPY, RIGHT SALPINGO OOPHORECTOMY, LYSIS OF ADHESIONS, AND PELVIC WASHINGS;  Surgeon: Nneka Arroyo DO;  Location: [facility];  Service:      [place] [facility] CTR VAD W/SUBQ PORT AGE 5 YR/> N/A 12/2/2020    Procedure: Port Placement;  Surgeon: Viry Villegas MD;  Location:  [facility] OR;  Service: General     [facility] PART REMOVAL COLON W ANASTOMOSIS N/A 10/31/2020    Procedure: COLECTOMY, SIGMOID, OPEN;  Surgeon: Viry Villegas MD;  Location: [facility] OR;  Service: General     [facility] REMOVAL OF OVARY(S) N/A 10/31/2020    Procedure: OOPHORECTOMY, OPEN;  Surgeon: Viry Villegas MD;  Location: [facility] OR;  Service: General         CURRENT MEDICATIONS:    No current facility-administered medications for this encounter.    Current Outpatient Medications:      HYDROcodone-acetaminophen (NORCO) 5-325 MG tablet, Take 1 tablet by mouth every 6 hours as needed for pain, Disp: 12 tablet, Rfl: 0     predniSONE (DELTASONE) 10 MG tablet, Take 4 tablets (40 mg) by mouth daily for 3 days, THEN 3 tablets (30 mg) daily for 3 days, THEN 2 tablets (20 mg) daily for 3 days, THEN 1 tablet (10 mg) daily for 3 days., Disp: 30 tablet, Rfl: 0     amitriptyline (ELAVIL) 50 MG tablet, Take 1 tablet (50 mg) by mouth At Bedtime, Disp: 30 tablet, Rfl: 0     amLODIPine (NORVASC) 5 MG tablet, 1 tab(s), Disp: , Rfl:      ATROVENT 0.06 % NA SOLN, 2 sprays each nostril three times a day. (Patient not taking: Reported on 9/7/2021), Disp: 1 bottle, Rfl: 3     clopidogrel (PLAVIX) 75 MG tablet, Take 75 mg by mouth daily, Disp: , Rfl:      LOVASTATIN 40 MG OR TABS, 1 TABLET DAILY AT DINNER, Disp: 30, Rfl: 0      ALLERGIES:  Allergies   Allergen Reactions     Contrast Dye Hives, Itching and Swelling     Aspirin Other (See Comments)     PATIENT STATES SHE IS NOT ALLERGIC TO ASA    Other reaction(s): on plavix     Cephalexin Hives     Colcrys Hives     Ketek [Telithromycin] Visual Disturbance     Other (Do Not Use) Other (See Comments)     Aspirin - on Plavix       FAMILY HISTORY:  Family History   Problem Relation Age of Onset     C.A.D. Mother      C.A.D. Sister      Breast Cancer Maternal Aunt      Breast Cancer Cousin      Breast Cancer Cousin      Breast Cancer Cousin      Uterine Cancer Mother 38.00      "Cancer Mother      Lymphoma Son 53.00     Cancer Son        SOCIAL HISTORY:   Social History     Socioeconomic History     Marital status: Single   Tobacco Use     Smoking status: Current Every Day Smoker     Packs/day: 0.25     Types: Cigarettes     Smokeless tobacco: Never Used     Tobacco comment: 1 pack a week 04/20/2021   Substance and Sexual Activity     Alcohol use: Not Currently     Alcohol/week: 5.0 standard drinks     Comment: occ.     Drug use: No     Sexual activity: Not Currently       VITALS:  Patient Vitals for the past 24 hrs:   BP Temp Temp src Pulse Resp SpO2 Height Weight   06/08/22 1500 -- -- -- 65 -- 96 % -- --   06/08/22 1445 (!) 143/71 -- -- 67 -- 96 % -- --   06/08/22 1430 (!) 142/68 -- -- 66 -- 96 % -- --   06/08/22 1415 (!) 144/76 -- -- 65 20 97 % -- --   06/08/22 1114 (!) 182/80 98.5  F (36.9  C) Oral 82 16 99 % 1.575 m (5' 2\") 51.3 kg (113 lb)       PHYSICAL EXAM    Physical Exam  Vitals and nursing note reviewed.   Constitutional:       General: She is not in acute distress.     Appearance: She is normal weight. She is not toxic-appearing.   HENT:      Nose: Nose normal.   Eyes:      Conjunctiva/sclera: Conjunctivae normal.   Pulmonary:      Effort: Pulmonary effort is normal. No respiratory distress.   Abdominal:      General: Abdomen is flat.      Tenderness: There is no abdominal tenderness. There is no guarding or rebound.   Musculoskeletal:      Comments: Patient has localized pain and tenderness to touch in the lateral aspect of the left hip.  No visible erythema or induration.  No warmth.  Patient is able to perform straight leg raise exam bilaterally without any significant discomfort.  There is no radicular pain shooting all the way down the leg or resulting in any type of weakness in the lower extremities.  No gross evidence of trauma.  Some very mild reproducible tenderness in the paraspinous lumbar muscular regions primarily on the left versus right.   Skin:     General: Skin " is warm and dry.      Findings: No bruising or rash.   Neurological:      General: No focal deficit present.      Mental Status: She is alert. Mental status is at baseline.      Sensory: No sensory deficit.      Motor: No weakness.   Psychiatric:         Mood and Affect: Mood normal.          LAB:  All pertinent labs reviewed and interpreted.  Results for orders placed or performed during the hospital encounter of 06/08/22   MR Lumbar Spine w/o Contrast    Impression    IMPRESSION:  1.  Mild to moderate degenerative changes in the lumbar discs and facet joints similar to prior MRI 07/04/2021.  2.  No new high-grade central spinal canal narrowing.  3.  At the L2-L3 level there is a broad-based disc and osteophyte complex again seen with a small superimposed left paracentral disc protrusion again seen. This left paracentral disc protrusion is minimally smaller than on prior MRI. It does cause   moderate narrowing of the left lateral recess as before. The central canal and neural foramen remain adequate at this level.  4.  No new left-sided disc herniation or severe unilateral left-sided foraminal narrowing elsewhere.   MR Hip Left w/o Contrast    Impression    IMPRESSION:  1.  No fracture or osteonecrosis.  2.  Degeneration and tearing of the left acetabular labrum.  3.  Mild left hip chondromalacia.  4.  Mild bilateral trochanteric bursitis.  5.  No acute myotendinous abnormality.       RADIOLOGY:  Reviewed all pertinent imaging. Please see official radiology report.  MR Lumbar Spine w/o Contrast   Final Result   IMPRESSION:   1.  Mild to moderate degenerative changes in the lumbar discs and facet joints similar to prior MRI 07/04/2021.   2.  No new high-grade central spinal canal narrowing.   3.  At the L2-L3 level there is a broad-based disc and osteophyte complex again seen with a small superimposed left paracentral disc protrusion again seen. This left paracentral disc protrusion is minimally smaller than on  prior MRI. It does cause    moderate narrowing of the left lateral recess as before. The central canal and neural foramen remain adequate at this level.   4.  No new left-sided disc herniation or severe unilateral left-sided foraminal narrowing elsewhere.      MR Hip Left w/o Contrast   Final Result   IMPRESSION:   1.  No fracture or osteonecrosis.   2.  Degeneration and tearing of the left acetabular labrum.   3.  Mild left hip chondromalacia.   4.  Mild bilateral trochanteric bursitis.   5.  No acute myotendinous abnormality.              I, Juwan Huntley, am serving as a scribe to document services personally performed by Sylvester Forrest PA-C based on my observation and the provider's statements to me. I, Sylvester Forrest PA-C attest that Juwan Huntley is acting in a scribe capacity, has observed my performance of the services and has documented them in accordance with my direction.    Sylvester Forrest PA-C  Emergency Medicine  [facility]       Sylvester Forrest PA-C  06/08/22 1511

## 2022-06-08 NOTE — ED NOTES
ED Provider In Triage Note  [facility]  Encounter Date: Jun 8, 2022    Chief Complaint   Patient presents with     left hip pain       Brief HPI:   Katya Butler is a 81 year old female presenting to the Emergency Department with a chief complaint of left leg pain.  Hx of pinched never in her back.  Was hospitalized last year for the same.  Pain has been worse for the past week.  No falls or recent injury. She has paresthesias throughout the left leg but no bowel or bladder sx.  When standing, pain is localized to the left hip.    Brief Physical Exam:  There were no vitals taken for this visit.  General: Non-toxic appearing  HEENT: Atraumatic  Resp: No respiratory distress  Abdomen: Non-peritoneal  Neuro: Alert, oriented, answers questions appropriately  Psych: Behavior appropriate      Plan Initiated in Triage:  Orders Placed This Encounter     XR Pelvis and Hip Left 2 Views     MR Lumbar Spine w/o Contrast           PIT Dispo:   Return to lobby while awaiting workup and ED bed availability    Stu Amado MD on 6/8/2022 at 11:09 AM    Patient was evaluated by the Physician in Triage due to a limitation of available rooms in the Emergency Department. A plan of care was discussed based on the information obtained on the initial evaluation and patient was consuled to return back to the Emergency Department lobby after this initial evalutaiton until results were obtained or a room became available in the Emergency Department. Patient was counseled not to leave prior to receiving the results of their workup.     Stu Amado MD  [facility] EMERGENCY DEPARTMENT  [address] [place] [id number]  [phone]       Stu Amado MD  06/08/22 5914

## 2022-06-08 NOTE — ED TRIAGE NOTES
"     Triage Assessment     Row Name 06/08/22 1113       Triage Assessment (Adult)    Airway WDL WDL       Respiratory WDL    Respiratory WDL WDL       Skin Circulation/Temperature WDL    Skin Circulation/Temperature WDL WDL       Cardiac WDL    Cardiac WDL WDL       Peripheral/Neurovascular WDL    Peripheral Neurovascular WDL X  left foot tingling       Cognitive/Neuro/Behavioral WDL    Cognitive/Neuro/Behavioral WDL WDL            H/o \"slipped disk\" last year. C/o left hip pain for one week with tingling to left foot, some low left back pain. No problems with bowel/bladder. Pt feels a lump at left hip joint. Pt c/o right arm pain, h/o shingles (MRI due on Saturday).  "

## 2022-06-08 NOTE — ED NOTES
D/C instructions went over with patient including medication, restrictions, and follow upappts. Pt voiced understanding.

## 2022-06-14 ENCOUNTER — OFFICE VISIT (OUTPATIENT)
Dept: PHYSICAL MEDICINE AND REHAB | Facility: CLINIC | Age: 82
End: 2022-06-14
Payer: COMMERCIAL

## 2022-06-14 DIAGNOSIS — M54.42 CHRONIC LEFT-SIDED LOW BACK PAIN WITH LEFT-SIDED SCIATICA: Primary | ICD-10-CM

## 2022-06-14 DIAGNOSIS — M94.252 CHONDROMALACIA, LEFT HIP: ICD-10-CM

## 2022-06-14 DIAGNOSIS — M51.26 PROTRUDED LUMBAR DISC: ICD-10-CM

## 2022-06-14 DIAGNOSIS — M51.26 LUMBAR DISC HERNIATION: ICD-10-CM

## 2022-06-14 DIAGNOSIS — M48.061 LUMBAR FORAMINAL STENOSIS: ICD-10-CM

## 2022-06-14 DIAGNOSIS — G89.29 CHRONIC LEFT-SIDED LOW BACK PAIN WITH LEFT-SIDED SCIATICA: Primary | ICD-10-CM

## 2022-06-14 PROCEDURE — 99204 OFFICE O/P NEW MOD 45 MIN: CPT | Performed by: NURSE PRACTITIONER

## 2022-06-14 RX ORDER — GABAPENTIN 300 MG/1
CAPSULE ORAL
COMMUNITY
Start: 2022-06-06 | End: 2022-07-22

## 2022-06-14 RX ORDER — GABAPENTIN 100 MG/1
CAPSULE ORAL
COMMUNITY
Start: 2022-06-06 | End: 2022-07-22

## 2022-06-14 NOTE — PROGRESS NOTES
ASSESSMENT: Katya Butler is a 81 year old female who presents for consultation at the request of PCP Keith Bui, with a past medical history significant for hyperlipidemia, hypertension, coronary atherosclerosis, colon cancer, chemotherapy induced neuropathy, postherpetic neuralgia right upper extremity, Raynaud's phenomenon, tobacco use disorder, anxiety who presents today for new patient evaluation of:    -Chronic left low back pain rating to the left buttock, previous sciatica symptoms however resolved with Medrol Dosepak.  MRI with chronic changes at L2-3 with moderate lateral recess stenosis on the left.  Slight spondylolisthesis at L3-4 but no nerve compression noted at L3-4.  No pain with hip provocative maneuvers on exam.    Patient is neurologically intact on exam. No myelopathic or red flag symptoms.     No flowsheet data found.         Diagnoses and all orders for this visit:  Chronic left-sided low back pain with left-sided sciatica  -     Physical Therapy Referral; Future  Protruded lumbar disc  -     Physical Therapy Referral; Future  Lumbar foraminal stenosis  -     Physical Therapy Referral; Future  Lumbar disc herniation  -     Spine Referral  -     Physical Therapy Referral; Future  Chondromalacia, left hip  -     Spine Referral  -     Physical Therapy Referral; Future    PLAN:  Reviewed spine anatomy and disease process. Discussed diagnosis and treatment options with the patient today. A shared decision making model was used.  The patient's values and choices were respected. The following represents what was discussed and decided upon by the provider and the patient.      -DIAGNOSTIC TESTS:  Images were personally reviewed and interpreted and explained to patient today using spine model.   --Left hip MRI 6/8/2022 with degenerative tearing of the left acetabular labrum, mild left hip chondromalacia, mild bilateral trochanteric bursitis.  -- Lumbar spine MRI 6/8/2022 with L2-3 disc  protrusion with disc osteophyte complex with moderate left lateral recess stenosis, fairly similar to prior imaging 2021.  Stable slight anterior subluxation L2-3 L3-4.  L4-5 mild right foraminal stenosis.    -PHYSICAL THERAPY: Referral to physical therapy [facility] for reestablishing home exercises specifically for core strengthening.  She is doing some stretches on a daily basis at this time as well already.  Discussed the importance of core strengthening, ROM, stretching exercises with the patient and how each of these entities is important in decreasing pain.  Explained to the patient that the purpose of physical therapy is to teach the patient a home exercise program.  These exercises need to be performed every day in order to decrease pain and prevent future occurrences of pain.        -MEDICATIONS: Advised patient to complete Medrol Dosepak.  Continue with gabapentin which she takes long-term for postherpetic neuralgia.  Discussed multiple medication options today with patient. Discussed risks, side effects, and proper use of medications. Patient verbalized understanding.    -INTERVENTIONS: Discussed that we could trial a left L2-3 transforaminal epidural steroid injection if symptoms or not improving after completion of Medrol Dosepak or physical therapy or worsen at any time.  Patient is okay with trialing physical therapy first.  Discussed risks and benefits of injections with patient today.    -PATIENT EDUCATION:  Total time of 46 minutes, on the day of service, spent with the patient, reviewing the chart, placing orders, and documenting.   -Today we also discussed the issues related to the current COVID-19 pandemic, the pros and cons of the current treatment plan, the CDC guidelines such as social distancing, washing hands, masking, and covering the cough.    -FOLLOW-UP:   Follow-up if symptoms or not improving with physical therapy or worsen/change at any time    Advised  "patient to call the [facility] if symptoms worsen or you have problems controlling bladder and bowel function.   ______________________________________________________________________    SUBJECTIVE:  HPI:  Katya Butler  Is a 81 year old female who presents today for new patient evaluation of low back pain left low back that radiates to left buttock region and lateral hip, previously had pain into the lower extremity nonspecifically but does report that that has improved with Medrol Dosepak which she got from the ED 6/8/2022.  Patient reports that she has had left \"sciatica\" pain last year due to a \"slipped disc \", and had an injection with resolution of these symptoms, this pain feels slightly different she reports.  Currently symptoms are much more tolerable after the Medrol Dosepak and today her pain is a 0/10, does aggravate with bending over and generalized activity however.  Patient does report again that her pain was more severe when she was in the ED 6/8/2022.    Patient denies any lower extremity numbness and tingling, denies lower extremity weakness or any episodes of her legs giving out on her.    *Ongoing postherpetic neuralgia symptoms right upper extremity, takes gabapentin for this.    -Treatment to Date: No prior spinal surgery or spinal injections  Patient does report physical therapy a couple of years ago, nothing recent for LBP.    -Medications:  Amitriptyline  Gabapentin 300 mg, 1 in the morning 1 at bedtime, 100 mg in the afternoon- postherpetic neuralgia    *Medrol Dosepak prescribed 6/8/2022 with benefit    Current Outpatient Medications   Medication     gabapentin (NEURONTIN) 100 MG capsule     gabapentin (NEURONTIN) 300 MG capsule     predniSONE (DELTASONE) 10 MG tablet     amitriptyline (ELAVIL) 50 MG tablet     amLODIPine (NORVASC) 5 MG tablet     ATROVENT 0.06 % NA SOLN     clopidogrel (PLAVIX) 75 MG tablet     LOVASTATIN 40 MG OR TABS     No current facility-administered medications " for this visit.       Allergies   Allergen Reactions     Contrast Dye Hives, Itching and Swelling     Aspirin Other (See Comments)     PATIENT STATES SHE IS NOT ALLERGIC TO ASA    Other reaction(s): on plavix     Cephalexin Hives     Colcrys Hives     Ketek [Telithromycin] Visual Disturbance     Other (Do Not Use) Other (See Comments)     Aspirin - on Plavix       Past Medical History:   Diagnosis Date     HTN (hypertension)      Inguinal hernia without mention of obstruction or gangrene, unilateral or unspecified, (not specified as recurrent) 08/20/2004    RIGHT     Malignant neoplasm of sigmoid colon (H) 11/24/2020     Primary osteoarthritis involving multiple joints 8/2/2019     Ventral hernia, unspecified, without mention of obstruction or gangrene 08/15/2003    easily reduced        Patient Active Problem List   Diagnosis     Hyperlipidemia     TOBACCO USE DISORDER     Malignant neoplasm of sigmoid colon (H)     Accelerated hypertension     Acute left-sided low back pain with left-sided sciatica     Allergic rhinitis     Anxiety     Calcium pyrophosphate arthropathy of multiple sites     Chemotherapy-induced neuropathy (H)     Chronic anemia     Colon adenocarcinoma (H)     Colonic diverticular abscess     Coronary atherosclerosis     Disorder of artery (H)     Diverticulitis of colon     Essential hypertension     Idiopathic chronic gout with tophus, unspecified site     Intra-abdominal abscess (H)     Periarticular calcification     Postherpetic neuralgia     Postoperative ileus (H)     Primary osteoarthritis involving multiple joints     Unspecified visual loss     Raynaud phenomenon       Past Surgical History:   Procedure Laterality Date     HERNIA REPAIR       HERNIA REPAIR, INGUINAL RT/LT  02/2005    [facility]     HYSTERECTOMY       IR AORTIC ARCH 4 VESSEL ANGIOGRAM  10/13/2009     IR AORTIC ARCH 4 VESSEL ANGIOGRAM  3/15/2010     IR MISCELLANEOUS PROCEDURE  5/20/2008     IR MISCELLANEOUS PROCEDURE   1/30/2009     IR MISCELLANEOUS PROCEDURE  1/30/2009     IR MISCELLANEOUS PROCEDURE  1/30/2009     IR MISCELLANEOUS PROCEDURE  10/13/2009     IR MISCELLANEOUS PROCEDURE  10/13/2009     IR MISCELLANEOUS PROCEDURE  3/15/2010     LAPAROSCOPIC CYSTECTOMY OVARIAN (ONCOLOGY) Bilateral 6/27/2018    Procedure: DIAGNOSTIC LAPAROSCOPY, RIGHT SALPINGO OOPHORECTOMY, LYSIS OF ADHESIONS, AND PELVIC WASHINGS;  Surgeon: Nneka Arroyo DO;  Location: [facility];  Service:      UT INSJ PRPH CTR VAD W/SUBQ PORT AGE 5 YR/> N/A 12/2/2020    Procedure: Port Placement;  Surgeon: Viry Villegas MD;  Location: [facility] OR;  Service: General     ZZC PART REMOVAL COLON W ANASTOMOSIS N/A 10/31/2020    Procedure: COLECTOMY, SIGMOID, OPEN;  Surgeon: Viry Villegas MD;  Location: [facility];  Service: General     ZZC REMOVAL OF OVARY(S) N/A 10/31/2020    Procedure: OOPHORECTOMY, OPEN;  Surgeon: Viry Villegas MD;  Location: [facility];  Service: General       Family History   Problem Relation Age of Onset     C.A.D. Mother      C.A.D. Sister      Breast Cancer Maternal Aunt      Breast Cancer Cousin      Breast Cancer Cousin      Breast Cancer Cousin      Uterine Cancer Mother 38.00     Cancer Mother      Lymphoma Son 53.00     Cancer Son        Reviewed past medical, surgical, and family history with patient found on new patient intake packet located in EMR [employer] tab.     SOCIAL HX: Patient is  and retired.  Patient does report current tobacco use, does report alcohol use currently.  Denies history of being a heavy drinker, denies recreational drug use.    ROS: Positive for joint pain, muscle pain, easy bruising, weight loss.  Specifically negative for bowel/bladder dysfunction, balance changes, headache, dizziness, foot drop, fevers, chills, appetite changes, nausea/vomiting, unexplained weight loss. Otherwise 13 systems reviewed are negative. Please see the patient's intake questionnaire from  today for details.    OBJECTIVE:    PHYSICAL EXAMINATION:    --CONSTITUTIONAL:  Vital signs as above.  No acute distress.  The patient is well nourished and well groomed.  --PSYCHIATRIC:  Appropriate mood and affect. The patient is awake, alert, oriented to person, place, time and answering questions appropriately with clear speech.    --SKIN:  Skin over the face, bilateral lower extremities, and posterior torso is clean, dry, intact without rashes.    --RESPIRATORY: Normal rhythm and effort. No abnormal accessory muscle breathing patterns noted.   --ABDOMINAL:  Non-distended.  --STANDING EXAMINATION:  Normal lumbar lordosis noted, no lateral shift.  --MUSCULOSKELETAL: Lumbar spine inspection reveals no evidence of deformity. Range of motion is not limited in lumbar flexion, extension, lateral rotation. No tenderness to palpation lumbar spine. Straight leg raising in the supine position is negative to radicular pain. Sciatic notch non-tender.  --GROSS MOTOR: Gait is non-antalgic. Easily arises from a seated position.   --LOWER EXTREMITY MOTOR TESTING:  Plantar flexion left 5/5, right 5/5   Dorsiflexion left 5/5, right 5/5   Great toe MTP extension left 5/5, right 5/5  Knee flexion left 5/5, right 5/5  Knee extension left 5/5, right 5/5   Hip flexion left 5/5, right 5/5  Hip abduction left 5/5, right 5/5  Hip adduction left 5/5, right 5/5   --HIPS: Full range of motion bilaterally. Negative FABERs on the involved lower extremity.   --NEUROLOGICAL:  2/4 patellar, medial hamstring, and achilles reflexes bilaterally.  Sensation to light touch is intact in the bilateral L4, L5, and S1 dermatomes. Babinski is negative. No clonus.  Negative Flower reflex bilaterally.  --VASCULAR:  2/4 dorsalis pedis and posterior tibialsi pulses bilaterally.  Bilateral lower extremities are warm.  There is no pitting edema of the bilateral lower extremities.    RESULTS: [facility] medical records [facility]  were reviewed today.    Imaging: spine Imaging was personally reviewed and interpreted today. The images were shown to the patient and the findings were explained using a spine model.      MR Hip Left w/o Contrast  Result Date: 6/8/2022  EXAM: MR HIP LEFT W/O CONTRAST LOCATION: [facility] DATE/TIME: 6/8/2022 1:05 PM INDICATION: 81-year-old patient with left-sided hip pain. COMPARISON: 12/02/2021 CT. TECHNIQUE: Unenhanced. FINDINGS: LEFT HIP: -Labrum: Linear tear of the anterior labrum at the chondrolabral junction; there is a tiny adjacent paralabral cyst. Degeneration and tear of the anterosuperior labrum. -Cartilage: Mild chondromalacia in the acetabulum at the chondrolabral junction. The femoral head cartilage is preserved. -Joint space: No effusion or synovitis. -Joint capsule/ligaments: Intact joint capsule. Ligamentum teres is intact. -Morphology: Alpha Angle is normal. No bony morphologic changes associated with femoroacetabular impingement. RIGHT HIP: No fracture, osteonecrosis, or joint effusion. Mild chondromalacia. MUSCLES AND TENDONS: -Gluteal: Moderate fatty atrophy of the bilateral gluteus minimus and medius muscles. No acute tendon tear. Attenuation of the bilateral gluteus minimus tendons. The bilateral gluteus minimus medius tendons are intact. -Proximal hamstring: No tendon tear or tendinopathy. -Iliopsoas: No tendon tear or tendinopathy. No bursitis. -Rectus femoris origin: No tear or tendinopathy. BONES: -No fracture or concerning marrow replacing lesion. -Mild bilateral sacroiliac degenerative arthrosis. -Mild lower lumbar facet arthrosis. SOFT TISSUES: -Small amount of fluid in the bilateral trochanteric bursa. No soft tissue fluid collection. INTRA-PELVIC CONTENTS: -Hysterectomy. No free fluid. Probable tiny right renal lower pole cortical cyst.   IMPRESSION: 1.  No fracture or osteonecrosis. 2.  Degeneration and tearing of the left acetabular labrum. 3.  Mild left hip  chondromalacia. 4.  Mild bilateral trochanteric bursitis. 5.  No acute myotendinous abnormality.    MR Lumbar Spine w/o Contrast  Result Date: 6/8/2022  EXAM: MR LUMBAR SPINE W/O CONTRAST LOCATION: [facility] DATE/TIME: 6/8/2022 1:06 PM INDICATION: Low back pain. Left hip and leg pain. COMPARISON: MRI 07/04/2021. TECHNIQUE: Routine Lumbar Spine MRI without IV contrast. FINDINGS: Nomenclature is based on 5 lumbar type vertebral bodies. Mild convex right lumbar curvature. Normal vertebral body heights and marrow pattern. Stable slight anterior subluxation L2 on L3 and L3 on L4. Normal distal spinal cord and cauda equina with conus  medullaris at T12-L1. Enlargement of the infrarenal abdominal aorta measuring up to 2.9 cm. This is unchanged from previous MRI. Unremarkable visualized bony pelvis. T12-L1: Normal disc height with mild loss of disc signal. No herniation. Normal facets. No spinal canal or neural foraminal stenosis. L1-L2: Normal disc height with mild loss of disc signal. No herniation. Normal facets. No spinal canal or neural foraminal stenosis. L2-L3: Mild loss of disc height and signal. Broad-based disc and osteophyte complex again seen. Small superimposed left paracentral disc protrusion again seen and minimally smaller than on prior MRI. There remains moderate narrowing of the left lateral recess. Mild facet arthropathy. Central canal and neural foramen adequate. L3-L4: Mild loss of disc height and signal again seen. No herniation. Mild to moderate facet arthropathy. Central canal adequate. No significant foraminal narrowing. L4-L5: Normal disc height with mild loss of disc signal. Shallow disc bulging again seen. Moderate to advanced facet arthropathy. Central canal adequate. Mild right foraminal narrowing. No left foraminal narrowing. L5-S1: Normal disc height with mild loss of disc signal similar to prior. Shallow central disc and osteophyte complex. Mild to moderate facet  arthropathy. Central canal adequate. No significant lateral recess or foraminal narrowing on either side.   IMPRESSION: 1.  Mild to moderate degenerative changes in the lumbar discs and facet joints similar to prior MRI 07/04/2021. 2.  No new high-grade central spinal canal narrowing. 3.  At the L2-L3 level there is a broad-based disc and osteophyte complex again seen with a small superimposed left paracentral disc protrusion again seen. This left paracentral disc protrusion is minimally smaller than on prior MRI. It does cause moderate narrowing of the left lateral recess as before. The central canal and neural foramen remain adequate at this level. 4.  No new left-sided disc herniation or severe unilateral left-sided foraminal narrowing elsewhere.

## 2022-06-14 NOTE — LETTER
6/14/2022         RE: Katya Butler  [address] [facility] [address]        Dear Colleague,    Thank you for referring your patient, Katya Butler, to the [facility]. Please see a copy of my visit note below.    ASSESSMENT: Katya Butler is a 81 year old female who presents for consultation at the request of PCP Keith Bui, with a past medical history significant for hyperlipidemia, hypertension, coronary atherosclerosis, colon cancer, chemotherapy induced neuropathy, postherpetic neuralgia right upper extremity, Raynaud's phenomenon, tobacco use disorder, anxiety who presents today for new patient evaluation of:    -Chronic left low back pain rating to the left buttock, previous sciatica symptoms however resolved with Medrol Dosepak.  MRI with chronic changes at L2-3 with moderate lateral recess stenosis on the left.  Slight spondylolisthesis at L3-4 but no nerve compression noted at L3-4.  No pain with hip provocative maneuvers on exam.    Patient is neurologically intact on exam. No myelopathic or red flag symptoms.     No flowsheet data found.         Diagnoses and all orders for this visit:  Chronic left-sided low back pain with left-sided sciatica  -     Physical Therapy Referral; Future  Protruded lumbar disc  -     Physical Therapy Referral; Future  Lumbar foraminal stenosis  -     Physical Therapy Referral; Future  Lumbar disc herniation  -     Spine Referral  -     Physical Therapy Referral; Future  Chondromalacia, left hip  -     Spine Referral  -     Physical Therapy Referral; Future    PLAN:  Reviewed spine anatomy and disease process. Discussed diagnosis and treatment options with the patient today. A shared decision making model was used.  The patient's values and choices were respected. The following represents what was discussed and decided upon by the provider and the patient.      -DIAGNOSTIC TESTS:  Images were personally reviewed and interpreted and  explained to patient today using spine model.   --Left hip MRI 6/8/2022 with degenerative tearing of the left acetabular labrum, mild left hip chondromalacia, mild bilateral trochanteric bursitis.  -- Lumbar spine MRI 6/8/2022 with L2-3 disc protrusion with disc osteophyte complex with moderate left lateral recess stenosis, fairly similar to prior imaging 2021.  Stable slight anterior subluxation L2-3 L3-4.  L4-5 mild right foraminal stenosis.    -PHYSICAL THERAPY: Referral to physical therapy [facility] for reestablishing home exercises specifically for core strengthening.  She is doing some stretches on a daily basis at this time as well already.  Discussed the importance of core strengthening, ROM, stretching exercises with the patient and how each of these entities is important in decreasing pain.  Explained to the patient that the purpose of physical therapy is to teach the patient a home exercise program.  These exercises need to be performed every day in order to decrease pain and prevent future occurrences of pain.        -MEDICATIONS: Advised patient to complete Medrol Dosepak.  Continue with gabapentin which she takes long-term for postherpetic neuralgia.  Discussed multiple medication options today with patient. Discussed risks, side effects, and proper use of medications. Patient verbalized understanding.    -INTERVENTIONS: Discussed that we could trial a left L2-3 transforaminal epidural steroid injection if symptoms or not improving after completion of Medrol Dosepak or physical therapy or worsen at any time.  Patient is okay with trialing physical therapy first.  Discussed risks and benefits of injections with patient today.    -PATIENT EDUCATION:  Total time of 46 minutes, on the day of service, spent with the patient, reviewing the chart, placing orders, and documenting.   -Today we also discussed the issues related to the current COVID-19 pandemic, the pros and cons  "of the current treatment plan, the CDC guidelines such as social distancing, washing hands, masking, and covering the cough.    -FOLLOW-UP:   Follow-up if symptoms or not improving with physical therapy or worsen/change at any time    Advised patient to call the Spine Center if symptoms worsen or you have problems controlling bladder and bowel function.   ______________________________________________________________________    SUBJECTIVE:  HPI:  Katya Butler  Is a 81 year old female who presents today for new patient evaluation of low back pain left low back that radiates to left buttock region and lateral hip, previously had pain into the lower extremity nonspecifically but does report that that has improved with Medrol Dosepak which she got from the ED 6/8/2022.  Patient reports that she has had left \"sciatica\" pain last year due to a \"slipped disc \", and had an injection with resolution of these symptoms, this pain feels slightly different she reports.  Currently symptoms are much more tolerable after the Medrol Dosepak and today her pain is a 0/10, does aggravate with bending over and generalized activity however.  Patient does report again that her pain was more severe when she was in the ED 6/8/2022.    Patient denies any lower extremity numbness and tingling, denies lower extremity weakness or any episodes of her legs giving out on her.    *Ongoing postherpetic neuralgia symptoms right upper extremity, takes gabapentin for this.    -Treatment to Date: No prior spinal surgery or spinal injections  Patient does report physical therapy a couple of years ago, nothing recent for LBP.    -Medications:  Amitriptyline  Gabapentin 300 mg, 1 in the morning 1 at bedtime, 100 mg in the afternoon- postherpetic neuralgia    *Medrol Dosepak prescribed 6/8/2022 with benefit    Current Outpatient Medications   Medication     gabapentin (NEURONTIN) 100 MG capsule     gabapentin (NEURONTIN) 300 MG capsule     predniSONE " (DELTASONE) 10 MG tablet     amitriptyline (ELAVIL) 50 MG tablet     amLODIPine (NORVASC) 5 MG tablet     ATROVENT 0.06 % NA SOLN     clopidogrel (PLAVIX) 75 MG tablet     LOVASTATIN 40 MG OR TABS     No current facility-administered medications for this visit.       Allergies   Allergen Reactions     Contrast Dye Hives, Itching and Swelling     Aspirin Other (See Comments)     PATIENT STATES SHE IS NOT ALLERGIC TO ASA    Other reaction(s): on plavix     Cephalexin Hives     Colcrys Hives     Ketek [Telithromycin] Visual Disturbance     Other (Do Not Use) Other (See Comments)     Aspirin - on Plavix       Past Medical History:   Diagnosis Date     HTN (hypertension)      Inguinal hernia without mention of obstruction or gangrene, unilateral or unspecified, (not specified as recurrent) 08/20/2004    RIGHT     Malignant neoplasm of sigmoid colon (H) 11/24/2020     Primary osteoarthritis involving multiple joints 8/2/2019     Ventral hernia, unspecified, without mention of obstruction or gangrene 08/15/2003    easily reduced        Patient Active Problem List   Diagnosis     Hyperlipidemia     TOBACCO USE DISORDER     Malignant neoplasm of sigmoid colon (H)     Accelerated hypertension     Acute left-sided low back pain with left-sided sciatica     Allergic rhinitis     Anxiety     Calcium pyrophosphate arthropathy of multiple sites     Chemotherapy-induced neuropathy (H)     Chronic anemia     Colon adenocarcinoma (H)     Colonic diverticular abscess     Coronary atherosclerosis     Disorder of artery (H)     Diverticulitis of colon     Essential hypertension     Idiopathic chronic gout with tophus, unspecified site     Intra-abdominal abscess (H)     Periarticular calcification     Postherpetic neuralgia     Postoperative ileus (H)     Primary osteoarthritis involving multiple joints     Unspecified visual loss     Raynaud phenomenon       Past Surgical History:   Procedure Laterality Date     HERNIA REPAIR        HERNIA REPAIR, INGUINAL RT/LT  02/2005    [facility]     HYSTERECTOMY       IR AORTIC ARCH 4 VESSEL ANGIOGRAM  10/13/2009     IR AORTIC ARCH 4 VESSEL ANGIOGRAM  3/15/2010     IR MISCELLANEOUS PROCEDURE  5/20/2008     IR MISCELLANEOUS PROCEDURE  1/30/2009     IR MISCELLANEOUS PROCEDURE  1/30/2009     IR MISCELLANEOUS PROCEDURE  1/30/2009     IR MISCELLANEOUS PROCEDURE  10/13/2009     IR MISCELLANEOUS PROCEDURE  10/13/2009     IR MISCELLANEOUS PROCEDURE  3/15/2010     LAPAROSCOPIC CYSTECTOMY OVARIAN (ONCOLOGY) Bilateral 6/27/2018    Procedure: DIAGNOSTIC LAPAROSCOPY, RIGHT SALPINGO OOPHORECTOMY, LYSIS OF ADHESIONS, AND PELVIC WASHINGS;  Surgeon: Nneka Arroyo DO;  Location: [facility] OR;  Service:      VA INSJ PRPH CTR VAD W/SUBQ PORT AGE 5 YR/> N/A 12/2/2020    Procedure: Port Placement;  Surgeon: Viry Villegas MD;  Location: [facility] OR;  Service: General     ZZC PART REMOVAL COLON W ANASTOMOSIS N/A 10/31/2020    Procedure: COLECTOMY, SIGMOID, OPEN;  Surgeon: Viry Villegas MD;  Location: [facility] OR;  Service: General     ZZC REMOVAL OF OVARY(S) N/A 10/31/2020    Procedure: OOPHORECTOMY, OPEN;  Surgeon: Viry Villegas MD;  Location: [facility];  Service: General       Family History   Problem Relation Age of Onset     C.A.D. Mother      C.A.D. Sister      Breast Cancer Maternal Aunt      Breast Cancer Cousin      Breast Cancer Cousin      Breast Cancer Cousin      Uterine Cancer Mother 38.00     Cancer Mother      Lymphoma Son 53.00     Cancer Son        Reviewed past medical, surgical, and family history with patient found on new patient intake packet located in EMR Media tab.     SOCIAL HX: Patient is  and retired.  Patient does report current tobacco use, does report alcohol use currently.  Denies history of being a heavy drinker, denies recreational drug use.    ROS: Positive for joint pain, muscle pain, easy bruising, weight loss.  Specifically negative for  bowel/bladder dysfunction, balance changes, headache, dizziness, foot drop, fevers, chills, appetite changes, nausea/vomiting, unexplained weight loss. Otherwise 13 systems reviewed are negative. Please see the patient's intake questionnaire from today for details.    OBJECTIVE:    PHYSICAL EXAMINATION:    --CONSTITUTIONAL:  Vital signs as above.  No acute distress.  The patient is well nourished and well groomed.  --PSYCHIATRIC:  Appropriate mood and affect. The patient is awake, alert, oriented to person, place, time and answering questions appropriately with clear speech.    --SKIN:  Skin over the face, bilateral lower extremities, and posterior torso is clean, dry, intact without rashes.    --RESPIRATORY: Normal rhythm and effort. No abnormal accessory muscle breathing patterns noted.   --ABDOMINAL:  Non-distended.  --STANDING EXAMINATION:  Normal lumbar lordosis noted, no lateral shift.  --MUSCULOSKELETAL: Lumbar spine inspection reveals no evidence of deformity. Range of motion is not limited in lumbar flexion, extension, lateral rotation. No tenderness to palpation lumbar spine. Straight leg raising in the supine position is negative to radicular pain. Sciatic notch non-tender.  --GROSS MOTOR: Gait is non-antalgic. Easily arises from a seated position.   --LOWER EXTREMITY MOTOR TESTING:  Plantar flexion left 5/5, right 5/5   Dorsiflexion left 5/5, right 5/5   Great toe MTP extension left 5/5, right 5/5  Knee flexion left 5/5, right 5/5  Knee extension left 5/5, right 5/5   Hip flexion left 5/5, right 5/5  Hip abduction left 5/5, right 5/5  Hip adduction left 5/5, right 5/5   --HIPS: Full range of motion bilaterally. Negative FABERs on the involved lower extremity.   --NEUROLOGICAL:  2/4 patellar, medial hamstring, and achilles reflexes bilaterally.  Sensation to light touch is intact in the bilateral L4, L5, and S1 dermatomes. Babinski is negative. No clonus.  Negative Flower reflex  bilaterally.  --VASCULAR:  2/4 dorsalis pedis and posterior tibialsi pulses bilaterally.  Bilateral lower extremities are warm.  There is no pitting edema of the bilateral lower extremities.    RESULTS: Prior medical records from [facility]where were reviewed today.    Imaging: spine Imaging was personally reviewed and interpreted today. The images were shown to the patient and the findings were explained using a spine model.      MR Hip Left w/o Contrast  Result Date: 6/8/2022  EXAM: MR HIP LEFT W/O CONTRAST LOCATION: [facility] DATE/TIME: 6/8/2022 1:05 PM INDICATION: 81-year-old patient with left-sided hip pain. COMPARISON: 12/02/2021 CT. TECHNIQUE: Unenhanced. FINDINGS: LEFT HIP: -Labrum: Linear tear of the anterior labrum at the chondrolabral junction; there is a tiny adjacent paralabral cyst. Degeneration and tear of the anterosuperior labrum. -Cartilage: Mild chondromalacia in the acetabulum at the chondrolabral junction. The femoral head cartilage is preserved. -Joint space: No effusion or synovitis. -Joint capsule/ligaments: Intact joint capsule. Ligamentum teres is intact. -Morphology: Alpha Angle is normal. No bony morphologic changes associated with femoroacetabular impingement. RIGHT HIP: No fracture, osteonecrosis, or joint effusion. Mild chondromalacia. MUSCLES AND TENDONS: -Gluteal: Moderate fatty atrophy of the bilateral gluteus minimus and medius muscles. No acute tendon tear. Attenuation of the bilateral gluteus minimus tendons. The bilateral gluteus minimus medius tendons are intact. -Proximal hamstring: No tendon tear or tendinopathy. -Iliopsoas: No tendon tear or tendinopathy. No bursitis. -Rectus femoris origin: No tear or tendinopathy. BONES: -No fracture or concerning marrow replacing lesion. -Mild bilateral sacroiliac degenerative arthrosis. -Mild lower lumbar facet arthrosis. SOFT TISSUES: -Small amount of fluid in the bilateral trochanteric  bursa. No soft tissue fluid collection. INTRA-PELVIC CONTENTS: -Hysterectomy. No free fluid. Probable tiny right renal lower pole cortical cyst.   IMPRESSION: 1.  No fracture or osteonecrosis. 2.  Degeneration and tearing of the left acetabular labrum. 3.  Mild left hip chondromalacia. 4.  Mild bilateral trochanteric bursitis. 5.  No acute myotendinous abnormality.    MR Lumbar Spine w/o Contrast  Result Date: 6/8/2022  EXAM: MR LUMBAR SPINE W/O CONTRAST LOCATION: [facility] DATE/TIME: 6/8/2022 1:06 PM INDICATION: Low back pain. Left hip and leg pain. COMPARISON: MRI 07/04/2021. TECHNIQUE: Routine Lumbar Spine MRI without IV contrast. FINDINGS: Nomenclature is based on 5 lumbar type vertebral bodies. Mild convex right lumbar curvature. Normal vertebral body heights and marrow pattern. Stable slight anterior subluxation L2 on L3 and L3 on L4. Normal distal spinal cord and cauda equina with conus  medullaris at T12-L1. Enlargement of the infrarenal abdominal aorta measuring up to 2.9 cm. This is unchanged from previous MRI. Unremarkable visualized bony pelvis. T12-L1: Normal disc height with mild loss of disc signal. No herniation. Normal facets. No spinal canal or neural foraminal stenosis. L1-L2: Normal disc height with mild loss of disc signal. No herniation. Normal facets. No spinal canal or neural foraminal stenosis. L2-L3: Mild loss of disc height and signal. Broad-based disc and osteophyte complex again seen. Small superimposed left paracentral disc protrusion again seen and minimally smaller than on prior MRI. There remains moderate narrowing of the left lateral recess. Mild facet arthropathy. Central canal and neural foramen adequate. L3-L4: Mild loss of disc height and signal again seen. No herniation. Mild to moderate facet arthropathy. Central canal adequate. No significant foraminal narrowing. L4-L5: Normal disc height with mild loss of disc signal. Shallow disc bulging again  seen. Moderate to advanced facet arthropathy. Central canal adequate. Mild right foraminal narrowing. No left foraminal narrowing. L5-S1: Normal disc height with mild loss of disc signal similar to prior. Shallow central disc and osteophyte complex. Mild to moderate facet arthropathy. Central canal adequate. No significant lateral recess or foraminal narrowing on either side.   IMPRESSION: 1.  Mild to moderate degenerative changes in the lumbar discs and facet joints similar to prior MRI 07/04/2021. 2.  No new high-grade central spinal canal narrowing. 3.  At the L2-L3 level there is a broad-based disc and osteophyte complex again seen with a small superimposed left paracentral disc protrusion again seen. This left paracentral disc protrusion is minimally smaller than on prior MRI. It does cause moderate narrowing of the left lateral recess as before. The central canal and neural foramen remain adequate at this level. 4.  No new left-sided disc herniation or severe unilateral left-sided foraminal narrowing elsewhere.               Again, thank you for allowing me to participate in the care of your patient.        Sincerely,        Heaven Kuhn, CNP

## 2022-06-14 NOTE — PATIENT INSTRUCTIONS
~[facility] scheduling #[phone].  ~Please call our [facility] Nurse Navigation line [phone] with any questions or concerns about your treatment plan, if symptoms worsen and you would like to be seen urgently, or if you have problems controlling bladder and bowel function.       ~You have been referred for Physical Therapy to [facility]. They will call you to schedule an appointment.      Scheduling phone number is [phone] for [facility], or [place] location.  If you have not heard from the scheduling office within 2 business days, please call [phone] for ALL other locations.    Discussed the importance of core strengthening, ROM, stretching exercises and how each of these entities is important in decreasing pain and improving long term spine health.  The purpose of physical therapy is to teach you an individualized home exercise program.  These exercises need to be performed every day in order to decrease pain and prevent future occurrences of pain.

## 2022-07-20 ENCOUNTER — HOSPITAL ENCOUNTER (OUTPATIENT)
Dept: CT IMAGING | Facility: HOSPITAL | Age: 82
Discharge: HOME | End: 2022-07-20
Attending: INTERNAL MEDICINE | Admitting: INTERNAL MEDICINE
Payer: COMMERCIAL

## 2022-07-20 DIAGNOSIS — C18.7 MALIGNANT NEOPLASM OF SIGMOID COLON (H): ICD-10-CM

## 2022-07-20 PROCEDURE — 71250 CT THORAX DX C-: CPT

## 2022-07-22 ENCOUNTER — ONCOLOGY VISIT (OUTPATIENT)
Dept: ONCOLOGY | Facility: HOSPITAL | Age: 82
End: 2022-07-22
Attending: INTERNAL MEDICINE
Payer: COMMERCIAL

## 2022-07-22 ENCOUNTER — LAB (OUTPATIENT)
Dept: INFUSION THERAPY | Facility: HOSPITAL | Age: 82
End: 2022-07-22
Attending: INTERNAL MEDICINE
Payer: COMMERCIAL

## 2022-07-22 VITALS
RESPIRATION RATE: 16 BRPM | HEART RATE: 75 BPM | OXYGEN SATURATION: 97 % | DIASTOLIC BLOOD PRESSURE: 77 MMHG | WEIGHT: 109 LBS | BODY MASS INDEX: 20.06 KG/M2 | SYSTOLIC BLOOD PRESSURE: 171 MMHG | TEMPERATURE: 98.6 F | HEIGHT: 62 IN

## 2022-07-22 DIAGNOSIS — B02.29 POSTHERPETIC NEURALGIA: Primary | ICD-10-CM

## 2022-07-22 DIAGNOSIS — C18.7 MALIGNANT NEOPLASM OF SIGMOID COLON (H): Primary | ICD-10-CM

## 2022-07-22 LAB
ALBUMIN SERPL-MCNC: 3.6 G/DL (ref 3.5–5)
ALP SERPL-CCNC: 77 U/L (ref 45–120)
ALT SERPL W P-5'-P-CCNC: <9 U/L (ref 0–45)
ANION GAP SERPL CALCULATED.3IONS-SCNC: 7 MMOL/L (ref 5–18)
AST SERPL W P-5'-P-CCNC: 16 U/L (ref 0–40)
BASOPHILS # BLD AUTO: 0 10E3/UL (ref 0–0.2)
BASOPHILS NFR BLD AUTO: 1 %
BILIRUB SERPL-MCNC: 0.4 MG/DL (ref 0–1)
BUN SERPL-MCNC: 16 MG/DL (ref 8–28)
CALCIUM SERPL-MCNC: 9.2 MG/DL (ref 8.5–10.5)
CHLORIDE BLD-SCNC: 107 MMOL/L (ref 98–107)
CO2 SERPL-SCNC: 25 MMOL/L (ref 22–31)
CREAT SERPL-MCNC: 0.77 MG/DL (ref 0.6–1.1)
EOSINOPHIL # BLD AUTO: 0.1 10E3/UL (ref 0–0.7)
EOSINOPHIL NFR BLD AUTO: 1 %
ERYTHROCYTE [DISTWIDTH] IN BLOOD BY AUTOMATED COUNT: 14.1 % (ref 10–15)
GFR SERPL CREATININE-BSD FRML MDRD: 77 ML/MIN/1.73M2
GLUCOSE BLD-MCNC: 89 MG/DL (ref 70–125)
HCT VFR BLD AUTO: 39.5 % (ref 35–47)
HGB BLD-MCNC: 13 G/DL (ref 11.7–15.7)
IMM GRANULOCYTES # BLD: 0 10E3/UL
IMM GRANULOCYTES NFR BLD: 0 %
LYMPHOCYTES # BLD AUTO: 1.9 10E3/UL (ref 0.8–5.3)
LYMPHOCYTES NFR BLD AUTO: 25 %
MCH RBC QN AUTO: 31.1 PG (ref 26.5–33)
MCHC RBC AUTO-ENTMCNC: 32.9 G/DL (ref 31.5–36.5)
MCV RBC AUTO: 95 FL (ref 78–100)
MONOCYTES # BLD AUTO: 0.8 10E3/UL (ref 0–1.3)
MONOCYTES NFR BLD AUTO: 10 %
NEUTROPHILS # BLD AUTO: 4.8 10E3/UL (ref 1.6–8.3)
NEUTROPHILS NFR BLD AUTO: 63 %
NRBC # BLD AUTO: 0 10E3/UL
NRBC BLD AUTO-RTO: 0 /100
PLATELET # BLD AUTO: 304 10E3/UL (ref 150–450)
POTASSIUM BLD-SCNC: 4.2 MMOL/L (ref 3.5–5)
PROT SERPL-MCNC: 6.7 G/DL (ref 6–8)
RBC # BLD AUTO: 4.18 10E6/UL (ref 3.8–5.2)
SODIUM SERPL-SCNC: 139 MMOL/L (ref 136–145)
WBC # BLD AUTO: 7.5 10E3/UL (ref 4–11)

## 2022-07-22 PROCEDURE — 85025 COMPLETE CBC W/AUTO DIFF WBC: CPT

## 2022-07-22 PROCEDURE — G0463 HOSPITAL OUTPT CLINIC VISIT: HCPCS | Mod: 25

## 2022-07-22 PROCEDURE — G0463 HOSPITAL OUTPT CLINIC VISIT: HCPCS

## 2022-07-22 PROCEDURE — 36591 DRAW BLOOD OFF VENOUS DEVICE: CPT

## 2022-07-22 PROCEDURE — 80053 COMPREHEN METABOLIC PANEL: CPT

## 2022-07-22 PROCEDURE — 250N000011 HC RX IP 250 OP 636: Performed by: INTERNAL MEDICINE

## 2022-07-22 PROCEDURE — 99214 OFFICE O/P EST MOD 30 MIN: CPT | Performed by: INTERNAL MEDICINE

## 2022-07-22 RX ORDER — TRIAMCINOLONE ACETONIDE 1 MG/G
CREAM TOPICAL 3 TIMES DAILY
Qty: 80 G | Refills: 1 | Status: SHIPPED | OUTPATIENT
Start: 2022-07-22 | End: 2024-08-21

## 2022-07-22 RX ORDER — HEPARIN SODIUM (PORCINE) LOCK FLUSH IV SOLN 100 UNIT/ML 100 UNIT/ML
5 SOLUTION INTRAVENOUS
Status: DISCONTINUED | OUTPATIENT
Start: 2022-07-22 | End: 2022-07-22 | Stop reason: HOSPADM

## 2022-07-22 RX ORDER — TRIAMCINOLONE ACETONIDE 1 MG/G
CREAM TOPICAL
COMMUNITY
Start: 2022-06-24 | End: 2022-07-22

## 2022-07-22 RX ORDER — LIDOCAINE/PRILOCAINE 2.5 %-2.5%
CREAM (GRAM) TOPICAL PRN
Qty: 30 G | Refills: 1 | Status: SHIPPED | OUTPATIENT
Start: 2022-07-22

## 2022-07-22 RX ORDER — HYDROCODONE BITARTRATE AND ACETAMINOPHEN 5; 325 MG/1; MG/1
1 TABLET ORAL PRN
COMMUNITY
Start: 2022-01-31 | End: 2022-07-22

## 2022-07-22 RX ORDER — PREGABALIN 75 MG/1
2 CAPSULE ORAL DAILY
COMMUNITY
Start: 2022-02-23 | End: 2023-05-31

## 2022-07-22 RX ORDER — LIDOCAINE 50 MG/G
2 PATCH TOPICAL EVERY 12 HOURS
COMMUNITY
Start: 2022-06-06 | End: 2022-07-22

## 2022-07-22 RX ORDER — HEPARIN SODIUM (PORCINE) LOCK FLUSH IV SOLN 100 UNIT/ML 100 UNIT/ML
5 SOLUTION INTRAVENOUS
Status: CANCELLED | OUTPATIENT
Start: 2022-07-22

## 2022-07-22 RX ADMIN — HEPARIN 5 ML: 100 SYRINGE at 08:55

## 2022-07-22 ASSESSMENT — PAIN SCALES - GENERAL: PAINLEVEL: EXTREME PAIN (9)

## 2022-07-22 NOTE — PROGRESS NOTES
"Oncology Rooming Note    July 22, 2022 9:23 AM   Katya Butler is a 82 year old female who presents for:    Chief Complaint   Patient presents with     Oncology Clinic Visit     Return visit for 6 month follow up with labs and CT review related to Malignant neoplasm of sigmoid colon (H)     Initial Vitals: BP (!) 171/77 (BP Location: Left arm, Patient Position: Sitting, Cuff Size: Adult Regular)   Pulse 75   Temp 98.6  F (37  C) (Oral)   Resp 16   Ht 1.568 m (5' 1.75\")   Wt 49.4 kg (109 lb)   SpO2 97%   BMI 20.10 kg/m   Estimated body mass index is 20.1 kg/m  as calculated from the following:    Height as of this encounter: 1.568 m (5' 1.75\").    Weight as of this encounter: 49.4 kg (109 lb). Body surface area is 1.47 meters squared.  Extreme Pain (9) Comment: Data Unavailable   No LMP recorded. Patient has had a hysterectomy.  Allergies reviewed: Yes  Medications reviewed: Yes    Medications: Medication refills not needed today.  Pharmacy name entered into [facility]: [id number] - [place] [phone]    Clinical concerns:Return visit for 6 month follow up with labs and CT review related to Malignant neoplasm of sigmoid colon (H)      CHRISTIANO PICKARD MA            "

## 2022-07-22 NOTE — PROGRESS NOTES
[facility] Hematology and Oncology Progress Note    Patient: Katya Butler  MRN: [id number]  Date of Service: Jul 22, 2022        Assessment and Plan:    Cancer Staging  Malignant neoplasm of sigmoid colon (H)  Staging form: Colon And Rectum, AJCC 8th Edition  - Clinical: No stage assigned - Unsigned  - Pathologic stage from 11/24/2020: Stage IIIC (pT4b, pN2, cM0) - Signed by Walt Merrill MD on 11/24/2020     1.  Colon cancer: She had a CT scan of the abdomen and pelvis on July 20.  Imaging was personally reviewed.  There is no evidence of recurrent disease of the chest, abdomen, and pelvis.  Images were reviewed with Katya.  We will plan on seeing her back in 6 months with repeat imaging.  We will continue follow-up imaging until we are 5 years out, October 2025.  CBC was reviewed today.  Hemoglobin is normal at 13 and a white count of 7.5.    2.  Large cecal polyp found on colonoscopy: We are just going to follow this clinically and radiographically.  She does not want to have anymore colonoscopies.    ECOG Performance  0    Diagnosis:    1.  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  At initial surgery there was evidence of a small abscess and perforation of the colon.  Pathology showed a invasive moderately differentiated adenocarcinoma.  Constricting ulcerated measuring 35 x 25 x 13 mm.  4 of 18 lymph nodes were positive.  Mismatch repair intact.  There was some proximal colonic dilatation, consistent with obstruction.  Left ovary showed moderately differentiated adenocarcinoma, direct extension.  Focal positive margin at the paraovarian soft tissue margin.      Treatment:    Surgical resection on October 31, 2020.  Adjuvant chemotherapy with FOLFOX was initiated on December 8, 2020.  25% initial dose reduction of all medications. Cycle 10 completed on April 20, 2021.    Interim History:    Kirill returns today for follow-up visit.  No acute complaints today.  Generally doing  "well.  No abdominal pain or change in bowel or bladder habits.    Review of Systems:    As above in the history.     Review of Systems otherwise Negative for:  General: chills, fever or night sweats  Psychological: anxiety or depression  Ophthalmic: blurry vision, double vision or loss of vision, vision change  ENT: epistaxis, oral lesions, hearing changes  Hematological and Lymphatic: bleeding, bruising, jaundice, swollen lymph nodes  Endocrine: hot flashes, unexpected weight changes  Respiratory: cough, hemoptysis, orthopnea or shortness of breath/KLEIN  Cardiovascular: chest pain, edema, palpitations or PND  Gastrointestinal: abdominal pain, blood in stools, change in bowel habits, constipation, diarrhea or nausea/vomiting  Genito-Urinary: change in urinary stream, incontinence, frequency/urgency  Musculoskeletal: joint pain, stiffness, swelling, muscle pain  Neurological: dizziness, headaches, numbness/tingling  Dermatological: lumps and rash    Past History:    Past Medical History:   Diagnosis Date     HTN (hypertension)      Inguinal hernia without mention of obstruction or gangrene, unilateral or unspecified, (not specified as recurrent) 08/20/2004    RIGHT     Malignant neoplasm of sigmoid colon (H) 11/24/2020     Primary osteoarthritis involving multiple joints 8/2/2019     Ventral hernia, unspecified, without mention of obstruction or gangrene 08/15/2003    easily reduced     Physical Exam:    BP (!) 171/77 (BP Location: Left arm, Patient Position: Sitting, Cuff Size: Adult Regular)   Pulse 75   Temp 98.6  F (37  C) (Oral)   Resp 16   Ht 1.568 m (5' 1.75\")   Wt 49.4 kg (109 lb)   SpO2 97%   BMI 20.10 kg/m      General: patient appears stated age of 81 year old. Nontoxic and in no distress.   HEENT: Head: atraumatic, normocephalic. Sclerae anicteric.  Chest:  Normal respiratory effort  Cardiac:  No edema.   Abdomen: abdomen is non-distended  Extremities: normal tone and muscle bulk.  Skin: no lesions " or rash on visible skin. Warm and dry.   CNS: alert and oriented. Grossly non-focal.   Psychiatric: normal mood and affect.     Lab Results:    Recent Results (from the past 168 hour(s))   Comprehensive metabolic panel   Result Value Ref Range    Sodium 139 136 - 145 mmol/L    Potassium 4.2 3.5 - 5.0 mmol/L    Chloride 107 98 - 107 mmol/L    Carbon Dioxide (CO2) 25 22 - 31 mmol/L    Anion Gap 7 5 - 18 mmol/L    Urea Nitrogen 16 8 - 28 mg/dL    Creatinine 0.77 0.60 - 1.10 mg/dL    Calcium 9.2 8.5 - 10.5 mg/dL    Glucose 89 70 - 125 mg/dL    Alkaline Phosphatase 77 45 - 120 U/L    AST 16 0 - 40 U/L    ALT <9 0 - 45 U/L    Protein Total 6.7 6.0 - 8.0 g/dL    Albumin 3.6 3.5 - 5.0 g/dL    Bilirubin Total 0.4 0.0 - 1.0 mg/dL    GFR Estimate 77 >60 mL/min/1.73m2   CBC with platelets and differential   Result Value Ref Range    WBC Count 7.5 4.0 - 11.0 10e3/uL    RBC Count 4.18 3.80 - 5.20 10e6/uL    Hemoglobin 13.0 11.7 - 15.7 g/dL    Hematocrit 39.5 35.0 - 47.0 %    MCV 95 78 - 100 fL    MCH 31.1 26.5 - 33.0 pg    MCHC 32.9 31.5 - 36.5 g/dL    RDW 14.1 10.0 - 15.0 %    Platelet Count 304 150 - 450 10e3/uL    % Neutrophils 63 %    % Lymphocytes 25 %    % Monocytes 10 %    % Eosinophils 1 %    % Basophils 1 %    % Immature Granulocytes 0 %    NRBCs per 100 WBC 0 <1 /100    Absolute Neutrophils 4.8 1.6 - 8.3 10e3/uL    Absolute Lymphocytes 1.9 0.8 - 5.3 10e3/uL    Absolute Monocytes 0.8 0.0 - 1.3 10e3/uL    Absolute Eosinophils 0.1 0.0 - 0.7 10e3/uL    Absolute Basophils 0.0 0.0 - 0.2 10e3/uL    Absolute Immature Granulocytes 0.0 <=0.4 10e3/uL    Absolute NRBCs 0.0 10e3/uL     Imaging:    CT Chest abdomen pelvis w/o contrast    Result Date: 7/20/2022  EXAM: CT CHEST ABDOMEN PELVIS W/O CONTRAST LOCATION: [facility] DATE/TIME: 7/20/2022 10:40 AM INDICATION:  Malignant neoplasm of sigmoid colon (H) COMPARISON: 12/02/2021 TECHNIQUE: CT scan of the chest, abdomen, and pelvis was performed without IV  contrast. Multiplanar reformats were obtained. Dose reduction techniques were used. CONTRAST: None. FINDINGS: LUNGS AND PLEURA: Upper lung predominant centrilobular emphysema. Linear scarring at the lung bases. No suspicious nodules. MEDIASTINUM/AXILLAE: No lymphadenopathy. No thoracic aortic aneurysms. Stent in the proximal left subclavian artery. Left chest port catheter terminates in the upper SVC. Heavily calcified aorta. CORONARY ARTERY CALCIFICATION: Moderate. HEPATOBILIARY: No significant mass or bile duct dilatation. No calcified gallstones. PANCREAS: Normal. SPLEEN: Normal. ADRENAL GLANDS: Normal. KIDNEYS/BLADDER: No significant mass, stones, or hydronephrosis. There are simple or benign cysts. No follow up is needed. Punctate nonobstructing calculus in the upper pole left kidney. BOWEL: Postsurgical changes from sigmoid colectomy. No evidence of obstruction or acute inflammation. No suspicious soft tissue thickening. LYMPH NODES: No lymphadenopathy. VASCULATURE: Unchanged 3.1 cm infrarenal aortic aneurysm. PELVIC ORGANS: Absent MUSCULOSKELETAL: Osteopenia and degenerative changes in the spine. No aggressive or destructive lesions.     IMPRESSION: 1.  Sigmoid colectomy for adenocarcinoma. No evidence of recurrent or metastatic disease, within the limitations of the noncontrast technique. 2.  Severe atherosclerotic calcification of the aorta with unchanged infrarenal aneurysm measuring 3.1 cm.      Signed by: Walt Merrill MD

## 2022-07-22 NOTE — LETTER
7/22/2022         RE: Katya Butler  [address]  [place] [address]        Dear Colleague,    Thank you for referring your patient, Katya Butler, to the [facility]. Please see a copy of my visit note below.    [facility] Hematology and Oncology Progress Note    Patient: Katya Butler  MRN: [id number]  Date of Service: Jul 22, 2022        Assessment and Plan:    Cancer Staging  Malignant neoplasm of sigmoid colon (H)  Staging form: Colon And Rectum, AJCC 8th Edition  - Clinical: No stage assigned - Unsigned  - Pathologic stage from 11/24/2020: Stage IIIC (pT4b, pN2, cM0) - Signed by Walt Merrill MD on 11/24/2020     1.  Colon cancer: She had a CT scan of the abdomen and pelvis on July 20.  Imaging was personally reviewed.  There is no evidence of recurrent disease of the chest, abdomen, and pelvis.  Images were reviewed with Katya.  We will plan on seeing her back in 6 months with repeat imaging.  We will continue follow-up imaging until we are 5 years out, October 2025.  CBC was reviewed today.  Hemoglobin is normal at 13 and a white count of 7.5.    2.  Large cecal polyp found on colonoscopy: We are just going to follow this clinically and radiographically.  She does not want to have anymore colonoscopies.    ECOG Performance  0    Diagnosis:    1.  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  At initial surgery there was evidence of a small abscess and perforation of the colon.  Pathology showed a invasive moderately differentiated adenocarcinoma.  Constricting ulcerated measuring 35 x 25 x 13 mm.  4 of 18 lymph nodes were positive.  Mismatch repair intact.  There was some proximal colonic dilatation, consistent with obstruction.  Left ovary showed moderately differentiated adenocarcinoma, direct extension.  Focal positive margin at the paraovarian soft tissue margin.      Treatment:    Surgical resection on October 31, 2020.  Adjuvant  "chemotherapy with FOLFOX was initiated on December 8, 2020.  25% initial dose reduction of all medications. Cycle 10 completed on April 20, 2021.    Interim History:    Kirill returns today for follow-up visit.  No acute complaints today.  Generally doing well.  No abdominal pain or change in bowel or bladder habits.    Review of Systems:    As above in the history.     Review of Systems otherwise Negative for:  General: chills, fever or night sweats  Psychological: anxiety or depression  Ophthalmic: blurry vision, double vision or loss of vision, vision change  ENT: epistaxis, oral lesions, hearing changes  Hematological and Lymphatic: bleeding, bruising, jaundice, swollen lymph nodes  Endocrine: hot flashes, unexpected weight changes  Respiratory: cough, hemoptysis, orthopnea or shortness of breath/KLEIN  Cardiovascular: chest pain, edema, palpitations or PND  Gastrointestinal: abdominal pain, blood in stools, change in bowel habits, constipation, diarrhea or nausea/vomiting  Genito-Urinary: change in urinary stream, incontinence, frequency/urgency  Musculoskeletal: joint pain, stiffness, swelling, muscle pain  Neurological: dizziness, headaches, numbness/tingling  Dermatological: lumps and rash    Past History:    Past Medical History:   Diagnosis Date     HTN (hypertension)      Inguinal hernia without mention of obstruction or gangrene, unilateral or unspecified, (not specified as recurrent) 08/20/2004    RIGHT     Malignant neoplasm of sigmoid colon (H) 11/24/2020     Primary osteoarthritis involving multiple joints 8/2/2019     Ventral hernia, unspecified, without mention of obstruction or gangrene 08/15/2003    easily reduced     Physical Exam:    BP (!) 171/77 (BP Location: Left arm, Patient Position: Sitting, Cuff Size: Adult Regular)   Pulse 75   Temp 98.6  F (37  C) (Oral)   Resp 16   Ht 1.568 m (5' 1.75\")   Wt 49.4 kg (109 lb)   SpO2 97%   BMI 20.10 kg/m      General: patient appears stated age of 81 " year old. Nontoxic and in no distress.   HEENT: Head: atraumatic, normocephalic. Sclerae anicteric.  Chest:  Normal respiratory effort  Cardiac:  No edema.   Abdomen: abdomen is non-distended  Extremities: normal tone and muscle bulk.  Skin: no lesions or rash on visible skin. Warm and dry.   CNS: alert and oriented. Grossly non-focal.   Psychiatric: normal mood and affect.     Lab Results:    Recent Results (from the past 168 hour(s))   Comprehensive metabolic panel   Result Value Ref Range    Sodium 139 136 - 145 mmol/L    Potassium 4.2 3.5 - 5.0 mmol/L    Chloride 107 98 - 107 mmol/L    Carbon Dioxide (CO2) 25 22 - 31 mmol/L    Anion Gap 7 5 - 18 mmol/L    Urea Nitrogen 16 8 - 28 mg/dL    Creatinine 0.77 0.60 - 1.10 mg/dL    Calcium 9.2 8.5 - 10.5 mg/dL    Glucose 89 70 - 125 mg/dL    Alkaline Phosphatase 77 45 - 120 U/L    AST 16 0 - 40 U/L    ALT <9 0 - 45 U/L    Protein Total 6.7 6.0 - 8.0 g/dL    Albumin 3.6 3.5 - 5.0 g/dL    Bilirubin Total 0.4 0.0 - 1.0 mg/dL    GFR Estimate 77 >60 mL/min/1.73m2   CBC with platelets and differential   Result Value Ref Range    WBC Count 7.5 4.0 - 11.0 10e3/uL    RBC Count 4.18 3.80 - 5.20 10e6/uL    Hemoglobin 13.0 11.7 - 15.7 g/dL    Hematocrit 39.5 35.0 - 47.0 %    MCV 95 78 - 100 fL    MCH 31.1 26.5 - 33.0 pg    MCHC 32.9 31.5 - 36.5 g/dL    RDW 14.1 10.0 - 15.0 %    Platelet Count 304 150 - 450 10e3/uL    % Neutrophils 63 %    % Lymphocytes 25 %    % Monocytes 10 %    % Eosinophils 1 %    % Basophils 1 %    % Immature Granulocytes 0 %    NRBCs per 100 WBC 0 <1 /100    Absolute Neutrophils 4.8 1.6 - 8.3 10e3/uL    Absolute Lymphocytes 1.9 0.8 - 5.3 10e3/uL    Absolute Monocytes 0.8 0.0 - 1.3 10e3/uL    Absolute Eosinophils 0.1 0.0 - 0.7 10e3/uL    Absolute Basophils 0.0 0.0 - 0.2 10e3/uL    Absolute Immature Granulocytes 0.0 <=0.4 10e3/uL    Absolute NRBCs 0.0 10e3/uL     Imaging:    CT Chest abdomen pelvis w/o contrast    Result Date: 7/20/2022  EXAM: CT CHEST ABDOMEN  PELVIS W/O CONTRAST LOCATION: [facility] DATE/TIME: 7/20/2022 10:40 AM INDICATION:  Malignant neoplasm of sigmoid colon (H) COMPARISON: 12/02/2021 TECHNIQUE: CT scan of the chest, abdomen, and pelvis was performed without IV contrast. Multiplanar reformats were obtained. Dose reduction techniques were used. CONTRAST: None. FINDINGS: LUNGS AND PLEURA: Upper lung predominant centrilobular emphysema. Linear scarring at the lung bases. No suspicious nodules. MEDIASTINUM/AXILLAE: No lymphadenopathy. No thoracic aortic aneurysms. Stent in the proximal left subclavian artery. Left chest port catheter terminates in the upper SVC. Heavily calcified aorta. CORONARY ARTERY CALCIFICATION: Moderate. HEPATOBILIARY: No significant mass or bile duct dilatation. No calcified gallstones. PANCREAS: Normal. SPLEEN: Normal. ADRENAL GLANDS: Normal. KIDNEYS/BLADDER: No significant mass, stones, or hydronephrosis. There are simple or benign cysts. No follow up is needed. Punctate nonobstructing calculus in the upper pole left kidney. BOWEL: Postsurgical changes from sigmoid colectomy. No evidence of obstruction or acute inflammation. No suspicious soft tissue thickening. LYMPH NODES: No lymphadenopathy. VASCULATURE: Unchanged 3.1 cm infrarenal aortic aneurysm. PELVIC ORGANS: Absent MUSCULOSKELETAL: Osteopenia and degenerative changes in the spine. No aggressive or destructive lesions.     IMPRESSION: 1.  Sigmoid colectomy for adenocarcinoma. No evidence of recurrent or metastatic disease, within the limitations of the noncontrast technique. 2.  Severe atherosclerotic calcification of the aorta with unchanged infrarenal aneurysm measuring 3.1 cm.      Signed by: Walt Merrill MD      Oncology Rooming Note    July 22, 2022 9:23 AM   Katya Butler is a 82 year old female who presents for:    Chief Complaint   Patient presents with     Oncology Clinic Visit     Return visit for 6 month follow up with labs and CT  "review related to Malignant neoplasm of sigmoid colon (H)     Initial Vitals: BP (!) 171/77 (BP Location: Left arm, Patient Position: Sitting, Cuff Size: Adult Regular)   Pulse 75   Temp 98.6  F (37  C) (Oral)   Resp 16   Ht 1.568 m (5' 1.75\")   Wt 49.4 kg (109 lb)   SpO2 97%   BMI 20.10 kg/m   Estimated body mass index is 20.1 kg/m  as calculated from the following:    Height as of this encounter: 1.568 m (5' 1.75\").    Weight as of this encounter: 49.4 kg (109 lb). Body surface area is 1.47 meters squared.  Extreme Pain (9) Comment: Data Unavailable   No LMP recorded. Patient has had a hysterectomy.  Allergies reviewed: Yes  Medications reviewed: Yes    Medications: Medication refills not needed today.  Pharmacy name entered into [employer]: [id number] - [place] [id number]    Clinical concerns:Return visit for 6 month follow up with labs and CT review related to Malignant neoplasm of sigmoid colon (H)      CHRISTIANO PICKARD MA                Again, thank you for allowing me to participate in the care of your patient.        Sincerely,        Walt Merrill MD    "

## 2022-07-25 ENCOUNTER — TELEPHONE (OUTPATIENT)
Dept: ONCOLOGY | Facility: HOSPITAL | Age: 82
End: 2022-07-25

## 2022-07-25 NOTE — TELEPHONE ENCOUNTER
Central Prior Authorization Team   Phone: [phone]      EPA DENIED: WAITING ON DENIAL LETTER

## 2022-07-27 NOTE — TELEPHONE ENCOUNTER
Central Prior Authorization Team   Phone: [phone]      PRIOR AUTHORIZATION DENIED    Medication: lidocaine-prilocaine (EMLA) 2.5-2.5 % external cream - EPA DENIED     Denial Date: 7/23/2022    Denial Rational:           Appeal Information: If the Provider/Patient would like to appeal this denial, please provide a letter of medical necessity explaining why Preferred Formulary medications are not appropriate in the treatment of the patient's condition; along with any previous therapies tried/failed.    Once completed, please route back to me directly: Mayo Alvarez

## 2022-08-17 ENCOUNTER — LAB REQUISITION (OUTPATIENT)
Dept: LAB | Facility: CLINIC | Age: 82
End: 2022-08-17

## 2022-08-17 DIAGNOSIS — E78.5 HYPERLIPIDEMIA, UNSPECIFIED: ICD-10-CM

## 2022-08-17 DIAGNOSIS — I10 ESSENTIAL (PRIMARY) HYPERTENSION: ICD-10-CM

## 2022-08-23 LAB
ALBUMIN SERPL-MCNC: 3.8 G/DL (ref 3.5–5)
ALP SERPL-CCNC: 82 U/L (ref 45–120)
ALT SERPL W P-5'-P-CCNC: 11 U/L (ref 0–45)
ANION GAP SERPL CALCULATED.3IONS-SCNC: 8 MMOL/L (ref 5–18)
AST SERPL W P-5'-P-CCNC: 18 U/L (ref 0–40)
BILIRUB SERPL-MCNC: 0.4 MG/DL (ref 0–1)
BUN SERPL-MCNC: 16 MG/DL (ref 8–28)
CALCIUM SERPL-MCNC: 9.6 MG/DL (ref 8.5–10.5)
CHLORIDE BLD-SCNC: 104 MMOL/L (ref 98–107)
CHOLEST SERPL-MCNC: 203 MG/DL
CO2 SERPL-SCNC: 26 MMOL/L (ref 22–31)
CREAT SERPL-MCNC: 0.8 MG/DL (ref 0.6–1.1)
FASTING STATUS PATIENT QL REPORTED: YES
GFR SERPL CREATININE-BSD FRML MDRD: 74 ML/MIN/1.73M2
GLUCOSE BLD-MCNC: 91 MG/DL (ref 70–125)
HDLC SERPL-MCNC: 63 MG/DL
LDLC SERPL CALC-MCNC: 112 MG/DL
POTASSIUM BLD-SCNC: 4.2 MMOL/L (ref 3.5–5)
PROT SERPL-MCNC: 7.1 G/DL (ref 6–8)
SODIUM SERPL-SCNC: 138 MMOL/L (ref 136–145)
TRIGL SERPL-MCNC: 142 MG/DL

## 2022-08-23 PROCEDURE — 80061 LIPID PANEL: CPT | Performed by: PHYSICIAN ASSISTANT

## 2022-08-23 PROCEDURE — 80053 COMPREHEN METABOLIC PANEL: CPT | Performed by: PHYSICIAN ASSISTANT

## 2022-11-10 ENCOUNTER — TRANSFERRED RECORDS (OUTPATIENT)
Dept: HEALTH INFORMATION MANAGEMENT | Facility: CLINIC | Age: 82
End: 2022-11-10

## 2022-12-01 ENCOUNTER — TRANSFERRED RECORDS (OUTPATIENT)
Dept: HEALTH INFORMATION MANAGEMENT | Facility: CLINIC | Age: 82
End: 2022-12-01

## 2022-12-02 ENCOUNTER — MEDICAL CORRESPONDENCE (OUTPATIENT)
Dept: HEALTH INFORMATION MANAGEMENT | Facility: CLINIC | Age: 82
End: 2022-12-02

## 2022-12-06 ENCOUNTER — TRANSCRIBE ORDERS (OUTPATIENT)
Dept: OTHER | Age: 82
End: 2022-12-06

## 2022-12-06 DIAGNOSIS — M54.16 RADICULOPATHY, LUMBAR REGION: Primary | ICD-10-CM

## 2023-01-26 ENCOUNTER — HOSPITAL ENCOUNTER (OUTPATIENT)
Dept: CT IMAGING | Facility: HOSPITAL | Age: 83
Discharge: HOME | End: 2023-01-26
Attending: INTERNAL MEDICINE | Admitting: INTERNAL MEDICINE
Payer: COMMERCIAL

## 2023-01-26 ENCOUNTER — ANCILLARY ORDERS (OUTPATIENT)
Dept: ONCOLOGY | Facility: HOSPITAL | Age: 83
End: 2023-01-26

## 2023-01-26 DIAGNOSIS — B02.29 POSTHERPETIC NEURALGIA: ICD-10-CM

## 2023-01-26 PROCEDURE — 71250 CT THORAX DX C-: CPT

## 2023-02-03 ENCOUNTER — ONCOLOGY VISIT (OUTPATIENT)
Dept: ONCOLOGY | Facility: HOSPITAL | Age: 83
End: 2023-02-03
Attending: INTERNAL MEDICINE
Payer: COMMERCIAL

## 2023-02-03 ENCOUNTER — LAB (OUTPATIENT)
Dept: INFUSION THERAPY | Facility: HOSPITAL | Age: 83
End: 2023-02-03
Attending: INTERNAL MEDICINE
Payer: COMMERCIAL

## 2023-02-03 VITALS
WEIGHT: 105 LBS | HEART RATE: 79 BPM | OXYGEN SATURATION: 97 % | HEIGHT: 62 IN | SYSTOLIC BLOOD PRESSURE: 138 MMHG | RESPIRATION RATE: 18 BRPM | TEMPERATURE: 97.7 F | BODY MASS INDEX: 19.32 KG/M2 | DIASTOLIC BLOOD PRESSURE: 73 MMHG

## 2023-02-03 DIAGNOSIS — R59.0 PELVIC LYMPHADENOPATHY: ICD-10-CM

## 2023-02-03 DIAGNOSIS — G62.0 CHEMOTHERAPY-INDUCED NEUROPATHY (H): ICD-10-CM

## 2023-02-03 DIAGNOSIS — C18.7 MALIGNANT NEOPLASM OF SIGMOID COLON (H): Primary | ICD-10-CM

## 2023-02-03 DIAGNOSIS — J38.2 VOCAL CORD NODULE: ICD-10-CM

## 2023-02-03 DIAGNOSIS — B02.29 POSTHERPETIC NEURALGIA: Primary | ICD-10-CM

## 2023-02-03 DIAGNOSIS — T45.1X5A CHEMOTHERAPY-INDUCED NEUROPATHY (H): ICD-10-CM

## 2023-02-03 DIAGNOSIS — C18.7 MALIGNANT NEOPLASM OF SIGMOID COLON (H): ICD-10-CM

## 2023-02-03 LAB
ALBUMIN SERPL BCG-MCNC: 4.4 G/DL (ref 3.5–5.2)
ALP SERPL-CCNC: 84 U/L (ref 35–104)
ALT SERPL W P-5'-P-CCNC: 10 U/L (ref 10–35)
ANION GAP SERPL CALCULATED.3IONS-SCNC: 10 MMOL/L (ref 7–15)
AST SERPL W P-5'-P-CCNC: 22 U/L (ref 10–35)
BASOPHILS # BLD AUTO: 0 10E3/UL (ref 0–0.2)
BASOPHILS NFR BLD AUTO: 1 %
BILIRUB SERPL-MCNC: 0.2 MG/DL
BUN SERPL-MCNC: 19.7 MG/DL (ref 8–23)
CALCIUM SERPL-MCNC: 9.7 MG/DL (ref 8.8–10.2)
CEA SERPL-MCNC: 8.2 NG/ML
CHLORIDE SERPL-SCNC: 103 MMOL/L (ref 98–107)
CREAT SERPL-MCNC: 0.82 MG/DL (ref 0.51–0.95)
DEPRECATED HCO3 PLAS-SCNC: 26 MMOL/L (ref 22–29)
EOSINOPHIL # BLD AUTO: 0.1 10E3/UL (ref 0–0.7)
EOSINOPHIL NFR BLD AUTO: 1 %
ERYTHROCYTE [DISTWIDTH] IN BLOOD BY AUTOMATED COUNT: 15 % (ref 10–15)
GFR SERPL CREATININE-BSD FRML MDRD: 71 ML/MIN/1.73M2
GLUCOSE SERPL-MCNC: 95 MG/DL (ref 70–99)
HCT VFR BLD AUTO: 43.6 % (ref 35–47)
HGB BLD-MCNC: 13.9 G/DL (ref 11.7–15.7)
IMM GRANULOCYTES # BLD: 0 10E3/UL
IMM GRANULOCYTES NFR BLD: 1 %
LYMPHOCYTES # BLD AUTO: 2 10E3/UL (ref 0.8–5.3)
LYMPHOCYTES NFR BLD AUTO: 26 %
MCH RBC QN AUTO: 31 PG (ref 26.5–33)
MCHC RBC AUTO-ENTMCNC: 31.9 G/DL (ref 31.5–36.5)
MCV RBC AUTO: 97 FL (ref 78–100)
MONOCYTES # BLD AUTO: 0.8 10E3/UL (ref 0–1.3)
MONOCYTES NFR BLD AUTO: 10 %
NEUTROPHILS # BLD AUTO: 4.9 10E3/UL (ref 1.6–8.3)
NEUTROPHILS NFR BLD AUTO: 61 %
NRBC # BLD AUTO: 0 10E3/UL
NRBC BLD AUTO-RTO: 0 /100
PLATELET # BLD AUTO: 367 10E3/UL (ref 150–450)
POTASSIUM SERPL-SCNC: 4.6 MMOL/L (ref 3.4–5.3)
PROT SERPL-MCNC: 7.3 G/DL (ref 6.4–8.3)
RBC # BLD AUTO: 4.49 10E6/UL (ref 3.8–5.2)
SODIUM SERPL-SCNC: 139 MMOL/L (ref 136–145)
WBC # BLD AUTO: 7.8 10E3/UL (ref 4–11)

## 2023-02-03 PROCEDURE — 82378 CARCINOEMBRYONIC ANTIGEN: CPT

## 2023-02-03 PROCEDURE — 80053 COMPREHEN METABOLIC PANEL: CPT

## 2023-02-03 PROCEDURE — 99215 OFFICE O/P EST HI 40 MIN: CPT | Performed by: NURSE PRACTITIONER

## 2023-02-03 PROCEDURE — 250N000011 HC RX IP 250 OP 636: Performed by: INTERNAL MEDICINE

## 2023-02-03 PROCEDURE — 36591 DRAW BLOOD OFF VENOUS DEVICE: CPT

## 2023-02-03 PROCEDURE — G0463 HOSPITAL OUTPT CLINIC VISIT: HCPCS | Mod: 25 | Performed by: NURSE PRACTITIONER

## 2023-02-03 PROCEDURE — 85025 COMPLETE CBC W/AUTO DIFF WBC: CPT

## 2023-02-03 RX ORDER — HEPARIN SODIUM (PORCINE) LOCK FLUSH IV SOLN 100 UNIT/ML 100 UNIT/ML
5 SOLUTION INTRAVENOUS
Status: DISCONTINUED | OUTPATIENT
Start: 2023-02-03 | End: 2023-02-03 | Stop reason: HOSPADM

## 2023-02-03 RX ADMIN — Medication 5 ML: at 13:58

## 2023-02-03 ASSESSMENT — PAIN SCALES - GENERAL: PAINLEVEL: NO PAIN (0)

## 2023-02-03 NOTE — LETTER
2/3/2023         RE: Katya Butler  [address]  [place] [address]        Dear Colleague,    Thank you for referring your patient, Katya Butler, to the [facility]. Please see a copy of my visit note below.    [facility] Hematology and Oncology Progress Note    Patient: Katya Butler  MRN: [id number]        Assessment and Plan:    Cancer Staging  Malignant neoplasm of sigmoid colon (H)  Staging form: Colon And Rectum, AJCC 8th Edition  - Clinical: No stage assigned - Unsigned  - Pathologic stage from 11/24/2020: Stage IIIC (pT4b, pN2, cM0) - Signed by Walt Merrill MD on 11/24/2020    1.  Stage IIIC colon cancer (10/2020):   She has had resection and completed 10 cycles of adjuvant chemotherapy 2 yrs ago.     CT shows enlarging left para-aortic and left iliac adenopathy since last scan. Liver and lungs negative.  CEA pending  CBC and CMP WNL    Mild unintentional weight loss (5 lb) over 6 mths, otherwise no symptoms concerning for recurrence. No palpable nodes on exam.     She has declined further colonoscopies as part of surveillance/screening.     Plan:  -PET scan restaging to assess suspicious nodes and rule out other sites. Return with PET results. Pending findings, would likely biopsy accessible site as next step.     2.  Large cecal polyp found on colonoscopy:   Surgical resection recommended but patient opted against surgery or colonoscopies. Following clinically and radiographically. No symptoms; not noted on current CT.    3.  Right vocal cord lesion  Noted on CT. No cervical adenopathy.  She has had some voice changes, increased phlegm/globus sensation over the last few months. She has a smoking hx and smokes 3 cigarettes/day currently.      Plan:  -ENT referral for laryngoscopy    4.  Port dysfunction  She has not had this routinely flushed and could not get blood draw today. Given possibility for recurrent cancer, it would be wise to keep this in place for  now until we know potential treatment options.     Plan:  -Alteplase at her next visit    5.  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 Causes a bit of imbalance. No pain.      ECOG Performance    0    Distress Assessment  Distress Assessment Score: 1    Pain  Currently in Pain: No/denies    Diagnosis:    1.  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  At initial surgery there was evidence of a small abscess and perforation of the colon.  Pathology showed a invasive moderately differentiated adenocarcinoma.  Constricting ulcerated measuring 35 x 25 x 13 mm.  4 of 18 lymph nodes were positive.  Mismatch repair intact.  There was some proximal colonic dilatation, consistent with obstruction.  Left ovary showed moderately differentiated adenocarcinoma, direct extension.  Focal positive margin at the paraovarian soft tissue margin.      Treatment:    Surgical resection on October 31, 2020.  Adjuvant chemotherapy with FOLFOX was initiated on December 8, 2020.  25% initial dose reduction of all medications.  Cycle 10 completed on April 20, 2021.    Interim History:    Katya was seen in clinic today for 6-month follow-up visit. She is 2.5 yrs from diagnosis/resection and almost 2 yrs from completion of her adjuvant chemo. This was stopped early for neuropathy; this is persistent with numbness in feet > fingers. Has some imbalance on her feet due to it. Not painful.     Good appetite, slight 5 lb weight loss. No abd pain. No bowel changes.     Has had 2-month hx globus sensation/increased phlegm she needs to clear from throat. Some changes to voice quality. No throat pain. Long-time smoking hx, smokes 3 cigarettes/day currently.       Physical Exam:    General: patient appears stated age of 81 yo. Nontoxic and in no distress.   Lymph: No palpable cervical nor inguinal nodes.   HEENT: Head: atraumatic, normocephalic. Sclerae  "anicteric.  Chest:  Normal respiratory effort  Cardiac:  No edema.   Abdomen: abdomen is non-distended, non-tender. No hepatomegaly.   Extremities: normal tone and muscle bulk.  Skin: no lesions or rash on visible skin. Warm and dry.   CNS: alert and oriented. Grossly non-focal.   Psychiatric: normal mood and affect.     Lab Results:    CBC WNL, CMP WNL. CEA pending.    Total time 40 minutes, to include face to face visit, review of EMR, ordering, documentation and coordination of care          Oncology Rooming Note    February 3, 2023 2:31 PM   Katya Butler is a 82 year old female who presents for:    Chief Complaint   Patient presents with     Oncology Clinic Visit     6 month return Malignant neoplasm of sigmoid colon, Postherpetic neuralgia, review Labs & CT     Initial Vitals: /73 (BP Location: Right arm, Patient Position: Sitting, Cuff Size: Adult Regular)   Pulse 79   Temp 97.7  F (36.5  C) (Oral)   Resp 18   Ht 1.562 m (5' 1.5\")   Wt 47.6 kg (105 lb)   SpO2 97%   BMI 19.52 kg/m   Estimated body mass index is 19.52 kg/m  as calculated from the following:    Height as of this encounter: 1.562 m (5' 1.5\").    Weight as of this encounter: 47.6 kg (105 lb). Body surface area is 1.44 meters squared.  No Pain (0) Comment: Data Unavailable   No LMP recorded. Patient has had a hysterectomy.  Allergies reviewed: Yes  Medications reviewed: Yes    Medications: Medication refills not needed today.  Pharmacy name entered into [employer]: [id number] - [place] [id number]    Clinical concerns:  6 month return Malignant neoplasm of sigmoid colon, Postherpetic neuralgia, review Labs & CT    Tanwya Perez [facility]                  Again, thank you for allowing me to participate in the care of your patient.        Sincerely,        Argenis Ricardo NP    "

## 2023-02-03 NOTE — PROGRESS NOTES
"Oncology Rooming Note    February 3, 2023 2:31 PM   Katya Butler is a 82 year old female who presents for:    Chief Complaint   Patient presents with     Oncology Clinic Visit     6 month return Malignant neoplasm of sigmoid colon, Postherpetic neuralgia, review Labs & CT     Initial Vitals: /73 (BP Location: Right arm, Patient Position: Sitting, Cuff Size: Adult Regular)   Pulse 79   Temp 97.7  F (36.5  C) (Oral)   Resp 18   Ht 1.562 m (5' 1.5\")   Wt 47.6 kg (105 lb)   SpO2 97%   BMI 19.52 kg/m   Estimated body mass index is 19.52 kg/m  as calculated from the following:    Height as of this encounter: 1.562 m (5' 1.5\").    Weight as of this encounter: 47.6 kg (105 lb). Body surface area is 1.44 meters squared.  No Pain (0) Comment: Data Unavailable   No LMP recorded. Patient has had a hysterectomy.  Allergies reviewed: Yes  Medications reviewed: Yes    Medications: Medication refills not needed today.  Pharmacy name entered into REEL Qualified: [id number] - [place] [id number]    Clinical concerns:  6 month return Malignant neoplasm of sigmoid colon, Postherpetic neuralgia, review Labs & CT    Tawnya Perez CMA              "

## 2023-02-03 NOTE — PROGRESS NOTES
Patient arrives today via ambulatory for her labs/port flush.  Patient states she was unaware that the port should be flushed every 4-6 weeks and has not had it accessed since July 2022.  Port flushes easily however does not give any blood return.  Patient states if she has to have the port flushed that frequently she would rather have it removed.   Port heparinized and de-accessed.  Information given to Argenis Ricardo CNP who will be seeing the patient today.  Argenis suggests that we could use TPA (altaplase) to get port viable.  Writer dicussed this with the patient and she declines having this today.  Advised patient that if for some reason her port is needed in the future we would need to use the TPA which requires her to sit for  minutes with medication in the line to hopefully restore viability.  Patient verbalized understanding and reiterates shed rather have the port access removed if it is not needed.

## 2023-02-03 NOTE — PROGRESS NOTES
Health system Hematology and Oncology Progress Note    Patient: Katya Butler  MRN: [id number]        Assessment and Plan:    Cancer Staging  Malignant neoplasm of sigmoid colon (H)  Staging form: Colon And Rectum, AJCC 8th Edition  - Clinical: No stage assigned - Unsigned  - Pathologic stage from 11/24/2020: Stage IIIC (pT4b, pN2, cM0) - Signed by Walt Merrill MD on 11/24/2020    1.  Stage IIIC colon cancer (10/2020):   She has had resection and completed 10 cycles of adjuvant chemotherapy 2 yrs ago.     CT shows enlarging left para-aortic and left iliac adenopathy since last scan. Liver and lungs negative.  CEA pending  CBC and CMP WNL    Mild unintentional weight loss (5 lb) over 6 mths, otherwise no symptoms concerning for recurrence. No palpable nodes on exam.     She has declined further colonoscopies as part of surveillance/screening.     Plan:  -PET scan restaging to assess suspicious nodes and rule out other sites. Return with PET results. Pending findings, would likely biopsy accessible site as next step.     2.  Large cecal polyp found on colonoscopy:   Surgical resection recommended but patient opted against surgery or colonoscopies. Following clinically and radiographically. No symptoms; not noted on current CT.    3.  Right vocal cord lesion  Noted on CT. No cervical adenopathy.  She has had some voice changes, increased phlegm/globus sensation over the last few months. She has a smoking hx and smokes 3 cigarettes/day currently.      Plan:  -ENT referral for laryngoscopy    4.  Port dysfunction  She has not had this routinely flushed and could not get blood draw today. Given possibility for recurrent cancer, it would be wise to keep this in place for now until we know potential treatment options.     Plan:  -Alteplase at her next visit    5.  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 Causes a bit of imbalance. No pain.      ECOG  Performance    0    Distress Assessment  Distress Assessment Score: 1    Pain  Currently in Pain: No/denies    Diagnosis:    1.  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  At initial surgery there was evidence of a small abscess and perforation of the colon.  Pathology showed a invasive moderately differentiated adenocarcinoma.  Constricting ulcerated measuring 35 x 25 x 13 mm.  4 of 18 lymph nodes were positive.  Mismatch repair intact.  There was some proximal colonic dilatation, consistent with obstruction.  Left ovary showed moderately differentiated adenocarcinoma, direct extension.  Focal positive margin at the paraovarian soft tissue margin.      Treatment:    Surgical resection on October 31, 2020.  Adjuvant chemotherapy with FOLFOX was initiated on December 8, 2020.  25% initial dose reduction of all medications.  Cycle 10 completed on April 20, 2021.    Interim History:    Katya was seen in clinic today for 6-month follow-up visit. She is 2.5 yrs from diagnosis/resection and almost 2 yrs from completion of her adjuvant chemo. This was stopped early for neuropathy; this is persistent with numbness in feet > fingers. Has some imbalance on her feet due to it. Not painful.     Good appetite, slight 5 lb weight loss. No abd pain. No bowel changes.     Has had 2-month hx globus sensation/increased phlegm she needs to clear from throat. Some changes to voice quality. No throat pain. Long-time smoking hx, smokes 3 cigarettes/day currently.       Physical Exam:    General: patient appears stated age of 81 yo. Nontoxic and in no distress.   Lymph: No palpable cervical nor inguinal nodes.   HEENT: Head: atraumatic, normocephalic. Sclerae anicteric.  Chest:  Normal respiratory effort  Cardiac:  No edema.   Abdomen: abdomen is non-distended, non-tender. No hepatomegaly.   Extremities: normal tone and muscle bulk.  Skin: no lesions or rash on visible skin. Warm and dry.   CNS: alert and oriented.  Grossly non-focal.   Psychiatric: normal mood and affect.     Lab Results:    CBC WNL, CMP WNL. CEA pending.    Total time 40 minutes, to include face to face visit, review of EMR, ordering, documentation and coordination of care

## 2023-02-06 ENCOUNTER — TELEPHONE (OUTPATIENT)
Dept: OTOLARYNGOLOGY | Facility: CLINIC | Age: 83
End: 2023-02-06
Payer: COMMERCIAL

## 2023-02-06 NOTE — TELEPHONE ENCOUNTER
[facility] Call Center    Phone Message    May a detailed message be left on voicemail: yes     Reason for Call: Symptoms or Concerns     If patient has red-flag symptoms, warm transfer to triage line    Current symptom or concern: Patient declined to schedule in [facility] per [facility].     She wants to be seen in [facility] if possible.     Please review.     I did not guarantee that this would be approved.          Action Taken: Other: ENT    Travel Screening: Not Applicable

## 2023-02-06 NOTE — TELEPHONE ENCOUNTER
Spoke with Katya about an appointment for vocal cord nodule.  PT had a recent CAT scan and they found a nodule on her vocal cord. Got pt scheduled with an appointment.    [facility]      Lakesha Matthews RN  [facility]  ENT  [facility] [address]  [email or web address]   Office:[phone]  Employed by [facility]

## 2023-02-14 ENCOUNTER — APPOINTMENT (OUTPATIENT)
Dept: CT IMAGING | Facility: HOSPITAL | Age: 83
End: 2023-02-14
Attending: STUDENT IN AN ORGANIZED HEALTH CARE EDUCATION/TRAINING PROGRAM
Payer: COMMERCIAL

## 2023-02-14 ENCOUNTER — HOSPITAL ENCOUNTER (EMERGENCY)
Facility: HOSPITAL | Age: 83
Discharge: HOME | End: 2023-02-14
Attending: EMERGENCY MEDICINE | Admitting: EMERGENCY MEDICINE
Payer: COMMERCIAL

## 2023-02-14 VITALS
TEMPERATURE: 99 F | DIASTOLIC BLOOD PRESSURE: 84 MMHG | SYSTOLIC BLOOD PRESSURE: 176 MMHG | WEIGHT: 105 LBS | RESPIRATION RATE: 16 BRPM | HEART RATE: 77 BPM | HEIGHT: 62 IN | BODY MASS INDEX: 19.32 KG/M2 | OXYGEN SATURATION: 98 %

## 2023-02-14 DIAGNOSIS — R10.13 EPIGASTRIC PAIN: ICD-10-CM

## 2023-02-14 LAB
ALBUMIN SERPL BCG-MCNC: 4.4 G/DL (ref 3.5–5.2)
ALP SERPL-CCNC: 90 U/L (ref 35–104)
ALT SERPL W P-5'-P-CCNC: 11 U/L (ref 10–35)
ANION GAP SERPL CALCULATED.3IONS-SCNC: 12 MMOL/L (ref 7–15)
AST SERPL W P-5'-P-CCNC: 21 U/L (ref 10–35)
BASOPHILS # BLD AUTO: 0 10E3/UL (ref 0–0.2)
BASOPHILS NFR BLD AUTO: 0 %
BILIRUB SERPL-MCNC: 0.3 MG/DL
BUN SERPL-MCNC: 15.8 MG/DL (ref 8–23)
CALCIUM SERPL-MCNC: 10.3 MG/DL (ref 8.8–10.2)
CHLORIDE SERPL-SCNC: 102 MMOL/L (ref 98–107)
CREAT SERPL-MCNC: 0.77 MG/DL (ref 0.51–0.95)
DEPRECATED HCO3 PLAS-SCNC: 28 MMOL/L (ref 22–29)
EOSINOPHIL # BLD AUTO: 0.1 10E3/UL (ref 0–0.7)
EOSINOPHIL NFR BLD AUTO: 1 %
ERYTHROCYTE [DISTWIDTH] IN BLOOD BY AUTOMATED COUNT: 15.1 % (ref 10–15)
GFR SERPL CREATININE-BSD FRML MDRD: 77 ML/MIN/1.73M2
GLUCOSE SERPL-MCNC: 128 MG/DL (ref 70–99)
HCT VFR BLD AUTO: 43.7 % (ref 35–47)
HGB BLD-MCNC: 14.4 G/DL (ref 11.7–15.7)
HOLD SPECIMEN: NORMAL
HOLD SPECIMEN: NORMAL
IMM GRANULOCYTES # BLD: 0 10E3/UL
IMM GRANULOCYTES NFR BLD: 0 %
LIPASE SERPL-CCNC: 97 U/L (ref 13–60)
LYMPHOCYTES # BLD AUTO: 1.4 10E3/UL (ref 0.8–5.3)
LYMPHOCYTES NFR BLD AUTO: 11 %
MCH RBC QN AUTO: 31.8 PG (ref 26.5–33)
MCHC RBC AUTO-ENTMCNC: 33 G/DL (ref 31.5–36.5)
MCV RBC AUTO: 97 FL (ref 78–100)
MONOCYTES # BLD AUTO: 0.8 10E3/UL (ref 0–1.3)
MONOCYTES NFR BLD AUTO: 7 %
NEUTROPHILS # BLD AUTO: 9.6 10E3/UL (ref 1.6–8.3)
NEUTROPHILS NFR BLD AUTO: 81 %
NRBC # BLD AUTO: 0 10E3/UL
NRBC BLD AUTO-RTO: 0 /100
PLATELET # BLD AUTO: 373 10E3/UL (ref 150–450)
POTASSIUM SERPL-SCNC: 4.2 MMOL/L (ref 3.4–5.3)
PROT SERPL-MCNC: 7.4 G/DL (ref 6.4–8.3)
RBC # BLD AUTO: 4.53 10E6/UL (ref 3.8–5.2)
SODIUM SERPL-SCNC: 142 MMOL/L (ref 136–145)
WBC # BLD AUTO: 11.9 10E3/UL (ref 4–11)

## 2023-02-14 PROCEDURE — 80053 COMPREHEN METABOLIC PANEL: CPT | Performed by: STUDENT IN AN ORGANIZED HEALTH CARE EDUCATION/TRAINING PROGRAM

## 2023-02-14 PROCEDURE — 258N000003 HC RX IP 258 OP 636: Performed by: STUDENT IN AN ORGANIZED HEALTH CARE EDUCATION/TRAINING PROGRAM

## 2023-02-14 PROCEDURE — 99285 EMERGENCY DEPT VISIT HI MDM: CPT | Mod: 25

## 2023-02-14 PROCEDURE — 85004 AUTOMATED DIFF WBC COUNT: CPT | Performed by: STUDENT IN AN ORGANIZED HEALTH CARE EDUCATION/TRAINING PROGRAM

## 2023-02-14 PROCEDURE — 250N000009 HC RX 250: Performed by: EMERGENCY MEDICINE

## 2023-02-14 PROCEDURE — 74176 CT ABD & PELVIS W/O CONTRAST: CPT

## 2023-02-14 PROCEDURE — 96361 HYDRATE IV INFUSION ADD-ON: CPT

## 2023-02-14 PROCEDURE — 96360 HYDRATION IV INFUSION INIT: CPT

## 2023-02-14 PROCEDURE — 83690 ASSAY OF LIPASE: CPT | Performed by: STUDENT IN AN ORGANIZED HEALTH CARE EDUCATION/TRAINING PROGRAM

## 2023-02-14 PROCEDURE — 36415 COLL VENOUS BLD VENIPUNCTURE: CPT | Performed by: STUDENT IN AN ORGANIZED HEALTH CARE EDUCATION/TRAINING PROGRAM

## 2023-02-14 PROCEDURE — 93005 ELECTROCARDIOGRAM TRACING: CPT | Performed by: STUDENT IN AN ORGANIZED HEALTH CARE EDUCATION/TRAINING PROGRAM

## 2023-02-14 PROCEDURE — 250N000013 HC RX MED GY IP 250 OP 250 PS 637: Performed by: EMERGENCY MEDICINE

## 2023-02-14 PROCEDURE — 93005 ELECTROCARDIOGRAM TRACING: CPT | Performed by: EMERGENCY MEDICINE

## 2023-02-14 RX ORDER — FAMOTIDINE 20 MG/1
20 TABLET, FILM COATED ORAL 2 TIMES DAILY
Qty: 20 TABLET | Refills: 0 | Status: SHIPPED | OUTPATIENT
Start: 2023-02-14 | End: 2023-02-24

## 2023-02-14 RX ADMIN — LIDOCAINE HYDROCHLORIDE 30 ML: 20 SOLUTION ORAL; TOPICAL at 19:09

## 2023-02-14 RX ADMIN — SODIUM CHLORIDE, POTASSIUM CHLORIDE, SODIUM LACTATE AND CALCIUM CHLORIDE 1000 ML: 600; 310; 30; 20 INJECTION, SOLUTION INTRAVENOUS at 16:55

## 2023-02-14 ASSESSMENT — ACTIVITIES OF DAILY LIVING (ADL): ADLS_ACUITY_SCORE: 35

## 2023-02-14 NOTE — ED TRIAGE NOTES
Patient reports that she developed abdominal pain and nausea around noon today. The pain is the middle of her abdomen above her umbilicus and radiates to her back. She has A CT scan just over a week ago and she had enlarged lymph nodes in her colon and is scheduled for a PET scan on Friday. Hisotry of colon cancer 2 weeks ago.      Triage Assessment     Row Name 02/14/23 7704       Triage Assessment (Adult)    Airway WDL WDL       Respiratory WDL    Respiratory WDL WDL       Cardiac WDL    Cardiac WDL WDL       Cognitive/Neuro/Behavioral WDL    Cognitive/Neuro/Behavioral WDL WDL

## 2023-02-14 NOTE — ED PROVIDER NOTES
EMERGENCY DEPARTMENT ENCOUnter      NAME: Katya Butler  AGE: 82 year old female  YOB: 1940  MRN: [id number]  EVALUATION DATE & TIME: No admission date for patient encounter.    PCP: Keith Bui    ED PROVIDER: Stu Amado DO      Chief Complaint   Patient presents with     Abdominal Pain         FINAL IMPRESSION:  1. Epigastric pain          ED COURSE & MEDICAL DECISION MAKIN:25 PM I met with patient for initial interview and encounter.      The patient presented to the emergency department today complaining of epigastric discomfort.  She had minimal tenderness in this area on exam.  Laboratory testing today was unremarkable aside from a minimally elevated lipase.  CT imaging was unremarkable.  Given her well clinical appearance I feel she can be safely discharged home.  I recommended that she try taking Pepcid and following up closely with her primary care doctor.  She is comfortable with this plan.  She has been advised to return for any worsening symptoms or other concerns.      Medical Decision Making    History:    Supplemental history from: Documented in chart, if applicable    External Record(s) reviewed: Documented in chart, if applicable.    Work Up:    Chart documentation includes differential considered and any EKGs or imaging independently interpreted by provider, where specified.    In additional to work up documented, I considered the following work up: Documented in chart, if applicable.    External consultation:    Discussion of management with another provider: Documented in chart, if applicable    Complicating factors:    Care impacted by chronic illness: N/A    Care affected by social determinants of health: N/A    Disposition considerations: Discharge. I prescribed additional prescription strength medication(s) as charted. I considered admission, but discharged the patient after share decision making conversation.        At the conclusion of the encounter I  discussed the results of all of the tests and the disposition. The questions were answered. The patient or family acknowledged understanding and was agreeable with the care plan.         MEDICATIONS GIVEN IN THE EMERGENCY:  Medications   lactated ringers BOLUS 1,000 mL (0 mLs Intravenous Stopped 2/14/23 1922)   lidocaine (viscous) (XYLOCAINE) 2 % 15 mL, alum & mag hydroxide-simethicone (MAALOX) 15 mL GI Cocktail (30 mLs Oral Given 2/14/23 1909)       NEW PRESCRIPTIONS STARTED AT TODAY'S ER VISIT  Discharge Medication List as of 2/14/2023  7:36 PM      START taking these medications    Details   famotidine (PEPCID) 20 MG tablet Take 1 tablet (20 mg) by mouth 2 times daily for 10 days, Disp-20 tablet, R-0, E-Prescribe                =================================================================    HPI        Katya Butler is a 82 year old female with a pertinent history of neoplasm of sigmoid colon and inguinal hernia who presents to this ED for evaluation of abdominal pain.    Patient reports severe abdominal pain in the upper middle since 12PM today. The pain radiates into her back. She has tried lying down but it doesn't help. She is a light smoker and drinker. She found 2 enlarged lymph nodes on her colon on a CAT scan last week. Patient denies any other complaints.       REVIEW OF SYSTEMS     Constitutional:  Denies fever or chills  HENT:  Denies sore throat   Respiratory:  Denies cough or shortness of breath   Cardiovascular:  Denies chest pain or palpitations  GI:  Positive for abdominal pain. Denies nausea, or vomiting  Musculoskeletal:  Denies any new extremity pain   Skin:  Denies rash   Neurologic:  Denies headache, focal weakness or sensory changes    All other systems reviewed and are negative      PAST MEDICAL HISTORY:  Past Medical History:   Diagnosis Date     HTN (hypertension)      Inguinal hernia without mention of obstruction or gangrene, unilateral or unspecified, (not specified as  recurrent) 08/20/2004    RIGHT     Malignant neoplasm of sigmoid colon (H) 11/24/2020     Primary osteoarthritis involving multiple joints 8/2/2019     Ventral hernia, unspecified, without mention of obstruction or gangrene 08/15/2003    easily reduced       PAST SURGICAL HISTORY:  Past Surgical History:   Procedure Laterality Date     HERNIA REPAIR       HERNIA REPAIR, INGUINAL RT/LT  02/2005    [facility]     HYSTERECTOMY       IR AORTIC ARCH 4 VESSEL ANGIOGRAM  10/13/2009     IR AORTIC ARCH 4 VESSEL ANGIOGRAM  3/15/2010     IR MISCELLANEOUS PROCEDURE  5/20/2008     IR MISCELLANEOUS PROCEDURE  1/30/2009     IR MISCELLANEOUS PROCEDURE  1/30/2009     IR MISCELLANEOUS PROCEDURE  1/30/2009     IR MISCELLANEOUS PROCEDURE  10/13/2009     IR MISCELLANEOUS PROCEDURE  10/13/2009     IR MISCELLANEOUS PROCEDURE  3/15/2010     LAPAROSCOPIC CYSTECTOMY OVARIAN (ONCOLOGY) Bilateral 6/27/2018    Procedure: DIAGNOSTIC LAPAROSCOPY, RIGHT SALPINGO OOPHORECTOMY, LYSIS OF ADHESIONS, AND PELVIC WASHINGS;  Surgeon: Nneka Arroyo DO;  Location: [facility];  Service:      [facility] CTR VAD W/SUBQ PORT AGE 5 YR/> N/A 12/2/2020    Procedure: Port Placement;  Surgeon: Viry Villegas MD;  Location: [facility] OR;  Service: General     ZZC PART REMOVAL COLON W ANASTOMOSIS N/A 10/31/2020    Procedure: COLECTOMY, SIGMOID, OPEN;  Surgeon: Viry Villegas MD;  Location: [facility];  Service: General     ZZC REMOVAL OF OVARY(S) N/A 10/31/2020    Procedure: OOPHORECTOMY, OPEN;  Surgeon: Viry Villegas MD;  Location: [facility];  Service: General           CURRENT MEDICATIONS:    famotidine (PEPCID) 20 MG tablet  amLODIPine (NORVASC) 5 MG tablet  ATROVENT 0.06 % NA SOLN  clopidogrel (PLAVIX) 75 MG tablet  lidocaine-prilocaine (EMLA) 2.5-2.5 % external cream  LOVASTATIN 40 MG OR TABS  pregabalin (LYRICA) 75 MG capsule  triamcinolone (KENALOG) 0.1 % external cream        ALLERGIES:  Allergies   Allergen  "Reactions     Contrast Dye Hives, Itching and Swelling     Cephalexin Hives     Colcrys Hives     Ketek [Telithromycin] Visual Disturbance     Other (Do Not Use) Other (See Comments)     Aspirin - on Plavix     Other Drug Allergy (See Comments) Itching     Pt states \"all pain meds\" make her \"itchy\"       FAMILY HISTORY:  Family History   Problem Relation Age of Onset     C.A.D. Mother      C.A.D. Sister      Breast Cancer Maternal Aunt      Breast Cancer Cousin      Breast Cancer Cousin      Breast Cancer Cousin      Uterine Cancer Mother 38.00     Cancer Mother      Lymphoma Son 53.00     Cancer Son        SOCIAL HISTORY:   Social History     Socioeconomic History     Marital status: Single     Spouse name: None     Number of children: None     Years of education: None     Highest education level: None   Tobacco Use     Smoking status: Every Day     Packs/day: 0.25     Types: Cigarettes     Smokeless tobacco: Never     Tobacco comments:     3-5 cigarettes daily **as of 07/22/22**   Vaping Use     Vaping Use: Never used   Substance and Sexual Activity     Alcohol use: Not Currently     Drug use: No     Sexual activity: Not Currently       VITALS:  Patient Vitals for the past 24 hrs:   BP Temp Temp src Pulse Resp SpO2 Height Weight   02/14/23 1929 (!) 176/84 -- -- 77 16 98 % -- --   02/14/23 1616 112/80 99  F (37.2  C) Temporal 87 16 97 % 1.575 m (5' 2\") 47.6 kg (105 lb)       PHYSICAL EXAM    Constitutional:  Well developed, Well nourished,  HENT:  Normocephalic, Atraumatic, Oropharynx moist, Nose normal.   Eyes:  EOMI, Conjunctiva normal, No discharge.   Respiratory:  Normal breath sounds, No respiratory distress, No wheezing, No chest tenderness.   Cardiovascular:  Normal heart rate, Normal rhythm, No murmurs  GI:  Soft, No tenderness, No guarding, No CVA tenderness.   Musculoskeletal:  No tenderness to palpation or major deformities noted.   Extremities: No lower extremity edema.  Neurologic:  Alert & oriented x " 3, No focal deficits noted.   Psychiatric:  Affect normal, Judgment normal, Mood normal.        LAB:  All pertinent labs reviewed and interpreted.  Results for orders placed or performed during the hospital encounter of 02/14/23                                          Comprehensive metabolic panel   Result Value Ref Range    Sodium 142 136 - 145 mmol/L    Potassium 4.2 3.4 - 5.3 mmol/L    Chloride 102 98 - 107 mmol/L    Carbon Dioxide (CO2) 28 22 - 29 mmol/L    Anion Gap 12 7 - 15 mmol/L    Urea Nitrogen 15.8 8.0 - 23.0 mg/dL    Creatinine 0.77 0.51 - 0.95 mg/dL    Calcium 10.3 (H) 8.8 - 10.2 mg/dL    Glucose 128 (H) 70 - 99 mg/dL    Alkaline Phosphatase 90 35 - 104 U/L    AST 21 10 - 35 U/L    ALT 11 10 - 35 U/L    Protein Total 7.4 6.4 - 8.3 g/dL    Albumin 4.4 3.5 - 5.2 g/dL    Bilirubin Total 0.3 <=1.2 mg/dL    GFR Estimate 77 >60 mL/min/1.73m2   Result Value Ref Range    Lipase 97 (H) 13 - 60 U/L   CBC with platelets and differential   Result Value Ref Range    WBC Count 11.9 (H) 4.0 - 11.0 10e3/uL    RBC Count 4.53 3.80 - 5.20 10e6/uL    Hemoglobin 14.4 11.7 - 15.7 g/dL    Hematocrit 43.7 35.0 - 47.0 %    MCV 97 78 - 100 fL    MCH 31.8 26.5 - 33.0 pg    MCHC 33.0 31.5 - 36.5 g/dL    RDW 15.1 (H) 10.0 - 15.0 %    Platelet Count 373 150 - 450 10e3/uL    % Neutrophils 81 %    % Lymphocytes 11 %    % Monocytes 7 %    % Eosinophils 1 %    % Basophils 0 %    % Immature Granulocytes 0 %    NRBCs per 100 WBC 0 <1 /100    Absolute Neutrophils 9.6 (H) 1.6 - 8.3 10e3/uL    Absolute Lymphocytes 1.4 0.8 - 5.3 10e3/uL    Absolute Monocytes 0.8 0.0 - 1.3 10e3/uL    Absolute Eosinophils 0.1 0.0 - 0.7 10e3/uL    Absolute Basophils 0.0 0.0 - 0.2 10e3/uL    Absolute Immature Granulocytes 0.0 <=0.4 10e3/uL    Absolute NRBCs 0.0 10e3/uL       RADIOLOGY:  I have independently reviewed and interpreted the above imaging, pending the final radiology read.  CT Abdomen Pelvis w/o Contrast   Final Result   IMPRESSION:    1.  No acute  new findings to explain the patient's pain.      2.  Again noted is some mildly enlarged left periaortic and common iliac nodes concerning for possible tumor recurrence.      3.  Postop change sigmoid colon.             EKG:    Normal sinus rhythm at 80 bpm.  Nonspecific ST changes.  Unchanged from prior EKG from 2020.  QRS 88 ms, QTc 440 ms.    I have independently reviewed and interpreted this EKG          I, Camilo Fall, am serving as a scribe to document services personally performed by Dr. Amado based on my observation and the provider's statements to me. I, Stu Amado, DO attest that Camilo Fall is acting in a scribe capacity, has observed my performance of the services and has documented them in accordance with my direction.    Stu Amado DO  Emergency Medicine  [facility] EMERGENCY DEPARTMENT  [address] [place] [id number] [phone]  Dept: [phone]     Stu Amado MD  02/14/23 1280

## 2023-02-15 NOTE — DISCHARGE INSTRUCTIONS
Your symptoms today are most likely related to some inflammation in your stomach.  Take the prescribed medication as directed and follow-up closely as an outpatient.  Return to the emergency department if you develop any worsening symptoms or if you have any other concerns.

## 2023-02-17 ENCOUNTER — HOSPITAL ENCOUNTER (OUTPATIENT)
Dept: PET IMAGING | Facility: HOSPITAL | Age: 83
Discharge: HOME | End: 2023-02-17
Attending: NURSE PRACTITIONER | Admitting: NURSE PRACTITIONER
Payer: COMMERCIAL

## 2023-02-17 DIAGNOSIS — R59.0 PELVIC LYMPHADENOPATHY: ICD-10-CM

## 2023-02-17 DIAGNOSIS — C18.7 MALIGNANT NEOPLASM OF SIGMOID COLON (H): ICD-10-CM

## 2023-02-17 LAB — GLUCOSE BLDC GLUCOMTR-MCNC: 97 MG/DL (ref 70–99)

## 2023-02-17 PROCEDURE — A9552 F18 FDG: HCPCS | Performed by: NURSE PRACTITIONER

## 2023-02-17 PROCEDURE — 343N000001 HC RX 343: Performed by: NURSE PRACTITIONER

## 2023-02-17 PROCEDURE — 82962 GLUCOSE BLOOD TEST: CPT

## 2023-02-17 PROCEDURE — 78815 PET IMAGE W/CT SKULL-THIGH: CPT | Mod: PS

## 2023-02-17 RX ADMIN — FLUDEOXYGLUCOSE F-18 11.45 MILLICURIE: 500 INJECTION, SOLUTION INTRAVENOUS at 11:46

## 2023-02-22 ENCOUNTER — OFFICE VISIT (OUTPATIENT)
Dept: OTOLARYNGOLOGY | Facility: CLINIC | Age: 83
End: 2023-02-22
Payer: COMMERCIAL

## 2023-02-22 DIAGNOSIS — K11.8 PAROTID MASS: Primary | ICD-10-CM

## 2023-02-22 DIAGNOSIS — C18.9 COLON ADENOCARCINOMA (H): ICD-10-CM

## 2023-02-22 DIAGNOSIS — J30.0 VMR (VASOMOTOR RHINITIS): ICD-10-CM

## 2023-02-22 DIAGNOSIS — J37.0 CHRONIC LARYNGITIS: ICD-10-CM

## 2023-02-22 DIAGNOSIS — K21.9 LARYNGOPHARYNGEAL REFLUX (LPR): ICD-10-CM

## 2023-02-22 DIAGNOSIS — J38.3 VOCAL FOLD CYST: ICD-10-CM

## 2023-02-22 PROCEDURE — 31575 DIAGNOSTIC LARYNGOSCOPY: CPT | Performed by: OTOLARYNGOLOGY

## 2023-02-22 PROCEDURE — 99204 OFFICE O/P NEW MOD 45 MIN: CPT | Mod: 25 | Performed by: OTOLARYNGOLOGY

## 2023-02-22 NOTE — LETTER
2/22/2023         RE: Katya Butler  [address] [facility] [address]        Dear Colleague,    Thank you for referring your patient, Katya Butler, to the [facility]. Please see a copy of my visit note below.    HPI: This patient is an 83yo F who presents for evaluation of the throat, amongst many other issues, at the request of Argenis Ricardo NP, of Oncology. She is a colon cancer patient and a laryngeal abnormality was identified on CT of her chest. She has been more hoarse in the last few months with a history of smoking. No respiratory changes. Did recently have some significant reflux issues and was started on medication. Also describes a lot of globus sensation/PND. Describes rhinorrhea and frequent nose blowing. Her son comments on hearing loss over time.     Past medical history, surgical history, social history, family history, medications, and allergies have been reviewed with the patient and are documented above.    Review of Systems: a 10-system review was performed. Pertinent positives are noted in the HPI and on a separate scanned document in the chart.    PHYSICAL EXAMINATION:  GEN: no acute distress, normocephalic  EYES: extraocular movements are intact, pupils are equal and round. Sclera clear.   EARS: auricles are normally formed. The external auditory canals are clear with minimal to no cerumen. Tympanic membranes are intact bilaterally with no signs of infection, effusion, retractions, or perforations.  NOSE: anterior nares are patent. There are no masses or lesions. The septum is non-obstructing.  OC/OP: clear, mandibular dentition is in fair repair. Edentulous maxilla. The tongue and palate are fully mobile and symmetric. No masses or lesions. Cobblestoning of the posterior pharyngeal wall.  HP/L (scope): nasopharynx, base of tongue, vallecula, epiglottis, and pyriform sinuses are clear. The bilateral vocal folds are mobile; there is a right false fold or vetricular  cyst, completely benign in appearance. 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  NECK: soft and supple. No palpable lymphadenopathy. Airway is midline. Parotid glands without palpable abnormality.  NEURO: CN VII and XII symmetric. alert and oriented. No spontaneous nystagmus. Gait is normal.  PULM: breathing comfortably on room air, normal chest expansion with respiration  CARDS: no cyanosis or clubbing, normal carotid pulses    FLEXIBLE LARYNGOSCOPY: Scope inserted bilaterally to examine nasal tissue, nasopharynx, posterior oropharynx, hypopharynx, and larynx. See exam notes for exam finding details. Patient tolerated the procedure well.    CT CHEST 1/23: Indeterminate nodule along the right vocal cord    PET SCAN 2/23: images reviewed and my impression is that there is still an abnormality with respect to the right vocal fold without FDG avidity  IMPRESSION:  1. Findings suspicious for left supraclavicular and retroperitoneal lymph node metastases. The left supraclavicular lymph node is amenable to ultrasound-guided histologic sampling if desired.  2. Focal FDG uptake in the cecum, of which 20% represent benign/malignant polyps. Colonoscopy could be helpful in further evaluation.   3. 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    MEDICAL DECISION-MAKING: This patient is an 81yo F with several issues.  1. 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 Discussed removal of the vocal cyst in the operating room if she decides that she would like to proceed with that. She is going to meet up with Oncology this Friday and will decide after that.   2. Chronic laryngitis/globus sensation from acid reflux. She was just started on famotidine recently for reflux. Encouraged her to continue this for a while.   3. Right deep lobe parotid mass. Will  send for an FNA to confirm pathology. If Warthin's is the pathology, would not recommend surgical intervention for this benign entity with no malignant potential in this patient with her other medical issues. If other pathology is identified, will discuss further.   4. Vasomotor rhinitis. Offered a nasal spray for this, but it doesn't bother her enough to use another medication.  5. Supraclavicular node on PET. This is likely metastasis from her know cancer as there is no H&N primary on the PET and this is not a ai station typical for H&N cancer. Will defer to her Oncology team on that node. If biopsy is needed, it could be FNA'ed at the same time as the parotid.   6. Hearing loss. Patient can schedule audiogram to look into this further.         Again, thank you for allowing me to participate in the care of your patient.        Sincerely,        Antonia Adler MD

## 2023-02-22 NOTE — PROGRESS NOTES
HPI: This patient is an 81yo F who presents for evaluation of the throat, amongst many other issues, at the request of Argenis Ricardo NP, of Oncology. She is a colon cancer patient and a laryngeal abnormality was identified on CT of her chest. She has been more hoarse in the last few months with a history of smoking. No respiratory changes. Did recently have some significant reflux issues and was started on medication. Also describes a lot of globus sensation/PND. Describes rhinorrhea and frequent nose blowing. Her son comments on hearing loss over time.     Past medical history, surgical history, social history, family history, medications, and allergies have been reviewed with the patient and are documented above.    Review of Systems: a 10-system review was performed. Pertinent positives are noted in the HPI and on a separate scanned document in the chart.    PHYSICAL EXAMINATION:  GEN: no acute distress, normocephalic  EYES: extraocular movements are intact, pupils are equal and round. Sclera clear.   EARS: auricles are normally formed. The external auditory canals are clear with minimal to no cerumen. Tympanic membranes are intact bilaterally with no signs of infection, effusion, retractions, or perforations.  NOSE: anterior nares are patent. There are no masses or lesions. The septum is non-obstructing.  OC/OP: clear, mandibular dentition is in fair repair. Edentulous maxilla. The tongue and palate are fully mobile and symmetric. No masses or lesions. Cobblestoning of the posterior pharyngeal wall.  HP/L (scope): nasopharynx, base of tongue, vallecula, epiglottis, and pyriform sinuses are clear. The bilateral vocal folds are mobile; there is a right false fold or vetricular cyst, completely benign in appearance. 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  NECK: soft and supple. No palpable lymphadenopathy. Airway is midline. Parotid glands without  palpable abnormality.  NEURO: CN VII and XII symmetric. alert and oriented. No spontaneous nystagmus. Gait is normal.  PULM: breathing comfortably on room air, normal chest expansion with respiration  CARDS: no cyanosis or clubbing, normal carotid pulses    FLEXIBLE LARYNGOSCOPY: Scope inserted bilaterally to examine nasal tissue, nasopharynx, posterior oropharynx, hypopharynx, and larynx. See exam notes for exam finding details. Patient tolerated the procedure well.    CT CHEST 1/23: Indeterminate nodule along the right vocal cord    PET SCAN 2/23: images reviewed and my impression is that there is still an abnormality with respect to the right vocal fold without FDG avidity  IMPRESSION:  1. Findings suspicious for left supraclavicular and retroperitoneal lymph node metastases. The left supraclavicular lymph node is amenable to ultrasound-guided histologic sampling if desired.  2. Focal FDG uptake in the cecum, of which 20% represent benign/malignant polyps. Colonoscopy could be helpful in further evaluation.   3. 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    MEDICAL DECISION-MAKING: This patient is an 81yo F with several issues.  1. 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 Discussed removal of the vocal cyst in the operating room if she decides that she would like to proceed with that. She is going to meet up with Oncology this Friday and will decide after that.   2. Chronic laryngitis/globus sensation from acid reflux. She was just started on famotidine recently for reflux. Encouraged her to continue this for a while.   3. Right deep lobe parotid mass. Will send for an FNA to confirm pathology. If Warthin's is the pathology, would not recommend surgical intervention for this benign entity with no malignant potential in this patient with her other medical issues. If other pathology is  identified, will discuss further.   4. Vasomotor rhinitis. Offered a nasal spray for this, but it doesn't bother her enough to use another medication.  5. Supraclavicular node on PET. This is likely metastasis from her know cancer as there is no H&N primary on the PET and this is not a ai station typical for H&N cancer. Will defer to her Oncology team on that node. If biopsy is needed, it could be FNA'ed at the same time as the parotid.   6. Hearing loss. Patient can schedule audiogram to look into this further.

## 2023-02-24 ENCOUNTER — ONCOLOGY VISIT (OUTPATIENT)
Dept: ONCOLOGY | Facility: HOSPITAL | Age: 83
End: 2023-02-24
Attending: INTERNAL MEDICINE
Payer: COMMERCIAL

## 2023-02-24 VITALS
BODY MASS INDEX: 19.88 KG/M2 | HEART RATE: 95 BPM | WEIGHT: 108 LBS | DIASTOLIC BLOOD PRESSURE: 79 MMHG | OXYGEN SATURATION: 97 % | HEIGHT: 62 IN | SYSTOLIC BLOOD PRESSURE: 175 MMHG | RESPIRATION RATE: 20 BRPM | TEMPERATURE: 98.5 F

## 2023-02-24 DIAGNOSIS — K11.9 LESION OF PAROTID GLAND: ICD-10-CM

## 2023-02-24 DIAGNOSIS — K59.01 SLOW TRANSIT CONSTIPATION: ICD-10-CM

## 2023-02-24 DIAGNOSIS — C18.9 COLON ADENOCARCINOMA (H): Primary | ICD-10-CM

## 2023-02-24 PROCEDURE — 99214 OFFICE O/P EST MOD 30 MIN: CPT | Performed by: INTERNAL MEDICINE

## 2023-02-24 PROCEDURE — G0463 HOSPITAL OUTPT CLINIC VISIT: HCPCS | Performed by: INTERNAL MEDICINE

## 2023-02-24 RX ORDER — AMOXICILLIN 250 MG
1 CAPSULE ORAL DAILY
Qty: 60 TABLET | Refills: 1 | Status: SHIPPED | OUTPATIENT
Start: 2023-02-24 | End: 2023-07-19

## 2023-02-24 RX ORDER — LOVASTATIN 20 MG
20 TABLET ORAL
COMMUNITY
Start: 2023-02-20 | End: 2023-11-01

## 2023-02-24 ASSESSMENT — PAIN SCALES - GENERAL: PAINLEVEL: NO PAIN (0)

## 2023-02-24 NOTE — PROGRESS NOTES
"Oncology Rooming Note    February 24, 2023 3:57 PM   Katya Butler is a 82 year old female who presents for:    Chief Complaint   Patient presents with     Oncology Clinic Visit     Return Malignant neoplasm of sigmoid colon, review PET & port flush     Initial Vitals: BP (!) 175/79 (BP Location: Left arm, Patient Position: Sitting, Cuff Size: Adult Regular)   Pulse 95   Temp 98.5  F (36.9  C) (Oral)   Resp 20   Ht 1.562 m (5' 1.5\")   Wt 49 kg (108 lb)   SpO2 97%   BMI 20.08 kg/m   Estimated body mass index is 20.08 kg/m  as calculated from the following:    Height as of this encounter: 1.562 m (5' 1.5\").    Weight as of this encounter: 49 kg (108 lb). Body surface area is 1.46 meters squared.  No Pain (0) Comment: Data Unavailable   No LMP recorded. Patient has had a hysterectomy.  Allergies reviewed: Yes  Medications reviewed: Yes    Medications: Medication refills not needed today.  Pharmacy name entered into [employer]: [id number] - [place] [id number]    Clinical concerns: Return Malignant neoplasm of sigmoid colon, review PET & port flush      Tawnya Perez CMA            "

## 2023-02-24 NOTE — PROGRESS NOTES
[facility] Hematology and Oncology Progress Note    Patient: Katya Butler  MRN: [id number]  Date of Service: Feb 24, 2023        Assessment and Plan:    Cancer Staging  Malignant neoplasm of sigmoid colon (H)  Staging form: Colon And Rectum, AJCC 8th Edition  - Clinical: No stage assigned - Unsigned  - Pathologic stage from 11/24/2020: Stage IIIC (pT4b, pN2, cM0) - Signed by Walt Merrill MD on 11/24/2020     1.  Colon cancer: She recently had a PET scan.  I personally reviewed the images and shared them with Kirill.  There is hypermetabolic lymphadenopathy in the abdomen and also in the left supraclavicular area.  I reviewed with them that this is concerning for recurrent colon cancer.  We will proceed with a biopsy of the supraclavicular node.  We will send this for next generation sequencing.  We will see her back in clinic a few days after the biopsy to review the results.    I reviewed her CBC from February 14, 2023.  This showed a normal hemoglobin of 14.4 with a mean cell volume 97.    2.  Large cecal polyp found on colonoscopy: There is some FDG activity on her PET scan in this area which likely represents this larger polyp.  This polyp was biopsied on February 4, 2021 at [facility] and showed high-grade dysplasia, negative for invasive malignancy.    3.  Parotid lesion: SUV max of 4.7 on PET scan.  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  Given the likely metastatic lung cancer I do not feel that this needs to be biopsied at this time as I do not think the results will change her treatment plan her overall prognosis.    4.  Constipation: This is probably what is causing her abdominal pain she will continue MiraLAX as needed.  Senokot S1 tablet daily.  Titrate up as needed.    ECOG Performance  0    Diagnosis:    1.  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  At initial surgery there was evidence of a small abscess and perforation of the colon.   Pathology showed a invasive moderately differentiated adenocarcinoma. Constricting ulcerated measuring 35 x 25 x 13 mm.  4 of 18 lymph nodes were positive. Mismatch repair intact. There was some proximal colonic dilatation, consistent with obstruction.  Left ovary showed moderately differentiated adenocarcinoma, direct extension.  Focal positive margin at the paraovarian soft tissue margin.      Treatment:    Surgical resection on October 31, 2020.  Adjuvant chemotherapy with FOLFOX was initiated on December 8, 2020.  25% initial dose reduction of all medications. Cycle 10 completed on April 20, 2021.    Interim History:    Kirill returns today for follow-up visit.  She is here to review the results of her PET scan.  She notes intermittent abdominal discomfort and constipation.  No nausea or vomiting.  Drinking a lot of fluids daily.  No other acute complaints today.    Review of Systems:    As above in the history.     Review of Systems otherwise Negative for:  General: chills, fever or night sweats  Psychological: anxiety or depression  Ophthalmic: blurry vision, double vision or loss of vision, vision change  ENT: epistaxis, oral lesions, hearing changes  Hematological and Lymphatic: bleeding, bruising, jaundice, swollen lymph nodes  Endocrine: hot flashes, unexpected weight changes  Respiratory: cough, hemoptysis, orthopnea or shortness of breath/KLEIN  Cardiovascular: chest pain, edema, palpitations or PND  Gastrointestinal: blood in stools, change in bowel habits, constipation, diarrhea or nausea/vomiting  Genito-Urinary: change in urinary stream, incontinence, frequency/urgency  Musculoskeletal: joint pain, stiffness, swelling, muscle pain  Neurological: dizziness, headaches, numbness/tingling  Dermatological: lumps and rash    Past History:    Past Medical History:   Diagnosis Date     HTN (hypertension)      Inguinal hernia without mention of obstruction or gangrene, unilateral or unspecified, (not specified as  "recurrent) 08/20/2004    RIGHT     Malignant neoplasm of sigmoid colon (H) 11/24/2020     Primary osteoarthritis involving multiple joints 8/2/2019     Ventral hernia, unspecified, without mention of obstruction or gangrene 08/15/2003    easily reduced     Physical Exam:    BP (!) 175/79 (BP Location: Left arm, Patient Position: Sitting, Cuff Size: Adult Regular)   Pulse 95   Temp 98.5  F (36.9  C) (Oral)   Resp 20   Ht 1.562 m (5' 1.5\")   Wt 49 kg (108 lb)   SpO2 97%   BMI 20.08 kg/m      General: patient appears stated age of 81 year old. Nontoxic and in no distress.   HEENT: Head: atraumatic, normocephalic. Sclerae anicteric.  Chest:  Normal respiratory effort  Cardiac:  No edema.   Abdomen: abdomen is non-distended  Extremities: normal tone and muscle bulk.  Skin: no lesions or rash on visible skin. Warm and dry.   CNS: alert and oriented. Grossly non-focal.   Psychiatric: normal mood and affect.     Lab Results:    No results found for this or any previous visit (from the past 168 hour(s)).  Imaging:    PET Oncology (Eyes to Thighs)    Result Date: 2/17/2023  EXAM: PET ONCOLOGY (EYES TO THIGHS) LOCATION: [facility] DATE/TIME: 2/17/2023 1:01 PM INDICATION: Subsequent treatment planning and restaging for malignant neoplasm sigmoid colon. Status post resection and adjuvant chemotherapy completed in 2021. Monitor treatment response. COMPARISON: CT the abdomen pelvis dated 02/14/2023 and CT the chest abdomen pelvis dated 01/26/2023 CONTRAST: None TECHNIQUE: Serum glucose level 97 mg/dL. One hour post intravenous administration of 11.5 mCi F-18 FDG, PET imaging was performed from the skull vertex to mid thigh, utilizing attenuation correction with concurrent axial CT and PET/CT image fusion. Dose reduction techniques were used. PET/CT FINDINGS: FDG avid left supraclavicular (max SUV 7.4) and left greater than right retroperitoneal (max SUV 10.5 in a distal lower left periaortic lymph " node) lymph nodes suspicious for metastases. Focal FDG uptake in the cecum (max SUV 8.6), of which 20% represent benign/malignant polyps. FDG avid soft tissue thickening in the inguinal regions likely representing sequelae of prior inguinal hernia repair care (max SUV 4.2). 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 Mild diffuse esophagitis. Bilateral shoulder synovitis. CT FINDINGS: Moderate senescent intracranial changes. Postoperative change of bilateral lenses. Moderate to severe carotid artery bifurcation calcification. Left chest port with tip terminating in the superior cavoatrial junction. Moderate coronary artery calcium. Moderate severe emphysema. Right lower lobe scarring/atelectatic change. Renal cysts. Infrarenal abdominal aortic aneurysm measuring up to 33 mm. Partial sigmoidectomy. Sigmoid diverticulosis. Pelvic phleboliths. Multilevel degenerative changes of the spine.     IMPRESSION: 1. Findings suspicious for left supraclavicular and retroperitoneal lymph node metastases. The left supraclavicular lymph node is amenable to ultrasound-guided histologic sampling if desired. 2. Focal FDG uptake in the cecum, of which 20% represent benign/malignant polyps. Colonoscopy could be helpful in further evaluation. 3. 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    CT Abdomen Pelvis w/o Contrast    Result Date: 2/14/2023  EXAM: CT ABDOMEN PELVIS W/O CONTRAST LOCATION: [facility] DATE/TIME: 2/14/2023 6:24 PM INDICATION: abd pain with radiation into back. History of sigmoid colon cancer. COMPARISON: 1/26/2023 and 7/20/2022 TECHNIQUE: CT scan of the abdomen and pelvis was performed without IV contrast. Multiplanar reformats were obtained. Dose reduction techniques were used. CONTRAST: None. FINDINGS:  LOWER CHEST: Slight fibrosis. HEPATOBILIARY: Normal. PANCREAS: Normal. SPLEEN: Normal. ADRENAL GLANDS: Normal. KIDNEYS/BLADDER: Normal. BOWEL: Postop change sigmoid colon with resection and anastomosis. No concerning findings. LYMPH NODES: Some mildly enlarged left periaortic and left common iliac lymph nodes similar to that seen on 01/26/2023 and mildly larger since 07/20/2022. VASCULATURE: Stable 3.3 cm aortic aneurysm. PELVIC ORGANS: Postop change. MUSCULOSKELETAL: No concerning bone lesion.     IMPRESSION: 1.  No acute new findings to explain the patient's pain. 2.  Again noted is some mildly enlarged left periaortic and common iliac nodes concerning for possible tumor recurrence. 3.  Postop change sigmoid colon.     CT Chest Abdomen Pelvis w/o Contrast    Result Date: 1/26/2023  EXAM: CT CHEST ABDOMEN PELVIS W/O CONTRAST LOCATION: [facility] DATE/TIME: 1/26/2023 5:38 PM INDICATION: Sigmoid colon cancer. COMPARISON: 07/20/2022. TECHNIQUE: CT scan of the chest, abdomen, and pelvis was performed without IV contrast. Multiplanar reformats were obtained. Dose reduction techniques were used. CONTRAST: None. FINDINGS: Absence of intravenous contrast limits the sensitivity of this examination for detection of infectious/inflammatory change, post traumatic abnormalities, vascular abnormalities, and visceral lesions. Patient was imaged with arm(s) at side, limiting study quality. LUNGS AND PLEURA: Indeterminate nodule along the right vocal cord, axial image 22. Laryngoscopy follow-up is recommended. Trachea and large airways are patent. No focal airspace consolidation. Mild centrilobular emphysema. Scattered areas of linear atelectatic change and scarring. Few small calcified granulomas. No suspicious pulmonary nodule. No pneumothorax. No pleural effusion.  MEDIASTINUM/AXILLAE: No mediastinal/hilar adenopathy. Thoracic esophagus is unremarkable.   No axillary lymphadenopathy. Chest wall is  unremarkable. Left chest wall port catheter tip within the distal superior vena cava. No thoracic aortic aneurysm. Severe atherosclerotic calcification, with stent located in the proximal left subclavian artery. Heart is normal in size. No pericardial effusion. CORONARY ARTERY CALCIFICATION: Severe. HEPATOBILIARY: Liver is normal in attenuation and size. Gallbladder is normal. No intrahepatic or intrahepatic biliary ductal dilatation. PANCREAS: Normal attenuation. No peripancreatic inflammatory fat stranding. SPLEEN: Normal attenuation. Normal size. ADRENAL GLANDS: Normal. KIDNEYS: No hydronephrosis.. No nephroureterolithiasis. Low-attenuation subcentimeter renal lesion(s). These are compatible with small benign cysts and no specific imaging evaluation or follow-up is recommended. Urinary bladder is unremarkable. PELVIC ORGANS: Hysterectomy. BOWEL: No evidence of acute gastrointestinal inflammation or obstruction. Sigmoid colectomy. No intraperitoneal free fluid or free air. LYMPH NODES: Enlarging left para-aortic and left common iliac chain lymphadenopathy. For reference, a left para-aortic lymph node, axial image 206, measures 13 mm in short axis, previously 6 mm. VASCULATURE: Infrarenal abdominal aortic aneurysm, measuring 3.3 cm, unchanged. Severe atherosclerotic calcification of the abdominal aorta and branch vessels. MUSCULOSKELETAL: No suspicious abnormality. Old, healed sternal fracture. OTHER: No additionally significant abnormalities.     IMPRESSION: 1.  Enlarging left para-aortic and left common iliac chain lymphadenopathy; ai metastatic disease cannot be excluded. 2.  Indeterminate nodule along the right vocal cord. Laryngoscopy follow-up is recommended. 3.  Infrarenal abdominal aortic aneurysm, measuring 3.3 cm, unchanged. 4.  Sigmoid colectomy.       Signed by: Walt Merrill MD

## 2023-02-24 NOTE — LETTER
"    2/24/2023         RE: Katya Butler  [address] [facility] [address]        Dear Colleague,    Thank you for referring your patient, Katya Butler, to the [facility]. Please see a copy of my visit note below.    Oncology Rooming Note    February 24, 2023 3:57 PM   Katya Butler is a 82 year old female who presents for:    Chief Complaint   Patient presents with     Oncology Clinic Visit     Return Malignant neoplasm of sigmoid colon, review PET & port flush     Initial Vitals: BP (!) 175/79 (BP Location: Left arm, Patient Position: Sitting, Cuff Size: Adult Regular)   Pulse 95   Temp 98.5  F (36.9  C) (Oral)   Resp 20   Ht 1.562 m (5' 1.5\")   Wt 49 kg (108 lb)   SpO2 97%   BMI 20.08 kg/m   Estimated body mass index is 20.08 kg/m  as calculated from the following:    Height as of this encounter: 1.562 m (5' 1.5\").    Weight as of this encounter: 49 kg (108 lb). Body surface area is 1.46 meters squared.  No Pain (0) Comment: Data Unavailable   No LMP recorded. Patient has had a hysterectomy.  Allergies reviewed: Yes  Medications reviewed: Yes    Medications: Medication refills not needed today.  Pharmacy name entered into [employer]: [id number] - [place] [id number]    Clinical concerns: Return Malignant neoplasm of sigmoid colon, review PET & port flush      Tawnya Perez CMA              [facility] Hematology and Oncology Progress Note    Patient: Katya Butler  MRN: [id number]  Date of Service: Feb 24, 2023        Assessment and Plan:    Cancer Staging  Malignant neoplasm of sigmoid colon (H)  Staging form: Colon And Rectum, AJCC 8th Edition  - Clinical: No stage assigned - Unsigned  - Pathologic stage from 11/24/2020: Stage IIIC (pT4b, pN2, cM0) - Signed by Walt Merrill MD on 11/24/2020     1.  Colon cancer: She recently had a PET scan.  I personally reviewed the images and shared them with Kirill.  There is hypermetabolic " lymphadenopathy in the abdomen and also in the left supraclavicular area.  I reviewed with them that this is concerning for recurrent colon cancer.  We will proceed with a biopsy of the supraclavicular node.  We will send this for next generation sequencing.  We will see her back in clinic a few days after the biopsy to review the results.    I reviewed her CBC from February 14, 2023.  This showed a normal hemoglobin of 14.4 with a mean cell volume 97.    2.  Large cecal polyp found on colonoscopy: There is some FDG activity on her PET scan in this area which likely represents this larger polyp.  This polyp was biopsied on February 4, 2021 at [facility] and showed high-grade dysplasia, negative for invasive malignancy.    3.  Parotid lesion: SUV max of 4.7 on PET scan.  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  Given the likely metastatic lung cancer I do not feel that this needs to be biopsied at this time as I do not think the results will change her treatment plan her overall prognosis.    4.  Constipation: This is probably what is causing her abdominal pain she will continue MiraLAX as needed.  Senokot S1 tablet daily.  Titrate up as needed.    ECOG Performance  0    Diagnosis:    1.  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  At initial surgery there was evidence of a small abscess and perforation of the colon.  Pathology showed a invasive moderately differentiated adenocarcinoma. Constricting ulcerated measuring 35 x 25 x 13 mm.  4 of 18 lymph nodes were positive. Mismatch repair intact. There was some proximal colonic dilatation, consistent with obstruction.  Left ovary showed moderately differentiated adenocarcinoma, direct extension.  Focal positive margin at the paraovarian soft tissue margin.      Treatment:    Surgical resection on October 31, 2020.  Adjuvant chemotherapy with FOLFOX was initiated on December 8, 2020.  25% initial dose reduction of all  "medications. Cycle 10 completed on April 20, 2021.    Interim History:    Kirill returns today for follow-up visit.  She is here to review the results of her PET scan.  She notes intermittent abdominal discomfort and constipation.  No nausea or vomiting.  Drinking a lot of fluids daily.  No other acute complaints today.    Review of Systems:    As above in the history.     Review of Systems otherwise Negative for:  General: chills, fever or night sweats  Psychological: anxiety or depression  Ophthalmic: blurry vision, double vision or loss of vision, vision change  ENT: epistaxis, oral lesions, hearing changes  Hematological and Lymphatic: bleeding, bruising, jaundice, swollen lymph nodes  Endocrine: hot flashes, unexpected weight changes  Respiratory: cough, hemoptysis, orthopnea or shortness of breath/KLEIN  Cardiovascular: chest pain, edema, palpitations or PND  Gastrointestinal: blood in stools, change in bowel habits, constipation, diarrhea or nausea/vomiting  Genito-Urinary: change in urinary stream, incontinence, frequency/urgency  Musculoskeletal: joint pain, stiffness, swelling, muscle pain  Neurological: dizziness, headaches, numbness/tingling  Dermatological: lumps and rash    Past History:    Past Medical History:   Diagnosis Date     HTN (hypertension)      Inguinal hernia without mention of obstruction or gangrene, unilateral or unspecified, (not specified as recurrent) 08/20/2004    RIGHT     Malignant neoplasm of sigmoid colon (H) 11/24/2020     Primary osteoarthritis involving multiple joints 8/2/2019     Ventral hernia, unspecified, without mention of obstruction or gangrene 08/15/2003    easily reduced     Physical Exam:    BP (!) 175/79 (BP Location: Left arm, Patient Position: Sitting, Cuff Size: Adult Regular)   Pulse 95   Temp 98.5  F (36.9  C) (Oral)   Resp 20   Ht 1.562 m (5' 1.5\")   Wt 49 kg (108 lb)   SpO2 97%   BMI 20.08 kg/m      General: patient appears stated age of 81 year old. " Nontoxic and in no distress.   HEENT: Head: atraumatic, normocephalic. Sclerae anicteric.  Chest:  Normal respiratory effort  Cardiac:  No edema.   Abdomen: abdomen is non-distended  Extremities: normal tone and muscle bulk.  Skin: no lesions or rash on visible skin. Warm and dry.   CNS: alert and oriented. Grossly non-focal.   Psychiatric: normal mood and affect.     Lab Results:    No results found for this or any previous visit (from the past 168 hour(s)).  Imaging:    PET Oncology (Eyes to Thighs)    Result Date: 2/17/2023  EXAM: PET ONCOLOGY (EYES TO THIGHS) LOCATION: [facility] DATE/TIME: 2/17/2023 1:01 PM INDICATION: Subsequent treatment planning and restaging for malignant neoplasm sigmoid colon. Status post resection and adjuvant chemotherapy completed in 2021. Monitor treatment response. COMPARISON: CT the abdomen pelvis dated 02/14/2023 and CT the chest abdomen pelvis dated 01/26/2023 CONTRAST: None TECHNIQUE: Serum glucose level 97 mg/dL. One hour post intravenous administration of 11.5 mCi F-18 FDG, PET imaging was performed from the skull vertex to mid thigh, utilizing attenuation correction with concurrent axial CT and PET/CT image fusion. Dose reduction techniques were used. PET/CT FINDINGS: FDG avid left supraclavicular (max SUV 7.4) and left greater than right retroperitoneal (max SUV 10.5 in a distal lower left periaortic lymph node) lymph nodes suspicious for metastases. Focal FDG uptake in the cecum (max SUV 8.6), of which 20% represent benign/malignant polyps. FDG avid soft tissue thickening in the inguinal regions likely representing sequelae of prior inguinal hernia repair care (max SUV 4.2). 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 Mild diffuse esophagitis. Bilateral shoulder synovitis. CT FINDINGS: Moderate senescent intracranial changes. Postoperative change of  bilateral lenses. Moderate to severe carotid artery bifurcation calcification. Left chest port with tip terminating in the superior cavoatrial junction. Moderate coronary artery calcium. Moderate severe emphysema. Right lower lobe scarring/atelectatic change. Renal cysts. Infrarenal abdominal aortic aneurysm measuring up to 33 mm. Partial sigmoidectomy. Sigmoid diverticulosis. Pelvic phleboliths. Multilevel degenerative changes of the spine.     IMPRESSION: 1. Findings suspicious for left supraclavicular and retroperitoneal lymph node metastases. The left supraclavicular lymph node is amenable to ultrasound-guided histologic sampling if desired. 2. Focal FDG uptake in the cecum, of which 20% represent benign/malignant polyps. Colonoscopy could be helpful in further evaluation. 3. 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    CT Abdomen Pelvis w/o Contrast    Result Date: 2/14/2023  EXAM: CT ABDOMEN PELVIS W/O CONTRAST LOCATION: [facility] DATE/TIME: 2/14/2023 6:24 PM INDICATION: abd pain with radiation into back. History of sigmoid colon cancer. COMPARISON: 1/26/2023 and 7/20/2022 TECHNIQUE: CT scan of the abdomen and pelvis was performed without IV contrast. Multiplanar reformats were obtained. Dose reduction techniques were used. CONTRAST: None. FINDINGS: LOWER CHEST: Slight fibrosis. HEPATOBILIARY: Normal. PANCREAS: Normal. SPLEEN: Normal. ADRENAL GLANDS: Normal. KIDNEYS/BLADDER: Normal. BOWEL: Postop change sigmoid colon with resection and anastomosis. No concerning findings. LYMPH NODES: Some mildly enlarged left periaortic and left common iliac lymph nodes similar to that seen on 01/26/2023 and mildly larger since 07/20/2022. VASCULATURE: Stable 3.3 cm aortic aneurysm. PELVIC ORGANS: Postop change. MUSCULOSKELETAL: No concerning bone lesion.     IMPRESSION: 1.  No acute new findings to explain  the patient's pain. 2.  Again noted is some mildly enlarged left periaortic and common iliac nodes concerning for possible tumor recurrence. 3.  Postop change sigmoid colon.     CT Chest Abdomen Pelvis w/o Contrast    Result Date: 1/26/2023  EXAM: CT CHEST ABDOMEN PELVIS W/O CONTRAST LOCATION: [facility] DATE/TIME: 1/26/2023 5:38 PM INDICATION: Sigmoid colon cancer. COMPARISON: 07/20/2022. TECHNIQUE: CT scan of the chest, abdomen, and pelvis was performed without IV contrast. Multiplanar reformats were obtained. Dose reduction techniques were used. CONTRAST: None. FINDINGS: Absence of intravenous contrast limits the sensitivity of this examination for detection of infectious/inflammatory change, post traumatic abnormalities, vascular abnormalities, and visceral lesions. Patient was imaged with arm(s) at side, limiting study quality. LUNGS AND PLEURA: Indeterminate nodule along the right vocal cord, axial image 22. Laryngoscopy follow-up is recommended. Trachea and large airways are patent. No focal airspace consolidation. Mild centrilobular emphysema. Scattered areas of linear atelectatic change and scarring. Few small calcified granulomas. No suspicious pulmonary nodule. No pneumothorax. No pleural effusion.  MEDIASTINUM/AXILLAE: No mediastinal/hilar adenopathy. Thoracic esophagus is unremarkable.   No axillary lymphadenopathy. Chest wall is unremarkable. Left chest wall port catheter tip within the distal superior vena cava. No thoracic aortic aneurysm. Severe atherosclerotic calcification, with stent located in the proximal left subclavian artery. Heart is normal in size. No pericardial effusion. CORONARY ARTERY CALCIFICATION: Severe. HEPATOBILIARY: Liver is normal in attenuation and size. Gallbladder is normal. No intrahepatic or intrahepatic biliary ductal dilatation. PANCREAS: Normal attenuation. No peripancreatic inflammatory fat stranding. SPLEEN: Normal attenuation. Normal size.  ADRENAL GLANDS: Normal. KIDNEYS: No hydronephrosis.. No nephroureterolithiasis. Low-attenuation subcentimeter renal lesion(s). These are compatible with small benign cysts and no specific imaging evaluation or follow-up is recommended. Urinary bladder is unremarkable. PELVIC ORGANS: Hysterectomy. BOWEL: No evidence of acute gastrointestinal inflammation or obstruction. Sigmoid colectomy. No intraperitoneal free fluid or free air. LYMPH NODES: Enlarging left para-aortic and left common iliac chain lymphadenopathy. For reference, a left para-aortic lymph node, axial image 206, measures 13 mm in short axis, previously 6 mm. VASCULATURE: Infrarenal abdominal aortic aneurysm, measuring 3.3 cm, unchanged. Severe atherosclerotic calcification of the abdominal aorta and branch vessels. MUSCULOSKELETAL: No suspicious abnormality. Old, healed sternal fracture. OTHER: No additionally significant abnormalities.     IMPRESSION: 1.  Enlarging left para-aortic and left common iliac chain lymphadenopathy; ai metastatic disease cannot be excluded. 2.  Indeterminate nodule along the right vocal cord. Laryngoscopy follow-up is recommended. 3.  Infrarenal abdominal aortic aneurysm, measuring 3.3 cm, unchanged. 4.  Sigmoid colectomy.       Signed by: Walt Merrill MD        Again, thank you for allowing me to participate in the care of your patient.        Sincerely,        Walt Merrill MD

## 2023-03-13 ENCOUNTER — HOSPITAL ENCOUNTER (OUTPATIENT)
Dept: ULTRASOUND IMAGING | Facility: HOSPITAL | Age: 83
Discharge: HOME | End: 2023-03-13
Attending: INTERNAL MEDICINE | Admitting: INTERNAL MEDICINE
Payer: COMMERCIAL

## 2023-03-13 DIAGNOSIS — C18.9 COLON ADENOCARCINOMA (H): ICD-10-CM

## 2023-03-13 PROCEDURE — 272N000710 US BIOPSY CORE LYMPH NODE

## 2023-03-13 PROCEDURE — 88333 PATH CONSLTJ SURG CYTO XM 1: CPT | Mod: TC | Performed by: INTERNAL MEDICINE

## 2023-03-13 PROCEDURE — 38505 NEEDLE BIOPSY LYMPH NODES: CPT

## 2023-03-14 LAB
INTERPRETATION: NORMAL
LAB DIRECTOR COMMENTS: NORMAL
LAB DIRECTOR DISCLAIMER: NORMAL
LAB DIRECTOR INTERPRETATION: NORMAL
LAB DIRECTOR METHODOLOGY: NORMAL
LAB DIRECTOR RESULTS: NORMAL
SIGNIFICANT RESULTS: NORMAL
SPECIMEN DESCRIPTION: NORMAL
SPECIMEN DESCRIPTION: NORMAL
TEST DETAILS, MDL: NORMAL

## 2023-03-14 PROCEDURE — 88360 TUMOR IMMUNOHISTOCHEM/MANUAL: CPT | Mod: 26 | Performed by: PATHOLOGY

## 2023-03-14 PROCEDURE — 88305 TISSUE EXAM BY PATHOLOGIST: CPT | Mod: 26 | Performed by: PATHOLOGY

## 2023-03-14 PROCEDURE — 88342 IMHCHEM/IMCYTCHM 1ST ANTB: CPT | Mod: 26 | Performed by: PATHOLOGY

## 2023-03-14 PROCEDURE — G0452 MOLECULAR PATHOLOGY INTERPR: HCPCS | Mod: 26 | Performed by: PATHOLOGY

## 2023-03-14 PROCEDURE — 88341 IMHCHEM/IMCYTCHM EA ADD ANTB: CPT | Mod: 26 | Performed by: PATHOLOGY

## 2023-03-14 PROCEDURE — 88333 PATH CONSLTJ SURG CYTO XM 1: CPT | Mod: 26 | Performed by: PATHOLOGY

## 2023-03-15 LAB
PATH REPORT.ADDENDUM SPEC: ABNORMAL
PATH REPORT.COMMENTS IMP SPEC: ABNORMAL
PATH REPORT.COMMENTS IMP SPEC: YES
PATH REPORT.FINAL DX SPEC: ABNORMAL
PATH REPORT.GROSS SPEC: ABNORMAL
PATH REPORT.MICROSCOPIC SPEC OTHER STN: ABNORMAL
PATH REPORT.RELEVANT HX SPEC: ABNORMAL
PHOTO IMAGE: ABNORMAL

## 2023-03-15 PROCEDURE — 81456 SO/HL 51/>GSAP RNA ALYS: CPT | Performed by: INTERNAL MEDICINE

## 2023-03-15 PROCEDURE — G0452 MOLECULAR PATHOLOGY INTERPR: HCPCS | Mod: 26 | Performed by: PATHOLOGY

## 2023-03-20 ENCOUNTER — ONCOLOGY VISIT (OUTPATIENT)
Dept: ONCOLOGY | Facility: HOSPITAL | Age: 83
End: 2023-03-20
Attending: INTERNAL MEDICINE
Payer: COMMERCIAL

## 2023-03-20 VITALS
SYSTOLIC BLOOD PRESSURE: 181 MMHG | OXYGEN SATURATION: 97 % | TEMPERATURE: 98.1 F | HEART RATE: 86 BPM | HEIGHT: 62 IN | DIASTOLIC BLOOD PRESSURE: 81 MMHG | RESPIRATION RATE: 20 BRPM | WEIGHT: 108 LBS | BODY MASS INDEX: 19.88 KG/M2

## 2023-03-20 DIAGNOSIS — C18.9 COLON ADENOCARCINOMA (H): Primary | ICD-10-CM

## 2023-03-20 PROCEDURE — G0463 HOSPITAL OUTPT CLINIC VISIT: HCPCS | Performed by: NURSE PRACTITIONER

## 2023-03-20 PROCEDURE — 99215 OFFICE O/P EST HI 40 MIN: CPT | Performed by: NURSE PRACTITIONER

## 2023-03-20 ASSESSMENT — PAIN SCALES - GENERAL: PAINLEVEL: EXTREME PAIN (9)

## 2023-03-20 NOTE — PROGRESS NOTES
[facility] Hematology and Oncology Progress Note    Patient: Katya Butler  MRN: [id number]  Date of Service: Mar 20, 2023          Reason for Visit    Chief Complaint   Patient presents with     Oncology Clinic Visit     Return Colon adenocarcinoma, Biopsy       Assessment and Plan     Cancer Staging   No matching staging information was found for the patient.  NGS testing still pending    1.  Colon cancer, stage IIIc diagnosed in October 2020, recurrent disease with stage IV found in March 2023, retroperitoneal lymphadenopathy as well as supraclavicular lymphadenopathy: Patient is here today to review biopsy results which unfortunately does show she has a stage IV colon cancer, recurrent disease.  We talked at length today about what that means and the fact that she now has recurrent disease which is stage IV and technically incurable.  I did tell her she does not have a lot of disease right now and it is treatable but it will never completely go away.  Patient had questions about revival of this and how much the chemotherapy helps.  I told her that the median survival for metastatic colon cancer is roughly about 2 years.  I did tell her that the median survival without any treatment is probably around 6 months.  You are still waiting NGS testing on her cells but it is currently HER2 negative and MMR negative.  Her CEA is up a little at 8.2.  I talked to patient about 2 different options of treatment including FOLFOX and Avastin or FOLFIRI and Avastin.  She does have a lot of neuropathy left over from previous treatment so I thought FOLFIRI would be better but she is not sure that she would want to lose her hair.  Patient and son want to think about the options and what they want to do.  She is not sure that she actually wants to do treatment because of her age and side effects of the treatment.  We even briefly talked about hospice per her request.  At this time will give patient a couple of days to  talk to family I will have a nurse care coordinator call her on Thursday to see if she has decided what she would like to do.  Patient still has her port in from her previous chemo.    2.  Constipation: Encouraged her to continue to take MiraLAX and Senokot as needed.  Encouraged her to take more so she stays comfortable and is having regular bowel movements.    ECOG Performance    1 - Can't do physically strenuous work, but fully ambyulatory and can do light sedentary work    Distress Screening (within last 30 days)    No data recorded     Pain  Pain Score: Extreme Pain (9)  Pain Loc: Low Back (left)    Problem List    Patient Active Problem List   Diagnosis     Hyperlipidemia     TOBACCO USE DISORDER     Malignant neoplasm of sigmoid colon (H)     Accelerated hypertension     Acute left-sided low back pain with left-sided sciatica     Allergic rhinitis     Anxiety     Calcium pyrophosphate arthropathy of multiple sites     Chemotherapy-induced neuropathy (H)     Chronic anemia     Colon adenocarcinoma (H)     Colonic diverticular abscess     Coronary atherosclerosis     Disorder of artery (H)     Diverticulitis of colon     Essential hypertension     Idiopathic chronic gout with tophus, unspecified site     Intra-abdominal abscess (H)     Periarticular calcification     Postherpetic neuralgia     Postoperative ileus (H)     Primary osteoarthritis involving multiple joints     Unspecified visual loss     Raynaud phenomenon        ______________________________________________________________________________    History of Present Illness    Diagnosis:     1.  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  At initial surgery there was evidence of a small abscess and perforation of the colon.  Pathology showed a invasive moderately differentiated adenocarcinoma. Constricting ulcerated measuring 35 x 25 x 13 mm.  4 of 18 lymph nodes were positive. Mismatch repair intact. There was some proximal  "colonic dilatation, consistent with obstruction.  Left ovary showed moderately differentiated adenocarcinoma, direct extension.  Focal positive margin at the paraovarian soft tissue margin.    -PET scan on 2/17/2023 shows suspicious for left supraclavicular and retroperitoneal lymph node mets.  -Biopsy done of super clavicular lymph node on the left side that is positive for malignant cells, consistent with moderately differentiated adenocarcinoma of the colon.  HER2/adeel negative.  MMR intact.  CEA slightly elevated at 8.2.     Treatment:     Surgical resection on October 31, 2020.  Adjuvant chemotherapy with FOLFOX was initiated on December 8, 2020.  25% initial dose reduction of all medications. Cycle 10 completed on April 20, 2021.     Interim History:    Patient is here today to review biopsy that was done last week.  She states that overall she is feeling okay.  Her biggest issue she feels constipated and has some low back pain.  No blood in her stool.  She says her constipation has gotten a little bit better.  She denies any new bone or back pain.  She says the low back pain has been present for a couple of weeks and is overall stable.  She usually takes some Tylenol and it helps.  She denies any weight loss.    Review of Systems    Pertinent items are noted in HPI.    Past History    Past Medical History:   Diagnosis Date     HTN (hypertension)      Inguinal hernia without mention of obstruction or gangrene, unilateral or unspecified, (not specified as recurrent) 08/20/2004    RIGHT     Malignant neoplasm of sigmoid colon (H) 11/24/2020     Primary osteoarthritis involving multiple joints 8/2/2019     Ventral hernia, unspecified, without mention of obstruction or gangrene 08/15/2003    easily reduced       PHYSICAL EXAM  BP (!) 181/81 (BP Location: Left arm, Patient Position: Sitting, Cuff Size: Adult Regular)   Pulse 86   Temp 98.1  F (36.7  C) (Oral)   Resp 20   Ht 1.562 m (5' 1.5\")   Wt 49 kg (108 lb) "   SpO2 97%   BMI 20.08 kg/m      GENERAL: no acute distress. Cooperative in conversation. Here with son. Mask on  RESP: Regular respiratory rate. No expiratory wheezes   MUSCULOSKELETAL: no bilateral leg swelling  NEURO: non focal. Alert and oriented x3.   PSYCH: within normal limits. No depression or anxiety.  SKIN: exposed skin is dry intact.     Lab Results    No results found for this or any previous visit (from the past 168 hour(s)).    Imaging    US Biopsy Lymph Node Core    Result Date: 3/13/2023  EXAM: 1. PERCUTANEOUS BIOPSY LEFT SUPRACLAVICULAR ADENOPATHY 2. ULTRASOUND GUIDANCE LOCATION: [facility] DATE/TIME: 3/13/2023 2:23 PM INDICATION: Left supraclavicular adenopathy with history of colonic adenocarcinoma. PROCEDURE: Informed consent obtained. Site marked. Prior images reviewed. Required items made available. Patient identity was confirmed verbally and with arm band. Patient reevaluated immediately before beginning the procedure. Universal protocol was followed. Time out performed. The site was prepped and draped in sterile fashion. 5 mL of 1 percent lidocaine was infused into the local soft tissues. Using standard technique and under direct ultrasound guidance, a 20 gauge biopsy needle was used to make five core biopsies. Tissue was submitted to Pathology. The patient tolerated the procedure well. No complications.     IMPRESSION: Status post US-guided biopsy left supraclavicular adenopathy. Reference CPT Code: 61390, 89076    Total time spent with patient in face to face time, chart review and documentation was 40 minutes.        Signed by: NAVEEN Lanier CNP

## 2023-03-20 NOTE — LETTER
"    3/20/2023         RE: Katya Butler  [address] [facility] [address]        Dear Colleague,    Thank you for referring your patient, Katya Butler, to the [facility]. Please see a copy of my visit note below.    Oncology Rooming Note    March 20, 2023 12:47 PM   Katya Butler is a 82 year old female who presents for:    Chief Complaint   Patient presents with     Oncology Clinic Visit     Return Colon adenocarcinoma, Biopsy     Initial Vitals: BP (!) 181/81 (BP Location: Left arm, Patient Position: Sitting, Cuff Size: Adult Regular)   Pulse 86   Temp 98.1  F (36.7  C) (Oral)   Resp 20   Ht 1.562 m (5' 1.5\")   Wt 49 kg (108 lb)   SpO2 97%   BMI 20.08 kg/m   Estimated body mass index is 20.08 kg/m  as calculated from the following:    Height as of this encounter: 1.562 m (5' 1.5\").    Weight as of this encounter: 49 kg (108 lb). Body surface area is 1.46 meters squared.  Extreme Pain (9) Comment: Data Unavailable   No LMP recorded. Patient has had a hysterectomy.  Allergies reviewed: Yes  Medications reviewed: Yes    Medications: Medication refills not needed today.  Pharmacy name entered into [employer]: [id number] [place] [phone]    Clinical concerns: Return Colon adenocarcinoma, Biopsy      Tawnya Perez, [facility] Hematology and Oncology Progress Note    Patient: Katya Butler  MRN: [id number]  Date of Service: Mar 20, 2023          Reason for Visit    Chief Complaint   Patient presents with     Oncology Clinic Visit     Return Colon adenocarcinoma, Biopsy       Assessment and Plan     Cancer Staging   No matching staging information was found for the patient.  NGS testing still pending    1.  Colon cancer, stage IIIc diagnosed in October 2020, recurrent disease with stage IV found in March 2023, retroperitoneal lymphadenopathy as well as supraclavicular lymphadenopathy: Patient is here today to review " biopsy results which unfortunately does show she has a stage IV colon cancer, recurrent disease.  We talked at length today about what that means and the fact that she now has recurrent disease which is stage IV and technically incurable.  I did tell her she does not have a lot of disease right now and it is treatable but it will never completely go away.  Patient had questions about revival of this and how much the chemotherapy helps.  I told her that the median survival for metastatic colon cancer is roughly about 2 years.  I did tell her that the median survival without any treatment is probably around 6 months.  You are still waiting NGS testing on her cells but it is currently HER2 negative and MMR negative.  Her CEA is up a little at 8.2.  I talked to patient about 2 different options of treatment including FOLFOX and Avastin or FOLFIRI and Avastin.  She does have a lot of neuropathy left over from previous treatment so I thought FOLFIRI would be better but she is not sure that she would want to lose her hair.  Patient and son want to think about the options and what they want to do.  She is not sure that she actually wants to do treatment because of her age and side effects of the treatment.  We even briefly talked about hospice per her request.  At this time will give patient a couple of days to talk to family I will have a nurse care coordinator call her on Thursday to see if she has decided what she would like to do.  Patient still has her port in from her previous chemo.    2.  Constipation: Encouraged her to continue to take MiraLAX and Senokot as needed.  Encouraged her to take more so she stays comfortable and is having regular bowel movements.    ECOG Performance    1 - Can't do physically strenuous work, but fully ambyulatory and can do light sedentary work    Distress Screening (within last 30 days)    No data recorded     Pain  Pain Score: Extreme Pain (9)  Pain Loc: Low Back (left)    Problem  List    Patient Active Problem List   Diagnosis     Hyperlipidemia     TOBACCO USE DISORDER     Malignant neoplasm of sigmoid colon (H)     Accelerated hypertension     Acute left-sided low back pain with left-sided sciatica     Allergic rhinitis     Anxiety     Calcium pyrophosphate arthropathy of multiple sites     Chemotherapy-induced neuropathy (H)     Chronic anemia     Colon adenocarcinoma (H)     Colonic diverticular abscess     Coronary atherosclerosis     Disorder of artery (H)     Diverticulitis of colon     Essential hypertension     Idiopathic chronic gout with tophus, unspecified site     Intra-abdominal abscess (H)     Periarticular calcification     Postherpetic neuralgia     Postoperative ileus (H)     Primary osteoarthritis involving multiple joints     Unspecified visual loss     Raynaud phenomenon        ______________________________________________________________________________    History of Present Illness    Diagnosis:     1.  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  At initial surgery there was evidence of a small abscess and perforation of the colon.  Pathology showed a invasive moderately differentiated adenocarcinoma. Constricting ulcerated measuring 35 x 25 x 13 mm.  4 of 18 lymph nodes were positive. Mismatch repair intact. There was some proximal colonic dilatation, consistent with obstruction.  Left ovary showed moderately differentiated adenocarcinoma, direct extension.  Focal positive margin at the paraovarian soft tissue margin.    -PET scan on 2/17/2023 shows suspicious for left supraclavicular and retroperitoneal lymph node mets.  -Biopsy done of super clavicular lymph node on the left side that is positive for malignant cells, consistent with moderately differentiated adenocarcinoma of the colon.  HER2/adeel negative.  MMR intact.  CEA slightly elevated at 8.2.     Treatment:     Surgical resection on October 31, 2020.  Adjuvant chemotherapy with FOLFOX  "was initiated on December 8, 2020.  25% initial dose reduction of all medications. Cycle 10 completed on April 20, 2021.     Interim History:    Patient is here today to review biopsy that was done last week.  She states that overall she is feeling okay.  Her biggest issue she feels constipated and has some low back pain.  No blood in her stool.  She says her constipation has gotten a little bit better.  She denies any new bone or back pain.  She says the low back pain has been present for a couple of weeks and is overall stable.  She usually takes some Tylenol and it helps.  She denies any weight loss.    Review of Systems    Pertinent items are noted in HPI.    Past History    Past Medical History:   Diagnosis Date     HTN (hypertension)      Inguinal hernia without mention of obstruction or gangrene, unilateral or unspecified, (not specified as recurrent) 08/20/2004    RIGHT     Malignant neoplasm of sigmoid colon (H) 11/24/2020     Primary osteoarthritis involving multiple joints 8/2/2019     Ventral hernia, unspecified, without mention of obstruction or gangrene 08/15/2003    easily reduced       PHYSICAL EXAM  BP (!) 181/81 (BP Location: Left arm, Patient Position: Sitting, Cuff Size: Adult Regular)   Pulse 86   Temp 98.1  F (36.7  C) (Oral)   Resp 20   Ht 1.562 m (5' 1.5\")   Wt 49 kg (108 lb)   SpO2 97%   BMI 20.08 kg/m      GENERAL: no acute distress. Cooperative in conversation. Here with son. Mask on  RESP: Regular respiratory rate. No expiratory wheezes   MUSCULOSKELETAL: no bilateral leg swelling  NEURO: non focal. Alert and oriented x3.   PSYCH: within normal limits. No depression or anxiety.  SKIN: exposed skin is dry intact.     Lab Results    No results found for this or any previous visit (from the past 168 hour(s)).    Imaging    US Biopsy Lymph Node Core    Result Date: 3/13/2023  EXAM: 1. PERCUTANEOUS BIOPSY LEFT SUPRACLAVICULAR ADENOPATHY 2. ULTRASOUND GUIDANCE LOCATION: [facility]" [facility] DATE/TIME: 3/13/2023 2:23 PM INDICATION: Left supraclavicular adenopathy with history of colonic adenocarcinoma. PROCEDURE: Informed consent obtained. Site marked. Prior images reviewed. Required items made available. Patient identity was confirmed verbally and with arm band. Patient reevaluated immediately before beginning the procedure. Universal protocol was followed. Time out performed. The site was prepped and draped in sterile fashion. 5 mL of 1 percent lidocaine was infused into the local soft tissues. Using standard technique and under direct ultrasound guidance, a 20 gauge biopsy needle was used to make five core biopsies. Tissue was submitted to Pathology. The patient tolerated the procedure well. No complications.     IMPRESSION: Status post US-guided biopsy left supraclavicular adenopathy. Reference CPT Code: 29367, 71085    Total time spent with patient in face to face time, chart review and documentation was 40 minutes.        Signed by: NAVEEN Lanier CNP      Again, thank you for allowing me to participate in the care of your patient.        Sincerely,        NAVEEN Lanier CNP

## 2023-03-20 NOTE — PROGRESS NOTES
"Oncology Rooming Note    March 20, 2023 12:47 PM   Katya Butler is a 82 year old female who presents for:    Chief Complaint   Patient presents with     Oncology Clinic Visit     Return Colon adenocarcinoma, Biopsy     Initial Vitals: BP (!) 181/81 (BP Location: Left arm, Patient Position: Sitting, Cuff Size: Adult Regular)   Pulse 86   Temp 98.1  F (36.7  C) (Oral)   Resp 20   Ht 1.562 m (5' 1.5\")   Wt 49 kg (108 lb)   SpO2 97%   BMI 20.08 kg/m   Estimated body mass index is 20.08 kg/m  as calculated from the following:    Height as of this encounter: 1.562 m (5' 1.5\").    Weight as of this encounter: 49 kg (108 lb). Body surface area is 1.46 meters squared.  Extreme Pain (9) Comment: Data Unavailable   No LMP recorded. Patient has had a hysterectomy.  Allergies reviewed: Yes  Medications reviewed: Yes    Medications: Medication refills not needed today.  Pharmacy name entered into [employer]: [id number] - [place] [id number]    Clinical concerns: Return Colon adenocarcinoma, Biopsy      Tawnya Perez CMA            "

## 2023-03-24 ENCOUNTER — PATIENT OUTREACH (OUTPATIENT)
Dept: ONCOLOGY | Facility: HOSPITAL | Age: 83
End: 2023-03-24
Payer: COMMERCIAL

## 2023-03-24 NOTE — PROGRESS NOTES
[facility]: Cancer Care                                                                                          Called Katya to follow up on conversation she had with Jody Tolentino CNP.  Katya told me she does not want treatment since she would have to have it for the rest of her life and it wouldn't do any good anyway.  I let her know we are here to help and answer any questions.  Also, we would like to still see her in clinic to make sure she is getting what she needs and not suffering.  Patient expressed understanding and needed nothing else.    Signature:  Marcus Quintero RN

## 2023-04-28 ENCOUNTER — ONCOLOGY VISIT (OUTPATIENT)
Dept: ONCOLOGY | Facility: HOSPITAL | Age: 83
End: 2023-04-28
Attending: NURSE PRACTITIONER
Payer: COMMERCIAL

## 2023-04-28 VITALS
WEIGHT: 108.9 LBS | TEMPERATURE: 98.3 F | DIASTOLIC BLOOD PRESSURE: 85 MMHG | RESPIRATION RATE: 28 BRPM | BODY MASS INDEX: 20.24 KG/M2 | SYSTOLIC BLOOD PRESSURE: 188 MMHG | HEART RATE: 86 BPM | OXYGEN SATURATION: 96 %

## 2023-04-28 DIAGNOSIS — M51.9 INTERVERTEBRAL DISK DISEASE: ICD-10-CM

## 2023-04-28 DIAGNOSIS — C18.7 MALIGNANT NEOPLASM OF SIGMOID COLON (H): Primary | ICD-10-CM

## 2023-04-28 DIAGNOSIS — C77.2 MALIGNANT NEOPLASM METASTATIC TO INTRA-ABDOMINAL LYMPH NODE (H): ICD-10-CM

## 2023-04-28 PROCEDURE — G0463 HOSPITAL OUTPT CLINIC VISIT: HCPCS | Performed by: INTERNAL MEDICINE

## 2023-04-28 PROCEDURE — 99215 OFFICE O/P EST HI 40 MIN: CPT | Performed by: INTERNAL MEDICINE

## 2023-04-28 RX ORDER — ACETAMINOPHEN 500 MG
500-1000 TABLET ORAL EVERY 6 HOURS PRN
COMMUNITY

## 2023-04-28 ASSESSMENT — PAIN SCALES - GENERAL: PAINLEVEL: SEVERE PAIN (6)

## 2023-04-28 NOTE — LETTER
"    4/28/2023         RE: Katya Butler  [address] [facility] [address]        Dear Colleague,    Thank you for referring your patient, Katya Butler, to the [facility]. Please see a copy of my visit note below.    Oncology Rooming Note    April 28, 2023 11:01 AM   Katya Butler is a 82 year old female who presents for:    Chief Complaint   Patient presents with     Oncology Clinic Visit     Return Colon adenocarcinoma, discuss treatment / Care plan options     Initial Vitals: BP (!) 188/85 (BP Location: Left arm, Patient Position: Sitting, Cuff Size: Adult Small)   Pulse 86   Temp 98.3  F (36.8  C) (Oral)   Resp 28   Wt 49.4 kg (108 lb 14.4 oz)   SpO2 96%   BMI 20.24 kg/m   Estimated body mass index is 20.24 kg/m  as calculated from the following:    Height as of 3/20/23: 1.562 m (5' 1.5\").    Weight as of this encounter: 49.4 kg (108 lb 14.4 oz). Body surface area is 1.46 meters squared.  Severe Pain (6) Comment: 10 at night   No LMP recorded. Patient has had a hysterectomy.  Allergies reviewed: Yes  Medications reviewed: Yes    Medications: Medication refills not needed today.  Pharmacy name entered into [employer]: [id number] - [place] [phone]    Clinical concerns: Return Colon adenocarcinoma, discuss treatment / Care plan options      Tawnya Perez CMA              [facility] Hematology and Oncology Progress Note    Patient: Katya Butler  MRN: [id number]  Date of Service: Apr 28, 2023        Assessment and Plan:    Cancer Staging  Malignant neoplasm of sigmoid colon (H)  Staging form: Colon And Rectum, AJCC 8th Edition  - Clinical: No stage assigned - Unsigned  - Pathologic stage from 11/24/2020: Stage IIIC (pT4b, pN2, cM0) - Signed by Walt Merrill MD on 11/24/2020     1.  Colon cancer: She has recently been found to have metastatic disease.  At her visit on March 20 2 treatment options involving chemotherapy were " outlined.  She wanted to take some time to think about it.  We spent some time again today reviewing her diagnosis and various treatment options.  At this point she would like to continue to avoid chemotherapy.  She is most concerned about quality of life.  I like to see her back in clinic in 6 to 8 weeks to see how she is doing clinically.    2.  Large cecal polyp found on colonoscopy: Remains some FDG activity in this area on her recent PET scan from February of this year. This polyp was biopsied on February 4, 2021 at [facility] and showed high-grade dysplasia, negative for invasive malignancy.    3.  Parotid lesion: SUV max of 4.7 on PET scan.  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  Stable.  No plan to biopsy given metastatic cancer.    4.  Low back pain: She has chronic low back pain which is significant for her.  This is currently affecting her quality of life.  We talked about options for this.  Going to give her a referral to a spine clinic to discuss options.    Today's visit was centered around a metastatic cancer diagnosis in the setting of additional medical comorbidities. The risk of additional morbidity or mortality with or without further treatment is high.     ECOG Performance  0    Diagnosis:    1.  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  At initial surgery there was evidence of a small abscess and perforation of the colon.  Pathology showed a invasive moderately differentiated adenocarcinoma. Constricting ulcerated measuring 35 x 25 x 13 mm.  4 of 18 lymph nodes were positive. Mismatch repair intact. There was some proximal colonic dilatation, consistent with obstruction.  Left ovary showed moderately differentiated adenocarcinoma, direct extension.  Focal positive margin at the paraovarian soft tissue margin.      Recurrence diagnosed in March 2023 from a left supraclavicular lymph node.  MSI low.  HER2 negative.  PET scan imaging shows uptake  in left supraclavicular nodes and retroperitoneal lymph nodes.    Treatment:    Surgical resection on October 31, 2020.  Adjuvant chemotherapy with FOLFOX was initiated on December 8, 2020.  25% initial dose reduction of all medications. Cycle 10 completed on April 20, 2021.    Interim History:    Kirill returns today for follow-up visit.  She has recently been found to have recurrent disease.  She is having a fair amount of low back pain which is chronic.  This is affecting her quality of life.  She seems to be asymptomatic from her cancer at this point.  No acute complaints today.    Review of Systems:    As above in the history.     Review of Systems otherwise Negative for:  General: chills, fever or night sweats  Psychological: anxiety or depression  Ophthalmic: blurry vision, double vision or loss of vision, vision change  ENT: epistaxis, oral lesions, hearing changes  Hematological and Lymphatic: bleeding, bruising, jaundice, swollen lymph nodes  Endocrine: hot flashes, unexpected weight changes  Respiratory: cough, hemoptysis, orthopnea or shortness of breath/KLEIN  Cardiovascular: chest pain, edema, palpitations or PND  Gastrointestinal: blood in stools, change in bowel habits, constipation, diarrhea or nausea/vomiting  Genito-Urinary: change in urinary stream, incontinence, frequency/urgency  Musculoskeletal: joint pain, stiffness, swelling, muscle pain  Neurological: dizziness, headaches, numbness/tingling  Dermatological: lumps and rash    Past History:    Past Medical History:   Diagnosis Date     HTN (hypertension)      Inguinal hernia without mention of obstruction or gangrene, unilateral or unspecified, (not specified as recurrent) 08/20/2004    RIGHT     Malignant neoplasm of sigmoid colon (H) 11/24/2020     Primary osteoarthritis involving multiple joints 8/2/2019     Ventral hernia, unspecified, without mention of obstruction or gangrene 08/15/2003    easily reduced     Physical Exam:    BP (!) 188/85  (BP Location: Left arm, Patient Position: Sitting, Cuff Size: Adult Small)   Pulse 86   Temp 98.3  F (36.8  C) (Oral)   Resp 28   Wt 49.4 kg (108 lb 14.4 oz)   SpO2 96%   BMI 20.24 kg/m      General: patient appears stated age of 81 year old. Nontoxic and in no distress.   HEENT: Head: atraumatic, normocephalic. Sclerae anicteric.  Chest:  Normal respiratory effort  Cardiac:  No edema.   Abdomen: abdomen is non-distended  Extremities: normal tone and muscle bulk.  Skin: no lesions or rash on visible skin. Warm and dry.   CNS: alert and oriented. Grossly non-focal.   Psychiatric: normal mood and affect.     Lab Results:    No results found for this or any previous visit (from the past 168 hour(s)).     Imaging:      Signed by: Walt Merrill MD        Again, thank you for allowing me to participate in the care of your patient.        Sincerely,        Walt Merrill MD

## 2023-04-28 NOTE — PROGRESS NOTES
"Oncology Rooming Note    April 28, 2023 11:01 AM   Katya Butler is a 82 year old female who presents for:    Chief Complaint   Patient presents with     Oncology Clinic Visit     Return Colon adenocarcinoma, discuss treatment / Care plan options     Initial Vitals: BP (!) 188/85 (BP Location: Left arm, Patient Position: Sitting, Cuff Size: Adult Small)   Pulse 86   Temp 98.3  F (36.8  C) (Oral)   Resp 28   Wt 49.4 kg (108 lb 14.4 oz)   SpO2 96%   BMI 20.24 kg/m   Estimated body mass index is 20.24 kg/m  as calculated from the following:    Height as of 3/20/23: 1.562 m (5' 1.5\").    Weight as of this encounter: 49.4 kg (108 lb 14.4 oz). Body surface area is 1.46 meters squared.  Severe Pain (6) Comment: 10 at night   No LMP recorded. Patient has had a hysterectomy.  Allergies reviewed: Yes  Medications reviewed: Yes    Medications: Medication refills not needed today.  Pharmacy name entered into [employer]: [id number] [place] [id number]    Clinical concerns: Return Colon adenocarcinoma, discuss treatment / Care plan options      Tawnya Perez CMA            "

## 2023-05-01 ENCOUNTER — PATIENT OUTREACH (OUTPATIENT)
Dept: ONCOLOGY | Facility: HOSPITAL | Age: 83
End: 2023-05-01
Payer: COMMERCIAL

## 2023-05-01 NOTE — PROGRESS NOTES
Phoned pt per distress screening form request.     Spoke with pt and pt declines mental health help at this time. Pt reports she is wanting help with her back pain.       Oncology Distress Screening    Row Name 04/28/23 8423       How concerned do you feel regarding the following common issues people with cancer face. Please answer with a number from 0-10 where 0=not concerned and 10=very concerned. PT response of >=8  will result in outreach from Support Team.   1. How concerned are you about your ability to eat?  0       2. How concerned are you about unintended weight loss or your current weight?  10 Abnormal        3. How concerned are you about feeling depressed or very sad?  8 Abnormal   Crying and says she is depressed       4. How concerned are you about feeling anxious or very scared?  5       5. Do you struggle with the loss of meaning and katelin in your life?  Not at all       6. How concerned are you about work and home life issues that may be affected by your cancer?  0       7. How concerned are you about knowing what resources are available to help you?  10 Abnormal        8. Do you currently have what you would describe as Yarsani or spiritual struggles?             Not at all       You can also ask to be contacted by one of our Oncology Supportive Care professionals.    9. If you want to be contacted by one of our professionals, I can send a message to them right now.  Oncology Dietitian;Oncology Psychologist

## 2023-05-11 ENCOUNTER — TELEPHONE (OUTPATIENT)
Dept: ONCOLOGY | Facility: HOSPITAL | Age: 83
End: 2023-05-11
Payer: COMMERCIAL

## 2023-05-11 NOTE — TELEPHONE ENCOUNTER
Oncology Distress Screen    Called Katya in regards to her positive oncology nutrition distress screen:    2. How concerned are you about unintended weight loss or your current weight? : 10  And  9. If you want to be contacted by one of our professionals, I can send a message to them right now. : ONCOLOGY DIETITIAN    Katya was not able to talk when I called her. She said she would call back.      Lakesha Martinez, MS, RD, LD

## 2023-05-12 ENCOUNTER — TELEPHONE (OUTPATIENT)
Dept: PHYSICAL MEDICINE AND REHAB | Facility: CLINIC | Age: 83
End: 2023-05-12
Payer: COMMERCIAL

## 2023-05-12 NOTE — TELEPHONE ENCOUNTER
"PSP:  Heaven Hamaltagracia CNP  Last clinic visit:  6/14/22 OV  Reason for call: Return of pain   Clinical information:  Pt transferred from central scheduling. New referral was placed for pt to be seen and so patient got scheduled with Carrie on 5/23 for new patient visit. It was noted that pt actually [facility] (6/14/2022 with Heaven) and so she is an established patient already.   Pt reports she is experiencing the same symptoms as when she was last evaluated. She endorses left greater than right low back pain. Denies pain in legs. Denies numbness/tingling. Denies weakness. \"It's so much pain I am miserable\". Pt takes 1000 mg Tylenol at night and utilizes ice packs. Pt states she had L2-3 REGINA at [facility] that was successful and inquires about repeating it.   Per last OV note from Heaven, \"Discussed that we could trial a left L2-3 transforaminal epidural steroid injection if symptoms or not improving after completion of Medrol Dosepak or physical therapy or worsen at any time.\"  Pt did not do the injection last year as she was doing better. Pain has worsened in the last 6 weeks.   Advice given to patient: Informed pt that provider would be updated with her status. She will be called with a response.   Provider to address: Will get new pt appt cancelled as pt is already established. Should pt be scheduled for f/u with Heaven vs. Have injection?             "

## 2023-05-12 NOTE — TELEPHONE ENCOUNTER
Please put her on my schedule for Monday at 320 and a 40-minute time slot to evaluate her pain and determine if an injection is appropriate in which injection would be best for her.

## 2023-05-15 ENCOUNTER — OFFICE VISIT (OUTPATIENT)
Dept: PHYSICAL MEDICINE AND REHAB | Facility: CLINIC | Age: 83
End: 2023-05-15
Attending: INTERNAL MEDICINE
Payer: COMMERCIAL

## 2023-05-15 VITALS — SYSTOLIC BLOOD PRESSURE: 142 MMHG | HEART RATE: 80 BPM | DIASTOLIC BLOOD PRESSURE: 65 MMHG

## 2023-05-15 DIAGNOSIS — M53.3 SACRAL PAIN: Primary | ICD-10-CM

## 2023-05-15 DIAGNOSIS — M51.369 DDD (DEGENERATIVE DISC DISEASE), LUMBAR: ICD-10-CM

## 2023-05-15 DIAGNOSIS — M79.18 GLUTEAL PAIN: ICD-10-CM

## 2023-05-15 PROCEDURE — 99214 OFFICE O/P EST MOD 30 MIN: CPT | Performed by: PHYSICAL MEDICINE & REHABILITATION

## 2023-05-15 RX ORDER — HYDROCODONE BITARTRATE AND ACETAMINOPHEN 5; 325 MG/1; MG/1
.5-1 TABLET ORAL 2 TIMES DAILY PRN
Qty: 10 TABLET | Refills: 0 | Status: SHIPPED | OUTPATIENT
Start: 2023-05-15 | End: 2023-05-20

## 2023-05-15 RX ORDER — METHYLPREDNISOLONE 4 MG
TABLET, DOSE PACK ORAL
Qty: 21 TABLET | Refills: 0 | Status: SHIPPED | OUTPATIENT
Start: 2023-05-15 | End: 2023-11-01

## 2023-05-15 RX ORDER — GABAPENTIN 100 MG/1
CAPSULE ORAL
Qty: 90 CAPSULE | Refills: 1 | Status: SHIPPED | OUTPATIENT
Start: 2023-05-15 | End: 2023-11-01

## 2023-05-15 ASSESSMENT — PAIN SCALES - GENERAL: PAINLEVEL: WORST PAIN (10)

## 2023-05-15 NOTE — PROGRESS NOTES
Assessment/Plan:      Katya was seen today for back pain.    Diagnoses and all orders for this visit:    Sacral pain  -     gabapentin (NEURONTIN) 100 MG capsule; 100mg at bedtime x 3 days, then may increase to 200mg x 3 days, then may increase to 300mg at bedtime  -     methylPREDNISolone (MEDROL DOSEPAK) 4 MG tablet therapy pack; Follow Package Directions  -     HYDROcodone-acetaminophen (NORCO) 5-325 MG tablet; Take 0.5-1 tablets by mouth 2 times daily as needed for severe pain    Gluteal pain  -     gabapentin (NEURONTIN) 100 MG capsule; 100mg at bedtime x 3 days, then may increase to 200mg x 3 days, then may increase to 300mg at bedtime  -     methylPREDNISolone (MEDROL DOSEPAK) 4 MG tablet therapy pack; Follow Package Directions  -     HYDROcodone-acetaminophen (NORCO) 5-325 MG tablet; Take 0.5-1 tablets by mouth 2 times daily as needed for severe pain    DDD (degenerative disc disease), lumbar  -     gabapentin (NEURONTIN) 100 MG capsule; 100mg at bedtime x 3 days, then may increase to 200mg x 3 days, then may increase to 300mg at bedtime  -     methylPREDNISolone (MEDROL DOSEPAK) 4 MG tablet therapy pack; Follow Package Directions    Other orders  -     Spine  Referral         Assessment: Pleasant 82 year old female with a past medical history significant for hyperlipidemia, hypertension, coronary atherosclerosis, colon cancer, chemotherapy induced neuropathy, postherpetic neuralgia right upper extremity, Raynaud's phenomenon, tobacco use disorder, anxiety with:     1.  6 to 8 weeks of severe left sacral pain over the lateral border of the sacrum left gluteal region.  This may represent sacroiliac joint pain versus sacral pain itself such as insufficiency fracture or proximal radicular pain as she does have a history of disc herniation with left lower extremity radicular pain treated with a lumbar epidural.  She also has sacroiliac joint degenerative changes on CT abdomen and pelvis and pain with  palpation over the SI joint and RADHA test were negative Gaenslen's and thigh thrust.    2.  Myofascial pain left gluteal region.    3.  Lumbar degenerative disc disease with a left paracentral L2-3 disc herniation on MRI from 1 year ago.    Discussion:    1.  I discussed the diagnosis and treatment options.  I am not sure if her symptoms are related to a new issue or related to some chronic degenerative changes in the lumbar spine.  Her pain now is over the sacrum and SI joint and likely represent sacroiliac joint pain or new disc herniation.  Cannot exclude insufficiency fracture of the sacrum itself.    2.  Given that this is likely a flare of SI pain and gluteal myofascial pain, recommend trial of Medrol Dosepak.  Order provided.    3.  Trial gabapentin for poor sleep and neuropathic pain.  This will be 100 mg at bedtime increasing to 300 mg at bedtime.    4.  I will provide a short course of hydrocodone for severe pain over the next few days.    5.  If she does not have any improvement with Medrol Dosepak over the next few days she will contact  nurse navigation and at that point I will order an MRI of the lumbar spine and sacrum given the severity of her pain.    6.  Follow-up with Heaven Kuhn or Carrie Souza in the next 2 to 4 weeks.      It was our pleasure caring for your patient today, if there any questions or concerns please do not hesitate to contact us.    35 minutes were spent on the date of the encounter performing chart review, patient visit and documentation in addition to any procedure.    Subjective:   Patient ID: Katya Butler is a 82 year old female.    History of Present Illness: Patient presents for evaluation of lumbar spine pain/left SI/gluteal pain.  She states that 2 years ago she had severe pain in the low back down the left lower extremity after bending forward while playing golf.  She was hospitalized at [facility] but I reviewed the records and she underwent a left L3-4  WING.  She reports after that point she did quite well.  She was seen 1 year ago by Heaven Kuhn in the spine center and I reviewed those notes she was referred to physical therapy.  She tells me she had a phone eval with physical therapy and as she was doing well she has not been involved with therapy since last year.  She was doing quite well until 6 to 8 weeks ago.    Without any new injury she began having severe pain left PSIS along the sacrum into the left.  She has no radiation down the leg paresthesias or weakness.  She has severe pain worse at night with lying down in bed and with sitting.  Medial gluteal region better with ice over the sacrum and Tylenol.  She takes Tylenol PM at night her pain is worsening over a 6 to 8 weeks becoming quite severe difficulty sleeping.  Pain is a 10/10 today and at worst.  Has had a CT abdomen and pelvis.  Has a history of colon cancer.      Imaging: MRI report and images were personally reviewed and discussed with the patient.  A plastic model was utilized during the discussion.  MRI of the lumbar spine from June 2022 personally reviewed for medical decision-making purposes.  Mild disc at loss L2-3 L3-4.  Mild to moderate facet arthropathy L5-S1 with no foraminal stenosis.  L4-5 mild right foraminal stenosis moderate severe facet arthropathy.  Mild to moderate facet arthropathy L3-4.  Left-sided paracentral disc protrusion at L2-3 with moderate left lateral recess stenosis no significant foraminal stenosis.  Grade 1 spondylolisthesis L3 on L4    CT scan abdomen and pelvis was also personally reviewed from February 2023 to evaluate the lumbar spine.  This shows mild to moderate right greater than left hip osteoarthritis.  Significant vascular calcifications with aortic aneurysm.  Vascular calcifications through the iliac arteries bilaterally.  Degenerative changes of the SI joints with vacuum joint phenomenon bilaterally.  Left greater than right facet arthropathy  L5-S1 L4-5 bilaterally L3-4 with mild spondylolisthesis L3-4 significant foraminal stenosis bilaterally on the CT scan.    Review of Systems: Pertinent positives: Pain worse in the evening.  Pertinent negatives: No numbness, tingling or weakness.  No bowel or bladder incontinence.  No urinary retention.  No fevers, unintentional weight loss, balance changes, headaches, frequent falling, difficulty swallowing, or coordination difficulties.  All others reviewed are negative.    Past Medical History:   Diagnosis Date     HTN (hypertension)      Inguinal hernia without mention of obstruction or gangrene, unilateral or unspecified, (not specified as recurrent) 08/20/2004    RIGHT     Malignant neoplasm of sigmoid colon (H) 11/24/2020     Primary osteoarthritis involving multiple joints 8/2/2019     Ventral hernia, unspecified, without mention of obstruction or gangrene 08/15/2003    easily reduced       The following portions of the patient's history were reviewed and updated as appropriate: allergies, current medications, past family history, past medical history, past social history, past surgical history and problem list.           Objective:   Physical Exam:    BP (!) 142/65   Pulse 80   There is no height or weight on file to calculate BMI.      General: Alert and oriented with normal affect. Attention, knowledge, memory, and language are intact. No acute distress.   Eyes: Sclerae are clear.  Respirations: Unlabored. CV: No lower extremity edema.  Skin: No rashes seen.    Gait:  Nonantalgic  Tenderness over the sacrum and left PSIS SI joint.  No pain with thigh thrust or Gaenslen's.  She has reproduction of symptoms with Jonah's test.  Sensation is intact to light touch throughout the lower extremities.  Reflexes are 2+ patellar and 0 Achilles with downgoing toes.    Manual muscle testing reveals:  Right /Left out of 5     5/5 hip flexors  5/5 knee flexors  5/5 knee extensors  5/5 ankle plantar flexors  5/5 ankle  dorsiflexors  5/5  EHL

## 2023-05-15 NOTE — PATIENT INSTRUCTIONS
Medrol dose pack has been prescribed today.  Please follow directions on package.  Do not take with NSAIDs (ibupreofen or aleve).  I would recommend tylenol (extra strength or extended release/8hour) over the counter as needed.     2. Hydrocodone/acetaminophen   was prescribed for you today Please lock this medication up when you are not taking it. Do not share this medication with other people. Do not increase the dose without permission from your physician. Do not drink alcohol while you take this medication as this can lead to death. Do not take other pain medications without approval from your physician or this can also lead to death. If you need a refill of this medication, you must come in to clinic by appointment. Please call if you have any questions on how to take this medication.     3. Prescribed Gabapentin today, 100 mg tablets, to be titrated up to 3 tablets at bedtime as tolerated for your nerve pain. Please follow Gabapentin dosing chart below.     Gabapentin 100mg Dosing Chart    DATE  MORNING AFTERNOON BEDTIME    Day 1 0 0 1    Day 2 0 0 1    Day 3 0 0 1    Day 4 0 0 2    Day 5 0 0 2    Day 6 0 0 2    Day 7 0 0 3    Day 8 0 0 3    Day 9 0 0 3                                                                                                                                    Continue medication, taking 3 capsules at bedtime    Please call if you have any questions regarding how to take your medication  Clinic Phone # [phone]    4. If you are not improved by Friday, please call the number above and I will order a MRI of your sacrum and low back

## 2023-05-15 NOTE — LETTER
5/15/2023         RE: Katya Butler  [address] [facility] [place] [address]        Dear Colleague,    Thank you for referring your patient, Katya Butler, to the [facility]. Please see a copy of my visit note below.    Assessment/Plan:      Katya was seen today for back pain.    Diagnoses and all orders for this visit:    Sacral pain  -     gabapentin (NEURONTIN) 100 MG capsule; 100mg at bedtime x 3 days, then may increase to 200mg x 3 days, then may increase to 300mg at bedtime  -     methylPREDNISolone (MEDROL DOSEPAK) 4 MG tablet therapy pack; Follow Package Directions  -     HYDROcodone-acetaminophen (NORCO) 5-325 MG tablet; Take 0.5-1 tablets by mouth 2 times daily as needed for severe pain    Gluteal pain  -     gabapentin (NEURONTIN) 100 MG capsule; 100mg at bedtime x 3 days, then may increase to 200mg x 3 days, then may increase to 300mg at bedtime  -     methylPREDNISolone (MEDROL DOSEPAK) 4 MG tablet therapy pack; Follow Package Directions  -     HYDROcodone-acetaminophen (NORCO) 5-325 MG tablet; Take 0.5-1 tablets by mouth 2 times daily as needed for severe pain    DDD (degenerative disc disease), lumbar  -     gabapentin (NEURONTIN) 100 MG capsule; 100mg at bedtime x 3 days, then may increase to 200mg x 3 days, then may increase to 300mg at bedtime  -     methylPREDNISolone (MEDROL DOSEPAK) 4 MG tablet therapy pack; Follow Package Directions    Other orders  -     Spine  Referral         Assessment: Pleasant 82 year old female with a past medical history significant for hyperlipidemia, hypertension, coronary atherosclerosis, colon cancer, chemotherapy induced neuropathy, postherpetic neuralgia right upper extremity, Raynaud's phenomenon, tobacco use disorder, anxiety with:     1.  6 to 8 weeks of severe left sacral pain over the lateral border of the sacrum left gluteal region.  This may represent sacroiliac joint pain versus sacral pain itself such as  insufficiency fracture or proximal radicular pain as she does have a history of disc herniation with left lower extremity radicular pain treated with a lumbar epidural.  She also has sacroiliac joint degenerative changes on CT abdomen and pelvis and pain with palpation over the SI joint and RADHA test were negative Gaenslen's and thigh thrust.    2.  Myofascial pain left gluteal region.    3.  Lumbar degenerative disc disease with a left paracentral L2-3 disc herniation on MRI from 1 year ago.    Discussion:    1.  I discussed the diagnosis and treatment options.  I am not sure if her symptoms are related to a new issue or related to some chronic degenerative changes in the lumbar spine.  Her pain now is over the sacrum and SI joint and likely represent sacroiliac joint pain or new disc herniation.  Cannot exclude insufficiency fracture of the sacrum itself.    2.  Given that this is likely a flare of SI pain and gluteal myofascial pain, recommend trial of Medrol Dosepak.  Order provided.    3.  Trial gabapentin for poor sleep and neuropathic pain.  This will be 100 mg at bedtime increasing to 300 mg at bedtime.    4.  I will provide a short course of hydrocodone for severe pain over the next few days.    5.  If she does not have any improvement with Medrol Dosepak over the next few days she will contact  nurse navigation and at that point I will order an MRI of the lumbar spine and sacrum given the severity of her pain.    6.  Follow-up with Heaven Kuhn or Carrie Souza in the next 2 to 4 weeks.      It was our pleasure caring for your patient today, if there any questions or concerns please do not hesitate to contact us.    35 minutes were spent on the date of the encounter performing chart review, patient visit and documentation in addition to any procedure.    Subjective:   Patient ID: Katya Butler is a 82 year old female.    History of Present Illness: Patient presents for evaluation of lumbar spine  pain/left SI/gluteal pain.  She states that 2 years ago she had severe pain in the low back down the left lower extremity after bending forward while playing golf.  She was hospitalized at [facility] but I reviewed the records and she underwent a left L3-4 TFESI.  She reports after that point she did quite well.  She was seen 1 year ago by Heaven Kuhn in the spine center and I reviewed those notes she was referred to physical therapy.  She tells me she had a phone eval with physical therapy and as she was doing well she has not been involved with therapy since last year.  She was doing quite well until 6 to 8 weeks ago.    Without any new injury she began having severe pain left PSIS along the sacrum into the left.  She has no radiation down the leg paresthesias or weakness.  She has severe pain worse at night with lying down in bed and with sitting.  Medial gluteal region better with ice over the sacrum and Tylenol.  She takes Tylenol PM at night her pain is worsening over a 6 to 8 weeks becoming quite severe difficulty sleeping.  Pain is a 10/10 today and at worst.  Has had a CT abdomen and pelvis.  Has a history of colon cancer.      Imaging: MRI report and images were personally reviewed and discussed with the patient.  A plastic model was utilized during the discussion.  MRI of the lumbar spine from June 2022 personally reviewed for medical decision-making purposes.  Mild disc at loss L2-3 L3-4.  Mild to moderate facet arthropathy L5-S1 with no foraminal stenosis.  L4-5 mild right foraminal stenosis moderate severe facet arthropathy.  Mild to moderate facet arthropathy L3-4.  Left-sided paracentral disc protrusion at L2-3 with moderate left lateral recess stenosis no significant foraminal stenosis.  Grade 1 spondylolisthesis L3 on L4    CT scan abdomen and pelvis was also personally reviewed from February 2023 to evaluate the lumbar spine.  This shows mild to moderate right greater than left hip  osteoarthritis.  Significant vascular calcifications with aortic aneurysm.  Vascular calcifications through the iliac arteries bilaterally.  Degenerative changes of the SI joints with vacuum joint phenomenon bilaterally.  Left greater than right facet arthropathy L5-S1 L4-5 bilaterally L3-4 with mild spondylolisthesis L3-4 significant foraminal stenosis bilaterally on the CT scan.    Review of Systems: Pertinent positives: Pain worse in the evening.  Pertinent negatives: No numbness, tingling or weakness.  No bowel or bladder incontinence.  No urinary retention.  No fevers, unintentional weight loss, balance changes, headaches, frequent falling, difficulty swallowing, or coordination difficulties.  All others reviewed are negative.    Past Medical History:   Diagnosis Date     HTN (hypertension)      Inguinal hernia without mention of obstruction or gangrene, unilateral or unspecified, (not specified as recurrent) 08/20/2004    RIGHT     Malignant neoplasm of sigmoid colon (H) 11/24/2020     Primary osteoarthritis involving multiple joints 8/2/2019     Ventral hernia, unspecified, without mention of obstruction or gangrene 08/15/2003    easily reduced       The following portions of the patient's history were reviewed and updated as appropriate: allergies, current medications, past family history, past medical history, past social history, past surgical history and problem list.           Objective:   Physical Exam:    BP (!) 142/65   Pulse 80   There is no height or weight on file to calculate BMI.      General: Alert and oriented with normal affect. Attention, knowledge, memory, and language are intact. No acute distress.   Eyes: Sclerae are clear.  Respirations: Unlabored. CV: No lower extremity edema.  Skin: No rashes seen.    Gait:  Nonantalgic  Tenderness over the sacrum and left PSIS SI joint.  No pain with thigh thrust or Gaenslen's.  She has reproduction of symptoms with Jonah's test.  Sensation is  intact to light touch throughout the lower extremities.  Reflexes are 2+ patellar and 0 Achilles with downgoing toes.    Manual muscle testing reveals:  Right /Left out of 5     5/5 hip flexors  5/5 knee flexors  5/5 knee extensors  5/5 ankle plantar flexors  5/5 ankle dorsiflexors  5/5  EHL       Again, thank you for allowing me to participate in the care of your patient.        Sincerely,        Lucas Toussaint, DO

## 2023-05-19 ENCOUNTER — TELEPHONE (OUTPATIENT)
Dept: PHYSICAL MEDICINE AND REHAB | Facility: CLINIC | Age: 83
End: 2023-05-19
Payer: COMMERCIAL

## 2023-05-19 DIAGNOSIS — M79.18 GLUTEAL PAIN: ICD-10-CM

## 2023-05-19 DIAGNOSIS — M53.3 SACRAL PAIN: Primary | ICD-10-CM

## 2023-05-19 NOTE — TELEPHONE ENCOUNTER
"Pt referring to medrol dose azam. Chart reviewed. Per providers last OV note,   \"If she does not have any improvement with Medrol Dosepak over the next few days she will contact  nurse navigation and at that point I will order an MRI of the lumbar spine and sacrum given the severity of her pain.\"    Please place order for MRI and I will contact patient to help coordinate.   "

## 2023-05-19 NOTE — TELEPHONE ENCOUNTER
[facility] Call Center    Phone Message    May a detailed message be left on voicemail: yes     Reason for Call: Other: Patient called in to let you know that the antibiotics you prescribed are not working and she would like to be set up for the MRI you suggested.  Please call her back.  Thank you.     Action Taken: Other: [phone]    Travel Screening: Not Applicable

## 2023-05-19 NOTE — TELEPHONE ENCOUNTER
MRI of the lumbar spine and sacrum have been ordered.  Please confirm no claustrophobia and no pacemaker or cochlear implants.

## 2023-05-19 NOTE — TELEPHONE ENCOUNTER
Call placed to pt. Pt confirmed she is not claustrophobic and does not have a pacemaker or cochlear implant.   Warm transfer done to radiology dept so patient could get scheduled for MRIs.

## 2023-05-22 ENCOUNTER — APPOINTMENT (OUTPATIENT)
Dept: CT IMAGING | Facility: HOSPITAL | Age: 83
End: 2023-05-22
Attending: EMERGENCY MEDICINE
Payer: COMMERCIAL

## 2023-05-22 ENCOUNTER — APPOINTMENT (OUTPATIENT)
Dept: MRI IMAGING | Facility: HOSPITAL | Age: 83
End: 2023-05-22
Attending: EMERGENCY MEDICINE
Payer: COMMERCIAL

## 2023-05-22 ENCOUNTER — TELEPHONE (OUTPATIENT)
Dept: PHYSICAL MEDICINE AND REHAB | Facility: CLINIC | Age: 83
End: 2023-05-22

## 2023-05-22 ENCOUNTER — HOSPITAL ENCOUNTER (EMERGENCY)
Facility: HOSPITAL | Age: 83
Discharge: HOME | End: 2023-05-22
Attending: EMERGENCY MEDICINE | Admitting: EMERGENCY MEDICINE
Payer: COMMERCIAL

## 2023-05-22 VITALS
BODY MASS INDEX: 19.88 KG/M2 | DIASTOLIC BLOOD PRESSURE: 61 MMHG | HEIGHT: 62 IN | HEART RATE: 67 BPM | OXYGEN SATURATION: 92 % | WEIGHT: 108 LBS | SYSTOLIC BLOOD PRESSURE: 132 MMHG | TEMPERATURE: 97.6 F | RESPIRATION RATE: 20 BRPM

## 2023-05-22 DIAGNOSIS — M54.9 BACK PAIN, UNSPECIFIED BACK LOCATION, UNSPECIFIED BACK PAIN LATERALITY, UNSPECIFIED CHRONICITY: ICD-10-CM

## 2023-05-22 LAB
ALBUMIN SERPL BCG-MCNC: 4.2 G/DL (ref 3.5–5.2)
ALBUMIN UR-MCNC: 10 MG/DL
ALP SERPL-CCNC: 75 U/L (ref 35–104)
ALT SERPL W P-5'-P-CCNC: 13 U/L (ref 10–35)
ANION GAP SERPL CALCULATED.3IONS-SCNC: 12 MMOL/L (ref 7–15)
APPEARANCE UR: CLEAR
AST SERPL W P-5'-P-CCNC: 23 U/L (ref 10–35)
BACTERIA #/AREA URNS HPF: ABNORMAL /HPF
BASOPHILS # BLD AUTO: 0 10E3/UL (ref 0–0.2)
BASOPHILS NFR BLD AUTO: 0 %
BILIRUB SERPL-MCNC: 0.2 MG/DL
BILIRUB UR QL STRIP: NEGATIVE
BUN SERPL-MCNC: 26.5 MG/DL (ref 8–23)
CALCIUM SERPL-MCNC: 9.5 MG/DL (ref 8.8–10.2)
CHLORIDE SERPL-SCNC: 100 MMOL/L (ref 98–107)
COLOR UR AUTO: YELLOW
CREAT SERPL-MCNC: 0.84 MG/DL (ref 0.51–0.95)
DEPRECATED HCO3 PLAS-SCNC: 26 MMOL/L (ref 22–29)
EOSINOPHIL # BLD AUTO: 0.1 10E3/UL (ref 0–0.7)
EOSINOPHIL NFR BLD AUTO: 1 %
ERYTHROCYTE [DISTWIDTH] IN BLOOD BY AUTOMATED COUNT: 14.1 % (ref 10–15)
GFR SERPL CREATININE-BSD FRML MDRD: 69 ML/MIN/1.73M2
GLUCOSE SERPL-MCNC: 105 MG/DL (ref 70–99)
GLUCOSE UR STRIP-MCNC: NEGATIVE MG/DL
HCT VFR BLD AUTO: 43.8 % (ref 35–47)
HGB BLD-MCNC: 14.7 G/DL (ref 11.7–15.7)
HGB UR QL STRIP: NEGATIVE
HYALINE CASTS: 7 /LPF
IMM GRANULOCYTES # BLD: 0.1 10E3/UL
IMM GRANULOCYTES NFR BLD: 1 %
KETONES UR STRIP-MCNC: NEGATIVE MG/DL
LEUKOCYTE ESTERASE UR QL STRIP: ABNORMAL
LIPASE SERPL-CCNC: 78 U/L (ref 13–60)
LYMPHOCYTES # BLD AUTO: 2.5 10E3/UL (ref 0.8–5.3)
LYMPHOCYTES NFR BLD AUTO: 24 %
MCH RBC QN AUTO: 31.6 PG (ref 26.5–33)
MCHC RBC AUTO-ENTMCNC: 33.6 G/DL (ref 31.5–36.5)
MCV RBC AUTO: 94 FL (ref 78–100)
MONOCYTES # BLD AUTO: 1.2 10E3/UL (ref 0–1.3)
MONOCYTES NFR BLD AUTO: 11 %
MUCOUS THREADS #/AREA URNS LPF: PRESENT /LPF
NEUTROPHILS # BLD AUTO: 6.7 10E3/UL (ref 1.6–8.3)
NEUTROPHILS NFR BLD AUTO: 63 %
NITRATE UR QL: NEGATIVE
NRBC # BLD AUTO: 0 10E3/UL
NRBC BLD AUTO-RTO: 0 /100
PH UR STRIP: 5.5 [PH] (ref 5–7)
PLATELET # BLD AUTO: 381 10E3/UL (ref 150–450)
POTASSIUM SERPL-SCNC: 4.6 MMOL/L (ref 3.4–5.3)
PROT SERPL-MCNC: 7 G/DL (ref 6.4–8.3)
RBC # BLD AUTO: 4.65 10E6/UL (ref 3.8–5.2)
RBC URINE: 1 /HPF
SODIUM SERPL-SCNC: 138 MMOL/L (ref 136–145)
SP GR UR STRIP: 1.02 (ref 1–1.03)
SQUAMOUS EPITHELIAL: 13 /HPF
TROPONIN T SERPL HS-MCNC: 13 NG/L
UROBILINOGEN UR STRIP-MCNC: <2 MG/DL
WBC # BLD AUTO: 10.6 10E3/UL (ref 4–11)
WBC URINE: 7 /HPF

## 2023-05-22 PROCEDURE — 87086 URINE CULTURE/COLONY COUNT: CPT | Performed by: EMERGENCY MEDICINE

## 2023-05-22 PROCEDURE — 83690 ASSAY OF LIPASE: CPT | Performed by: EMERGENCY MEDICINE

## 2023-05-22 PROCEDURE — 84484 ASSAY OF TROPONIN QUANT: CPT | Performed by: EMERGENCY MEDICINE

## 2023-05-22 PROCEDURE — 250N000011 HC RX IP 250 OP 636: Performed by: EMERGENCY MEDICINE

## 2023-05-22 PROCEDURE — 72195 MRI PELVIS W/O DYE: CPT

## 2023-05-22 PROCEDURE — 96374 THER/PROPH/DIAG INJ IV PUSH: CPT

## 2023-05-22 PROCEDURE — 93005 ELECTROCARDIOGRAM TRACING: CPT | Performed by: EMERGENCY MEDICINE

## 2023-05-22 PROCEDURE — 72148 MRI LUMBAR SPINE W/O DYE: CPT

## 2023-05-22 PROCEDURE — 80053 COMPREHEN METABOLIC PANEL: CPT | Performed by: EMERGENCY MEDICINE

## 2023-05-22 PROCEDURE — 81001 URINALYSIS AUTO W/SCOPE: CPT | Performed by: EMERGENCY MEDICINE

## 2023-05-22 PROCEDURE — 96375 TX/PRO/DX INJ NEW DRUG ADDON: CPT

## 2023-05-22 PROCEDURE — 85025 COMPLETE CBC W/AUTO DIFF WBC: CPT | Performed by: EMERGENCY MEDICINE

## 2023-05-22 PROCEDURE — 36415 COLL VENOUS BLD VENIPUNCTURE: CPT | Performed by: EMERGENCY MEDICINE

## 2023-05-22 PROCEDURE — 71250 CT THORAX DX C-: CPT

## 2023-05-22 PROCEDURE — 99285 EMERGENCY DEPT VISIT HI MDM: CPT | Mod: 25

## 2023-05-22 PROCEDURE — 250N000013 HC RX MED GY IP 250 OP 250 PS 637: Performed by: STUDENT IN AN ORGANIZED HEALTH CARE EDUCATION/TRAINING PROGRAM

## 2023-05-22 RX ORDER — OXYCODONE HYDROCHLORIDE 5 MG/1
5 TABLET ORAL EVERY 6 HOURS PRN
Qty: 12 TABLET | Refills: 0 | Status: SHIPPED | OUTPATIENT
Start: 2023-05-22 | End: 2023-05-25

## 2023-05-22 RX ORDER — ONDANSETRON 4 MG/1
4 TABLET, ORALLY DISINTEGRATING ORAL ONCE
Status: DISCONTINUED | OUTPATIENT
Start: 2023-05-22 | End: 2023-05-22

## 2023-05-22 RX ORDER — OXYCODONE HYDROCHLORIDE 5 MG/1
5 TABLET ORAL ONCE
Status: COMPLETED | OUTPATIENT
Start: 2023-05-22 | End: 2023-05-22

## 2023-05-22 RX ORDER — OXYCODONE HYDROCHLORIDE 5 MG/1
5 TABLET ORAL ONCE
Status: DISCONTINUED | OUTPATIENT
Start: 2023-05-22 | End: 2023-05-22

## 2023-05-22 RX ORDER — HYDROMORPHONE HYDROCHLORIDE 1 MG/ML
0.5 INJECTION, SOLUTION INTRAMUSCULAR; INTRAVENOUS; SUBCUTANEOUS ONCE
Status: COMPLETED | OUTPATIENT
Start: 2023-05-22 | End: 2023-05-22

## 2023-05-22 RX ORDER — POLYETHYLENE GLYCOL 3350 17 G/17G
17 POWDER, FOR SOLUTION ORAL 2 TIMES DAILY PRN
Qty: 510 G | Refills: 0 | Status: SHIPPED | OUTPATIENT
Start: 2023-05-22

## 2023-05-22 RX ORDER — ONDANSETRON 2 MG/ML
4 INJECTION INTRAMUSCULAR; INTRAVENOUS ONCE
Status: COMPLETED | OUTPATIENT
Start: 2023-05-22 | End: 2023-05-22

## 2023-05-22 RX ORDER — SENNA AND DOCUSATE SODIUM 50; 8.6 MG/1; MG/1
1 TABLET, FILM COATED ORAL AT BEDTIME
Qty: 60 TABLET | Refills: 0 | Status: SHIPPED | OUTPATIENT
Start: 2023-05-22 | End: 2024-08-21

## 2023-05-22 RX ADMIN — OXYCODONE HYDROCHLORIDE 5 MG: 5 TABLET ORAL at 16:09

## 2023-05-22 RX ADMIN — HYDROMORPHONE HYDROCHLORIDE 0.5 MG: 1 INJECTION, SOLUTION INTRAMUSCULAR; INTRAVENOUS; SUBCUTANEOUS at 13:13

## 2023-05-22 RX ADMIN — ONDANSETRON 4 MG: 2 INJECTION INTRAMUSCULAR; INTRAVENOUS at 13:13

## 2023-05-22 ASSESSMENT — ACTIVITIES OF DAILY LIVING (ADL)
ADLS_ACUITY_SCORE: 35

## 2023-05-22 NOTE — TELEPHONE ENCOUNTER
"Phone call to patient to discuss. Patient states she is able to sit or lay down. \"I have to pace the floor. I take the nerve pill medication at 7 PM and then finally get some relief where I can lay down around 10.\" Reports she had no relief with the Medrol Dosepack last week.     Conference call to imaging to see if sooner appointment available for MRI. Patient was offered an appointment at [facility] for Wednesday. \"What's the difference? I may as well keep the one for Thursday. Then won't get the results to doctor for a day or two. I'll suffer for another week.\" Explained PSP would be updated with status. If further recommendations she would be called. \"If I get worse I will be going to the ER.\"  "

## 2023-05-22 NOTE — ED TRIAGE NOTES
Lower back pain for about 6 weeks. Pinched nerve, Spine specialist for one week. Started on methylprednisolone which patient has finished and gabapentin, told if not better need MRI. Unable to get in until Thursday. Patient states pain is too bad and getting worse so could not wait until Thursday. Left foot became numb last night, feels like it is sleeping. Takes clopidogrel daily, hx stent placement.

## 2023-05-22 NOTE — TELEPHONE ENCOUNTER
Dr. Boggs had order an MRI for patient and she has it schedule for 5/25 but she is unable to wait until Thursday to have it done because she is in a lot of pain. Wondering is Dr. Toussaint can have it done sooner.

## 2023-05-22 NOTE — ED PROVIDER NOTES
EMERGENCY DEPARTMENT ENCOUNTER      NAME: Katya Butlre  AGE: 82 year old female  YOB: 1940  MRN: [id number]  EVALUATION DATE & TIME: No admission date for patient encounter.    PCP: Keith Bui    ED PROVIDER: Cata Houston MD      Chief Complaint   Patient presents with     Back Pain         FINAL IMPRESSION:  No diagnosis found.      ED COURSE & MEDICAL DECISION MAKING:    Pertinent Labs & Imaging studies reviewed. (See chart for details)    12:09 AM I introduced myself to the patient, obtained patient history, performed a physical exam, and discussed plan for ED workup including potential diagnostic laboratory/imaging studies and interventions.    82 year old female with a pertinent history of Stage 4 colon cancer (previous chemotherapy, now recurrent disease not yet on chemo), HTN, Raynaud phenomenon, HLD, coronary atherosclerosis, chemotherapy induced neuropathy, postherpetic neuralgia right upper extremity, tobacco use disorder, anxiety, sciatica who presents to this ED for evaluation of back pain.  On exam, patient has tenderness near the left SI joint area.  She does have decreased sensation on the left dorsum of the foot compared to the right but is able to feel my touch.  Otherwise she is neurovascularly intact in the left lower extremity.  She has normal range of motion of the left hip, knee, and ankle.  Does not have any midline thoracic lumbar spine tenderness.  Has had no known injury.  As discussed below she saw the spine specialist on May 15 for this pain and they prescribed her a Medrol Dosepak as well as gabapentin and Norco.  She has not taken the Norco due to concern for constipation.  They had ordered MRIs of the lumbar spine and sacrum have not yet been performed.  Patient states she is having worsening pain at home and presented here for evaluation as well as with new onset full foot left numbness which is new for her.  Does have baseline neuropathy in her toes but not  typically the whole foot.  She is having night pain as well as an episode of night sweats.  Has known stage IV colon cancer that has recurred and is not currently on chemotherapy.  With this do have concern for the potential of pathologic fracture/lytic lesion, mass, epidural abscess, cauda equina or cord compression, etc.  Also could potentially be radiculopathy of the more benign causes that she does have history of L3-L4 issues previously.  She was also reporting she would have some abdominal pain as well as a brief episode of chest pain into her back the last 2 nights that resolved spontaneously.  Has no chest pain or shortness of breath currently.  Did consider the potential of aortic pathology however unfortunately the patient states that she is allergic to all contrast.  Looking through her chart it does appear that she has not had any contrast eddies including MRI or CT contrast for quite some time.  States that she had lip and tongue swelling with this but cannot tell me exactly which contrast.  Thus unfortunately we cannot obtain contrast studies.  Overall have low suspicion for aortic pathology as she has no chest or upper back pain currently and is not hypertensive and has equal pulses.  Will obtain noncontrast CT of the chest abdomen and pelvis for further evaluation with her known metastatic colon cancer to ensure no other abdominal pathology as a cause of her back pain such as kidney stone or bowel obstruction as well as evaluate the chest with the brief chest pain that is now resolved.  We did obtain an EKG which does not reveal any evidence of acute ischemia.  Troponin within normal limits and this has not occurred now for over 24 hours making acute coronary syndrome less likely. She has good pedal pulses bilaterally making arterial occlusion as a cause of her numbness unlikely.  Overall have less concern over the very brief episode of chest pain that resolved spontaneously as her main complaint is  the left lower back pain.  Did consider the potential of PE with her cancer but again unfortunately cannot do contrast study and this has resolved and she was not short of breath with this thus at this point would not do a VQ scan as her main complaint is lower back pain.    Urinalysis does not reveal any evidence of UTI or pyelonephritis which is also considered.  CMP largely unremarkable.  CBC is also largely unremarkable with normal hemoglobin of 14.7 and a white blood cell count of 10.6.  Lipase slightly elevated at 78 however has no abdominal pain or tenderness at this time.  This is not 3 times normal.  Low suspicion that pancreatitis would be the source of her pain with this at this time.  Liver function within normal limits.  Kidney function within normal limits.    CT of the chest abdomen pelvis without contrast does not reveal any acute findings in the chest abdomen or pelvis.  Does have previously known supraclavicular and retroperitoneal lymph nodes.  No new or worsening disease.  No bowel obstruction.  No ureteral stone.  She does have atherosclerotic calcification of the abdominal aorta and iliofemoral arteries with 3.1 cm infrarenal aortic aneurysm.  No periaortic fat stranding or hematoma.  No sign of rupture.  Osteopenic with multilevel degenerative changes in the spine.  No aggressive or destructive lesions.    MRI of the lumbar spine and MRI of the sacrum and coccyx both without contrast due to her report of allergy are ordered and pending at this time for further evaluation as she does have multiple red flag symptoms for her back pain.  Patient was given 0.5 mg of IV Dilaudid as well as 4 mg of IV Zofran.  She is awaiting MRI at this time.  She will be signed out to my colleague Dr. Crum pending MRI results and reevaluation.  Please see his note for final results and further management and final disposition. She was signed out in stable condition.          At the conclusion of the encounter I  discussed the results of all of the tests and the disposition. The questions were answered. The patient or family acknowledged understanding and was agreeable with the care plan.       Medical Decision Making    History:    Supplemental history from: Documented in chart, if applicable    External Record(s) reviewed: Documented in chart, if applicable.    Work Up:    Chart documentation includes differential considered and any EKGs or imaging independently interpreted by provider.    In additional to work up documented, I considered the following work up: Documented in chart, if applicable.    External consultation:    Discussion of management with another provider: Documented in chart, if applicable    Complicating factors:    Care impacted by chronic illness: Anticoagulated State, Cancer/Chemotherapy, Chronic Pain, Hyperlipidemia and Hypertension    Care affected by social determinants of health: N/A    Disposition considerations: Admission considered. Patient was signed out to the oncoming physician, disposition pending.      MEDICATIONS GIVEN IN THE EMERGENCY:  Medications   HYDROmorphone (PF) (DILAUDID) injection 0.5 mg (0.5 mg Intravenous $Given 5/22/23 1313)   ondansetron (ZOFRAN) injection 4 mg (4 mg Intravenous $Given 5/22/23 1313)       NEW PRESCRIPTIONS STARTED AT TODAY'S ER VISIT  New Prescriptions    No medications on file          =================================================================    HPI    Patient information was obtained from: Patient     Use of : N/A         Katya Butler is a 82 year old female with a pertinent history of Stage 4 colon cancer (previous chemotherapy, now recurrent disease not yet on chemo), HTN, Raynaud phenomenon, HLD, coronary atherosclerosis, chemotherapy induced neuropathy, postherpetic neuralgia right upper extremity, tobacco use disorder, anxiety, sciatica who presents to this ED for evaluation of back pain.     Per Chart Review: The patient  presented to [facility] She had severe pain in the low back down the left lower extremity after bending forward while playing golf.  She was hospitalized at [facility] and underwent a left L3-4 TFESI. She has no radiation down the leg paresthesias or weakness. MRI of the lumbar spine from June 2022 Mild disc at loss L2-3 L3-4. CT scan abdomen and pelvis from February 2023 to evaluate the lumbar spine.  This shows mild to moderate right greater than left hip osteoarthritis. She had improved and then 6 weeks ago developed pain in the left SI joint area. Again saw Spine on 5/15/23 who prescribed a Medrol Dosepak, gabapentin, and Norco for pain control and was going to reevaluate with MRI of the lumbar spine and sacrum if not improving.    The patient presents with ongoing back pain for 6 weeks. The pain is located in her lower back on the left side near the SI joint. She describes the pain as sharp sensation. The back pain is worse when laying down and with certain movements.  She states she has been pacing at night is when she lies down at night the pain is much worse.  Specifically overnight the pain is worse.  She reports that at baseline she has bilateral toe numbness from her known neuropathy however now since yesterday she noticed that her whole left foot feels numb which is different than normal. She has been taking Tylenol with little relief. Last dose of Tylenol 500 mg at 7 AM today.  She has also been on a Medrol Dosepak and gabapentin.  She has not taken the Norco prescribed to her as she was concerned about constipation.  She endorses nausea due to back pain.  She also reports she will have some discomfort in her abdomen with the nausea.  In the ED, she rates the back pain 10/10. Denies any recent falls or injuries. She denies leg swelling, headache, dysuria, hematuria, bloody stool, or other medical concerns. She denies any saddle anesthesia, or bowel or bladder  incontinence.  Does report she had some brief pain in her chest through to her back while sleeping last night and the night before but has no chest pain currently.  No shortness of breath.  She is on Plavix. She has been able to ambulate.     REVIEW OF SYSTEMS   Review of Systems   Pertinent positives and negatives are documented in the HPI. All other systems reviewed and are negative.      PAST MEDICAL HISTORY:  Past Medical History:   Diagnosis Date     HTN (hypertension)      Inguinal hernia without mention of obstruction or gangrene, unilateral or unspecified, (not specified as recurrent) 08/20/2004    RIGHT     Malignant neoplasm of sigmoid colon (H) 11/24/2020     Primary osteoarthritis involving multiple joints 8/2/2019     Ventral hernia, unspecified, without mention of obstruction or gangrene 08/15/2003    easily reduced       PAST SURGICAL HISTORY:  Past Surgical History:   Procedure Laterality Date     HERNIA REPAIR       HERNIA REPAIR, INGUINAL RT/LT  02/2005    [facility]     HYSTERECTOMY       IR AORTIC ARCH 4 VESSEL ANGIOGRAM  10/13/2009     IR AORTIC ARCH 4 VESSEL ANGIOGRAM  3/15/2010     IR MISCELLANEOUS PROCEDURE  5/20/2008     IR MISCELLANEOUS PROCEDURE  1/30/2009     IR MISCELLANEOUS PROCEDURE  1/30/2009     IR MISCELLANEOUS PROCEDURE  1/30/2009     IR MISCELLANEOUS PROCEDURE  10/13/2009     IR MISCELLANEOUS PROCEDURE  10/13/2009     IR MISCELLANEOUS PROCEDURE  3/15/2010     LAPAROSCOPIC CYSTECTOMY OVARIAN (ONCOLOGY) Bilateral 6/27/2018    Procedure: DIAGNOSTIC LAPAROSCOPY, RIGHT SALPINGO OOPHORECTOMY, LYSIS OF ADHESIONS, AND PELVIC WASHINGS;  Surgeon: Nneka Arroyo DO;  Location: [facility];  Service:      WA INSJ PRPH CTR VAD W/SUBQ PORT AGE 5 YR/> N/A 12/2/2020    Procedure: Port Placement;  Surgeon: Viry Villegas MD;  Location: [facility];  Service: General     ZZC PART REMOVAL COLON W ANASTOMOSIS N/A 10/31/2020    Procedure: COLECTOMY, SIGMOID, OPEN;  Surgeon:  "Viry Villegas MD;  Location: [facility];  Service: General     [facility] REMOVAL OF OVARY(S) N/A 10/31/2020    Procedure: OOPHORECTOMY, OPEN;  Surgeon: iVry Villegas MD;  Location: [facility];  Service: General           CURRENT MEDICATIONS:    acetaminophen (TYLENOL) 500 MG tablet  amLODIPine (NORVASC) 5 MG tablet  ATROVENT 0.06 % NA SOLN  clopidogrel (PLAVIX) 75 MG tablet  diphenhydrAMINE-acetaminophen (TYLENOL PM)  MG tablet  gabapentin (NEURONTIN) 100 MG capsule  lidocaine-prilocaine (EMLA) 2.5-2.5 % external cream  lovastatin (MEVACOR) 20 MG tablet  LOVASTATIN 40 MG OR TABS  methylPREDNISolone (MEDROL DOSEPAK) 4 MG tablet therapy pack  pregabalin (LYRICA) 75 MG capsule  senna-docusate (SENOKOT-S/PERICOLACE) 8.6-50 MG tablet  triamcinolone (KENALOG) 0.1 % external cream        ALLERGIES:  Allergies   Allergen Reactions     Contrast Dye Hives, Itching and Swelling     Cephalexin Hives     Colchicine Hives     Ketek [Telithromycin] Visual Disturbance     Other (Do Not Use) Other (See Comments)     Aspirin - on Plavix     Other Drug Allergy (See Comments) Itching     Pt states \"all pain meds\" make her \"itchy\"       FAMILY HISTORY:  Family History   Problem Relation Age of Onset     C.A.D. Mother      C.A.D. Sister      Breast Cancer Maternal Aunt      Breast Cancer Cousin      Breast Cancer Cousin      Breast Cancer Cousin      Uterine Cancer Mother 38.00     Cancer Mother      Lymphoma Son 53.00     Cancer Son        SOCIAL HISTORY:   Social History     Socioeconomic History     Marital status: Single   Tobacco Use     Smoking status: Every Day     Packs/day: 0.25     Types: Cigarettes     Smokeless tobacco: Never     Tobacco comments:     3-5 cigarettes daily **as of 07/22/22**   Vaping Use     Vaping status: Never Used     Passive vaping exposure: Yes   Substance and Sexual Activity     Alcohol use: Not Currently     Drug use: No     Sexual activity: Not Currently       VITALS:  /63  " " Pulse 66   Temp 97.6  F (36.4  C) (Oral)   Resp 20   Ht 1.575 m (5' 2\")   Wt 49 kg (108 lb)   SpO2 95%   BMI 19.75 kg/m      PHYSICAL EXAM    Physical Exam  Constitutional: Thin, no acute distress, GCS 15  HENT: Normocephalic, Atraumatic, Bilateral external ears normal, Oropharynx normal, mucous membranes moist, Nose normal. Neck- Normal range of motion, No tenderness, Supple, No stridor.   Eyes: PERRL, EOMI, Conjunctiva normal, No discharge.   Respiratory: Normal breath sounds, No respiratory distress, No wheezing or crackles, Speaks in full sentences easily.   Cardiovascular: Normal heart rate, Regular rhythm, No murmurs, No rubs, No gallops. 2+ radial pulses bilaterally  GI: Bowel sounds normal, Soft, No tenderness, No masses, No rebound or guarding. No CVA tenderness bilaterally   Musculoskeletal: 2+ DP and PT pulses bilaterally. No notable lower extremity edema. Good range of motion in all major joints.  Has tenderness to palpation near the left SI joint area.  No tenderness to palpation of the bony lower extremity.  Does have normal range of motion of the left hip and joints of the left lower extremity.  Decreased sensation on the dorsum left foot that is decreased compared to the right foot.  She is able to feel light touch.  Compartments are soft compressible.  No midline cervical, thoracic, or lumbar spine tenderness.  Integument: Warm, Dry, No erythema, No rash. No petechiae.  Neurologic: Alert & oriented x 3, 5/5 strength in all 4 extremities bilaterally including dorsiflexion, plantarflexion, hip flexion, hip extension, knee flexion, knee extension bilaterally.  Decreased sensation to light touch on the dorsum of the left foot compared to the right foot.  However she is able to feel my touch there.  Otherwise normal sensation in the dermatomes of the left lower extremity and remainder of the extremities.    Psychiatric: Intermittently tearful due to the pain      LAB:  All pertinent labs reviewed " and interpreted.  Results for orders placed or performed during the hospital encounter of 05/22/23   CT Chest Abdomen Pelvis w/o Contrast    Impression    IMPRESSION:  1.  No acute findings in the chest, abdomen, or pelvis, within limitations of noncontrast technique.    2.  Unchanged metastatic previously FDG avid left supraclavicular and retroperitoneal lymph nodes. No new or worsening disease.   Comprehensive metabolic panel   Result Value Ref Range    Sodium 138 136 - 145 mmol/L    Potassium 4.6 3.4 - 5.3 mmol/L    Chloride 100 98 - 107 mmol/L    Carbon Dioxide (CO2) 26 22 - 29 mmol/L    Anion Gap 12 7 - 15 mmol/L    Urea Nitrogen 26.5 (H) 8.0 - 23.0 mg/dL    Creatinine 0.84 0.51 - 0.95 mg/dL    Calcium 9.5 8.8 - 10.2 mg/dL    Glucose 105 (H) 70 - 99 mg/dL    Alkaline Phosphatase 75 35 - 104 U/L    AST 23 10 - 35 U/L    ALT 13 10 - 35 U/L    Protein Total 7.0 6.4 - 8.3 g/dL    Albumin 4.2 3.5 - 5.2 g/dL    Bilirubin Total 0.2 <=1.2 mg/dL    GFR Estimate 69 >60 mL/min/1.73m2   UA with Microscopic reflex to Culture    Specimen: Urine, Clean Catch   Result Value Ref Range    Color Urine Yellow Colorless, Straw, Light Yellow, Yellow    Appearance Urine Clear Clear    Glucose Urine Negative Negative mg/dL    Bilirubin Urine Negative Negative    Ketones Urine Negative Negative mg/dL    Specific Gravity Urine 1.024 1.001 - 1.030    Blood Urine Negative Negative    pH Urine 5.5 5.0 - 7.0    Protein Albumin Urine 10 (A) Negative mg/dL    Urobilinogen Urine <2.0 <2.0 mg/dL    Nitrite Urine Negative Negative    Leukocyte Esterase Urine 250 Pedro/uL (A) Negative    Bacteria Urine Few (A) None Seen /HPF    Mucus Urine Present (A) None Seen /LPF    RBC Urine 1 <=2 /HPF    WBC Urine 7 (H) <=5 /HPF    Squamous Epithelials Urine 13 (H) <=1 /HPF    Hyaline Casts Urine 7 (H) <=2 /LPF   CBC with platelets and differential   Result Value Ref Range    WBC Count 10.6 4.0 - 11.0 10e3/uL    RBC Count 4.65 3.80 - 5.20 10e6/uL    Hemoglobin  14.7 11.7 - 15.7 g/dL    Hematocrit 43.8 35.0 - 47.0 %    MCV 94 78 - 100 fL    MCH 31.6 26.5 - 33.0 pg    MCHC 33.6 31.5 - 36.5 g/dL    RDW 14.1 10.0 - 15.0 %    Platelet Count 381 150 - 450 10e3/uL    % Neutrophils 63 %    % Lymphocytes 24 %    % Monocytes 11 %    % Eosinophils 1 %    % Basophils 0 %    % Immature Granulocytes 1 %    NRBCs per 100 WBC 0 <1 /100    Absolute Neutrophils 6.7 1.6 - 8.3 10e3/uL    Absolute Lymphocytes 2.5 0.8 - 5.3 10e3/uL    Absolute Monocytes 1.2 0.0 - 1.3 10e3/uL    Absolute Eosinophils 0.1 0.0 - 0.7 10e3/uL    Absolute Basophils 0.0 0.0 - 0.2 10e3/uL    Absolute Immature Granulocytes 0.1 <=0.4 10e3/uL    Absolute NRBCs 0.0 10e3/uL   Result Value Ref Range    Troponin T, High Sensitivity 13 <=14 ng/L   Result Value Ref Range    Lipase 78 (H) 13 - 60 U/L       RADIOLOGY:  Reviewed all pertinent imaging. Please see official radiology report.  CT Chest Abdomen Pelvis w/o Contrast   Final Result   IMPRESSION:   1.  No acute findings in the chest, abdomen, or pelvis, within limitations of noncontrast technique.      2.  Unchanged metastatic previously FDG avid left supraclavicular and retroperitoneal lymph nodes. No new or worsening disease.      Lumbar spine MRI w/o contrast    (Results Pending)   MR Sacrum and Coccyx w/o Contrast    (Results Pending)       EKG:    Performed at: 12:53 pm    Impression: Normal sinus rhythm.  No evidence of acute ischemia.  Unchanged from prior EKG    Rate: 69  Rhythm: Sinus  Axis: Normal  RI Interval: 124  QRS Interval: 86  QTc Interval: 420  ST Changes: None  Comparison: Unchanged from prior EKG on 2-    I have independently reviewed and interpreted the EKG(s) documented above.    PROCEDURES:   None      [employer] System Documentation:   CMS Diagnoses:               I, Alyssa Her, am serving as a scribe to document services personally performed by Cata Houston MD based on my observation and the provider's statements to me. Cata MARIE  MD Celso, attest that Alyssa Her is acting in a scribe capacity, has observed my performance of the services and has documented them in accordance with my direction.    Cata Houston MD  [facility] EMERGENCY DEPARTMENT  [address] [place] [id number]  [phone]       Cata Houston MD  05/22/23 4106

## 2023-05-22 NOTE — ED NOTES
EMERGENCY DEPARTMENT SIGN OUT NOTE        ED COURSE AND MEDICAL DECISION MAKING  Patient was signed out to me by Dr Cata Houston at 3:08 PM  4:35 PM I updated the patient.    In brief, Katya Butler is a 82 year old female who initially presented for evaluation of left lower back pain ongoing for 6 weeks. The back pain is worse when laying down and with certain movements. She has been taking Tylenol with little relief. She has also been on a Medrol Dosepak and gabapentin.  She has not taken the Norco prescribed to her as she was concerned about constipation. Patient rates the back pain 10/10. Denies any recent falls or injuries. She denies leg swelling, headache, dysuria, hematuria, bloody stool, or other medical concerns. She denies any saddle anesthesia, or bowel or bladder incontinence.  Does report she had some brief pain in her chest through to her back while sleeping last night and the night before but has no chest pain currently.  No shortness of breath.  She is on Plavix. She has been able to ambulate.     At time of sign out, disposition was pending MRI of the lumbar spine and MRI of the sacrum and coccyx both without contrast and reevaluation.    MRIs returned showing no acute findings, does have some degenerative changes throughout and an old disc bulge that looks improved from prior imaging, though no emergent findings.  Had a lengthy discussion with patient that if her primary complaint is pain if she is not taking the oxycodone she was prescribed, should probably be taking that.  Patient reported that those pain meds cause constipation.  Discussed with patient that she will also need to be on bowel softening medications like senna docusate and/or MiraLAX, discussed that she can increase the MiraLAX to twice daily or more as needed if she gets constipated though at this juncture she should be taking the oxycodone and will certainly need to follow-up with the spine clinic.  Sounds like she has an  appointment coming up in about a week.  Will be discharged home with written prescriptions.    FINAL IMPRESSION    1. Back pain, unspecified back location, unspecified back pain laterality, unspecified chronicity        ED MEDS  Medications   HYDROmorphone (PF) (DILAUDID) injection 0.5 mg (0.5 mg Intravenous $Given 5/22/23 1313)   ondansetron (ZOFRAN) injection 4 mg (4 mg Intravenous $Given 5/22/23 1313)   oxyCODONE (ROXICODONE) tablet 5 mg (5 mg Oral $Given 5/22/23 1609)       RADIOLOGY    Lumbar spine MRI w/o contrast   Final Result   IMPRESSION:   1.  Decreased size of L2-L3 disc protrusion      2.  Stable, mild degenerative changes at L3-L4 through L5-S1.      3.  No new or progressive lesions.      4.  Infrarenal abdominal aortic aneurysm      MR Sacrum and Coccyx w/o Contrast   Final Result   IMPRESSION:   1.  Mild degenerative arthritis of both SI joints.   2.  No other abnormality to explain symptoms. No evidence of metastatic disease to the sacrum.      CT Chest Abdomen Pelvis w/o Contrast   Final Result   IMPRESSION:   1.  No acute findings in the chest, abdomen, or pelvis, within limitations of noncontrast technique.      2.  Unchanged metastatic previously FDG avid left supraclavicular and retroperitoneal lymph nodes. No new or worsening disease.          DISCHARGE MEDS  Discharge Medication List as of 5/22/2023  5:34 PM      START taking these medications    Details   oxyCODONE (ROXICODONE) 5 MG tablet Take 1 tablet (5 mg) by mouth every 6 hours as needed for pain, Disp-12 tablet, R-0, Local Print      polyethylene glycol (MIRALAX) 17 GM/Dose powder Take 17 g by mouth 2 times daily as needed for constipation, Disp-510 g, R-0, Local Print      !! SENNA-docusate sodium (SENNA S) 8.6-50 MG tablet Take 1 tablet by mouth At Bedtime, Disp-60 tablet, R-0, Local Print       !! - Potential duplicate medications found. Please discuss with provider.          Hiren Crum MD  [facility]  [facility] EMERGENCY DEPARTMENT  [address] [place] [id number]  [phone]     Hiren Crum MD  05/22/23 0230

## 2023-05-23 LAB
ATRIAL RATE - MUSE: 69 BPM
DIASTOLIC BLOOD PRESSURE - MUSE: NORMAL MMHG
INTERPRETATION ECG - MUSE: NORMAL
P AXIS - MUSE: 69 DEGREES
PR INTERVAL - MUSE: 124 MS
QRS DURATION - MUSE: 86 MS
QT - MUSE: 392 MS
QTC - MUSE: 420 MS
R AXIS - MUSE: 43 DEGREES
SYSTOLIC BLOOD PRESSURE - MUSE: NORMAL MMHG
T AXIS - MUSE: 69 DEGREES
VENTRICULAR RATE- MUSE: 69 BPM

## 2023-05-24 LAB — BACTERIA UR CULT: NORMAL

## 2023-05-31 ENCOUNTER — OFFICE VISIT (OUTPATIENT)
Dept: PHYSICAL MEDICINE AND REHAB | Facility: CLINIC | Age: 83
End: 2023-05-31
Payer: COMMERCIAL

## 2023-05-31 VITALS — DIASTOLIC BLOOD PRESSURE: 69 MMHG | HEART RATE: 84 BPM | SYSTOLIC BLOOD PRESSURE: 152 MMHG | OXYGEN SATURATION: 95 %

## 2023-05-31 DIAGNOSIS — M53.3 SACROILIAC JOINT DYSFUNCTION: ICD-10-CM

## 2023-05-31 DIAGNOSIS — M79.18 BILATERAL BUTTOCK PAIN: Primary | ICD-10-CM

## 2023-05-31 DIAGNOSIS — M53.3 SACROILIAC JOINT PAIN: ICD-10-CM

## 2023-05-31 PROCEDURE — 99214 OFFICE O/P EST MOD 30 MIN: CPT | Performed by: NURSE PRACTITIONER

## 2023-05-31 ASSESSMENT — PAIN SCALES - GENERAL: PAINLEVEL: MODERATE PAIN (5)

## 2023-05-31 NOTE — PATIENT INSTRUCTIONS
~Please call our [facility] Nurse Navigation line [phone] with any questions or concerns about your treatment plan, if symptoms worsen and you would like to be seen urgently, or if you have problems controlling bladder and bowel function.  ~Please note that any My Chart messages may take multiple days for a response due to the high volume of patients seen in clinic.   Anything sent Thursday night or after will be answered the following week when able, as Heaven Kuhn CNP does not work in clinic on Fridays.   ~Heaven Kuhn CNP is at the [facility] on the first and third Tuesdays of the month only, otherwise primarily at the [facility].      An injection has been ordered today to potentially help with your pain symptoms. These injections do not fix what is going on in your back, therefore they typically do not take away the pain completely, however they can many times help improve symptoms. Injections should always be completed along with other modalities such as physical therapy for the best long term outcomes. If injections alone are done, then pain will likely return.     [facility] Injection Requirements    A  is required for all fluoroscopically-guided injections.  Injection appointments may be cancelled if there are signs/symptoms of an active infection or if the patient is being actively treated with antibiotics for a diagnosed infection.  Patients may have their steroid injection cancelled if they have had another steroid injection within 2 weeks.  Diabetic patients will have their blood glucose levels checked the day of their injection and the appointment will be rescheduled if the blood glucose level is 300 or higher.  Patients with allergies to cortisone, local anesthetics, iodine, or contrast dye should contact the Spine Center to further discuss these considerations.  Patients scheduled for medial branch block diagnostic  injections should refrain from taking pain medication the day of the procedure.  The medial branch block injection appointment will be rescheduled if the patient's pain rating is not 5/10 or greater at the time of the procedure.  Patients taking warfarin/Coumadin will have their INR checked the day of the procedure and the procedure may be rescheduled if the INR is greater than 3.0.  Please contact the Spine Center [phone]) if you are taking any prescription blood-thinning medications (warfarin, Plavix, Lovenox, Eliquis, Brilinta, Effient, etc.) as special dosing adjustments may need to be made depending on the type of injection you are scheduled to receive.  It is recommended that you delay having your steroid injection if you have received a flu shot or shingles vaccine within 2 weeks.

## 2023-05-31 NOTE — LETTER
5/31/2023         RE: Katya Butler  [address] [facility] [id number]        Dear Colleague,    Thank you for referring your patient, Katya Butler, to the [facility]. Please see a copy of my visit note below.      Assessment:     Diagnoses and all orders for this visit:  Bilateral buttock pain  -     PAIN Joint Injection Sacroiliac Joint Bari; Future  Sacroiliac joint dysfunction  -     PAIN Joint Injection Sacroiliac Joint Bari; Future  Sacroiliac joint pain  -     PAIN Joint Injection Sacroiliac Joint Bari; Future     Katya Butler is a 82 year old y.o. female with past medical history significant for hyperlipidemia, hypertension, coronary atherosclerosis, colon cancer, chemotherapy induced neuropathy, postherpetic neuralgia right upper extremity, Raynaud's phenomenon, tobacco use disorder, anxiety who presents today for follow-up regarding:    -Bilateral buttock pain left greater than right with increased pain with sacroiliac joint provocative maneuvers indicating sacroiliac joint pain.  No current radicular component.    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  Left supraclavicular adenopathy biopsy completed 3/13/2023.     Plan:     A shared decision making plan was used. The patient's values and choices were respected. Prior medical records were reviewed today. The following represents what was discussed and decided upon by the provider and the patient.        -DIAGNOSTIC TESTS: Images were personally reviewed and interpreted.   -- Lumbar spine MRI 5/22/2023 with decreased size and L2-3 disc protrusion.  Stable degenerative changes at L3-4, L4-5, L5-S1.  L3-4 mild to moderate left and mild right foraminal stenosis.  L4-5 right lateral recess and mild right foraminal stenosis, right L5 contact.  L5-S1 no nerve compression.  --MRI of the sacrum/coccyx 5/22/2023 with mild degenerative changes bilateral SI joints.  No  fracture noted.  --CT chest abdomen pelvis 5/22/2023 with osteopenia.  Moderate to severe atherosclerotic calcification abdominal aorta with aortic aneurysm 3.1 cm.  Unchanged metastatic left supraclavicular and retroperitoneal lymph nodes.  --Left hip MRI 6/8/2022 with degenerative tearing of the left acetabular labrum, mild left hip chondromalacia, mild bilateral trochanteric bursitis.  -- Lumbar spine MRI 6/8/2022 with L2-3 disc protrusion with disc osteophyte complex with moderate left lateral recess stenosis, fairly similar to prior imaging 2021.  Stable slight anterior subluxation L2-3 L3-4.  L4-5 mild right foraminal stenosis.    -INTERVENTIONS: Ordered bilateral sacroiliac joint steroid injection to see if we can further improve sacroiliac joint dysfunction.    -MEDICATIONS: No changes in medications advised patient to continue with acetaminophen and gabapentin.  Discussed side effects of medications and proper use. Patient verbalized understanding.    -PHYSICAL THERAPY: Did discuss physical therapy options to establish home exercises for sacroiliac joint dysfunction, patient has home exercises from her prior PT for LBP therefore defers further physical therapy at this time.  Discussed the importance of core strengthening, ROM, stretching exercises with the patient and how each of these entities is important in decreasing pain.  Explained to the patient that the purpose of physical therapy is to teach the patient a home exercise program.  These exercises need to be performed every day in order to decrease pain and prevent future occurrences of pain.        -PATIENT EDUCATION:  Total time of 32 minutes, on the day of service, spent with the patient, reviewing the chart, placing orders, and documenting.   -Today we also discussed the issues related to the current COVID-19 pandemic, the pros and cons of the current treatment plan, the CDC guidelines such as social distancing, washing the hands, and covering the  cough.    -FOLLOW UP: Follow-up for SI injection with Dr. Frederick.  Advised to contact clinic if symptoms worsen or change.    Subjective:     Katya Butler is a 82 year old female who presents today for follow-up regarding bilateral low back and buttock pain greatest on the left lumbosacral junction which has been worse in the last couple months currently pain is a 5/10 aggravated with sitting for long periods of time as well as working and lying down.  Currently denies any radiating lower extremity pain.  Denies numbness or tingling sensations.  Denies any recent trips or falls or balance changes.  Denies bowel or bladder loss control.    *Ongoing postherpetic neuralgia symptoms right upper extremity, takes gabapentin for this.     -Treatment to Date: No prior spinal surgery   Patient does report physical therapy a couple of years ago, nothing recent for LBP.     -Medications:  Amitriptyline  Gabapentin      *Medrol Dosepak prescribed 6/8/2022  and 5/15/2023  *Hydrocodone prescriber Dr. Toussaint 5/15/2023    Patient Active Problem List   Diagnosis     Hyperlipidemia     TOBACCO USE DISORDER     Malignant neoplasm of sigmoid colon (H)     Accelerated hypertension     Acute left-sided low back pain with left-sided sciatica     Allergic rhinitis     Anxiety     Calcium pyrophosphate arthropathy of multiple sites     Chemotherapy-induced neuropathy (H)     Chronic anemia     Colon adenocarcinoma (H)     Colonic diverticular abscess     Coronary atherosclerosis     Disorder of artery (H)     Diverticulitis of colon     Essential hypertension     Idiopathic chronic gout with tophus, unspecified site     Intra-abdominal abscess (H)     Periarticular calcification     Postherpetic neuralgia     Postoperative ileus (H)     Primary osteoarthritis involving multiple joints     Unspecified visual loss     Raynaud phenomenon       Current Outpatient Medications   Medication     acetaminophen (TYLENOL) 500 MG tablet      "clopidogrel (PLAVIX) 75 MG tablet     gabapentin (NEURONTIN) 100 MG capsule     amLODIPine (NORVASC) 5 MG tablet     ATROVENT 0.06 % NA SOLN     diphenhydrAMINE-acetaminophen (TYLENOL PM)  MG tablet     lidocaine-prilocaine (EMLA) 2.5-2.5 % external cream     lovastatin (MEVACOR) 20 MG tablet     LOVASTATIN 40 MG OR TABS     methylPREDNISolone (MEDROL DOSEPAK) 4 MG tablet therapy pack     polyethylene glycol (MIRALAX) 17 GM/Dose powder     senna-docusate (SENOKOT-S/PERICOLACE) 8.6-50 MG tablet     SENNA-docusate sodium (SENNA S) 8.6-50 MG tablet     triamcinolone (KENALOG) 0.1 % external cream     No current facility-administered medications for this visit.       Allergies   Allergen Reactions     Contrast Dye Hives, Itching and Swelling     Cephalexin Hives     Colchicine Hives     Ketek [Telithromycin] Visual Disturbance     Other (Do Not Use) Other (See Comments)     Aspirin - on Plavix     Other Drug Allergy (See Comments) Itching     Pt states \"all pain meds\" make her \"itchy\"       Past Medical History:   Diagnosis Date     HTN (hypertension)      Inguinal hernia without mention of obstruction or gangrene, unilateral or unspecified, (not specified as recurrent) 08/20/2004    RIGHT     Malignant neoplasm of sigmoid colon (H) 11/24/2020     Primary osteoarthritis involving multiple joints 8/2/2019     Ventral hernia, unspecified, without mention of obstruction or gangrene 08/15/2003    easily reduced        Review of Systems  ROS:  Specifically negative for bowel/bladder dysfunction, balance changes, headache, dizziness, foot drop, fevers, chills, appetite changes, nausea/vomiting, unexplained weight loss. Otherwise 13 systems reviewed are negative. Please see the patient's intake questionnaire from today for details.    Reviewed Social, Family, Past Medical and Past Surgical history with patient, no significant changes noted since prior visit.     Objective:     BP (!) 152/69 (BP Location: Right arm, " Patient Position: Sitting)   Pulse 84   SpO2 95%     PHYSICAL EXAMINATION:    --CONSTITUTIONAL: Well developed, well nourished, healthy appearing individual.  --PSYCHIATRIC: Appropriate mood and affect. No difficulty interacting due to temper, social withdrawal, or memory issues.  --SKIN: Lumbar region is dry and intact.   --RESPIRATORY: Normal rhythm and effort. No abnormal accessory muscle breathing patterns noted.   --MUSCULOSKELETAL:  Normal lumbar lordosis noted, no lateral shift.  --GROSS MOTOR: Easily arises from a seated position. Gait is non-antalgic  --LUMBAR SPINE:  Inspection reveals no evidence of deformity. Range of motion is not limited in lumbar flexion, extension, lateral rotation. No tenderness to palpation lumbar spine. Straight leg raising is negative to radicular pain. Sciatic notch non-tender.   --SACROILIAC JOINT: Positive bilateral left greater than right distraction.  Positive bilateral left greater than right Gail's with reproduction of pain to affected extremity.  Positive bilateral left greater than right Gaenslen's Test with reproduction of pain to affected extremity. Sacroiliac Joint Compression Test positive bilateral left greater than right.  --LOWER EXTREMITY MOTOR TESTING:  Plantar flexion left 5/5, right 5/5   Dorsiflexion left 5/5, right 5/5   Great toe MTP extension left 5/5, right 5/5  Knee flexion left 5/5, right 5/5  Knee extension left 5/5, right 5/5   Hip flexion left 5/5, right 5/5  Hip abduction left 5/5, right 5/5  Hip adduction left 5/5, right 5/5   --HIPS: Full range of motion bilaterally.   --NEUROLOGIC: Bilateral patellar and achilles reflexes are physiologic and symmetric. Sensation to light touch is intact in the bilateral L4, L5, and S1 dermatomes.    RESULTS:   Imaging: Spine imaging was reviewed today. The images were shown to the patient and the findings were explained using a spine model.      Lumbar spine MRI w/o contrast  Result Date: 5/22/2023  EXAM: MR  LUMBAR SPINE W/O CONTRAST LOCATION: [facility] DATE/TIME: 5/22/2023 3:37 PM CDT INDICATION: lower back pain, left foot numbness, has metastatic colon cancer, eval for spinal compression, mass, lytic lesion fracture, epidural abscess, etc COMPARISON: 06/08/2022 MRI lumbar spine TECHNIQUE: Routine Lumbar Spine MRI without IV contrast. FINDINGS: Nomenclature is based on 5 lumbar type vertebral bodies. Normal vertebral body heights. 3 mm degenerative anterolisthesis at L3-L4. 1.5 mm anterolisthesis at L2-L3. No pars defects. Mild mid lumbar dextroconvex curvature without congenital anomaly., Normal distal spinal cord and cauda equina with conus medullaris at L1. Infrarenal abdominal aortic aneurysm is present measuring 31 mm. Evaluated on previous chest abdomen pelvis CT from 05/22/2023. Mild SI joint arthropathy. T10-T11: Patent central canal and foramen. Disc desiccation. No bulge or protrusion. T11-T12: Disc desiccation with minimal right eccentric bulge. Patent foramina and central canal. Bilateral foraminal perineural cysts incidentally noted. T12-L1: Disc desiccation. Normal facets. Patent central canal and foramen. L1-L2: Disc desiccation. Normal facets. Patent central canal and foramen. L2-L3: Loss of disc height and signal. Small, central disc protrusion with posterior annular fissure. Diminished volume with minimal residual flattening the ventral thecal sac. Mild facet arthropathy. Very mild central canal stenosis reduced when compared with the previous exam. Patent lateral recesses. Patent neural foramina. L3-L4: Disc desiccation and mild posterior disc bulge. Moderate facet arthropathy. Mild-to-moderate left and mild right foraminal stenosis. L4-L5: Disc desiccation and mild right eccentric bulge. Moderate facet ligamentous degenerative changes. Patent central canal. Slight narrowing of the right subarticular recess and upper portion the right lateral recess with right L5 nerve  contact. Very mild right foraminal stenosis. Patent foramen. L5-S1: Normal disc height. Minimal loss of disc signal. Small, central disc bulge. Mild-to-moderate facet arthropathy slightly more pronounced on left. Patent central canal. Patent lateral recesses. Patent foramina. No evidence for suspicious marrow signal replacement, pathologic fracture or epidural soft tissue mass. Negative for paraspinous tumor. Negative for abnormal nodularity of the cauda equina/conus medullaris.   IMPRESSION: 1.  Decreased size of L2-L3 disc protrusion 2.  Stable, mild degenerative changes at L3-L4 through L5-S1. 3.  No new or progressive lesions. 4.  Infrarenal abdominal aortic aneurysm      MR Sacrum and Coccyx w/o Contrast  Result Date: 5/22/2023  EXAM: MR SACRUM AND COCCYX W/O CONTRAST LOCATION: [facility] DATE/TIME: 5/22/2023 3:37 PM CDT INDICATION: Lower back pain and pain at SI joint, left foot numbness, has metastatic colon cancer, eval for spinal compression, mass, lytic lesion fracture, epidural abscess COMPARISON: CT 05/22/2023, MRI left hip 06/08/2022 TECHNIQUE: Unenhanced. FINDINGS: SI JOINTS AND BONES: -Mild degenerative arthritis of both SI joints with mild subchondral cystic change, similar to previous MRI. No suspicious bony lesion or marrow edema.. SOFT TISSUES: -No edema, mass, or fluid collection.   IMPRESSION: 1.  Mild degenerative arthritis of both SI joints. 2.  No other abnormality to explain symptoms. No evidence of metastatic disease to the sacrum.    CT Chest Abdomen Pelvis w/o Contrast  Result Date: 5/22/2023  EXAM: CT CHEST ABDOMEN PELVIS W/O CONTRAST LOCATION: [facility] DATE/TIME: 5/22/2023 1:48 PM CDT INDICATION: chest pain, back pain, abdominal pain, metastatic colon cancer, reports allergy to contrast, eval for pathology COMPARISON: PET CT 02/17/2023, CT abdomen pelvis 02/14/2023, CT chest abdomen pelvis 01/26/2023 TECHNIQUE: CT scan of the chest,  abdomen, and pelvis was performed without IV contrast. Multiplanar reformats were obtained. Dose reduction techniques were used. CONTRAST: None. FINDINGS: LUNGS AND PLEURA: No new or enlarging suspicious nodules. Unchanged linear scarring bilaterally. Upper lung predominant mild emphysema. MEDIASTINUM/AXILLAE: 1.5 x 1.1 cm left supraclavicular lymph node, which was FDG avid on 02/17/2023 (previously measuring 1.5 x 1.3 cm). No other enlarged thoracic lymph nodes. Left chest port catheter terminates in the upper SVC. Multifocal atherosclerotic calcification of the thoracic aorta, but no aneurysm or periaortic fat stranding. Left subclavian artery stent. CORONARY ARTERY CALCIFICATION: Moderate. HEPATOBILIARY: Normal. PANCREAS: Normal. SPLEEN: Normal. ADRENAL GLANDS: Normal. KIDNEYS/BLADDER: Punctate nonobstructing calculus in the upper pole left kidney and a simple cyst in the interpolar left kidney, which does not require further follow-up. BOWEL: Diverticulosis of the colon. No acute inflammatory change. No obstruction. LYMPH NODES: Enlarged producing FDG avid retroperitoneal lymph nodes. For example, lower left periaortic measuring 2.1 x 1.5 cm (3/196), previously 2.0 x 1.3 cm VASCULATURE: Moderate-severe atherosclerotic calcification of the abdominal aorta and iliofemoral arteries with 3.1 cm infrarenal aortic aneurysm. No periaortic fat stranding or hematoma. PELVIC ORGANS: Absent MUSCULOSKELETAL: Osteopenia with multilevel degenerative changes in the spine. A few scattered benign bone islands remain unchanged. No aggressive or destructive lesions.   IMPRESSION: 1.  No acute findings in the chest, abdomen, or pelvis, within limitations of noncontrast technique. 2.  Unchanged metastatic previously FDG avid left supraclavicular and retroperitoneal lymph nodes. No new or worsening disease.      MR Hip Left w/o Contrast  Result Date: 6/8/2022  EXAM: MR HIP LEFT W/O CONTRAST LOCATION: [facility] DATE/TIME: 6/8/2022 1:05 PM INDICATION: 81-year-old patient with left-sided hip pain. COMPARISON: 12/02/2021 CT. TECHNIQUE: Unenhanced. FINDINGS: LEFT HIP: -Labrum: Linear tear of the anterior labrum at the chondrolabral junction; there is a tiny adjacent paralabral cyst. Degeneration and tear of the anterosuperior labrum. -Cartilage: Mild chondromalacia in the acetabulum at the chondrolabral junction. The femoral head cartilage is preserved. -Joint space: No effusion or synovitis. -Joint capsule/ligaments: Intact joint capsule. Ligamentum teres is intact. -Morphology: Alpha Angle is normal. No bony morphologic changes associated with femoroacetabular impingement. RIGHT HIP: No fracture, osteonecrosis, or joint effusion. Mild chondromalacia. MUSCLES AND TENDONS: -Gluteal: Moderate fatty atrophy of the bilateral gluteus minimus and medius muscles. No acute tendon tear. Attenuation of the bilateral gluteus minimus tendons. The bilateral gluteus minimus medius tendons are intact. -Proximal hamstring: No tendon tear or tendinopathy. -Iliopsoas: No tendon tear or tendinopathy. No bursitis. -Rectus femoris origin: No tear or tendinopathy. BONES: -No fracture or concerning marrow replacing lesion. -Mild bilateral sacroiliac degenerative arthrosis. -Mild lower lumbar facet arthrosis. SOFT TISSUES: -Small amount of fluid in the bilateral trochanteric bursa. No soft tissue fluid collection. INTRA-PELVIC CONTENTS: -Hysterectomy. No free fluid. Probable tiny right renal lower pole cortical cyst.   IMPRESSION: 1.  No fracture or osteonecrosis. 2.  Degeneration and tearing of the left acetabular labrum. 3.  Mild left hip chondromalacia. 4.  Mild bilateral trochanteric bursitis. 5.  No acute myotendinous abnormality.     MR Lumbar Spine w/o Contrast  Result Date: 6/8/2022  EXAM: MR LUMBAR SPINE W/O CONTRAST LOCATION: [facility] DATE/TIME: 6/8/2022 1:06 PM INDICATION: Low back pain. Left hip and  leg pain. COMPARISON: MRI 07/04/2021. TECHNIQUE: Routine Lumbar Spine MRI without IV contrast. FINDINGS: Nomenclature is based on 5 lumbar type vertebral bodies. Mild convex right lumbar curvature. Normal vertebral body heights and marrow pattern. Stable slight anterior subluxation L2 on L3 and L3 on L4. Normal distal spinal cord and cauda equina with conus  medullaris at T12-L1. Enlargement of the infrarenal abdominal aorta measuring up to 2.9 cm. This is unchanged from previous MRI. Unremarkable visualized bony pelvis. T12-L1: Normal disc height with mild loss of disc signal. No herniation. Normal facets. No spinal canal or neural foraminal stenosis. L1-L2: Normal disc height with mild loss of disc signal. No herniation. Normal facets. No spinal canal or neural foraminal stenosis. L2-L3: Mild loss of disc height and signal. Broad-based disc and osteophyte complex again seen. Small superimposed left paracentral disc protrusion again seen and minimally smaller than on prior MRI. There remains moderate narrowing of the left lateral recess. Mild facet arthropathy. Central canal and neural foramen adequate. L3-L4: Mild loss of disc height and signal again seen. No herniation. Mild to moderate facet arthropathy. Central canal adequate. No significant foraminal narrowing. L4-L5: Normal disc height with mild loss of disc signal. Shallow disc bulging again seen. Moderate to advanced facet arthropathy. Central canal adequate. Mild right foraminal narrowing. No left foraminal narrowing. L5-S1: Normal disc height with mild loss of disc signal similar to prior. Shallow central disc and osteophyte complex. Mild to moderate facet arthropathy. Central canal adequate. No significant lateral recess or foraminal narrowing on either side.   IMPRESSION: 1.  Mild to moderate degenerative changes in the lumbar discs and facet joints similar to prior MRI 07/04/2021. 2.  No new high-grade central spinal canal narrowing. 3.  At the L2-L3  level there is a broad-based disc and osteophyte complex again seen with a small superimposed left paracentral disc protrusion again seen. This left paracentral disc protrusion is minimally smaller than on prior MRI. It does cause moderate narrowing of the left lateral recess as before. The central canal and neural foramen remain adequate at this level. 4.  No new left-sided disc herniation or severe unilateral left-sided foraminal narrowing elsewhere.                         Again, thank you for allowing me to participate in the care of your patient.        Sincerely,        Heaven Kuhn, CNP

## 2023-05-31 NOTE — PROGRESS NOTES
Assessment:     Diagnoses and all orders for this visit:  Bilateral buttock pain  -     PAIN Joint Injection Sacroiliac Joint Bari; Future  Sacroiliac joint dysfunction  -     PAIN Joint Injection Sacroiliac Joint Bari; Future  Sacroiliac joint pain  -     PAIN Joint Injection Sacroiliac Joint Bari; Future     Katya Butler is a 82 year old y.o. female with past medical history significant for hyperlipidemia, hypertension, coronary atherosclerosis, colon cancer, chemotherapy induced neuropathy, postherpetic neuralgia right upper extremity, Raynaud's phenomenon, tobacco use disorder, anxiety who presents today for follow-up regarding:    -Bilateral buttock pain left greater than right with increased pain with sacroiliac joint provocative maneuvers indicating sacroiliac joint pain.  No current radicular component.    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  Left supraclavicular adenopathy biopsy completed 3/13/2023.     Plan:     A shared decision making plan was used. The patient's values and choices were respected. Prior medical records were reviewed today. The following represents what was discussed and decided upon by the provider and the patient.        -DIAGNOSTIC TESTS: Images were personally reviewed and interpreted.   -- Lumbar spine MRI 5/22/2023 with decreased size and L2-3 disc protrusion.  Stable degenerative changes at L3-4, L4-5, L5-S1.  L3-4 mild to moderate left and mild right foraminal stenosis.  L4-5 right lateral recess and mild right foraminal stenosis, right L5 contact.  L5-S1 no nerve compression.  --MRI of the sacrum/coccyx 5/22/2023 with mild degenerative changes bilateral SI joints.  No fracture noted.  --CT chest abdomen pelvis 5/22/2023 with osteopenia.  Moderate to severe atherosclerotic calcification abdominal aorta with aortic aneurysm 3.1 cm.  Unchanged metastatic left supraclavicular and retroperitoneal lymph nodes.  --Left hip  MRI 6/8/2022 with degenerative tearing of the left acetabular labrum, mild left hip chondromalacia, mild bilateral trochanteric bursitis.  -- Lumbar spine MRI 6/8/2022 with L2-3 disc protrusion with disc osteophyte complex with moderate left lateral recess stenosis, fairly similar to prior imaging 2021.  Stable slight anterior subluxation L2-3 L3-4.  L4-5 mild right foraminal stenosis.    -INTERVENTIONS: Ordered bilateral sacroiliac joint steroid injection to see if we can further improve sacroiliac joint dysfunction.    -MEDICATIONS: No changes in medications advised patient to continue with acetaminophen and gabapentin.  Discussed side effects of medications and proper use. Patient verbalized understanding.    -PHYSICAL THERAPY: Did discuss physical therapy options to establish home exercises for sacroiliac joint dysfunction, patient has home exercises from her prior PT for LBP therefore defers further physical therapy at this time.  Discussed the importance of core strengthening, ROM, stretching exercises with the patient and how each of these entities is important in decreasing pain.  Explained to the patient that the purpose of physical therapy is to teach the patient a home exercise program.  These exercises need to be performed every day in order to decrease pain and prevent future occurrences of pain.        -PATIENT EDUCATION:  Total time of 32 minutes, on the day of service, spent with the patient, reviewing the chart, placing orders, and documenting.   -Today we also discussed the issues related to the current COVID-19 pandemic, the pros and cons of the current treatment plan, the CDC guidelines such as social distancing, washing the hands, and covering the cough.    -FOLLOW UP: Follow-up for SI injection with Dr. Frederick.  Advised to contact clinic if symptoms worsen or change.    Subjective:     Katya BRUNO Butler is a 82 year old female who presents today for follow-up regarding bilateral low back and  buttock pain greatest on the left lumbosacral junction which has been worse in the last couple months currently pain is a 5/10 aggravated with sitting for long periods of time as well as working and lying down.  Currently denies any radiating lower extremity pain.  Denies numbness or tingling sensations.  Denies any recent trips or falls or balance changes.  Denies bowel or bladder loss control.    *Ongoing postherpetic neuralgia symptoms right upper extremity, takes gabapentin for this.     -Treatment to Date: No prior spinal surgery   Patient does report physical therapy a couple of years ago, nothing recent for LBP.     -Medications:  Amitriptyline  Gabapentin      *Medrol Dosepak prescribed 6/8/2022  and 5/15/2023  *Hydrocodone prescriber Dr. Toussaint 5/15/2023    Patient Active Problem List   Diagnosis     Hyperlipidemia     TOBACCO USE DISORDER     Malignant neoplasm of sigmoid colon (H)     Accelerated hypertension     Acute left-sided low back pain with left-sided sciatica     Allergic rhinitis     Anxiety     Calcium pyrophosphate arthropathy of multiple sites     Chemotherapy-induced neuropathy (H)     Chronic anemia     Colon adenocarcinoma (H)     Colonic diverticular abscess     Coronary atherosclerosis     Disorder of artery (H)     Diverticulitis of colon     Essential hypertension     Idiopathic chronic gout with tophus, unspecified site     Intra-abdominal abscess (H)     Periarticular calcification     Postherpetic neuralgia     Postoperative ileus (H)     Primary osteoarthritis involving multiple joints     Unspecified visual loss     Raynaud phenomenon       Current Outpatient Medications   Medication     acetaminophen (TYLENOL) 500 MG tablet     clopidogrel (PLAVIX) 75 MG tablet     gabapentin (NEURONTIN) 100 MG capsule     amLODIPine (NORVASC) 5 MG tablet     ATROVENT 0.06 % NA SOLN     diphenhydrAMINE-acetaminophen (TYLENOL PM)  MG tablet     lidocaine-prilocaine (EMLA) 2.5-2.5 % external  "cream     lovastatin (MEVACOR) 20 MG tablet     LOVASTATIN 40 MG OR TABS     methylPREDNISolone (MEDROL DOSEPAK) 4 MG tablet therapy pack     polyethylene glycol (MIRALAX) 17 GM/Dose powder     senna-docusate (SENOKOT-S/PERICOLACE) 8.6-50 MG tablet     SENNA-docusate sodium (SENNA S) 8.6-50 MG tablet     triamcinolone (KENALOG) 0.1 % external cream     No current facility-administered medications for this visit.       Allergies   Allergen Reactions     Contrast Dye Hives, Itching and Swelling     Cephalexin Hives     Colchicine Hives     Ketek [Telithromycin] Visual Disturbance     Other (Do Not Use) Other (See Comments)     Aspirin - on Plavix     Other Drug Allergy (See Comments) Itching     Pt states \"all pain meds\" make her \"itchy\"       Past Medical History:   Diagnosis Date     HTN (hypertension)      Inguinal hernia without mention of obstruction or gangrene, unilateral or unspecified, (not specified as recurrent) 08/20/2004    RIGHT     Malignant neoplasm of sigmoid colon (H) 11/24/2020     Primary osteoarthritis involving multiple joints 8/2/2019     Ventral hernia, unspecified, without mention of obstruction or gangrene 08/15/2003    easily reduced        Review of Systems  ROS:  Specifically negative for bowel/bladder dysfunction, balance changes, headache, dizziness, foot drop, fevers, chills, appetite changes, nausea/vomiting, unexplained weight loss. Otherwise 13 systems reviewed are negative. Please see the patient's intake questionnaire from today for details.    Reviewed Social, Family, Past Medical and Past Surgical history with patient, no significant changes noted since prior visit.     Objective:     BP (!) 152/69 (BP Location: Right arm, Patient Position: Sitting)   Pulse 84   SpO2 95%     PHYSICAL EXAMINATION:    --CONSTITUTIONAL: Well developed, well nourished, healthy appearing individual.  --PSYCHIATRIC: Appropriate mood and affect. No difficulty interacting due to temper, social " withdrawal, or memory issues.  --SKIN: Lumbar region is dry and intact.   --RESPIRATORY: Normal rhythm and effort. No abnormal accessory muscle breathing patterns noted.   --MUSCULOSKELETAL:  Normal lumbar lordosis noted, no lateral shift.  --GROSS MOTOR: Easily arises from a seated position. Gait is non-antalgic  --LUMBAR SPINE:  Inspection reveals no evidence of deformity. Range of motion is not limited in lumbar flexion, extension, lateral rotation. No tenderness to palpation lumbar spine. Straight leg raising is negative to radicular pain. Sciatic notch non-tender.   --SACROILIAC JOINT: Positive bilateral left greater than right distraction.  Positive bilateral left greater than right Gail's with reproduction of pain to affected extremity.  Positive bilateral left greater than right Gaenslen's Test with reproduction of pain to affected extremity. Sacroiliac Joint Compression Test positive bilateral left greater than right.  --LOWER EXTREMITY MOTOR TESTING:  Plantar flexion left 5/5, right 5/5   Dorsiflexion left 5/5, right 5/5   Great toe MTP extension left 5/5, right 5/5  Knee flexion left 5/5, right 5/5  Knee extension left 5/5, right 5/5   Hip flexion left 5/5, right 5/5  Hip abduction left 5/5, right 5/5  Hip adduction left 5/5, right 5/5   --HIPS: Full range of motion bilaterally.   --NEUROLOGIC: Bilateral patellar and achilles reflexes are physiologic and symmetric. Sensation to light touch is intact in the bilateral L4, L5, and S1 dermatomes.    RESULTS:   Imaging: Spine imaging was reviewed today. The images were shown to the patient and the findings were explained using a spine model.      Lumbar spine MRI w/o contrast  Result Date: 5/22/2023  EXAM: MR LUMBAR SPINE W/O CONTRAST LOCATION: [facility] DATE/TIME: 5/22/2023 3:37 PM CDT INDICATION: lower back pain, left foot numbness, has metastatic colon cancer, eval for spinal compression, mass, lytic lesion fracture, epidural  abscess, etc COMPARISON: 06/08/2022 MRI lumbar spine TECHNIQUE: Routine Lumbar Spine MRI without IV contrast. FINDINGS: Nomenclature is based on 5 lumbar type vertebral bodies. Normal vertebral body heights. 3 mm degenerative anterolisthesis at L3-L4. 1.5 mm anterolisthesis at L2-L3. No pars defects. Mild mid lumbar dextroconvex curvature without congenital anomaly., Normal distal spinal cord and cauda equina with conus medullaris at L1. Infrarenal abdominal aortic aneurysm is present measuring 31 mm. Evaluated on previous chest abdomen pelvis CT from 05/22/2023. Mild SI joint arthropathy. T10-T11: Patent central canal and foramen. Disc desiccation. No bulge or protrusion. T11-T12: Disc desiccation with minimal right eccentric bulge. Patent foramina and central canal. Bilateral foraminal perineural cysts incidentally noted. T12-L1: Disc desiccation. Normal facets. Patent central canal and foramen. L1-L2: Disc desiccation. Normal facets. Patent central canal and foramen. L2-L3: Loss of disc height and signal. Small, central disc protrusion with posterior annular fissure. Diminished volume with minimal residual flattening the ventral thecal sac. Mild facet arthropathy. Very mild central canal stenosis reduced when compared with the previous exam. Patent lateral recesses. Patent neural foramina. L3-L4: Disc desiccation and mild posterior disc bulge. Moderate facet arthropathy. Mild-to-moderate left and mild right foraminal stenosis. L4-L5: Disc desiccation and mild right eccentric bulge. Moderate facet ligamentous degenerative changes. Patent central canal. Slight narrowing of the right subarticular recess and upper portion the right lateral recess with right L5 nerve contact. Very mild right foraminal stenosis. Patent foramen. L5-S1: Normal disc height. Minimal loss of disc signal. Small, central disc bulge. Mild-to-moderate facet arthropathy slightly more pronounced on left. Patent central canal. Patent lateral  recesses. Patent foramina. No evidence for suspicious marrow signal replacement, pathologic fracture or epidural soft tissue mass. Negative for paraspinous tumor. Negative for abnormal nodularity of the cauda equina/conus medullaris.   IMPRESSION: 1.  Decreased size of L2-L3 disc protrusion 2.  Stable, mild degenerative changes at L3-L4 through L5-S1. 3.  No new or progressive lesions. 4.  Infrarenal abdominal aortic aneurysm      MR Sacrum and Coccyx w/o Contrast  Result Date: 5/22/2023  EXAM: MR SACRUM AND COCCYX W/O CONTRAST LOCATION: [facility] DATE/TIME: 5/22/2023 3:37 PM CDT INDICATION: Lower back pain and pain at SI joint, left foot numbness, has metastatic colon cancer, eval for spinal compression, mass, lytic lesion fracture, epidural abscess COMPARISON: CT 05/22/2023, MRI left hip 06/08/2022 TECHNIQUE: Unenhanced. FINDINGS: SI JOINTS AND BONES: -Mild degenerative arthritis of both SI joints with mild subchondral cystic change, similar to previous MRI. No suspicious bony lesion or marrow edema.. SOFT TISSUES: -No edema, mass, or fluid collection.   IMPRESSION: 1.  Mild degenerative arthritis of both SI joints. 2.  No other abnormality to explain symptoms. No evidence of metastatic disease to the sacrum.    CT Chest Abdomen Pelvis w/o Contrast  Result Date: 5/22/2023  EXAM: CT CHEST ABDOMEN PELVIS W/O CONTRAST LOCATION: [facility] DATE/TIME: 5/22/2023 1:48 PM CDT INDICATION: chest pain, back pain, abdominal pain, metastatic colon cancer, reports allergy to contrast, eval for pathology COMPARISON: PET CT 02/17/2023, CT abdomen pelvis 02/14/2023, CT chest abdomen pelvis 01/26/2023 TECHNIQUE: CT scan of the chest, abdomen, and pelvis was performed without IV contrast. Multiplanar reformats were obtained. Dose reduction techniques were used. CONTRAST: None. FINDINGS: LUNGS AND PLEURA: No new or enlarging suspicious nodules. Unchanged linear scarring bilaterally.  Upper lung predominant mild emphysema. MEDIASTINUM/AXILLAE: 1.5 x 1.1 cm left supraclavicular lymph node, which was FDG avid on 02/17/2023 (previously measuring 1.5 x 1.3 cm). No other enlarged thoracic lymph nodes. Left chest port catheter terminates in the upper SVC. Multifocal atherosclerotic calcification of the thoracic aorta, but no aneurysm or periaortic fat stranding. Left subclavian artery stent. CORONARY ARTERY CALCIFICATION: Moderate. HEPATOBILIARY: Normal. PANCREAS: Normal. SPLEEN: Normal. ADRENAL GLANDS: Normal. KIDNEYS/BLADDER: Punctate nonobstructing calculus in the upper pole left kidney and a simple cyst in the interpolar left kidney, which does not require further follow-up. BOWEL: Diverticulosis of the colon. No acute inflammatory change. No obstruction. LYMPH NODES: Enlarged producing FDG avid retroperitoneal lymph nodes. For example, lower left periaortic measuring 2.1 x 1.5 cm (3/196), previously 2.0 x 1.3 cm VASCULATURE: Moderate-severe atherosclerotic calcification of the abdominal aorta and iliofemoral arteries with 3.1 cm infrarenal aortic aneurysm. No periaortic fat stranding or hematoma. PELVIC ORGANS: Absent MUSCULOSKELETAL: Osteopenia with multilevel degenerative changes in the spine. A few scattered benign bone islands remain unchanged. No aggressive or destructive lesions.   IMPRESSION: 1.  No acute findings in the chest, abdomen, or pelvis, within limitations of noncontrast technique. 2.  Unchanged metastatic previously FDG avid left supraclavicular and retroperitoneal lymph nodes. No new or worsening disease.      MR Hip Left w/o Contrast  Result Date: 6/8/2022  EXAM: MR HIP LEFT W/O CONTRAST LOCATION: [facility] DATE/TIME: 6/8/2022 1:05 PM INDICATION: 81-year-old patient with left-sided hip pain. COMPARISON: 12/02/2021 CT. TECHNIQUE: Unenhanced. FINDINGS: LEFT HIP: -Labrum: Linear tear of the anterior labrum at the chondrolabral junction; there is a tiny  adjacent paralabral cyst. Degeneration and tear of the anterosuperior labrum. -Cartilage: Mild chondromalacia in the acetabulum at the chondrolabral junction. The femoral head cartilage is preserved. -Joint space: No effusion or synovitis. -Joint capsule/ligaments: Intact joint capsule. Ligamentum teres is intact. -Morphology: Alpha Angle is normal. No bony morphologic changes associated with femoroacetabular impingement. RIGHT HIP: No fracture, osteonecrosis, or joint effusion. Mild chondromalacia. MUSCLES AND TENDONS: -Gluteal: Moderate fatty atrophy of the bilateral gluteus minimus and medius muscles. No acute tendon tear. Attenuation of the bilateral gluteus minimus tendons. The bilateral gluteus minimus medius tendons are intact. -Proximal hamstring: No tendon tear or tendinopathy. -Iliopsoas: No tendon tear or tendinopathy. No bursitis. -Rectus femoris origin: No tear or tendinopathy. BONES: -No fracture or concerning marrow replacing lesion. -Mild bilateral sacroiliac degenerative arthrosis. -Mild lower lumbar facet arthrosis. SOFT TISSUES: -Small amount of fluid in the bilateral trochanteric bursa. No soft tissue fluid collection. INTRA-PELVIC CONTENTS: -Hysterectomy. No free fluid. Probable tiny right renal lower pole cortical cyst.   IMPRESSION: 1.  No fracture or osteonecrosis. 2.  Degeneration and tearing of the left acetabular labrum. 3.  Mild left hip chondromalacia. 4.  Mild bilateral trochanteric bursitis. 5.  No acute myotendinous abnormality.     MR Lumbar Spine w/o Contrast  Result Date: 6/8/2022  EXAM: MR LUMBAR SPINE W/O CONTRAST LOCATION: [facility] DATE/TIME: 6/8/2022 1:06 PM INDICATION: Low back pain. Left hip and leg pain. COMPARISON: MRI 07/04/2021. TECHNIQUE: Routine Lumbar Spine MRI without IV contrast. FINDINGS: Nomenclature is based on 5 lumbar type vertebral bodies. Mild convex right lumbar curvature. Normal vertebral body heights and marrow pattern. Stable  slight anterior subluxation L2 on L3 and L3 on L4. Normal distal spinal cord and cauda equina with conus  medullaris at T12-L1. Enlargement of the infrarenal abdominal aorta measuring up to 2.9 cm. This is unchanged from previous MRI. Unremarkable visualized bony pelvis. T12-L1: Normal disc height with mild loss of disc signal. No herniation. Normal facets. No spinal canal or neural foraminal stenosis. L1-L2: Normal disc height with mild loss of disc signal. No herniation. Normal facets. No spinal canal or neural foraminal stenosis. L2-L3: Mild loss of disc height and signal. Broad-based disc and osteophyte complex again seen. Small superimposed left paracentral disc protrusion again seen and minimally smaller than on prior MRI. There remains moderate narrowing of the left lateral recess. Mild facet arthropathy. Central canal and neural foramen adequate. L3-L4: Mild loss of disc height and signal again seen. No herniation. Mild to moderate facet arthropathy. Central canal adequate. No significant foraminal narrowing. L4-L5: Normal disc height with mild loss of disc signal. Shallow disc bulging again seen. Moderate to advanced facet arthropathy. Central canal adequate. Mild right foraminal narrowing. No left foraminal narrowing. L5-S1: Normal disc height with mild loss of disc signal similar to prior. Shallow central disc and osteophyte complex. Mild to moderate facet arthropathy. Central canal adequate. No significant lateral recess or foraminal narrowing on either side.   IMPRESSION: 1.  Mild to moderate degenerative changes in the lumbar discs and facet joints similar to prior MRI 07/04/2021. 2.  No new high-grade central spinal canal narrowing. 3.  At the L2-L3 level there is a broad-based disc and osteophyte complex again seen with a small superimposed left paracentral disc protrusion again seen. This left paracentral disc protrusion is minimally smaller than on prior MRI. It does cause moderate narrowing of the  left lateral recess as before. The central canal and neural foramen remain adequate at this level. 4.  No new left-sided disc herniation or severe unilateral left-sided foraminal narrowing elsewhere.

## 2023-06-15 ENCOUNTER — TELEPHONE (OUTPATIENT)
Dept: PHYSICAL MEDICINE AND REHAB | Facility: CLINIC | Age: 83
End: 2023-06-15
Payer: COMMERCIAL

## 2023-06-15 PROBLEM — G89.29 OTHER CHRONIC PAIN: Status: ACTIVE | Noted: 2023-06-15

## 2023-06-15 PROBLEM — C18.7 MALIGNANT NEOPLASM OF SIGMOID COLON (H): Status: ACTIVE | Noted: 2020-11-24

## 2023-06-15 PROBLEM — M79.601 ARM PAIN, RIGHT: Status: ACTIVE | Noted: 2022-06-06

## 2023-06-15 NOTE — TELEPHONE ENCOUNTER
Spoke to pt regarding allergy. Pt is agreeable to using alternate contrast dye without pretreating as Heaven JIMENEZ has advised.

## 2023-06-15 NOTE — TELEPHONE ENCOUNTER
Contacted patient regarding contrast allergy and upcoming injection appointment. Pt states that she had a severe allergic reaction to contrast dye that was used for a cardiac stress test back in 2010. She said that her reaction was latent and that the next day when she woke up she was covered in hives, very itchy, and had serious facial swelling to the point that she was having a hard time seeing. While she noted that her lips/face/tongue were swollen, the tongue swelling was minimal and she did not have difficulty breathing. She ended up having to go in for infusions for several weeks for this issue. I will notify the injection provider and ask him to advise on the matter.     Once determination is made please contact patient with update, as well as any updates as to whether or not insurance has approved this injection.

## 2023-06-19 ENCOUNTER — RADIOLOGY INJECTION OFFICE VISIT (OUTPATIENT)
Dept: PHYSICAL MEDICINE AND REHAB | Facility: CLINIC | Age: 83
End: 2023-06-19
Attending: NURSE PRACTITIONER
Payer: COMMERCIAL

## 2023-06-19 VITALS
RESPIRATION RATE: 16 BRPM | TEMPERATURE: 97.6 F | DIASTOLIC BLOOD PRESSURE: 64 MMHG | HEART RATE: 78 BPM | OXYGEN SATURATION: 98 % | SYSTOLIC BLOOD PRESSURE: 138 MMHG

## 2023-06-19 DIAGNOSIS — M79.18 BILATERAL BUTTOCK PAIN: ICD-10-CM

## 2023-06-19 DIAGNOSIS — M53.3 SACROILIAC JOINT PAIN: ICD-10-CM

## 2023-06-19 DIAGNOSIS — M53.3 SACROILIAC JOINT DYSFUNCTION: ICD-10-CM

## 2023-06-19 PROCEDURE — A9576 INJ PROHANCE MULTIPACK: HCPCS | Performed by: PAIN MEDICINE

## 2023-06-19 PROCEDURE — 27096 INJECT SACROILIAC JOINT: CPT | Mod: 50 | Performed by: PAIN MEDICINE

## 2023-06-19 RX ORDER — LIDOCAINE HYDROCHLORIDE 10 MG/ML
INJECTION, SOLUTION EPIDURAL; INFILTRATION; INTRACAUDAL; PERINEURAL
Status: COMPLETED | OUTPATIENT
Start: 2023-06-19 | End: 2023-06-19

## 2023-06-19 RX ORDER — ROPIVACAINE HYDROCHLORIDE 5 MG/ML
INJECTION, SOLUTION EPIDURAL; INFILTRATION; PERINEURAL
Status: COMPLETED | OUTPATIENT
Start: 2023-06-19 | End: 2023-06-19

## 2023-06-19 RX ORDER — METHYLPREDNISOLONE ACETATE 40 MG/ML
INJECTION, SUSPENSION INTRA-ARTICULAR; INTRALESIONAL; INTRAMUSCULAR; SOFT TISSUE
Status: COMPLETED | OUTPATIENT
Start: 2023-06-19 | End: 2023-06-19

## 2023-06-19 RX ADMIN — LIDOCAINE HYDROCHLORIDE 4 ML: 10 INJECTION, SOLUTION EPIDURAL; INFILTRATION; INTRACAUDAL; PERINEURAL at 11:13

## 2023-06-19 RX ADMIN — ROPIVACAINE HYDROCHLORIDE 5 ML: 5 INJECTION, SOLUTION EPIDURAL; INFILTRATION; PERINEURAL at 11:13

## 2023-06-19 RX ADMIN — METHYLPREDNISOLONE ACETATE 40 MG: 40 INJECTION, SUSPENSION INTRA-ARTICULAR; INTRALESIONAL; INTRAMUSCULAR; SOFT TISSUE at 11:13

## 2023-06-19 ASSESSMENT — PAIN SCALES - GENERAL
PAINLEVEL: EXTREME PAIN (8)
PAINLEVEL: NO PAIN (0)

## 2023-06-19 NOTE — PATIENT INSTRUCTIONS
DISCHARGE INSTRUCTIONS    During office hours (8:00 a.m.- 4:00 p.m.) questions or concerns may be answered  by calling [facility] Navigation Nurses at  [phone].  Messages received after hours will be returned the following business day.      In the case of an emergency, please dial 911 or seek assistance at the nearest Emergency Room/Urgent Care facility.     All Patients:    You may experience an increase in your symptoms for the first 2 days (It may take anywhere between 2 days- 2 weeks for the steroid to have maximum effect).    You may use ice on the injection site, as frequently as 20 minutes each hour if needed.    You may take your pain medicine.    You may continue taking your regular medication after your injection. If you have had a Medial Branch Block you may resume pain medication once your pain diary is completed.    You may shower. No swimming, tub bath or hot tub for 48 hours.  You may remove your bandaid/bandage as soon as you are home.    You may resume light activities, as tolerated.    Resume your usual diet as tolerated.    It is strongly advised that you do not drive for 1-3 hours post injection.    If you have had oral sedation:  Do not drive for 8 hours post injection.      If you have had IV sedation:  Do not drive for 24 hours post injection.  Do not operate hazardous machinery or make important personal/business decisions for 24 hours.      POSSIBLE STEROID SIDE EFFECTS (If steroid/cortisone was used for your procedure)    -If you experience these symptoms, it should only last for a short period    Swelling of the legs              Skin redness (flushing)     Mouth (oral) irritation   Blood sugar (glucose) levels            Sweats                    Mood changes  Headache  Sleeplessness  Weakened immune system for up to 14 days, which could increase the risk of vince the COVID-19 virus and/or experiencing more severe symptoms of the disease, if exposed.  Decreased  effectiveness of the flu vaccine if given within 2 weeks of the steroid.         POSSIBLE PROCEDURE SIDE EFFECTS  -Call the Spine Center if you are concerned  Increased Pain           Increased numbness/tingling      Nausea/Vomiting          Bruising/bleeding at site      Redness or swelling                                              Difficulty walking      Weakness           Fever greater than 100.5    *In the event of a severe headache after an epidural steroid injection that is relieved by lying down, please call the [facility] to speak with a clinical staff member*

## 2023-07-05 ENCOUNTER — TELEPHONE (OUTPATIENT)
Dept: PHYSICAL MEDICINE AND REHAB | Facility: CLINIC | Age: 83
End: 2023-07-05
Payer: COMMERCIAL

## 2023-07-05 DIAGNOSIS — M79.18 BILATERAL BUTTOCK PAIN: Primary | ICD-10-CM

## 2023-07-05 DIAGNOSIS — M51.369 DDD (DEGENERATIVE DISC DISEASE), LUMBAR: ICD-10-CM

## 2023-07-05 DIAGNOSIS — M53.3 SACROILIAC JOINT DYSFUNCTION: ICD-10-CM

## 2023-07-05 DIAGNOSIS — M53.3 SACROILIAC JOINT PAIN: ICD-10-CM

## 2023-07-05 DIAGNOSIS — G89.29 CHRONIC LEFT-SIDED LOW BACK PAIN WITH LEFT-SIDED SCIATICA: ICD-10-CM

## 2023-07-05 DIAGNOSIS — M54.42 CHRONIC LEFT-SIDED LOW BACK PAIN WITH LEFT-SIDED SCIATICA: ICD-10-CM

## 2023-07-05 NOTE — TELEPHONE ENCOUNTER
[facility] Call Center    Phone Message    May a detailed message be left on voicemail: yes     Reason for Call: Other: pt calling back re: injection that was done on 06/19   - pt advised that injections did not help. Requesting call back to discuss what next steps are. Offered to schedule f/up apt with Heaven - pt declined at this time.  # [phone]        Action Taken: Other: Heaven mario CNP     Travel Screening: Not Applicable

## 2023-07-05 NOTE — TELEPHONE ENCOUNTER
"PSP:  [place]  Last clinic visit: 6-19-23 Sesar  Reason for call: Continued pain  Clinical information:  Pt is still experiencing pain whenever she sits and tries to sleep at night. Pt refuses follow up visit to discuss outcome d/t expense and difficulty with transportation. Was offered VV and still declined. Pt is currently taking 2 Tylenol before bed, which is helpful, but she is concerned about long term effects. Reassured her this is fine to continue. Pt requested Vicodin because her son told her Tylenol would \"ruin the lining\" of her stomach. Encouraged pt to schedule PT as ordered and provided phone number.   Provider to address: [facility]  "

## 2023-07-06 NOTE — TELEPHONE ENCOUNTER
[facility] Call Center    Phone Message    May a detailed message be left on voicemail: yes     Reason for Call Patient asking for Call back to discuss P T . Injection She had didn't work   Action Taken: Message routed to:  Other: [facility]    Travel Screening: Not Applicable

## 2023-07-06 NOTE — TELEPHONE ENCOUNTER
Phone call to patient to discuss. Patient would like a referral for PT placed. She does not drive, but did locate a place in [place] she can get to. Address give was to the [facility]. She would like a PT referral for here.

## 2023-07-09 NOTE — PROGRESS NOTES
[facility] Hematology and Oncology Progress Note    Patient: Katya Butler  MRN: [id number]  Date of Service: Apr 28, 2023        Assessment and Plan:    Cancer Staging  Malignant neoplasm of sigmoid colon (H)  Staging form: Colon And Rectum, AJCC 8th Edition  - Clinical: No stage assigned - Unsigned  - Pathologic stage from 11/24/2020: Stage IIIC (pT4b, pN2, cM0) - Signed by Walt Merrill MD on 11/24/2020     1.  Colon cancer: She has recently been found to have metastatic disease.  At her visit on March 20 2 treatment options involving chemotherapy were outlined.  She wanted to take some time to think about it.  We spent some time again today reviewing her diagnosis and various treatment options.  At this point she would like to continue to avoid chemotherapy.  She is most concerned about quality of life.  I like to see her back in clinic in 6 to 8 weeks to see how she is doing clinically.    2.  Large cecal polyp found on colonoscopy: Remains some FDG activity in this area on her recent PET scan from February of this year. This polyp was biopsied on February 4, 2021 at [facility] and showed high-grade dysplasia, negative for invasive malignancy.    3.  Parotid lesion: SUV max of 4.7 on PET scan.  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  Stable.  No plan to biopsy given metastatic cancer.    4.  Low back pain: She has chronic low back pain which is significant for her.  This is currently affecting her quality of life.  We talked about options for this.  Going to give her a referral to a spine clinic to discuss options.    Today's visit was centered around a metastatic cancer diagnosis in the setting of additional medical comorbidities. The risk of additional morbidity or mortality with or without further treatment is high.     ECOG Performance  0    Diagnosis:    1.  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  At initial surgery there was evidence of a  small abscess and perforation of the colon.  Pathology showed a invasive moderately differentiated adenocarcinoma. Constricting ulcerated measuring 35 x 25 x 13 mm.  4 of 18 lymph nodes were positive. Mismatch repair intact. There was some proximal colonic dilatation, consistent with obstruction.  Left ovary showed moderately differentiated adenocarcinoma, direct extension.  Focal positive margin at the paraovarian soft tissue margin.      Recurrence diagnosed in March 2023 from a left supraclavicular lymph node.  MSI low.  HER2 negative.  PET scan imaging shows uptake in left supraclavicular nodes and retroperitoneal lymph nodes.    Treatment:    Surgical resection on October 31, 2020.  Adjuvant chemotherapy with FOLFOX was initiated on December 8, 2020.  25% initial dose reduction of all medications. Cycle 10 completed on April 20, 2021.    Interim History:    Kirill returns today for follow-up visit.  She has recently been found to have recurrent disease.  She is having a fair amount of low back pain which is chronic.  This is affecting her quality of life.  She seems to be asymptomatic from her cancer at this point.  No acute complaints today.    Review of Systems:    As above in the history.     Review of Systems otherwise Negative for:  General: chills, fever or night sweats  Psychological: anxiety or depression  Ophthalmic: blurry vision, double vision or loss of vision, vision change  ENT: epistaxis, oral lesions, hearing changes  Hematological and Lymphatic: bleeding, bruising, jaundice, swollen lymph nodes  Endocrine: hot flashes, unexpected weight changes  Respiratory: cough, hemoptysis, orthopnea or shortness of breath/KLEIN  Cardiovascular: chest pain, edema, palpitations or PND  Gastrointestinal: blood in stools, change in bowel habits, constipation, diarrhea or nausea/vomiting  Genito-Urinary: change in urinary stream, incontinence, frequency/urgency  Musculoskeletal: joint pain, stiffness, swelling,  muscle pain  Neurological: dizziness, headaches, numbness/tingling  Dermatological: lumps and rash    Past History:    Past Medical History:   Diagnosis Date     HTN (hypertension)      Inguinal hernia without mention of obstruction or gangrene, unilateral or unspecified, (not specified as recurrent) 08/20/2004    RIGHT     Malignant neoplasm of sigmoid colon (H) 11/24/2020     Primary osteoarthritis involving multiple joints 8/2/2019     Ventral hernia, unspecified, without mention of obstruction or gangrene 08/15/2003    easily reduced     Physical Exam:    BP (!) 188/85 (BP Location: Left arm, Patient Position: Sitting, Cuff Size: Adult Small)   Pulse 86   Temp 98.3  F (36.8  C) (Oral)   Resp 28   Wt 49.4 kg (108 lb 14.4 oz)   SpO2 96%   BMI 20.24 kg/m      General: patient appears stated age of 81 year old. Nontoxic and in no distress.   HEENT: Head: atraumatic, normocephalic. Sclerae anicteric.  Chest:  Normal respiratory effort  Cardiac:  No edema.   Abdomen: abdomen is non-distended  Extremities: normal tone and muscle bulk.  Skin: no lesions or rash on visible skin. Warm and dry.   CNS: alert and oriented. Grossly non-focal.   Psychiatric: normal mood and affect.     Lab Results:    No results found for this or any previous visit (from the past 168 hour(s)).     Imaging:      Signed by: Walt Merrill MD

## 2023-07-10 ENCOUNTER — TELEPHONE (OUTPATIENT)
Dept: ONCOLOGY | Facility: HOSPITAL | Age: 83
End: 2023-07-10
Payer: COMMERCIAL

## 2023-07-10 NOTE — TELEPHONE ENCOUNTER
"I call Ktaya per Dr. Merrill' request, \"Can we get this person back into clinic to see me or a nurse practitioner for general follow-up and symptom management.\"    I was unable to get a hold of Katya. I left her a voice message, indicating the above. I asked that she call me back.     Daria Reyez  RN Care Coordinator  [facility]    "

## 2023-07-17 ENCOUNTER — LAB REQUISITION (OUTPATIENT)
Dept: LAB | Facility: CLINIC | Age: 83
End: 2023-07-17

## 2023-07-17 DIAGNOSIS — R30.0 DYSURIA: ICD-10-CM

## 2023-07-17 PROCEDURE — 87086 URINE CULTURE/COLONY COUNT: CPT | Performed by: PHYSICIAN ASSISTANT

## 2023-07-19 ENCOUNTER — ONCOLOGY VISIT (OUTPATIENT)
Dept: ONCOLOGY | Facility: HOSPITAL | Age: 83
End: 2023-07-19
Attending: INTERNAL MEDICINE
Payer: COMMERCIAL

## 2023-07-19 VITALS
RESPIRATION RATE: 18 BRPM | HEART RATE: 78 BPM | SYSTOLIC BLOOD PRESSURE: 140 MMHG | WEIGHT: 110.5 LBS | BODY MASS INDEX: 20.21 KG/M2 | DIASTOLIC BLOOD PRESSURE: 76 MMHG | TEMPERATURE: 98.5 F

## 2023-07-19 DIAGNOSIS — G89.29 CHRONIC MIDLINE LOW BACK PAIN WITHOUT SCIATICA: Primary | ICD-10-CM

## 2023-07-19 DIAGNOSIS — C18.9 COLON ADENOCARCINOMA (H): ICD-10-CM

## 2023-07-19 DIAGNOSIS — M54.50 CHRONIC MIDLINE LOW BACK PAIN WITHOUT SCIATICA: Primary | ICD-10-CM

## 2023-07-19 LAB — BACTERIA UR CULT: ABNORMAL

## 2023-07-19 PROCEDURE — 99215 OFFICE O/P EST HI 40 MIN: CPT | Performed by: NURSE PRACTITIONER

## 2023-07-19 PROCEDURE — G0463 HOSPITAL OUTPT CLINIC VISIT: HCPCS | Performed by: NURSE PRACTITIONER

## 2023-07-19 RX ORDER — AMOXICILLIN 250 MG
1-2 CAPSULE ORAL 2 TIMES DAILY PRN
Qty: 60 TABLET | Refills: 5 | Status: SHIPPED | OUTPATIENT
Start: 2023-07-19

## 2023-07-19 RX ORDER — HYDROCODONE BITARTRATE AND ACETAMINOPHEN 5; 325 MG/1; MG/1
1 TABLET ORAL
Qty: 30 TABLET | Refills: 0 | Status: SHIPPED | OUTPATIENT
Start: 2023-07-19 | End: 2023-10-23

## 2023-07-19 ASSESSMENT — PAIN SCALES - GENERAL: PAINLEVEL: MILD PAIN (2)

## 2023-07-19 NOTE — PROGRESS NOTES
"Oncology Rooming Note    July 19, 2023 1:45 PM   Katya Butler is a 83 year old female who presents for:    Chief Complaint   Patient presents with     Oncology Clinic Visit     Colon adenocarcinoma (H)  Malignant neoplasm of sigmoid colon (H)     Initial Vitals: BP (!) 140/76   Pulse 78   Temp 98.5  F (36.9  C)   Resp 18   Wt 50.1 kg (110 lb 8 oz)   BMI 20.21 kg/m   Estimated body mass index is 20.21 kg/m  as calculated from the following:    Height as of 5/22/23: 1.575 m (5' 2\").    Weight as of this encounter: 50.1 kg (110 lb 8 oz). Body surface area is 1.48 meters squared.  Mild Pain (2) Comment: Data Unavailable   No LMP recorded. Patient has had a hysterectomy.  Allergies reviewed: Yes  Medications reviewed: Yes    Medications: Medication refills not needed today.  Pharmacy name entered into [facility]: [id number] - [place] [id number]    Clinical concerns: Constipated. Back pain at night is worse than the day.         Rayne Acevedo LPN            "

## 2023-07-19 NOTE — LETTER
"    7/19/2023         RE: Katya Butler  [address] [facility] [address]        Dear Colleague,    Thank you for referring your patient, Katya Butler, to the [facility]. Please see a copy of my visit note below.    Oncology Rooming Note    July 19, 2023 1:45 PM   Katya Butler is a 83 year old female who presents for:    Chief Complaint   Patient presents with     Oncology Clinic Visit     Colon adenocarcinoma (H)  Malignant neoplasm of sigmoid colon (H)     Initial Vitals: BP (!) 140/76   Pulse 78   Temp 98.5  F (36.9  C)   Resp 18   Wt 50.1 kg (110 lb 8 oz)   BMI 20.21 kg/m   Estimated body mass index is 20.21 kg/m  as calculated from the following:    Height as of 5/22/23: 1.575 m (5' 2\").    Weight as of this encounter: 50.1 kg (110 lb 8 oz). Body surface area is 1.48 meters squared.  Mild Pain (2) Comment: Data Unavailable   No LMP recorded. Patient has had a hysterectomy.  Allergies reviewed: Yes  Medications reviewed: Yes    Medications: Medication refills not needed today.  Pharmacy name entered into [facility]: [id number] - [place] [id number]    Clinical concerns: Constipated. Back pain at night is worse than the day.         Rayne Acevedo LPN              [facility] Hematology and Oncology Progress Note    Patient: Katya Butler  MRN: [id number]  Date of Service: Jul 19, 2023          Reason for Visit    Chief Complaint   Patient presents with     Oncology Clinic Visit     Colon adenocarcinoma (H)  Malignant neoplasm of sigmoid colon (H)       Assessment and Plan     Cancer Staging   Colon adenocarcinoma (H)  Staging form: Colon and Rectum, AJCC 8th Edition  - Clinical stage from 3/13/2023: Stage IVB (cT0, cNX, cM1b) - Signed by Jody Tolentino APRN CNP on 3/20/2023  No loss of nuclear expression of MMR proteins  Her2/adeel negative  No KRAS, NRAS, BRAF mutation.   TMB score 0, low  Oncology fusion event: negative      1.  " Colon cancer, stage IIIc diagnosed in October 2020, recurrent disease with stage IV found in March 2023, retroperitoneal lymphadenopathy as well as supraclavicular lymphadenopathy: Patient has decided that she doesn't want to do treatment for the cancer. She states if it is not curable, she does not think the treatment is worth the side effects. Had a hard time with previous treatment. We talked a little about what to expect as cancer progresses. Her last CT scan in May, did show pretty stable disease so it does not appear that her cancer is growing quickly. Her son asked if we should do any imaging, and at this time, if she does not want to do treatment, there really is no reason to do a scan. If she has a new symptom that is causing a problem, we may be able to do imaging to see if cancer related and would have some potential for palliative treatment like radiation. I did tell her that we would like to see her somewhat regularly to make sure her symptoms are under control. She did not want to make an appointment now. I will have a nurse call her in 6 weeks to see how she is doing and make a follow up appt.     2.  Constipation: Encouraged her to continue to take something daily instead of PRN. I did send over Senna S and said to take 1-2 tablets daily and titrate up or down depending on BM's.  Encouraged her to take more so she stays comfortable and is having regular bowel movements.    3. Chronic back pain: met with spinal center. Try injections and they did not help. Mainly affects her at night and she cannot sleep some nights due to pain. Uses tylenol and that usually helps, but some nights it doesn't. I will give her a small amt of Norco to use in case pain is severe. Educated on side effects and addiction potential. Also educated on side effects including sedation, constipation.     ECOG Performance    1 - Can't do physically strenuous work, but fully ambyulatory and can do light sedentary work    Distress  Screening (within last 30 days)    No data recorded     Pain  Pain Score: Mild Pain (2)    Problem List    Patient Active Problem List   Diagnosis     Hyperlipemia     TOBACCO USE DISORDER     Malignant neoplasm of sigmoid colon (H)     Accelerated hypertension     Acute left-sided low back pain with left-sided sciatica     Allergic rhinitis     Anxiety     Calcium pyrophosphate arthropathy of multiple sites     Drug-induced polyneuropathy (H)     Chronic anemia     Colon adenocarcinoma (H)     Colonic diverticular abscess     Coronary atherosclerosis     Disorder of artery (H)     Diverticulitis of colon     Essential hypertension     Idiopathic chronic gout with tophus, unspecified site     Intra-abdominal abscess (H)     Periarticular calcification     Postherpetic neuralgia     Postoperative ileus (H)     Primary osteoarthritis involving multiple joints     Unspecified visual loss     Raynaud phenomenon     Other chronic pain     Arm pain, right        ______________________________________________________________________________    History of Present Illness    Diagnosis:     1.  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  At initial surgery there was evidence of a small abscess and perforation of the colon.  Pathology showed a invasive moderately differentiated adenocarcinoma. Constricting ulcerated measuring 35 x 25 x 13 mm.  4 of 18 lymph nodes were positive. Mismatch repair intact. There was some proximal colonic dilatation, consistent with obstruction.  Left ovary showed moderately differentiated adenocarcinoma, direct extension.  Focal positive margin at the paraovarian soft tissue margin.    -PET scan on 2/17/2023 shows suspicious for left supraclavicular and retroperitoneal lymph node mets.  -Biopsy done of super clavicular lymph node on the left side that is positive for malignant cells, consistent with moderately differentiated adenocarcinoma of the colon.  HER2/adeel negative.   MMR intact.  CEA slightly elevated at 8.2.     Treatment:     Surgical resection on October 31, 2020.  Adjuvant chemotherapy with FOLFOX was initiated on December 8, 2020.  25% initial dose reduction of all medications. Cycle 10 completed on April 20, 2021.     Interim History:    Patient is here today for a follow up visit. She is overall doing ok. Has a lot of fatigue which is frustrating for her. She also mainly complains of back pain and constipation. Does take some miralax every once in a while, but not consistently. She feels liek the back pain as been going on for a while. Generally uses tylenol. States it is worse at night and sometimes doesn't sleep well because of it. She also states if she sits too long, it is stiff and she needs to get up and walk. Has used ice/heat without much success. Had injections at spine center and they didn't help and maybe even made it worse. Was prescribed gabapentin, but did not take it due to anxiety about side effects.     Review of Systems    Pertinent items are noted in HPI.    Past History    Past Medical History:   Diagnosis Date     HTN (hypertension)      Inguinal hernia without mention of obstruction or gangrene, unilateral or unspecified, (not specified as recurrent) 08/20/2004    RIGHT     Malignant neoplasm of sigmoid colon (H) 11/24/2020     Primary osteoarthritis involving multiple joints 8/2/2019     Ventral hernia, unspecified, without mention of obstruction or gangrene 08/15/2003    easily reduced       PHYSICAL EXAM  BP (!) 140/76   Pulse 78   Temp 98.5  F (36.9  C)   Resp 18   Wt 50.1 kg (110 lb 8 oz)   BMI 20.21 kg/m      GENERAL: no acute distress. Cooperative in conversation. Here with son.   RESP: Regular respiratory rate. No expiratory wheezes   MUSCULOSKELETAL: no bilateral leg swelling  NEURO: non focal. Alert and oriented x3.   PSYCH: within normal limits. No depression or anxiety.  SKIN: exposed skin is dry intact.     Lab Results    Recent  Results (from the past 168 hour(s))   Urine Culture    Specimen: Urine, NOS   Result Value Ref Range    Culture >100,000 CFU/mL Escherichia coli (A)        Susceptibility    Escherichia coli - EULALIO     Ampicillin 4 Susceptible ug/mL     Ampicillin/ Sulbactam <=2 Susceptible ug/mL     Piperacillin/Tazobactam <=4 Susceptible ug/mL     Cefazolin* <=4 Susceptible ug/mL      * Cefazolin EULALIO breakpoints are for the treatment of uncomplicated urinary tract infections. For the treatment of systemic infections, please contact the laboratory for additional testing.     Cefoxitin <=4 Susceptible ug/mL     Ceftazidime <=1 Susceptible ug/mL     Ceftriaxone <=1 Susceptible ug/mL     Cefepime <=1 Susceptible ug/mL     Gentamicin <=1 Susceptible ug/mL     Tobramycin <=1 Susceptible ug/mL     Ciprofloxacin <=0.25 Susceptible ug/mL     Levofloxacin <=0.12 Susceptible ug/mL     Nitrofurantoin <=16 Susceptible ug/mL     Trimethoprim/Sulfamethoxazole <=1/19 Susceptible ug/mL       Imaging    No results found.  Total time spent with patient in face to face time, chart review and documentation was 40 minutes.        Signed by: NAVEEN Lanier CNP      Again, thank you for allowing me to participate in the care of your patient.        Sincerely,        NAVEEN Lanier CNP

## 2023-07-19 NOTE — PROGRESS NOTES
[facility] Hematology and Oncology Progress Note    Patient: Katya Butler  MRN: [id number]  Date of Service: Jul 19, 2023          Reason for Visit    Chief Complaint   Patient presents with     Oncology Clinic Visit     Colon adenocarcinoma (H)  Malignant neoplasm of sigmoid colon (H)       Assessment and Plan     Cancer Staging   Colon adenocarcinoma (H)  Staging form: Colon and Rectum, AJCC 8th Edition  - Clinical stage from 3/13/2023: Stage IVB (cT0, cNX, cM1b) - Signed by Jody Tolentino APRN CNP on 3/20/2023  No loss of nuclear expression of MMR proteins  Her2/adeel negative  No KRAS, NRAS, BRAF mutation.   TMB score 0, low  Oncology fusion event: negative      1.  Colon cancer, stage IIIc diagnosed in October 2020, recurrent disease with stage IV found in March 2023, retroperitoneal lymphadenopathy as well as supraclavicular lymphadenopathy: Patient has decided that she doesn't want to do treatment for the cancer. She states if it is not curable, she does not think the treatment is worth the side effects. Had a hard time with previous treatment. We talked a little about what to expect as cancer progresses. Her last CT scan in May, did show pretty stable disease so it does not appear that her cancer is growing quickly. Her son asked if we should do any imaging, and at this time, if she does not want to do treatment, there really is no reason to do a scan. If she has a new symptom that is causing a problem, we may be able to do imaging to see if cancer related and would have some potential for palliative treatment like radiation. I did tell her that we would like to see her somewhat regularly to make sure her symptoms are under control. She did not want to make an appointment now. I will have a nurse call her in 6 weeks to see how she is doing and make a follow up appt.     2.  Constipation: Encouraged her to continue to take something daily instead of PRN. I did send over Senna S and said to take  1-2 tablets daily and titrate up or down depending on BM's.  Encouraged her to take more so she stays comfortable and is having regular bowel movements.    3. Chronic back pain: met with spinal center. Try injections and they did not help. Mainly affects her at night and she cannot sleep some nights due to pain. Uses tylenol and that usually helps, but some nights it doesn't. I will give her a small amt of Norco to use in case pain is severe. Educated on side effects and addiction potential. Also educated on side effects including sedation, constipation.     ECOG Performance    1 - Can't do physically strenuous work, but fully ambyulatory and can do light sedentary work    Distress Screening (within last 30 days)    No data recorded     Pain  Pain Score: Mild Pain (2)    Problem List    Patient Active Problem List   Diagnosis     Hyperlipemia     TOBACCO USE DISORDER     Malignant neoplasm of sigmoid colon (H)     Accelerated hypertension     Acute left-sided low back pain with left-sided sciatica     Allergic rhinitis     Anxiety     Calcium pyrophosphate arthropathy of multiple sites     Drug-induced polyneuropathy (H)     Chronic anemia     Colon adenocarcinoma (H)     Colonic diverticular abscess     Coronary atherosclerosis     Disorder of artery (H)     Diverticulitis of colon     Essential hypertension     Idiopathic chronic gout with tophus, unspecified site     Intra-abdominal abscess (H)     Periarticular calcification     Postherpetic neuralgia     Postoperative ileus (H)     Primary osteoarthritis involving multiple joints     Unspecified visual loss     Raynaud phenomenon     Other chronic pain     Arm pain, right        ______________________________________________________________________________    History of Present Illness    Diagnosis:     1.  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  At initial surgery there was evidence of a small abscess and perforation of the colon.   Pathology showed a invasive moderately differentiated adenocarcinoma. Constricting ulcerated measuring 35 x 25 x 13 mm.  4 of 18 lymph nodes were positive. Mismatch repair intact. There was some proximal colonic dilatation, consistent with obstruction.  Left ovary showed moderately differentiated adenocarcinoma, direct extension.  Focal positive margin at the paraovarian soft tissue margin.    -PET scan on 2/17/2023 shows suspicious for left supraclavicular and retroperitoneal lymph node mets.  -Biopsy done of super clavicular lymph node on the left side that is positive for malignant cells, consistent with moderately differentiated adenocarcinoma of the colon.  HER2/adeel negative.  MMR intact.  CEA slightly elevated at 8.2.     Treatment:     Surgical resection on October 31, 2020.  Adjuvant chemotherapy with FOLFOX was initiated on December 8, 2020.  25% initial dose reduction of all medications. Cycle 10 completed on April 20, 2021.     Interim History:    Patient is here today for a follow up visit. She is overall doing ok. Has a lot of fatigue which is frustrating for her. She also mainly complains of back pain and constipation. Does take some miralax every once in a while, but not consistently. She feels liek the back pain as been going on for a while. Generally uses tylenol. States it is worse at night and sometimes doesn't sleep well because of it. She also states if she sits too long, it is stiff and she needs to get up and walk. Has used ice/heat without much success. Had injections at spine center and they didn't help and maybe even made it worse. Was prescribed gabapentin, but did not take it due to anxiety about side effects.     Review of Systems    Pertinent items are noted in HPI.    Past History    Past Medical History:   Diagnosis Date     HTN (hypertension)      Inguinal hernia without mention of obstruction or gangrene, unilateral or unspecified, (not specified as recurrent) 08/20/2004    RIGHT      Malignant neoplasm of sigmoid colon (H) 11/24/2020     Primary osteoarthritis involving multiple joints 8/2/2019     Ventral hernia, unspecified, without mention of obstruction or gangrene 08/15/2003    easily reduced       PHYSICAL EXAM  BP (!) 140/76   Pulse 78   Temp 98.5  F (36.9  C)   Resp 18   Wt 50.1 kg (110 lb 8 oz)   BMI 20.21 kg/m      GENERAL: no acute distress. Cooperative in conversation. Here with son.   RESP: Regular respiratory rate. No expiratory wheezes   MUSCULOSKELETAL: no bilateral leg swelling  NEURO: non focal. Alert and oriented x3.   PSYCH: within normal limits. No depression or anxiety.  SKIN: exposed skin is dry intact.     Lab Results    Recent Results (from the past 168 hour(s))   Urine Culture    Specimen: Urine, NOS   Result Value Ref Range    Culture >100,000 CFU/mL Escherichia coli (A)        Susceptibility    Escherichia coli - EULALIO     Ampicillin 4 Susceptible ug/mL     Ampicillin/ Sulbactam <=2 Susceptible ug/mL     Piperacillin/Tazobactam <=4 Susceptible ug/mL     Cefazolin* <=4 Susceptible ug/mL      * Cefazolin EULALIO breakpoints are for the treatment of uncomplicated urinary tract infections. For the treatment of systemic infections, please contact the laboratory for additional testing.     Cefoxitin <=4 Susceptible ug/mL     Ceftazidime <=1 Susceptible ug/mL     Ceftriaxone <=1 Susceptible ug/mL     Cefepime <=1 Susceptible ug/mL     Gentamicin <=1 Susceptible ug/mL     Tobramycin <=1 Susceptible ug/mL     Ciprofloxacin <=0.25 Susceptible ug/mL     Levofloxacin <=0.12 Susceptible ug/mL     Nitrofurantoin <=16 Susceptible ug/mL     Trimethoprim/Sulfamethoxazole <=1/19 Susceptible ug/mL       Imaging    No results found.  Total time spent with patient in face to face time, chart review and documentation was 40 minutes.        Signed by: NAVEEN Lanier CNP

## 2023-07-24 ENCOUNTER — TELEPHONE (OUTPATIENT)
Dept: ONCOLOGY | Facility: HOSPITAL | Age: 83
End: 2023-07-24
Payer: COMMERCIAL

## 2023-07-24 NOTE — TELEPHONE ENCOUNTER
Patient calls in today stating that she has some blisters on her upper gums and she wants to be certain that it is not from her cancer.  She states that she does wear dentures and she does feel like they may be more loose fitting and rubbing.  I did let her know that per Jody Tolentino CNP, she can say with almost 100% certainty that this is not from her cancer.  Patient does agree and thinks is from her dentures but wanted to make sure.  She verbalized understanding and was appreciative.    Cata Perez RN

## 2023-10-06 ENCOUNTER — PATIENT OUTREACH (OUTPATIENT)
Dept: ONCOLOGY | Facility: HOSPITAL | Age: 83
End: 2023-10-06
Payer: COMMERCIAL

## 2023-10-06 NOTE — PROGRESS NOTES
[facility]: Cancer Care                                                                                          Called patient to check in.  At last appt wasn't sure if she wanted to continue with care.  She is doing well, no complaints except chronic back pain.  I offered her an appt, she would like to wait until mid-November.  I will have scheduling reach out to get that appt set.  Patient had no other questions.    Signature:  Marcus Quintero RN

## 2023-10-23 DIAGNOSIS — C18.9 COLON ADENOCARCINOMA (H): ICD-10-CM

## 2023-10-23 DIAGNOSIS — M54.50 CHRONIC MIDLINE LOW BACK PAIN WITHOUT SCIATICA: ICD-10-CM

## 2023-10-23 DIAGNOSIS — G89.29 CHRONIC MIDLINE LOW BACK PAIN WITHOUT SCIATICA: ICD-10-CM

## 2023-10-23 RX ORDER — HYDROCODONE BITARTRATE AND ACETAMINOPHEN 5; 325 MG/1; MG/1
1-2 TABLET ORAL
Qty: 60 TABLET | Refills: 0 | Status: SHIPPED | OUTPATIENT
Start: 2023-10-23 | End: 2023-11-30

## 2023-10-25 DIAGNOSIS — C18.9 COLON ADENOCARCINOMA (H): Primary | ICD-10-CM

## 2023-11-01 ENCOUNTER — HOSPITAL ENCOUNTER (OUTPATIENT)
Dept: CT IMAGING | Facility: HOSPITAL | Age: 83
Discharge: HOME | End: 2023-11-01
Attending: INTERNAL MEDICINE
Payer: COMMERCIAL

## 2023-11-01 ENCOUNTER — ONCOLOGY VISIT (OUTPATIENT)
Dept: ONCOLOGY | Facility: HOSPITAL | Age: 83
End: 2023-11-01
Attending: INTERNAL MEDICINE
Payer: COMMERCIAL

## 2023-11-01 ENCOUNTER — LAB (OUTPATIENT)
Dept: INFUSION THERAPY | Facility: HOSPITAL | Age: 83
End: 2023-11-01
Attending: INTERNAL MEDICINE
Payer: COMMERCIAL

## 2023-11-01 VITALS
TEMPERATURE: 98.4 F | DIASTOLIC BLOOD PRESSURE: 82 MMHG | OXYGEN SATURATION: 96 % | HEART RATE: 91 BPM | RESPIRATION RATE: 20 BRPM | SYSTOLIC BLOOD PRESSURE: 177 MMHG | WEIGHT: 105.7 LBS | BODY MASS INDEX: 19.45 KG/M2 | HEIGHT: 62 IN

## 2023-11-01 DIAGNOSIS — M54.50 CHRONIC MIDLINE LOW BACK PAIN WITHOUT SCIATICA: ICD-10-CM

## 2023-11-01 DIAGNOSIS — C18.7 MALIGNANT NEOPLASM OF SIGMOID COLON (H): Primary | ICD-10-CM

## 2023-11-01 DIAGNOSIS — C18.7 MALIGNANT NEOPLASM OF SIGMOID COLON (H): ICD-10-CM

## 2023-11-01 DIAGNOSIS — G89.29 CHRONIC MIDLINE LOW BACK PAIN WITHOUT SCIATICA: ICD-10-CM

## 2023-11-01 DIAGNOSIS — C18.9 COLON ADENOCARCINOMA (H): ICD-10-CM

## 2023-11-01 LAB
ALBUMIN SERPL BCG-MCNC: 4.9 G/DL (ref 3.5–5.2)
ALP SERPL-CCNC: 84 U/L (ref 35–104)
ALT SERPL W P-5'-P-CCNC: 11 U/L (ref 0–50)
ANION GAP SERPL CALCULATED.3IONS-SCNC: 14 MMOL/L (ref 7–15)
AST SERPL W P-5'-P-CCNC: 21 U/L (ref 0–45)
BASOPHILS # BLD AUTO: 0 10E3/UL (ref 0–0.2)
BASOPHILS NFR BLD AUTO: 1 %
BILIRUB SERPL-MCNC: 0.2 MG/DL
BUN SERPL-MCNC: 16 MG/DL (ref 8–23)
CALCIUM SERPL-MCNC: 10.5 MG/DL (ref 8.8–10.2)
CEA SERPL-MCNC: 15.8 NG/ML
CHLORIDE SERPL-SCNC: 101 MMOL/L (ref 98–107)
CREAT SERPL-MCNC: 0.89 MG/DL (ref 0.51–0.95)
DEPRECATED HCO3 PLAS-SCNC: 24 MMOL/L (ref 22–29)
EGFRCR SERPLBLD CKD-EPI 2021: 64 ML/MIN/1.73M2
EOSINOPHIL # BLD AUTO: 0.1 10E3/UL (ref 0–0.7)
EOSINOPHIL NFR BLD AUTO: 1 %
ERYTHROCYTE [DISTWIDTH] IN BLOOD BY AUTOMATED COUNT: 14.4 % (ref 10–15)
GLUCOSE SERPL-MCNC: 98 MG/DL (ref 70–99)
HCT VFR BLD AUTO: 43.3 % (ref 35–47)
HGB BLD-MCNC: 14 G/DL (ref 11.7–15.7)
IMM GRANULOCYTES # BLD: 0 10E3/UL
IMM GRANULOCYTES NFR BLD: 0 %
LYMPHOCYTES # BLD AUTO: 2 10E3/UL (ref 0.8–5.3)
LYMPHOCYTES NFR BLD AUTO: 25 %
MCH RBC QN AUTO: 31.5 PG (ref 26.5–33)
MCHC RBC AUTO-ENTMCNC: 32.3 G/DL (ref 31.5–36.5)
MCV RBC AUTO: 98 FL (ref 78–100)
MONOCYTES # BLD AUTO: 0.7 10E3/UL (ref 0–1.3)
MONOCYTES NFR BLD AUTO: 9 %
NEUTROPHILS # BLD AUTO: 5.1 10E3/UL (ref 1.6–8.3)
NEUTROPHILS NFR BLD AUTO: 64 %
NRBC # BLD AUTO: 0 10E3/UL
NRBC BLD AUTO-RTO: 0 /100
PLATELET # BLD AUTO: 339 10E3/UL (ref 150–450)
POTASSIUM SERPL-SCNC: 5.1 MMOL/L (ref 3.4–5.3)
PROT SERPL-MCNC: 7.8 G/DL (ref 6.4–8.3)
RBC # BLD AUTO: 4.44 10E6/UL (ref 3.8–5.2)
SODIUM SERPL-SCNC: 139 MMOL/L (ref 135–145)
WBC # BLD AUTO: 7.8 10E3/UL (ref 4–11)

## 2023-11-01 PROCEDURE — 36591 DRAW BLOOD OFF VENOUS DEVICE: CPT

## 2023-11-01 PROCEDURE — 71250 CT THORAX DX C-: CPT

## 2023-11-01 PROCEDURE — 82378 CARCINOEMBRYONIC ANTIGEN: CPT

## 2023-11-01 PROCEDURE — 80053 COMPREHEN METABOLIC PANEL: CPT

## 2023-11-01 PROCEDURE — 99214 OFFICE O/P EST MOD 30 MIN: CPT | Performed by: INTERNAL MEDICINE

## 2023-11-01 PROCEDURE — G0463 HOSPITAL OUTPT CLINIC VISIT: HCPCS | Mod: 25 | Performed by: INTERNAL MEDICINE

## 2023-11-01 PROCEDURE — 85014 HEMATOCRIT: CPT

## 2023-11-01 RX ORDER — CELECOXIB 200 MG/1
200 CAPSULE ORAL DAILY
Qty: 30 CAPSULE | Refills: 3 | Status: SHIPPED | OUTPATIENT
Start: 2023-11-01 | End: 2024-08-21

## 2023-11-01 ASSESSMENT — PAIN SCALES - GENERAL: PAINLEVEL: NO PAIN (0)

## 2023-11-01 NOTE — PROGRESS NOTES
"Oncology Rooming Note    November 1, 2023 3:16 PM   Katya Butler is a 83 year old female who presents for:    Chief Complaint   Patient presents with    Oncology Clinic Visit     Return visit with labs related to Colon adenocarcinoma     Initial Vitals: BP (!) 177/82 (BP Location: Left arm, Patient Position: Sitting, Cuff Size: Adult Small)   Pulse 91   Temp 98.4  F (36.9  C) (Tympanic)   Resp 20   Ht 1.575 m (5' 2\")   Wt 47.9 kg (105 lb 11.2 oz)   SpO2 96%   BMI 19.33 kg/m   Estimated body mass index is 19.33 kg/m  as calculated from the following:    Height as of this encounter: 1.575 m (5' 2\").    Weight as of this encounter: 47.9 kg (105 lb 11.2 oz). Body surface area is 1.45 meters squared.  No Pain (0) Comment: Data Unavailable   No LMP recorded. Patient has had a hysterectomy.  Allergies reviewed: Yes  Medications reviewed: Yes    Medications: Medication refills not needed today.  Pharmacy name entered into [employer]: [id number] - [place] [id number]    Clinical concerns: Return visit with labs related to Colon adenocarcinoma      CHRISTIANO PICKARD CMA            "

## 2023-11-01 NOTE — LETTER
"    11/1/2023         RE: Katya Butler  [address] [facility] [address]        Dear Colleague,    Thank you for referring your patient, Katya Butler, to the [facility]. Please see a copy of my visit note below.    Oncology Rooming Note    November 1, 2023 3:16 PM   Katya Butler is a 83 year old female who presents for:    Chief Complaint   Patient presents with     Oncology Clinic Visit     Return visit with labs related to Colon adenocarcinoma     Initial Vitals: BP (!) 177/82 (BP Location: Left arm, Patient Position: Sitting, Cuff Size: Adult Small)   Pulse 91   Temp 98.4  F (36.9  C) (Tympanic)   Resp 20   Ht 1.575 m (5' 2\")   Wt 47.9 kg (105 lb 11.2 oz)   SpO2 96%   BMI 19.33 kg/m   Estimated body mass index is 19.33 kg/m  as calculated from the following:    Height as of this encounter: 1.575 m (5' 2\").    Weight as of this encounter: 47.9 kg (105 lb 11.2 oz). Body surface area is 1.45 meters squared.  No Pain (0) Comment: Data Unavailable   No LMP recorded. Patient has had a hysterectomy.  Allergies reviewed: Yes  Medications reviewed: Yes    Medications: Medication refills not needed today.  Pharmacy name entered into [employer]: [id number] - [place] [id number]    Clinical concerns: Return visit with labs related to Colon adenocarcinoma      CHRISTIANO PICKARD CMA              [facility] Hematology and Oncology Progress Note    Patient: Katya Butler  MRN: [id number]  Date of Service: Nov 1, 2023        Assessment and Plan:    Cancer Staging  Malignant neoplasm of sigmoid colon (H)  Staging form: Colon And Rectum, AJCC 8th Edition  - Clinical: No stage assigned - Unsigned  - Pathologic stage from 11/24/2020: Stage IIIC (pT4b, pN2, cM0) - Signed by Walt Merrill MD on 11/24/2020     1.  Colon cancer: She comes in today to check in.  Overall she is doing pretty well clinically.  Her main complaint is low back pain.  I am not sure this " is related to her cancer.  We are going to send her for a CT scan to evaluate the status of her cancer.  Imaging in May suggested stability.  There is no other obvious clinical evidence of progression.  She is still not sure if she would even want to do treatment but were going to get a new baseline scan.  We can review the results with her after they have returned.  We can have her return to clinic in about 6 to 8 weeks to see how she is doing.    Labs from today reviewed.  Sodium is 139, potassium 5.1, LFTs are normal with an alkaline phosphatase of 84, AST 21, ALT 11.    2.  Large cecal polyp found on colonoscopy: This polyp was biopsied on February 4, 2021 at [facility] and showed high-grade dysplasia, negative for invasive malignancy.    3.  Parotid lesion: SUV max of 4.7 on PET scan.  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  Stable.  No plan to biopsy given metastatic cancer.    4.  Low back pain: This is her biggest problem physically.  She had sacral injections in June without any improvement.  She recently called in to get some Vicodin for her back pain at night.  I am going to start her on Celebrex 200 mg daily in addition to the Vicodin to see how things go.  She is usually only taking the Vicodin 2 tablets nightly.    ECOG Performance  0    Diagnosis:    1.  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  At initial surgery there was evidence of a small abscess and perforation of the colon.  Pathology showed a invasive moderately differentiated adenocarcinoma. Constricting ulcerated measuring 35 x 25 x 13 mm.  4 of 18 lymph nodes were positive. Mismatch repair intact. There was some proximal colonic dilatation, consistent with obstruction.  Left ovary showed moderately differentiated adenocarcinoma, direct extension.  Focal positive margin at the paraovarian soft tissue margin.      Recurrence diagnosed in March 2023 from a left supraclavicular lymph node.  MSI  low.  HER2 negative.  PET scan imaging shows uptake in left supraclavicular nodes and retroperitoneal lymph nodes.    Treatment:    Surgical resection on October 31, 2020.  Adjuvant chemotherapy with FOLFOX was initiated on December 8, 2020.  25% initial dose reduction of all medications. Cycle 10 completed on April 20, 2021.    Interim History:    Kirill returns today for follow-up visit.  She is here for follow-up visit.  Last in clinic about 3 months ago.  Generally doing okay.  She complains of low back pain which is worse at night but does happen during the day.  No other symptoms.  Overall she remains generally active.    Review of Systems:    As above in the history.     Review of Systems otherwise Negative for:  General: chills, fever or night sweats  Psychological: anxiety or depression  Ophthalmic: blurry vision, double vision or loss of vision, vision change  ENT: epistaxis, oral lesions, hearing changes  Hematological and Lymphatic: bleeding, bruising, jaundice, swollen lymph nodes  Endocrine: hot flashes, unexpected weight changes  Respiratory: cough, hemoptysis, orthopnea or shortness of breath/KLEIN  Cardiovascular: chest pain, edema, palpitations or PND  Gastrointestinal: blood in stools, change in bowel habits, constipation, diarrhea or nausea/vomiting  Genito-Urinary: change in urinary stream, incontinence, frequency/urgency  Musculoskeletal: joint pain, stiffness, swelling, muscle pain  Neurological: dizziness, headaches, numbness/tingling  Dermatological: lumps and rash    Past History:    Past Medical History:   Diagnosis Date     HTN (hypertension)      Inguinal hernia without mention of obstruction or gangrene, unilateral or unspecified, (not specified as recurrent) 08/20/2004    RIGHT     Malignant neoplasm of sigmoid colon (H) 11/24/2020     Primary osteoarthritis involving multiple joints 8/2/2019     Ventral hernia, unspecified, without mention of obstruction or gangrene 08/15/2003    easily  "reduced     Physical Exam:    BP (!) 177/82 (BP Location: Left arm, Patient Position: Sitting, Cuff Size: Adult Small)   Pulse 91   Temp 98.4  F (36.9  C) (Tympanic)   Resp 20   Ht 1.575 m (5' 2\")   Wt 47.9 kg (105 lb 11.2 oz)   SpO2 96%   BMI 19.33 kg/m      General: patient appears stated age of 81 year old. Nontoxic and in no distress.   HEENT: Head: atraumatic, normocephalic. Sclerae anicteric.  Chest:  Normal respiratory effort  Cardiac:  No edema.   Abdomen: abdomen is non-distended  Extremities: normal tone and muscle bulk.  Skin: no lesions or rash on visible skin. Warm and dry.   CNS: alert and oriented. Grossly non-focal.   Psychiatric: normal mood and affect.     Lab Results:    Recent Results (from the past 168 hour(s))   Comprehensive metabolic panel   Result Value Ref Range    Sodium 139 135 - 145 mmol/L    Potassium 5.1 3.4 - 5.3 mmol/L    Carbon Dioxide (CO2) 24 22 - 29 mmol/L    Anion Gap 14 7 - 15 mmol/L    Urea Nitrogen 16.0 8.0 - 23.0 mg/dL    Creatinine 0.89 0.51 - 0.95 mg/dL    GFR Estimate 64 >60 mL/min/1.73m2    Calcium 10.5 (H) 8.8 - 10.2 mg/dL    Chloride 101 98 - 107 mmol/L    Glucose 98 70 - 99 mg/dL    Alkaline Phosphatase 84 35 - 104 U/L    AST 21 0 - 45 U/L    ALT 11 0 - 50 U/L    Protein Total 7.8 6.4 - 8.3 g/dL    Albumin 4.9 3.5 - 5.2 g/dL    Bilirubin Total 0.2 <=1.2 mg/dL   CBC with platelets and differential   Result Value Ref Range    WBC Count 7.8 4.0 - 11.0 10e3/uL    RBC Count 4.44 3.80 - 5.20 10e6/uL    Hemoglobin 14.0 11.7 - 15.7 g/dL    Hematocrit 43.3 35.0 - 47.0 %    MCV 98 78 - 100 fL    MCH 31.5 26.5 - 33.0 pg    MCHC 32.3 31.5 - 36.5 g/dL    RDW 14.4 10.0 - 15.0 %    Platelet Count 339 150 - 450 10e3/uL    % Neutrophils 64 %    % Lymphocytes 25 %    % Monocytes 9 %    % Eosinophils 1 %    % Basophils 1 %    % Immature Granulocytes 0 %    NRBCs per 100 WBC 0 <1 /100    Absolute Neutrophils 5.1 1.6 - 8.3 10e3/uL    Absolute Lymphocytes 2.0 0.8 - 5.3 10e3/uL    " Absolute Monocytes 0.7 0.0 - 1.3 10e3/uL    Absolute Eosinophils 0.1 0.0 - 0.7 10e3/uL    Absolute Basophils 0.0 0.0 - 0.2 10e3/uL    Absolute Immature Granulocytes 0.0 <=0.4 10e3/uL    Absolute NRBCs 0.0 10e3/uL       Signed by: Walt Merrill MD      Again, thank you for allowing me to participate in the care of your patient.        Sincerely,        Walt Merrill MD

## 2023-11-01 NOTE — PROGRESS NOTES
[facility] Hematology and Oncology Progress Note    Patient: Katya Butler  MRN: [id number]  Date of Service: Nov 1, 2023        Assessment and Plan:    Cancer Staging  Malignant neoplasm of sigmoid colon (H)  Staging form: Colon And Rectum, AJCC 8th Edition  - Clinical: No stage assigned - Unsigned  - Pathologic stage from 11/24/2020: Stage IIIC (pT4b, pN2, cM0) - Signed by Walt Merrill MD on 11/24/2020     1.  Colon cancer: She comes in today to check in.  Overall she is doing pretty well clinically.  Her main complaint is low back pain.  I am not sure this is related to her cancer.  We are going to send her for a CT scan to evaluate the status of her cancer.  Imaging in May suggested stability.  There is no other obvious clinical evidence of progression.  She is still not sure if she would even want to do treatment but were going to get a new baseline scan.  We can review the results with her after they have returned.  We can have her return to clinic in about 6 to 8 weeks to see how she is doing.    Labs from today reviewed.  Sodium is 139, potassium 5.1, LFTs are normal with an alkaline phosphatase of 84, AST 21, ALT 11.    2.  Large cecal polyp found on colonoscopy: This polyp was biopsied on February 4, 2021 at [facility] and showed high-grade dysplasia, negative for invasive malignancy.    3.  Parotid lesion: SUV max of 4.7 on PET scan.  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  Stable.  No plan to biopsy given metastatic cancer.    4.  Low back pain: This is her biggest problem physically.  She had sacral injections in June without any improvement.  She recently called in to get some Vicodin for her back pain at night.  I am going to start her on Celebrex 200 mg daily in addition to the Vicodin to see how things go.  She is usually only taking the Vicodin 2 tablets nightly.    ECOG Performance  0    Diagnosis:    1.  Adenocarcinoma of the sigmoid colon: Diagnosed  October 31, 2020.  At initial surgery there was evidence of a small abscess and perforation of the colon.  Pathology showed a invasive moderately differentiated adenocarcinoma. Constricting ulcerated measuring 35 x 25 x 13 mm.  4 of 18 lymph nodes were positive. Mismatch repair intact. There was some proximal colonic dilatation, consistent with obstruction.  Left ovary showed moderately differentiated adenocarcinoma, direct extension.  Focal positive margin at the paraovarian soft tissue margin.      Recurrence diagnosed in March 2023 from a left supraclavicular lymph node.  MSI low.  HER2 negative.  PET scan imaging shows uptake in left supraclavicular nodes and retroperitoneal lymph nodes.    Treatment:    Surgical resection on October 31, 2020.  Adjuvant chemotherapy with FOLFOX was initiated on December 8, 2020.  25% initial dose reduction of all medications. Cycle 10 completed on April 20, 2021.    Interim History:    Kirill returns today for follow-up visit.  She is here for follow-up visit.  Last in clinic about 3 months ago.  Generally doing okay.  She complains of low back pain which is worse at night but does happen during the day.  No other symptoms.  Overall she remains generally active.    Review of Systems:    As above in the history.     Review of Systems otherwise Negative for:  General: chills, fever or night sweats  Psychological: anxiety or depression  Ophthalmic: blurry vision, double vision or loss of vision, vision change  ENT: epistaxis, oral lesions, hearing changes  Hematological and Lymphatic: bleeding, bruising, jaundice, swollen lymph nodes  Endocrine: hot flashes, unexpected weight changes  Respiratory: cough, hemoptysis, orthopnea or shortness of breath/KLEIN  Cardiovascular: chest pain, edema, palpitations or PND  Gastrointestinal: blood in stools, change in bowel habits, constipation, diarrhea or nausea/vomiting  Genito-Urinary: change in urinary stream, incontinence,  "frequency/urgency  Musculoskeletal: joint pain, stiffness, swelling, muscle pain  Neurological: dizziness, headaches, numbness/tingling  Dermatological: lumps and rash    Past History:    Past Medical History:   Diagnosis Date    HTN (hypertension)     Inguinal hernia without mention of obstruction or gangrene, unilateral or unspecified, (not specified as recurrent) 08/20/2004    RIGHT    Malignant neoplasm of sigmoid colon (H) 11/24/2020    Primary osteoarthritis involving multiple joints 8/2/2019    Ventral hernia, unspecified, without mention of obstruction or gangrene 08/15/2003    easily reduced     Physical Exam:    BP (!) 177/82 (BP Location: Left arm, Patient Position: Sitting, Cuff Size: Adult Small)   Pulse 91   Temp 98.4  F (36.9  C) (Tympanic)   Resp 20   Ht 1.575 m (5' 2\")   Wt 47.9 kg (105 lb 11.2 oz)   SpO2 96%   BMI 19.33 kg/m      General: patient appears stated age of 81 year old. Nontoxic and in no distress.   HEENT: Head: atraumatic, normocephalic. Sclerae anicteric.  Chest:  Normal respiratory effort  Cardiac:  No edema.   Abdomen: abdomen is non-distended  Extremities: normal tone and muscle bulk.  Skin: no lesions or rash on visible skin. Warm and dry.   CNS: alert and oriented. Grossly non-focal.   Psychiatric: normal mood and affect.     Lab Results:    Recent Results (from the past 168 hour(s))   Comprehensive metabolic panel   Result Value Ref Range    Sodium 139 135 - 145 mmol/L    Potassium 5.1 3.4 - 5.3 mmol/L    Carbon Dioxide (CO2) 24 22 - 29 mmol/L    Anion Gap 14 7 - 15 mmol/L    Urea Nitrogen 16.0 8.0 - 23.0 mg/dL    Creatinine 0.89 0.51 - 0.95 mg/dL    GFR Estimate 64 >60 mL/min/1.73m2    Calcium 10.5 (H) 8.8 - 10.2 mg/dL    Chloride 101 98 - 107 mmol/L    Glucose 98 70 - 99 mg/dL    Alkaline Phosphatase 84 35 - 104 U/L    AST 21 0 - 45 U/L    ALT 11 0 - 50 U/L    Protein Total 7.8 6.4 - 8.3 g/dL    Albumin 4.9 3.5 - 5.2 g/dL    Bilirubin Total 0.2 <=1.2 mg/dL   CBC with " platelets and differential   Result Value Ref Range    WBC Count 7.8 4.0 - 11.0 10e3/uL    RBC Count 4.44 3.80 - 5.20 10e6/uL    Hemoglobin 14.0 11.7 - 15.7 g/dL    Hematocrit 43.3 35.0 - 47.0 %    MCV 98 78 - 100 fL    MCH 31.5 26.5 - 33.0 pg    MCHC 32.3 31.5 - 36.5 g/dL    RDW 14.4 10.0 - 15.0 %    Platelet Count 339 150 - 450 10e3/uL    % Neutrophils 64 %    % Lymphocytes 25 %    % Monocytes 9 %    % Eosinophils 1 %    % Basophils 1 %    % Immature Granulocytes 0 %    NRBCs per 100 WBC 0 <1 /100    Absolute Neutrophils 5.1 1.6 - 8.3 10e3/uL    Absolute Lymphocytes 2.0 0.8 - 5.3 10e3/uL    Absolute Monocytes 0.7 0.0 - 1.3 10e3/uL    Absolute Eosinophils 0.1 0.0 - 0.7 10e3/uL    Absolute Basophils 0.0 0.0 - 0.2 10e3/uL    Absolute Immature Granulocytes 0.0 <=0.4 10e3/uL    Absolute NRBCs 0.0 10e3/uL       Signed by: Walt Merrill MD

## 2023-11-06 ENCOUNTER — TELEPHONE (OUTPATIENT)
Dept: ONCOLOGY | Facility: HOSPITAL | Age: 83
End: 2023-11-06
Payer: COMMERCIAL

## 2023-11-06 NOTE — TELEPHONE ENCOUNTER
[facility]: Cancer Care                                                                                          Katya calls clinic to enquire about her scan results from Weds, 11/01. I will make Tad CARL aware of this to touch base with Dr. Merrill.    Daria Reyez RN Care Coordinator  [facility]  Cancer [facility]

## 2023-11-07 ENCOUNTER — PATIENT OUTREACH (OUTPATIENT)
Dept: ONCOLOGY | Facility: HOSPITAL | Age: 83
End: 2023-11-07
Payer: COMMERCIAL

## 2023-11-07 ENCOUNTER — TELEPHONE (OUTPATIENT)
Dept: ONCOLOGY | Facility: HOSPITAL | Age: 83
End: 2023-11-07
Payer: COMMERCIAL

## 2023-11-07 ENCOUNTER — TELEPHONE (OUTPATIENT)
Dept: OTOLARYNGOLOGY | Facility: CLINIC | Age: 83
End: 2023-11-07
Payer: COMMERCIAL

## 2023-11-07 NOTE — TELEPHONE ENCOUNTER
Poncho called requesting a call back with patients scan results.  Request was relayed to RNCC Tad Ceron who will relay message to [place].   No

## 2023-11-07 NOTE — PROGRESS NOTES
[facility]: Cancer Care                                                                                          Son calls in to clarify and go over results that I went over with his mother.  He had no other questions and expressed his appreciation for the care team.    Signature:  Marcus Quintero RN

## 2023-11-07 NOTE — TELEPHONE ENCOUNTER
Called patient discussed results.  Dr. Merrill said cancer unchanged.  Nothing to indicate back pain.  Patient tried celebrex and per self report, made her violently ill.  She will not take it again.  I advised we could refer to spine clinic, she stated she has been twice with no help.  I suggested going to something like [facility] and their walk in clinic.  She said she preferred to follow up with PCP and get their recommendations.    Tad HAWKINS RN

## 2023-11-07 NOTE — TELEPHONE ENCOUNTER
[facility] Call Center    Phone Message    May a detailed message be left on voicemail: yes     Reason for Call: Other: patient called saw  vocal nodule was going to have surgery but due to other health issues this was not done . Would like to continue the process of removal just wondering what the process would be thank you       Action Taken: Other: ENT    Travel Screening: Not Applicable

## 2023-11-08 NOTE — TELEPHONE ENCOUNTER
Spoke with Katya and informed her that Dr. Adler is no longer part of [facility].  Pt could go to the [facility] ENT for this procedure as no providers do this procdure at [facility].  PT expressed understanding and said she would call back to set up appointment.    [facility]      Lakesha Matthews RN  [facility]  ENT  [address]  [place] [address]  Office:[phone]  Employed by [facility]

## 2023-11-16 ENCOUNTER — PATIENT OUTREACH (OUTPATIENT)
Dept: ONCOLOGY | Facility: HOSPITAL | Age: 83
End: 2023-11-16
Payer: COMMERCIAL

## 2023-11-16 NOTE — TELEPHONE ENCOUNTER
[facility]: Cancer Care                                                                                      Pt calls triage line today stating that she was called by Tad to let her know that her CT scan looked great, and no cancer is seen. She wants to know why she needs to come back for an appointment on 12/13/23. She feels this is too soon when everything looked so good. She also comments that she doesn't drive and so it is no easy to come to clinic.     Writer explains I will send this over to Tad, DIGNACC, to get back to her with Dr. Merrill' thoughts on this. She asked if I would go ahead and cancel that appt, and I said no, I would defer to Tad and Dr. Merrill for that decision.    She voiced understanding and will expect to hear from Tad.      Signature:  Karen Reed RN

## 2023-11-29 DIAGNOSIS — C18.7 MALIGNANT NEOPLASM OF SIGMOID COLON (H): Primary | ICD-10-CM

## 2023-11-30 ENCOUNTER — MEDICAL CORRESPONDENCE (OUTPATIENT)
Dept: HEALTH INFORMATION MANAGEMENT | Facility: CLINIC | Age: 83
End: 2023-11-30
Payer: COMMERCIAL

## 2023-11-30 ENCOUNTER — TRANSFERRED RECORDS (OUTPATIENT)
Dept: HEALTH INFORMATION MANAGEMENT | Facility: CLINIC | Age: 83
End: 2023-11-30
Payer: COMMERCIAL

## 2023-11-30 ENCOUNTER — TRANSCRIBE ORDERS (OUTPATIENT)
Dept: OTHER | Age: 83
End: 2023-11-30

## 2023-11-30 DIAGNOSIS — G89.29 CHRONIC MIDLINE LOW BACK PAIN WITHOUT SCIATICA: ICD-10-CM

## 2023-11-30 DIAGNOSIS — M54.50 CHRONIC MIDLINE LOW BACK PAIN WITHOUT SCIATICA: ICD-10-CM

## 2023-11-30 DIAGNOSIS — C18.9 COLON ADENOCARCINOMA (H): ICD-10-CM

## 2023-11-30 DIAGNOSIS — J38.3 VOCAL CORD CYST: Primary | ICD-10-CM

## 2023-12-01 RX ORDER — HYDROCODONE BITARTRATE AND ACETAMINOPHEN 5; 325 MG/1; MG/1
1-2 TABLET ORAL
Qty: 60 TABLET | Refills: 0 | Status: SHIPPED | OUTPATIENT
Start: 2023-12-01 | End: 2024-01-25

## 2023-12-11 NOTE — OP NOTE
1132: lispro 5 units SubQ given for accu check 350 per Dr ARMAAN Mcdermott.   PROCEDURE NOTE - LAPAROSCOPY    NAME: Katya Butler   : 1940   MRN: [id number]      DATE OF SERVICE: 2018     PREOPERATIVE DIAGNOSIS:  Left sided adnexal mass    POSTOPERATIVE DIAGNOSIS:  Peritoneal cyst.  Adhesions.  Normal R ovary.      PROCEDURES: laparoscopy, lysis of adhesions, Right salpingectomy and Right oopherectomy, drainage of peritoneal cyst    SURGEON: Nneka Arroyo DO  Assistant:  Roselyn Hughes MD    ANESTHESIA: general     ESTIMATED BLOOD LOSS:5 mL from 2018  9:32 AM to 2018 10:48 AM    HISTORY OF PRESENT ILLNESS:   Katya Butler is a 77 y.o. female with history of diverticulitis and an adnexal left sided cyst.  She has this mass seen on CT and sono.  .     CONSENT:  She was met preoperatively, where we discussed the procedure and the risks associated with the procedure. She understood these to be, but not limited to injury to adjacent organs including bladder, bowel, ureter, infection and bleeding. Patient signed consent and was brought back to the operating room in stable condition.     PROCEDURE:  Patient underwent induction of a general anesthetic, she was carefully prepped and draped in sterile dorsal lithotomy position for the procedure. Timeout was performed. Bladder was drained with a Lima catheter.   A sponge stick was placed into the vagina.      Attention was turned to the abdomen. An incision above the umbilicus was made. A Veress needle was introduced with a 2 pop technique, saline drop test confirmed adequate placement. Opening pressure was 3-4. Insufflation was done until 15mm of pressure was established. A 5 nonbladed trocar was placed above the umbilicus. Two more port sights were place in the right and left lower quadrants under direct visualization. Trandelenburg assistance was then used.     The procedure began with identifying the anatomy- absent uterus, normal R tube and ovary.  Dense adhesions over the R sidewall. Peritoneal cyst noted in  the pelvis on the Left deep.  This was grasped and drained.  Adhesions are removed with blunt dissection and scissors.  Right IP ligament was transected after cautery.  R tube and ovary removed with the 5mm endocatch bag.      The trocars and gas were then removed from the abdomen. Skin was closed with 4-0 monocryl suture. Steri-Strips applied. Instruments were removed from vagina. No active bleeding was noted. Sponge and needle counts were correct X 2. She was taken to recovery in stable condition.     Nneka Arroyo DO        cc: Nneka Arroyo

## 2023-12-18 ENCOUNTER — TELEPHONE (OUTPATIENT)
Dept: ONCOLOGY | Facility: HOSPITAL | Age: 83
End: 2023-12-18
Payer: COMMERCIAL

## 2023-12-22 ENCOUNTER — TELEPHONE (OUTPATIENT)
Dept: ONCOLOGY | Facility: HOSPITAL | Age: 83
End: 2023-12-22
Payer: COMMERCIAL

## 2023-12-22 NOTE — TELEPHONE ENCOUNTER
Received a phone call from Katya angel. She states she was notified that she missed an appointment in our clinic. She states she was waiting for a phone call back from SIDDHARTHA Mishra with a plan but never did hear back. She was questioning why she needed to return to the clinic so soon after seeing Dr. Merrill. She would like a call back with a plan and to get her future appointment scheduled. Thank you!    Radha Mendoza RN on 12/22/2023 at 11:25 AM

## 2023-12-27 ENCOUNTER — TELEPHONE (OUTPATIENT)
Dept: ONCOLOGY | Facility: HOSPITAL | Age: 83
End: 2023-12-27
Payer: COMMERCIAL

## 2024-01-12 ENCOUNTER — TRANSFERRED RECORDS (OUTPATIENT)
Dept: HEALTH INFORMATION MANAGEMENT | Facility: CLINIC | Age: 84
End: 2024-01-12
Payer: COMMERCIAL

## 2024-01-19 ENCOUNTER — LAB REQUISITION (OUTPATIENT)
Dept: LAB | Facility: CLINIC | Age: 84
End: 2024-01-19

## 2024-01-19 DIAGNOSIS — Z01.818 ENCOUNTER FOR OTHER PREPROCEDURAL EXAMINATION: ICD-10-CM

## 2024-01-22 LAB
ANION GAP SERPL CALCULATED.3IONS-SCNC: 8 MMOL/L (ref 7–15)
BUN SERPL-MCNC: 15.9 MG/DL (ref 8–23)
CALCIUM SERPL-MCNC: 9.5 MG/DL (ref 8.8–10.2)
CHLORIDE SERPL-SCNC: 105 MMOL/L (ref 98–107)
CREAT SERPL-MCNC: 0.83 MG/DL (ref 0.51–0.95)
DEPRECATED HCO3 PLAS-SCNC: 27 MMOL/L (ref 22–29)
EGFRCR SERPLBLD CKD-EPI 2021: 70 ML/MIN/1.73M2
ERYTHROCYTE [DISTWIDTH] IN BLOOD BY AUTOMATED COUNT: 13.6 % (ref 10–15)
GLUCOSE SERPL-MCNC: 94 MG/DL (ref 70–99)
HCT VFR BLD AUTO: 39 % (ref 35–47)
HGB BLD-MCNC: 12.6 G/DL (ref 11.7–15.7)
MCH RBC QN AUTO: 31 PG (ref 26.5–33)
MCHC RBC AUTO-ENTMCNC: 32.3 G/DL (ref 31.5–36.5)
MCV RBC AUTO: 96 FL (ref 78–100)
PLATELET # BLD AUTO: 370 10E3/UL (ref 150–450)
POTASSIUM SERPL-SCNC: 4.5 MMOL/L (ref 3.4–5.3)
RBC # BLD AUTO: 4.06 10E6/UL (ref 3.8–5.2)
SODIUM SERPL-SCNC: 140 MMOL/L (ref 135–145)
WBC # BLD AUTO: 7 10E3/UL (ref 4–11)

## 2024-01-22 PROCEDURE — 80048 BASIC METABOLIC PNL TOTAL CA: CPT | Performed by: PHYSICIAN ASSISTANT

## 2024-01-22 PROCEDURE — 85014 HEMATOCRIT: CPT | Performed by: PHYSICIAN ASSISTANT

## 2024-01-25 DIAGNOSIS — G89.29 CHRONIC MIDLINE LOW BACK PAIN WITHOUT SCIATICA: ICD-10-CM

## 2024-01-25 DIAGNOSIS — M54.50 CHRONIC MIDLINE LOW BACK PAIN WITHOUT SCIATICA: ICD-10-CM

## 2024-01-25 DIAGNOSIS — C18.9 COLON ADENOCARCINOMA (H): ICD-10-CM

## 2024-01-25 RX ORDER — HYDROCODONE BITARTRATE AND ACETAMINOPHEN 5; 325 MG/1; MG/1
1-2 TABLET ORAL
Qty: 60 TABLET | Refills: 0 | Status: SHIPPED | OUTPATIENT
Start: 2024-01-25 | End: 2024-03-06

## 2024-01-31 ENCOUNTER — ONCOLOGY VISIT (OUTPATIENT)
Dept: ONCOLOGY | Facility: HOSPITAL | Age: 84
End: 2024-01-31
Payer: COMMERCIAL

## 2024-01-31 ENCOUNTER — LAB (OUTPATIENT)
Dept: INFUSION THERAPY | Facility: HOSPITAL | Age: 84
End: 2024-01-31
Payer: COMMERCIAL

## 2024-01-31 VITALS
WEIGHT: 104.8 LBS | HEIGHT: 62 IN | TEMPERATURE: 98.5 F | HEART RATE: 78 BPM | BODY MASS INDEX: 19.29 KG/M2 | OXYGEN SATURATION: 96 % | RESPIRATION RATE: 16 BRPM | DIASTOLIC BLOOD PRESSURE: 67 MMHG | SYSTOLIC BLOOD PRESSURE: 146 MMHG

## 2024-01-31 DIAGNOSIS — C77.2: ICD-10-CM

## 2024-01-31 DIAGNOSIS — C18.7 MALIGNANT NEOPLASM OF SIGMOID COLON (H): ICD-10-CM

## 2024-01-31 DIAGNOSIS — C18.7 MALIGNANT NEOPLASM OF SIGMOID COLON (H): Primary | ICD-10-CM

## 2024-01-31 DIAGNOSIS — C18.4: ICD-10-CM

## 2024-01-31 LAB
ALBUMIN SERPL BCG-MCNC: 3.9 G/DL (ref 3.5–5.2)
ALP SERPL-CCNC: 73 U/L (ref 40–150)
ALT SERPL W P-5'-P-CCNC: 8 U/L (ref 0–50)
ANION GAP SERPL CALCULATED.3IONS-SCNC: 9 MMOL/L (ref 7–15)
AST SERPL W P-5'-P-CCNC: 17 U/L (ref 0–45)
BASOPHILS # BLD AUTO: 0 10E3/UL (ref 0–0.2)
BASOPHILS NFR BLD AUTO: 1 %
BILIRUB SERPL-MCNC: 0.2 MG/DL
BUN SERPL-MCNC: 20.2 MG/DL (ref 8–23)
CALCIUM SERPL-MCNC: 8.9 MG/DL (ref 8.8–10.2)
CHLORIDE SERPL-SCNC: 104 MMOL/L (ref 98–107)
CREAT SERPL-MCNC: 0.89 MG/DL (ref 0.51–0.95)
DEPRECATED HCO3 PLAS-SCNC: 27 MMOL/L (ref 22–29)
EGFRCR SERPLBLD CKD-EPI 2021: 64 ML/MIN/1.73M2
EOSINOPHIL # BLD AUTO: 0.1 10E3/UL (ref 0–0.7)
EOSINOPHIL NFR BLD AUTO: 1 %
ERYTHROCYTE [DISTWIDTH] IN BLOOD BY AUTOMATED COUNT: 13.6 % (ref 10–15)
GLUCOSE SERPL-MCNC: 161 MG/DL (ref 70–99)
HCT VFR BLD AUTO: 37 % (ref 35–47)
HGB BLD-MCNC: 11.7 G/DL (ref 11.7–15.7)
IMM GRANULOCYTES # BLD: 0 10E3/UL
IMM GRANULOCYTES NFR BLD: 0 %
LYMPHOCYTES # BLD AUTO: 1.8 10E3/UL (ref 0.8–5.3)
LYMPHOCYTES NFR BLD AUTO: 21 %
MCH RBC QN AUTO: 30.6 PG (ref 26.5–33)
MCHC RBC AUTO-ENTMCNC: 31.6 G/DL (ref 31.5–36.5)
MCV RBC AUTO: 97 FL (ref 78–100)
MONOCYTES # BLD AUTO: 0.8 10E3/UL (ref 0–1.3)
MONOCYTES NFR BLD AUTO: 9 %
NEUTROPHILS # BLD AUTO: 5.9 10E3/UL (ref 1.6–8.3)
NEUTROPHILS NFR BLD AUTO: 68 %
NRBC # BLD AUTO: 0 10E3/UL
NRBC BLD AUTO-RTO: 0 /100
PLATELET # BLD AUTO: 333 10E3/UL (ref 150–450)
POTASSIUM SERPL-SCNC: 4.4 MMOL/L (ref 3.4–5.3)
PROT SERPL-MCNC: 6.5 G/DL (ref 6.4–8.3)
RBC # BLD AUTO: 3.82 10E6/UL (ref 3.8–5.2)
SODIUM SERPL-SCNC: 140 MMOL/L (ref 135–145)
WBC # BLD AUTO: 8.7 10E3/UL (ref 4–11)

## 2024-01-31 PROCEDURE — 85025 COMPLETE CBC W/AUTO DIFF WBC: CPT

## 2024-01-31 PROCEDURE — 82378 CARCINOEMBRYONIC ANTIGEN: CPT

## 2024-01-31 PROCEDURE — 99214 OFFICE O/P EST MOD 30 MIN: CPT | Performed by: INTERNAL MEDICINE

## 2024-01-31 PROCEDURE — G0463 HOSPITAL OUTPT CLINIC VISIT: HCPCS | Performed by: INTERNAL MEDICINE

## 2024-01-31 PROCEDURE — 36591 DRAW BLOOD OFF VENOUS DEVICE: CPT

## 2024-01-31 PROCEDURE — 80053 COMPREHEN METABOLIC PANEL: CPT

## 2024-01-31 ASSESSMENT — PAIN SCALES - GENERAL: PAINLEVEL: WORST PAIN (10)

## 2024-01-31 NOTE — PROGRESS NOTES
"Oncology Rooming Note    January 31, 2024 2:50 PM   Katya Butler is a 83 year old female who presents for:    Chief Complaint   Patient presents with    Oncology Clinic Visit     Return visit with lab. Malignant neoplasm of sigmoid colon.     Initial Vitals: BP (!) 146/67 (BP Location: Left arm, Patient Position: Sitting, Cuff Size: Adult Small)   Pulse 78   Temp 98.5  F (36.9  C) (Oral)   Resp 16   Ht 1.575 m (5' 2\")   Wt 47.5 kg (104 lb 12.8 oz)   SpO2 96%   BMI 19.17 kg/m   Estimated body mass index is 19.17 kg/m  as calculated from the following:    Height as of this encounter: 1.575 m (5' 2\").    Weight as of this encounter: 47.5 kg (104 lb 12.8 oz). Body surface area is 1.44 meters squared.  Worst Pain (10) Comment: Data Unavailable   No LMP recorded. Patient has had a hysterectomy.  Allergies reviewed: Yes  Medications reviewed: Yes    Medications: Medication refills not needed today.  Pharmacy name entered into [employer]: [id number] - [place] [phone]    Frailty Screening:   Is the patient here for a new oncology consult visit in cancer care? 2. No      Clinical concerns: Return visit with lab. Malignant neoplasm of sigmoid colon.      Isha Amin [facility]            "

## 2024-01-31 NOTE — Clinical Note
"    1/31/2024         RE: Katya Butler  [address] [address]        Dear Colleague,    Thank you for referring your patient, Katya Butler, to the [facility]. Please see a copy of my visit note below.    Oncology Rooming Note    January 31, 2024 2:50 PM   Katya Butler is a 83 year old female who presents for:    Chief Complaint   Patient presents with    Oncology Clinic Visit     Return visit with lab. Malignant neoplasm of sigmoid colon.     Initial Vitals: BP (!) 146/67 (BP Location: Left arm, Patient Position: Sitting, Cuff Size: Adult Small)   Pulse 78   Temp 98.5  F (36.9  C) (Oral)   Resp 16   Ht 1.575 m (5' 2\")   Wt 47.5 kg (104 lb 12.8 oz)   SpO2 96%   BMI 19.17 kg/m   Estimated body mass index is 19.17 kg/m  as calculated from the following:    Height as of this encounter: 1.575 m (5' 2\").    Weight as of this encounter: 47.5 kg (104 lb 12.8 oz). Body surface area is 1.44 meters squared.  Worst Pain (10) Comment: Data Unavailable   No LMP recorded. Patient has had a hysterectomy.  Allergies reviewed: Yes  Medications reviewed: Yes    Medications: Medication refills not needed today.  Pharmacy name entered into [employer]: [id number] - [place] [phone]    Frailty Screening:   Is the patient here for a new oncology consult visit in cancer care? 2. No      Clinical concerns: Return visit with lab. Malignant neoplasm of sigmoid colon.      Isha Amin [facility] Hematology and Oncology Progress Note    Patient: Katya Butler  MRN: [id number]  Date of Service: Jan 31, 2024        Assessment and Plan:    Cancer Staging  Malignant neoplasm of sigmoid colon (H)  Staging form: Colon And Rectum, AJCC 8th Edition  - Clinical: No stage assigned - Unsigned  - Pathologic stage from 11/24/2020: Stage IIIC (pT4b, pN2, cM0) - Signed by Walt Merrill MD on 11/24/2020     1.  Colon cancer: She comes in " today to check in.  Overall she is doing pretty well clinically.  Her main complaint is low back pain.  I am not sure this is related to her cancer.  We are going to send her for a CT scan to evaluate the status of her cancer.  Imaging in May suggested stability.  There is no other obvious clinical evidence of progression.  She is still not sure if she would even want to do treatment but were going to get a new baseline scan.  We can review the results with her after they have returned.  We can have her return to clinic in about 6 to 8 weeks to see how she is doing.    Labs from today reviewed.  Sodium is 139, potassium 5.1, LFTs are normal with an alkaline phosphatase of 84, AST 21, ALT 11.    2.  Large cecal polyp found on colonoscopy: This polyp was biopsied on February 4, 2021 at [facility] and showed high-grade dysplasia, negative for invasive malignancy.    3.  Parotid lesion: SUV max of 4.7 on PET scan.  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  Stable.  No plan to biopsy given metastatic cancer.    4.  Low back pain: This is her biggest problem physically.  She had sacral injections in June without any improvement.  She recently called in to get some Vicodin for her back pain at night.  I am going to start her on Celebrex 200 mg daily in addition to the Vicodin to see how things go.  She is usually only taking the Vicodin 2 tablets nightly.  Injections didn't help.     Sick with celebrex. Abdominal pain. Vomiting.           ECOG Performance  0    Diagnosis:    1.  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  At initial surgery there was evidence of a small abscess and perforation of the colon.  Pathology showed a invasive moderately differentiated adenocarcinoma. Constricting ulcerated measuring 35 x 25 x 13 mm.  4 of 18 lymph nodes were positive. Mismatch repair intact. There was some proximal colonic dilatation, consistent with obstruction.  Left ovary showed  moderately differentiated adenocarcinoma, direct extension.  Focal positive margin at the paraovarian soft tissue margin.      Recurrence diagnosed in March 2023 from a left supraclavicular lymph node.  MSI low.  HER2 negative.  PET scan imaging shows uptake in left supraclavicular nodes and retroperitoneal lymph nodes.    Treatment:    Surgical resection on October 31, 2020.  Adjuvant chemotherapy with FOLFOX was initiated on December 8, 2020.  25% initial dose reduction of all medications. Cycle 10 completed on April 20, 2021.    Interim History:    Kirill returns today for follow-up visit.        She is here for follow-up visit.  Last in clinic about 3 months ago.  Generally doing okay.  She complains of low back pain which is worse at night but does happen during the day.  No other symptoms.  Overall she remains generally active.      Using Senokot: 1 and then 2    Review of Systems:    As above in the history.     Review of Systems otherwise Negative for:  General: chills, fever or night sweats  Psychological: anxiety or depression  Ophthalmic: blurry vision, double vision or loss of vision, vision change  ENT: epistaxis, oral lesions, hearing changes  Hematological and Lymphatic: bleeding, bruising, jaundice, swollen lymph nodes  Endocrine: hot flashes, unexpected weight changes  Respiratory: cough, hemoptysis, orthopnea or shortness of breath/KLEIN  Cardiovascular: chest pain, edema, palpitations or PND  Gastrointestinal: blood in stools, change in bowel habits, constipation, diarrhea or nausea/vomiting  Genito-Urinary: change in urinary stream, incontinence, frequency/urgency  Musculoskeletal: joint pain, stiffness, swelling, muscle pain  Neurological: dizziness, headaches, numbness/tingling  Dermatological: lumps and rash    Past History:    Past Medical History:   Diagnosis Date     HTN (hypertension)      Inguinal hernia without mention of obstruction or gangrene, unilateral or unspecified, (not specified as  "recurrent) 08/20/2004    RIGHT     Malignant neoplasm of sigmoid colon (H) 11/24/2020     Primary osteoarthritis involving multiple joints 8/2/2019     Ventral hernia, unspecified, without mention of obstruction or gangrene 08/15/2003    easily reduced     Physical Exam:    BP (!) 146/67 (BP Location: Left arm, Patient Position: Sitting, Cuff Size: Adult Small)   Pulse 78   Temp 98.5  F (36.9  C) (Oral)   Resp 16   Ht 1.575 m (5' 2\")   Wt 47.5 kg (104 lb 12.8 oz)   SpO2 96%   BMI 19.17 kg/m      General: patient appears stated age of 81 year old. Nontoxic and in no distress.   HEENT: Head: atraumatic, normocephalic. Sclerae anicteric.  Chest:  Normal respiratory effort  Cardiac:  No edema.   Abdomen: abdomen is non-distended  Extremities: normal tone and muscle bulk.  Skin: no lesions or rash on visible skin. Warm and dry.   CNS: alert and oriented. Grossly non-focal.   Psychiatric: normal mood and affect.     Lab Results:    Recent Results (from the past 168 hour(s))   Comprehensive metabolic panel   Result Value Ref Range    Sodium 140 135 - 145 mmol/L    Potassium 4.4 3.4 - 5.3 mmol/L    Carbon Dioxide (CO2) 27 22 - 29 mmol/L    Anion Gap 9 7 - 15 mmol/L    Urea Nitrogen 20.2 8.0 - 23.0 mg/dL    Creatinine 0.89 0.51 - 0.95 mg/dL    GFR Estimate 64 >60 mL/min/1.73m2    Calcium 8.9 8.8 - 10.2 mg/dL    Chloride 104 98 - 107 mmol/L    Glucose 161 (H) 70 - 99 mg/dL    Alkaline Phosphatase 73 40 - 150 U/L    AST 17 0 - 45 U/L    ALT 8 0 - 50 U/L    Protein Total 6.5 6.4 - 8.3 g/dL    Albumin 3.9 3.5 - 5.2 g/dL    Bilirubin Total 0.2 <=1.2 mg/dL   CBC with platelets and differential   Result Value Ref Range    WBC Count 8.7 4.0 - 11.0 10e3/uL    RBC Count 3.82 3.80 - 5.20 10e6/uL    Hemoglobin 11.7 11.7 - 15.7 g/dL    Hematocrit 37.0 35.0 - 47.0 %    MCV 97 78 - 100 fL    MCH 30.6 26.5 - 33.0 pg    MCHC 31.6 31.5 - 36.5 g/dL    RDW 13.6 10.0 - 15.0 %    Platelet Count 333 150 - 450 10e3/uL    % Neutrophils 68 % "    % Lymphocytes 21 %    % Monocytes 9 %    % Eosinophils 1 %    % Basophils 1 %    % Immature Granulocytes 0 %    NRBCs per 100 WBC 0 <1 /100    Absolute Neutrophils 5.9 1.6 - 8.3 10e3/uL    Absolute Lymphocytes 1.8 0.8 - 5.3 10e3/uL    Absolute Monocytes 0.8 0.0 - 1.3 10e3/uL    Absolute Eosinophils 0.1 0.0 - 0.7 10e3/uL    Absolute Basophils 0.0 0.0 - 0.2 10e3/uL    Absolute Immature Granulocytes 0.0 <=0.4 10e3/uL    Absolute NRBCs 0.0 10e3/uL       Signed by: Walt Merrill MD        Again, thank you for allowing me to participate in the care of your patient.        Sincerely,        Walt Merrill MD

## 2024-01-31 NOTE — PROGRESS NOTES
[facility] Hematology and Oncology Progress Note    Patient: Katya Butler  MRN: [id number]  Date of Service: Jan 31, 2024        Assessment and Plan:    Cancer Staging  Malignant neoplasm of sigmoid colon (H)  Staging form: Colon And Rectum, AJCC 8th Edition  - Clinical: No stage assigned - Unsigned  - Pathologic stage from 11/24/2020: Stage IIIC (pT4b, pN2, cM0) - Signed by Walt Merrill MD on 11/24/2020     1.  Colon cancer: She has metastatic disease which we are not treating.  Clinically she is generally stable.  She does not seem to be having changes which would suggest significant progression.  She is not on hospice.  Will continue to follow her.  Will have her return to clinic in 3 months.  No surveillance imaging.  Labs can be checked at that time.  I did review her labs today which show a stable CEA at 16.  It was the same in November 2023.  CMP reviewed today shows a sodium of 140, potassium 4.4 with an alkaline phosphatase of 73, AST 17 and ALT 8.  Hemoglobin is 11.7.    2.  Large cecal polyp found on colonoscopy: This polyp was biopsied on February 4, 2021 at [facility] and showed high-grade dysplasia, negative for invasive malignancy.    3.  Parotid lesion: SUV max of 4.7 on PET scan.  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  Stable.  No plan to biopsy given metastatic cancer.    4.  Low back pain: This is her biggest problem physically.  We recently tried Celebrex but this made her sick.  Norco provides the best relief.  This will be refilled.  She previously had injections which did not help.    ECOG Performance  0    Diagnosis:    1.  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  At initial surgery there was evidence of a small abscess and perforation of the colon.  Pathology showed a invasive moderately differentiated adenocarcinoma. Constricting ulcerated measuring 35 x 25 x 13 mm.  4 of 18 lymph nodes were positive. Mismatch repair intact.  There was some proximal colonic dilatation, consistent with obstruction.  Left ovary showed moderately differentiated adenocarcinoma, direct extension.  Focal positive margin at the paraovarian soft tissue margin.      Recurrence diagnosed in March 2023 from a left supraclavicular lymph node. MSI low. HER-2 negative. PET scan imaging shows uptake in left supraclavicular nodes and retroperitoneal lymph nodes.  NGS:  negative.    Treatment:    Surgical resection on October 31, 2020.  Adjuvant chemotherapy with FOLFOX was initiated on December 8, 2020.  25% initial dose reduction of all medications. Cycle 10 completed on April 20, 2021.    Interim History:    Katya returns today for follow-up visit.  Overall she is doing okay.  Energy and appetite are okay.  Using Senokot for some constipation.  Chronic low back pain.    Review of Systems:    As above in the history.     Review of Systems otherwise Negative for:  General: chills, fever or night sweats  Psychological: anxiety or depression  Ophthalmic: blurry vision, double vision or loss of vision, vision change  ENT: epistaxis, oral lesions, hearing changes  Hematological and Lymphatic: bleeding, bruising, jaundice, swollen lymph nodes  Endocrine: hot flashes, unexpected weight changes  Respiratory: cough, hemoptysis, orthopnea or shortness of breath/KLEIN  Cardiovascular: chest pain, edema, palpitations or PND  Gastrointestinal: blood in stools, change in bowel habits, constipation, diarrhea or nausea/vomiting  Genito-Urinary: change in urinary stream, incontinence, frequency/urgency  Musculoskeletal: joint pain, stiffness, swelling, muscle pain  Neurological: dizziness, headaches, numbness/tingling  Dermatological: lumps and rash    Past History:    Past Medical History:   Diagnosis Date    HTN (hypertension)     Inguinal hernia without mention of obstruction or gangrene, unilateral or unspecified, (not specified as recurrent) 08/20/2004    RIGHT    Malignant neoplasm  "of sigmoid colon (H) 11/24/2020    Primary osteoarthritis involving multiple joints 8/2/2019    Ventral hernia, unspecified, without mention of obstruction or gangrene 08/15/2003    easily reduced     Physical Exam:    BP (!) 146/67 (BP Location: Left arm, Patient Position: Sitting, Cuff Size: Adult Small)   Pulse 78   Temp 98.5  F (36.9  C) (Oral)   Resp 16   Ht 1.575 m (5' 2\")   Wt 47.5 kg (104 lb 12.8 oz)   SpO2 96%   BMI 19.17 kg/m      General: patient appears stated age of 81 year old. Nontoxic and in no distress.   HEENT: Head: atraumatic, normocephalic. Sclerae anicteric.  Chest:  Normal respiratory effort  Cardiac:  No edema.   Abdomen: abdomen is non-distended  Extremities: normal tone and muscle bulk.  Skin: no lesions or rash on visible skin. Warm and dry.   CNS: alert and oriented. Grossly non-focal.   Psychiatric: normal mood and affect.     Lab Results:    Recent Results (from the past 168 hour(s))   Comprehensive metabolic panel   Result Value Ref Range    Sodium 140 135 - 145 mmol/L    Potassium 4.4 3.4 - 5.3 mmol/L    Carbon Dioxide (CO2) 27 22 - 29 mmol/L    Anion Gap 9 7 - 15 mmol/L    Urea Nitrogen 20.2 8.0 - 23.0 mg/dL    Creatinine 0.89 0.51 - 0.95 mg/dL    GFR Estimate 64 >60 mL/min/1.73m2    Calcium 8.9 8.8 - 10.2 mg/dL    Chloride 104 98 - 107 mmol/L    Glucose 161 (H) 70 - 99 mg/dL    Alkaline Phosphatase 73 40 - 150 U/L    AST 17 0 - 45 U/L    ALT 8 0 - 50 U/L    Protein Total 6.5 6.4 - 8.3 g/dL    Albumin 3.9 3.5 - 5.2 g/dL    Bilirubin Total 0.2 <=1.2 mg/dL   CBC with platelets and differential   Result Value Ref Range    WBC Count 8.7 4.0 - 11.0 10e3/uL    RBC Count 3.82 3.80 - 5.20 10e6/uL    Hemoglobin 11.7 11.7 - 15.7 g/dL    Hematocrit 37.0 35.0 - 47.0 %    MCV 97 78 - 100 fL    MCH 30.6 26.5 - 33.0 pg    MCHC 31.6 31.5 - 36.5 g/dL    RDW 13.6 10.0 - 15.0 %    Platelet Count 333 150 - 450 10e3/uL    % Neutrophils 68 %    % Lymphocytes 21 %    % Monocytes 9 %    % Eosinophils " 1 %    % Basophils 1 %    % Immature Granulocytes 0 %    NRBCs per 100 WBC 0 <1 /100    Absolute Neutrophils 5.9 1.6 - 8.3 10e3/uL    Absolute Lymphocytes 1.8 0.8 - 5.3 10e3/uL    Absolute Monocytes 0.8 0.0 - 1.3 10e3/uL    Absolute Eosinophils 0.1 0.0 - 0.7 10e3/uL    Absolute Basophils 0.0 0.0 - 0.2 10e3/uL    Absolute Immature Granulocytes 0.0 <=0.4 10e3/uL    Absolute NRBCs 0.0 10e3/uL       Signed by: Walt Merrill MD

## 2024-02-01 LAB — CEA SERPL-MCNC: 16 NG/ML

## 2024-03-06 DIAGNOSIS — M54.50 CHRONIC MIDLINE LOW BACK PAIN WITHOUT SCIATICA: ICD-10-CM

## 2024-03-06 DIAGNOSIS — C18.9 COLON ADENOCARCINOMA (H): ICD-10-CM

## 2024-03-06 DIAGNOSIS — G89.29 CHRONIC MIDLINE LOW BACK PAIN WITHOUT SCIATICA: ICD-10-CM

## 2024-03-06 RX ORDER — HYDROCODONE BITARTRATE AND ACETAMINOPHEN 5; 325 MG/1; MG/1
1-2 TABLET ORAL
Qty: 60 TABLET | Refills: 0 | Status: SHIPPED | OUTPATIENT
Start: 2024-03-06 | End: 2024-05-20

## 2024-03-06 NOTE — TELEPHONE ENCOUNTER
Katya called today requesting a refill of her pain medications.  Last fill was 1/25/2024. Will route to Dr. Merrill to refill.     Radha Mendoza RN on 3/6/2024 at 8:37 AM

## 2024-04-04 ENCOUNTER — TELEPHONE (OUTPATIENT)
Dept: ONCOLOGY | Facility: HOSPITAL | Age: 84
End: 2024-04-04
Payer: COMMERCIAL

## 2024-04-04 NOTE — TELEPHONE ENCOUNTER
"I received a phone call from Katya today.  She is calling to see if she can come into the clinic sooner to see a provider.  She is tearful and states \"things seem to be changing.\"  She has been weak, fatigued, nauseated.  She states she has no appetite.  She has been going to bed around 8 PM and sleeps until 8 AM most days.  She is also dealing with some constipation.  She states it has been about 3 days since she has had a bowel movement.  She plans to start taking some milk of magnesia if she does not have a bowel movement today.  She states she is able to pass gas.  Her son encouraged her to call today to set up an appointment.  I let her know I would send this message over to DIGNA Agarwal, care coordinator with Dr. Merrill to see if there was a way to move up her appointment.  She also states she has not had any imaging in many months.  She is open to getting the scan if Dr. Merrill thinks this would be helpful.  She can be reached at [phone].    Radha Mendoza RN on 4/4/2024 at 2:06 PM    "

## 2024-04-05 ENCOUNTER — PATIENT OUTREACH (OUTPATIENT)
Dept: ONCOLOGY | Facility: HOSPITAL | Age: 84
End: 2024-04-05
Payer: COMMERCIAL

## 2024-04-05 DIAGNOSIS — C18.7 MALIGNANT NEOPLASM OF SIGMOID COLON (H): Primary | ICD-10-CM

## 2024-04-05 DIAGNOSIS — C18.9 COLON ADENOCARCINOMA (H): ICD-10-CM

## 2024-04-05 NOTE — PROGRESS NOTES
[facility]: Cancer Care                                                                                          Talk to the patient who stated that she feels very weak and not feeling well.  I asked if she wanted to go to the ER for further follow-up, but she stated that she felt like going to the ER would not be of benefit to her.  She felt like going to the ER would be a waste and would rather be seen in clinic by Dr. Merrill.  I stated that the earliest availability would be a 4/26 which would only be a few days before her original appointment on 4/30.  She stated that she will just keep her original appointment.  Patient requested to have her labs drawn in the meantime. I stated that we could check her CBC and CMP just to get an idea of where her counts are at. I also stated that I would follow-up with Dr. Merrill on Tuesday when he gets back to clinic regarding her lab results and if she should get any further imaging prior to her follow-up appointment on 4/30.  Patient inquired if she would need a PET scan or CT scan.  I stated that the most recent scan that she had done was a CT scan of the chest abdomen and pelvis back in November so it may be another repeat scan of that, but I would get clarity from Dr. Merrill and then follow-up with the patient.  Patient stated that she would want to come into the clinic for a lab appointment on 4/8 at 11 AM.  I stated that I would let her schedulers know to get that appointment scheduled.  Patient verbalized understanding and expressed gratitude.    Signature:  Any Monzon RN

## 2024-04-08 ENCOUNTER — LAB (OUTPATIENT)
Dept: INFUSION THERAPY | Facility: HOSPITAL | Age: 84
End: 2024-04-08
Attending: INTERNAL MEDICINE
Payer: COMMERCIAL

## 2024-04-08 DIAGNOSIS — C18.7 MALIGNANT NEOPLASM OF SIGMOID COLON (H): ICD-10-CM

## 2024-04-08 LAB
ALBUMIN SERPL BCG-MCNC: 3.9 G/DL (ref 3.5–5.2)
ALP SERPL-CCNC: 82 U/L (ref 40–150)
ALT SERPL W P-5'-P-CCNC: 9 U/L (ref 0–50)
ANION GAP SERPL CALCULATED.3IONS-SCNC: 11 MMOL/L (ref 7–15)
AST SERPL W P-5'-P-CCNC: 16 U/L (ref 0–45)
BASOPHILS # BLD AUTO: 0 10E3/UL (ref 0–0.2)
BASOPHILS NFR BLD AUTO: 1 %
BILIRUB SERPL-MCNC: <0.2 MG/DL
BUN SERPL-MCNC: 15.5 MG/DL (ref 8–23)
CALCIUM SERPL-MCNC: 9.5 MG/DL (ref 8.8–10.2)
CHLORIDE SERPL-SCNC: 102 MMOL/L (ref 98–107)
CREAT SERPL-MCNC: 0.86 MG/DL (ref 0.51–0.95)
DEPRECATED HCO3 PLAS-SCNC: 25 MMOL/L (ref 22–29)
EGFRCR SERPLBLD CKD-EPI 2021: 67 ML/MIN/1.73M2
EOSINOPHIL # BLD AUTO: 0.2 10E3/UL (ref 0–0.7)
EOSINOPHIL NFR BLD AUTO: 2 %
ERYTHROCYTE [DISTWIDTH] IN BLOOD BY AUTOMATED COUNT: 15.4 % (ref 10–15)
GLUCOSE SERPL-MCNC: 84 MG/DL (ref 70–99)
HCT VFR BLD AUTO: 34.5 % (ref 35–47)
HGB BLD-MCNC: 10.7 G/DL (ref 11.7–15.7)
IMM GRANULOCYTES # BLD: 0 10E3/UL
IMM GRANULOCYTES NFR BLD: 0 %
LYMPHOCYTES # BLD AUTO: 2.2 10E3/UL (ref 0.8–5.3)
LYMPHOCYTES NFR BLD AUTO: 26 %
MCH RBC QN AUTO: 29.8 PG (ref 26.5–33)
MCHC RBC AUTO-ENTMCNC: 31 G/DL (ref 31.5–36.5)
MCV RBC AUTO: 96 FL (ref 78–100)
MONOCYTES # BLD AUTO: 1.1 10E3/UL (ref 0–1.3)
MONOCYTES NFR BLD AUTO: 13 %
NEUTROPHILS # BLD AUTO: 4.9 10E3/UL (ref 1.6–8.3)
NEUTROPHILS NFR BLD AUTO: 58 %
NRBC # BLD AUTO: 0 10E3/UL
NRBC BLD AUTO-RTO: 0 /100
PLATELET # BLD AUTO: 433 10E3/UL (ref 150–450)
POTASSIUM SERPL-SCNC: 4.8 MMOL/L (ref 3.4–5.3)
PROT SERPL-MCNC: 6.6 G/DL (ref 6.4–8.3)
RBC # BLD AUTO: 3.59 10E6/UL (ref 3.8–5.2)
SODIUM SERPL-SCNC: 138 MMOL/L (ref 135–145)
WBC # BLD AUTO: 8.3 10E3/UL (ref 4–11)

## 2024-04-08 PROCEDURE — 36591 DRAW BLOOD OFF VENOUS DEVICE: CPT

## 2024-04-08 PROCEDURE — 85025 COMPLETE CBC W/AUTO DIFF WBC: CPT

## 2024-04-08 PROCEDURE — 84075 ASSAY ALKALINE PHOSPHATASE: CPT

## 2024-04-10 DIAGNOSIS — C18.9 COLON ADENOCARCINOMA (H): Primary | ICD-10-CM

## 2024-04-10 DIAGNOSIS — C18.7 MALIGNANT NEOPLASM OF SIGMOID COLON (H): ICD-10-CM

## 2024-04-11 ENCOUNTER — PATIENT OUTREACH (OUTPATIENT)
Dept: ONCOLOGY | Facility: HOSPITAL | Age: 84
End: 2024-04-11
Payer: COMMERCIAL

## 2024-04-11 NOTE — PROGRESS NOTES
[facility]: Cancer Care                                                                                          Called the patient to let her know that Dr. Merrill reviewed her recent lab results and per Dr. Merrill, the patient's lab results look good but given her symptoms of feeling nauseated constipated and fatigued Dr. Merrill recommended getting a CT scan prior to her follow-up visit with him.  Patient then continued to complain of nausea, legs feeling very weak, feeling very fatigued.  I brought up the idea of hospice and not wanting to do treatment per past visit notes.  The patient then responded and stated that she would decide what to do during her follow-up visit with Dr. Merrill once the CT scan have resulted.  I stated that our schedulers will give her a call to schedule the CT scan.  Patient verbalized understanding and expressed gratitude.    Signature:  Any Monzon RN

## 2024-04-17 ENCOUNTER — HOSPITAL ENCOUNTER (OUTPATIENT)
Dept: CT IMAGING | Facility: HOSPITAL | Age: 84
Discharge: HOME | End: 2024-04-17
Attending: INTERNAL MEDICINE | Admitting: INTERNAL MEDICINE
Payer: COMMERCIAL

## 2024-04-17 DIAGNOSIS — C18.7 MALIGNANT NEOPLASM OF SIGMOID COLON (H): ICD-10-CM

## 2024-04-17 DIAGNOSIS — C18.9 COLON ADENOCARCINOMA (H): ICD-10-CM

## 2024-04-17 PROCEDURE — 71250 CT THORAX DX C-: CPT

## 2024-04-30 ENCOUNTER — LAB (OUTPATIENT)
Dept: INFUSION THERAPY | Facility: HOSPITAL | Age: 84
End: 2024-04-30
Attending: INTERNAL MEDICINE
Payer: COMMERCIAL

## 2024-04-30 ENCOUNTER — ONCOLOGY VISIT (OUTPATIENT)
Dept: ONCOLOGY | Facility: HOSPITAL | Age: 84
End: 2024-04-30
Attending: INTERNAL MEDICINE
Payer: COMMERCIAL

## 2024-04-30 VITALS
SYSTOLIC BLOOD PRESSURE: 189 MMHG | TEMPERATURE: 98.7 F | OXYGEN SATURATION: 98 % | DIASTOLIC BLOOD PRESSURE: 78 MMHG | HEART RATE: 89 BPM | RESPIRATION RATE: 16 BRPM | HEIGHT: 62 IN | WEIGHT: 102.1 LBS | BODY MASS INDEX: 18.79 KG/M2

## 2024-04-30 DIAGNOSIS — C18.9 COLON ADENOCARCINOMA (H): Primary | ICD-10-CM

## 2024-04-30 DIAGNOSIS — C18.7 MALIGNANT NEOPLASM OF SIGMOID COLON (H): ICD-10-CM

## 2024-04-30 LAB
ALBUMIN SERPL BCG-MCNC: 4 G/DL (ref 3.5–5.2)
ALP SERPL-CCNC: 81 U/L (ref 40–150)
ALT SERPL W P-5'-P-CCNC: 8 U/L (ref 0–50)
ANION GAP SERPL CALCULATED.3IONS-SCNC: 12 MMOL/L (ref 7–15)
AST SERPL W P-5'-P-CCNC: 17 U/L (ref 0–45)
BASOPHILS # BLD AUTO: 0.1 10E3/UL (ref 0–0.2)
BASOPHILS NFR BLD AUTO: 1 %
BILIRUB SERPL-MCNC: <0.2 MG/DL
BUN SERPL-MCNC: 15.5 MG/DL (ref 8–23)
CALCIUM SERPL-MCNC: 9.9 MG/DL (ref 8.8–10.2)
CHLORIDE SERPL-SCNC: 102 MMOL/L (ref 98–107)
CREAT SERPL-MCNC: 0.9 MG/DL (ref 0.51–0.95)
DEPRECATED HCO3 PLAS-SCNC: 24 MMOL/L (ref 22–29)
EGFRCR SERPLBLD CKD-EPI 2021: 64 ML/MIN/1.73M2
EOSINOPHIL # BLD AUTO: 0.2 10E3/UL (ref 0–0.7)
EOSINOPHIL NFR BLD AUTO: 2 %
ERYTHROCYTE [DISTWIDTH] IN BLOOD BY AUTOMATED COUNT: 14.9 % (ref 10–15)
GLUCOSE SERPL-MCNC: 101 MG/DL (ref 70–99)
HCT VFR BLD AUTO: 34.9 % (ref 35–47)
HGB BLD-MCNC: 11 G/DL (ref 11.7–15.7)
IMM GRANULOCYTES # BLD: 0 10E3/UL
IMM GRANULOCYTES NFR BLD: 0 %
LYMPHOCYTES # BLD AUTO: 3 10E3/UL (ref 0.8–5.3)
LYMPHOCYTES NFR BLD AUTO: 34 %
MCH RBC QN AUTO: 29.4 PG (ref 26.5–33)
MCHC RBC AUTO-ENTMCNC: 31.5 G/DL (ref 31.5–36.5)
MCV RBC AUTO: 93 FL (ref 78–100)
MONOCYTES # BLD AUTO: 0.8 10E3/UL (ref 0–1.3)
MONOCYTES NFR BLD AUTO: 9 %
NEUTROPHILS # BLD AUTO: 4.8 10E3/UL (ref 1.6–8.3)
NEUTROPHILS NFR BLD AUTO: 54 %
NRBC # BLD AUTO: 0 10E3/UL
NRBC BLD AUTO-RTO: 0 /100
PLATELET # BLD AUTO: 425 10E3/UL (ref 150–450)
POTASSIUM SERPL-SCNC: 4.1 MMOL/L (ref 3.4–5.3)
PROT SERPL-MCNC: 7.3 G/DL (ref 6.4–8.3)
RBC # BLD AUTO: 3.74 10E6/UL (ref 3.8–5.2)
SODIUM SERPL-SCNC: 138 MMOL/L (ref 135–145)
WBC # BLD AUTO: 8.9 10E3/UL (ref 4–11)

## 2024-04-30 PROCEDURE — 85025 COMPLETE CBC W/AUTO DIFF WBC: CPT

## 2024-04-30 PROCEDURE — 99213 OFFICE O/P EST LOW 20 MIN: CPT | Performed by: INTERNAL MEDICINE

## 2024-04-30 PROCEDURE — 36591 DRAW BLOOD OFF VENOUS DEVICE: CPT

## 2024-04-30 PROCEDURE — G0463 HOSPITAL OUTPT CLINIC VISIT: HCPCS | Performed by: INTERNAL MEDICINE

## 2024-04-30 PROCEDURE — 84155 ASSAY OF PROTEIN SERUM: CPT

## 2024-04-30 PROCEDURE — 82040 ASSAY OF SERUM ALBUMIN: CPT

## 2024-04-30 RX ORDER — OXYCODONE AND ACETAMINOPHEN 5; 325 MG/1; MG/1
1-2 TABLET ORAL
COMMUNITY
Start: 2024-02-01 | End: 2024-08-21

## 2024-04-30 ASSESSMENT — PAIN SCALES - GENERAL: PAINLEVEL: MILD PAIN (2)

## 2024-04-30 NOTE — PROGRESS NOTES
"Oncology Rooming Note    April 30, 2024 2:00 PM   Katya Butler is a 83 year old female who presents for:    Chief Complaint   Patient presents with    Oncology Clinic Visit     3 month return visit with labs pertaining to Malignant neoplasm of sigmoid colon.     Initial Vitals: BP (!) 189/78 (BP Location: Left arm, Patient Position: Sitting, Cuff Size: Adult Regular)   Pulse 89   Temp 98.7  F (37.1  C) (Tympanic)   Resp 16   Ht 1.575 m (5' 2\")   Wt 46.3 kg (102 lb 1.6 oz)   SpO2 98%   BMI 18.67 kg/m   Estimated body mass index is 18.67 kg/m  as calculated from the following:    Height as of this encounter: 1.575 m (5' 2\").    Weight as of this encounter: 46.3 kg (102 lb 1.6 oz). Body surface area is 1.42 meters squared.  Mild Pain (2) Comment: Data Unavailable   No LMP recorded. Patient has had a hysterectomy.  Allergies reviewed: Yes  Medications reviewed: Yes    Medications: Medication refills not needed today.  Pharmacy name entered into [employer] [employer]: [id number] - [place] [phone]    Frailty Screening:   Is the patient here for a new oncology consult visit in cancer care? 2. No      Clinical concerns: Bilateral lower leg pain with walking and activity.    Dr. Merrill was notified.      Amina Arguello CMA            "

## 2024-04-30 NOTE — PROGRESS NOTES
[facility] Hematology and Oncology Progress Note    Patient: Katya Butler  MRN: [id number]  Date of Service: Apr 30, 2024        Assessment and Plan:    Cancer Staging  Malignant neoplasm of sigmoid colon (H)  Staging form: Colon And Rectum, AJCC 8th Edition  - Clinical: No stage assigned - Unsigned  - Pathologic stage from 11/24/2020: Stage IIIC (pT4b, pN2, cM0) - Signed by Walt Merrill MD on 11/24/2020     1.  Colon cancer: She recently had a CT scan.  I personally reviewed the images and shared them with Kirill.  Overall there is not much change when compared to her imaging from November 1, 2023.  This despite no treatment for her cancer.  She appears to only have limited ai involvement.  Clinically she is feeling okay.  Will plan on seeing her back in clinic in 4 months for routine follow-up.  Will recheck labs at that time.  We can reimage in 6 to 8 months at her request. She will let us know in the interim if she develops any new symptoms.    2.  Large cecal polyp found on colonoscopy: This polyp was biopsied on February 4, 2021 at [facility] and showed high-grade dysplasia, negative for invasive malignancy.    3.  Parotid lesion: SUV max of 4.7 on PET scan.  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  Stable.  No plan to biopsy given metastatic cancer.  Currently asymptomatic    ECOG Performance  0    Diagnosis:    1.  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  At initial surgery there was evidence of a small abscess and perforation of the colon.  Pathology showed a invasive moderately differentiated adenocarcinoma. Constricting ulcerated measuring 35 x 25 x 13 mm.  4 of 18 lymph nodes were positive. Mismatch repair intact. There was some proximal colonic dilatation, consistent with obstruction.  Left ovary showed moderately differentiated adenocarcinoma, direct extension.  Focal positive margin at the paraovarian soft tissue margin.      Recurrence  diagnosed in March 2023 from a left supraclavicular lymph node. MSI low. HER-2 negative. PET scan imaging shows uptake in left supraclavicular nodes and retroperitoneal lymph nodes.  NGS:  negative.    Treatment:    Surgical resection on October 31, 2020.  Adjuvant chemotherapy with FOLFOX was initiated on December 8, 2020.  25% initial dose reduction of all medications. Cycle 10 completed on April 20, 2021.    Interim History:    Katya returns today for follow-up visit.  Overall she is doing okay.  She has some discomfort in the lower legs with activity.  No shortness of breath or cough or other areas of pain.    Review of Systems:    As above in the history.     Review of Systems otherwise Negative for:  General: chills, fever or night sweats  Psychological: anxiety or depression  Ophthalmic: blurry vision, double vision or loss of vision, vision change  ENT: epistaxis, oral lesions, hearing changes  Hematological and Lymphatic: bleeding, bruising, jaundice, swollen lymph nodes  Endocrine: hot flashes, unexpected weight changes  Respiratory: cough, hemoptysis, orthopnea or shortness of breath/KLEIN  Cardiovascular: chest pain, edema, palpitations or PND  Gastrointestinal: blood in stools, change in bowel habits, constipation, diarrhea or nausea/vomiting  Genito-Urinary: change in urinary stream, incontinence, frequency/urgency  Musculoskeletal: joint pain, stiffness, swelling  Neurological: dizziness, headaches, numbness/tingling  Dermatological: lumps and rash    Past History:    Past Medical History:   Diagnosis Date    HTN (hypertension)     Inguinal hernia without mention of obstruction or gangrene, unilateral or unspecified, (not specified as recurrent) 08/20/2004    RIGHT    Malignant neoplasm of sigmoid colon (H) 11/24/2020    Primary osteoarthritis involving multiple joints 8/2/2019    Ventral hernia, unspecified, without mention of obstruction or gangrene 08/15/2003    easily reduced     Physical Exam:    BP  "(!) 189/78 (BP Location: Left arm, Patient Position: Sitting, Cuff Size: Adult Regular)   Pulse 89   Temp 98.7  F (37.1  C) (Tympanic)   Resp 16   Ht 1.575 m (5' 2\")   Wt 46.3 kg (102 lb 1.6 oz)   SpO2 98%   BMI 18.67 kg/m      General: patient appears stated age of 81 year old. Nontoxic and in no distress.   HEENT: Head: atraumatic, normocephalic. Sclerae anicteric.  Chest:  Normal respiratory effort  Cardiac:  No edema.   Abdomen: abdomen is non-distended  Extremities: normal tone and muscle bulk.  Skin: no lesions or rash on visible skin. Warm and dry.   CNS: alert and oriented. Grossly non-focal.   Psychiatric: normal mood and affect.     Lab Results:    Recent Results (from the past 168 hour(s))   Comprehensive metabolic panel   Result Value Ref Range    Sodium 138 135 - 145 mmol/L    Potassium 4.1 3.4 - 5.3 mmol/L    Carbon Dioxide (CO2) 24 22 - 29 mmol/L    Anion Gap 12 7 - 15 mmol/L    Urea Nitrogen 15.5 8.0 - 23.0 mg/dL    Creatinine 0.90 0.51 - 0.95 mg/dL    GFR Estimate 64 >60 mL/min/1.73m2    Calcium 9.9 8.8 - 10.2 mg/dL    Chloride 102 98 - 107 mmol/L    Glucose 101 (H) 70 - 99 mg/dL    Alkaline Phosphatase 81 40 - 150 U/L    AST 17 0 - 45 U/L    ALT 8 0 - 50 U/L    Protein Total 7.3 6.4 - 8.3 g/dL    Albumin 4.0 3.5 - 5.2 g/dL    Bilirubin Total <0.2 <=1.2 mg/dL   CBC with platelets and differential   Result Value Ref Range    WBC Count 8.9 4.0 - 11.0 10e3/uL    RBC Count 3.74 (L) 3.80 - 5.20 10e6/uL    Hemoglobin 11.0 (L) 11.7 - 15.7 g/dL    Hematocrit 34.9 (L) 35.0 - 47.0 %    MCV 93 78 - 100 fL    MCH 29.4 26.5 - 33.0 pg    MCHC 31.5 31.5 - 36.5 g/dL    RDW 14.9 10.0 - 15.0 %    Platelet Count 425 150 - 450 10e3/uL    % Neutrophils 54 %    % Lymphocytes 34 %    % Monocytes 9 %    % Eosinophils 2 %    % Basophils 1 %    % Immature Granulocytes 0 %    NRBCs per 100 WBC 0 <1 /100    Absolute Neutrophils 4.8 1.6 - 8.3 10e3/uL    Absolute Lymphocytes 3.0 0.8 - 5.3 10e3/uL    Absolute Monocytes 0.8 " 0.0 - 1.3 10e3/uL    Absolute Eosinophils 0.2 0.0 - 0.7 10e3/uL    Absolute Basophils 0.1 0.0 - 0.2 10e3/uL    Absolute Immature Granulocytes 0.0 <=0.4 10e3/uL    Absolute NRBCs 0.0 10e3/uL       Signed by: Walt Merrill MD

## 2024-04-30 NOTE — LETTER
"    4/30/2024         RE: Katya Butler  [address] [facility] [address]        Dear Colleague,    Thank you for referring your patient, Katya Butler, to the [facility]. Please see a copy of my visit note below.    Oncology Rooming Note    April 30, 2024 2:00 PM   Katya Butler is a 83 year old female who presents for:    Chief Complaint   Patient presents with     Oncology Clinic Visit     3 month return visit with labs pertaining to Malignant neoplasm of sigmoid colon.     Initial Vitals: BP (!) 189/78 (BP Location: Left arm, Patient Position: Sitting, Cuff Size: Adult Regular)   Pulse 89   Temp 98.7  F (37.1  C) (Tympanic)   Resp 16   Ht 1.575 m (5' 2\")   Wt 46.3 kg (102 lb 1.6 oz)   SpO2 98%   BMI 18.67 kg/m   Estimated body mass index is 18.67 kg/m  as calculated from the following:    Height as of this encounter: 1.575 m (5' 2\").    Weight as of this encounter: 46.3 kg (102 lb 1.6 oz). Body surface area is 1.42 meters squared.  Mild Pain (2) Comment: Data Unavailable   No LMP recorded. Patient has had a hysterectomy.  Allergies reviewed: Yes  Medications reviewed: Yes    Medications: Medication refills not needed today.  Pharmacy name entered into [employer]: [id number] - [place] [id number]    Frailty Screening:   Is the patient here for a new oncology consult visit in cancer care? 2. No      Clinical concerns: Bilateral lower leg pain with walking and activity.    Dr. Merrill was notified.      Amina Arguello [facility] Hematology and Oncology Progress Note    Patient: Katya Butler  MRN: [id number]  Date of Service: Apr 30, 2024        Assessment and Plan:    Cancer Staging  Malignant neoplasm of sigmoid colon (H)  Staging form: Colon And Rectum, AJCC 8th Edition  - Clinical: No stage assigned - Unsigned  - Pathologic stage from 11/24/2020: Stage IIIC (pT4b, pN2, cM0) - Signed by Walt Merrill MD on 11/24/2020   "   1.  Colon cancer: She recently had a CT scan.  I personally reviewed the images and shared them with Kirill.  Overall there is not much change when compared to her imaging from November 1, 2023.  This despite no treatment for her cancer.  She appears to only have limited ai involvement.  Clinically she is feeling okay.  Will plan on seeing her back in clinic in 4 months for routine follow-up.  Will recheck labs at that time.  We can reimage in 6 to 8 months at her request. She will let us know in the interim if she develops any new symptoms.    2.  Large cecal polyp found on colonoscopy: This polyp was biopsied on February 4, 2021 at [facility] and showed high-grade dysplasia, negative for invasive malignancy.    3.  Parotid lesion: SUV max of 4.7 on PET scan.  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  Stable.  No plan to biopsy given metastatic cancer.  Currently asymptomatic    ECOG Performance  0    Diagnosis:    1.  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  At initial surgery there was evidence of a small abscess and perforation of the colon.  Pathology showed a invasive moderately differentiated adenocarcinoma. Constricting ulcerated measuring 35 x 25 x 13 mm.  4 of 18 lymph nodes were positive. Mismatch repair intact. There was some proximal colonic dilatation, consistent with obstruction.  Left ovary showed moderately differentiated adenocarcinoma, direct extension.  Focal positive margin at the paraovarian soft tissue margin.      Recurrence diagnosed in March 2023 from a left supraclavicular lymph node. MSI low. HER-2 negative. PET scan imaging shows uptake in left supraclavicular nodes and retroperitoneal lymph nodes.  NGS:  negative.    Treatment:    Surgical resection on October 31, 2020.  Adjuvant chemotherapy with FOLFOX was initiated on December 8, 2020.  25% initial dose reduction of all medications. Cycle 10 completed on April 20, 2021.    Interim  "History:    Katya returns today for follow-up visit.  Overall she is doing okay.  She has some discomfort in the lower legs with activity.  No shortness of breath or cough or other areas of pain.    Review of Systems:    As above in the history.     Review of Systems otherwise Negative for:  General: chills, fever or night sweats  Psychological: anxiety or depression  Ophthalmic: blurry vision, double vision or loss of vision, vision change  ENT: epistaxis, oral lesions, hearing changes  Hematological and Lymphatic: bleeding, bruising, jaundice, swollen lymph nodes  Endocrine: hot flashes, unexpected weight changes  Respiratory: cough, hemoptysis, orthopnea or shortness of breath/KLEIN  Cardiovascular: chest pain, edema, palpitations or PND  Gastrointestinal: blood in stools, change in bowel habits, constipation, diarrhea or nausea/vomiting  Genito-Urinary: change in urinary stream, incontinence, frequency/urgency  Musculoskeletal: joint pain, stiffness, swelling  Neurological: dizziness, headaches, numbness/tingling  Dermatological: lumps and rash    Past History:    Past Medical History:   Diagnosis Date     HTN (hypertension)      Inguinal hernia without mention of obstruction or gangrene, unilateral or unspecified, (not specified as recurrent) 08/20/2004    RIGHT     Malignant neoplasm of sigmoid colon (H) 11/24/2020     Primary osteoarthritis involving multiple joints 8/2/2019     Ventral hernia, unspecified, without mention of obstruction or gangrene 08/15/2003    easily reduced     Physical Exam:    BP (!) 189/78 (BP Location: Left arm, Patient Position: Sitting, Cuff Size: Adult Regular)   Pulse 89   Temp 98.7  F (37.1  C) (Tympanic)   Resp 16   Ht 1.575 m (5' 2\")   Wt 46.3 kg (102 lb 1.6 oz)   SpO2 98%   BMI 18.67 kg/m      General: patient appears stated age of 81 year old. Nontoxic and in no distress.   HEENT: Head: atraumatic, normocephalic. Sclerae anicteric.  Chest:  Normal respiratory " effort  Cardiac:  No edema.   Abdomen: abdomen is non-distended  Extremities: normal tone and muscle bulk.  Skin: no lesions or rash on visible skin. Warm and dry.   CNS: alert and oriented. Grossly non-focal.   Psychiatric: normal mood and affect.     Lab Results:    Recent Results (from the past 168 hour(s))   Comprehensive metabolic panel   Result Value Ref Range    Sodium 138 135 - 145 mmol/L    Potassium 4.1 3.4 - 5.3 mmol/L    Carbon Dioxide (CO2) 24 22 - 29 mmol/L    Anion Gap 12 7 - 15 mmol/L    Urea Nitrogen 15.5 8.0 - 23.0 mg/dL    Creatinine 0.90 0.51 - 0.95 mg/dL    GFR Estimate 64 >60 mL/min/1.73m2    Calcium 9.9 8.8 - 10.2 mg/dL    Chloride 102 98 - 107 mmol/L    Glucose 101 (H) 70 - 99 mg/dL    Alkaline Phosphatase 81 40 - 150 U/L    AST 17 0 - 45 U/L    ALT 8 0 - 50 U/L    Protein Total 7.3 6.4 - 8.3 g/dL    Albumin 4.0 3.5 - 5.2 g/dL    Bilirubin Total <0.2 <=1.2 mg/dL   CBC with platelets and differential   Result Value Ref Range    WBC Count 8.9 4.0 - 11.0 10e3/uL    RBC Count 3.74 (L) 3.80 - 5.20 10e6/uL    Hemoglobin 11.0 (L) 11.7 - 15.7 g/dL    Hematocrit 34.9 (L) 35.0 - 47.0 %    MCV 93 78 - 100 fL    MCH 29.4 26.5 - 33.0 pg    MCHC 31.5 31.5 - 36.5 g/dL    RDW 14.9 10.0 - 15.0 %    Platelet Count 425 150 - 450 10e3/uL    % Neutrophils 54 %    % Lymphocytes 34 %    % Monocytes 9 %    % Eosinophils 2 %    % Basophils 1 %    % Immature Granulocytes 0 %    NRBCs per 100 WBC 0 <1 /100    Absolute Neutrophils 4.8 1.6 - 8.3 10e3/uL    Absolute Lymphocytes 3.0 0.8 - 5.3 10e3/uL    Absolute Monocytes 0.8 0.0 - 1.3 10e3/uL    Absolute Eosinophils 0.2 0.0 - 0.7 10e3/uL    Absolute Basophils 0.1 0.0 - 0.2 10e3/uL    Absolute Immature Granulocytes 0.0 <=0.4 10e3/uL    Absolute NRBCs 0.0 10e3/uL       Signed by: Walt Merrill MD      Again, thank you for allowing me to participate in the care of your patient.        Sincerely,        Walt Merrill MD

## 2024-05-20 DIAGNOSIS — C18.9 COLON ADENOCARCINOMA (H): ICD-10-CM

## 2024-05-20 DIAGNOSIS — M54.50 CHRONIC MIDLINE LOW BACK PAIN WITHOUT SCIATICA: ICD-10-CM

## 2024-05-20 DIAGNOSIS — G89.29 CHRONIC MIDLINE LOW BACK PAIN WITHOUT SCIATICA: ICD-10-CM

## 2024-05-20 RX ORDER — HYDROCODONE BITARTRATE AND ACETAMINOPHEN 5; 325 MG/1; MG/1
1-2 TABLET ORAL
Qty: 60 TABLET | Refills: 0 | Status: SHIPPED | OUTPATIENT
Start: 2024-05-20 | End: 2024-07-12

## 2024-05-20 NOTE — TELEPHONE ENCOUNTER
Pt called triage line with vm regarding need for refill. Writer called her back - clarified this is for Hydrocodone. She has 6 pills left now.    Writer iron'd up refill and routed to Dr. Merrill and Claudia, RNCC (for awareness).

## 2024-05-28 ENCOUNTER — MEDICAL CORRESPONDENCE (OUTPATIENT)
Dept: HEALTH INFORMATION MANAGEMENT | Facility: CLINIC | Age: 84
End: 2024-05-28

## 2024-05-28 ENCOUNTER — TRANSFERRED RECORDS (OUTPATIENT)
Dept: HEALTH INFORMATION MANAGEMENT | Facility: CLINIC | Age: 84
End: 2024-05-28

## 2024-05-30 ENCOUNTER — TRANSCRIBE ORDERS (OUTPATIENT)
Dept: OTHER | Age: 84
End: 2024-05-30

## 2024-05-30 DIAGNOSIS — I73.9 PAD (PERIPHERAL ARTERY DISEASE) (H): Primary | ICD-10-CM

## 2024-05-30 DIAGNOSIS — I70.213 ATHEROSCLEROSIS OF NATIVE ARTERY OF BOTH LOWER EXTREMITIES WITH INTERMITTENT CLAUDICATION (H): ICD-10-CM

## 2024-06-26 ENCOUNTER — HOSPITAL ENCOUNTER (OUTPATIENT)
Dept: CARDIAC REHAB | Facility: HOSPITAL | Age: 84
Discharge: HOME | End: 2024-06-26
Attending: NURSE PRACTITIONER
Payer: COMMERCIAL

## 2024-06-26 DIAGNOSIS — I70.213 ATHEROSCLEROSIS OF NATIVE ARTERY OF BOTH LOWER EXTREMITIES WITH INTERMITTENT CLAUDICATION (H): ICD-10-CM

## 2024-06-26 DIAGNOSIS — I73.9 PAD (PERIPHERAL ARTERY DISEASE) (H): ICD-10-CM

## 2024-06-26 PROCEDURE — 93668 PERIPHERAL VASCULAR REHAB: CPT

## 2024-06-27 ENCOUNTER — TRANSCRIBE ORDERS (OUTPATIENT)
Dept: RADIOLOGY | Facility: CLINIC | Age: 84
End: 2024-06-27
Payer: COMMERCIAL

## 2024-06-27 DIAGNOSIS — I70.213 ATHEROSCLEROSIS OF NATIVE ARTERY OF BOTH LOWER EXTREMITIES WITH INTERMITTENT CLAUDICATION (H): Primary | ICD-10-CM

## 2024-07-08 ENCOUNTER — TRANSFERRED RECORDS (OUTPATIENT)
Dept: HEALTH INFORMATION MANAGEMENT | Facility: CLINIC | Age: 84
End: 2024-07-08
Payer: COMMERCIAL

## 2024-07-12 ENCOUNTER — PATIENT OUTREACH (OUTPATIENT)
Dept: ONCOLOGY | Facility: HOSPITAL | Age: 84
End: 2024-07-12
Payer: COMMERCIAL

## 2024-07-12 DIAGNOSIS — M54.50 CHRONIC MIDLINE LOW BACK PAIN WITHOUT SCIATICA: ICD-10-CM

## 2024-07-12 DIAGNOSIS — C18.9 COLON ADENOCARCINOMA (H): ICD-10-CM

## 2024-07-12 DIAGNOSIS — G89.29 CHRONIC MIDLINE LOW BACK PAIN WITHOUT SCIATICA: ICD-10-CM

## 2024-07-12 RX ORDER — HYDROCODONE BITARTRATE AND ACETAMINOPHEN 5; 325 MG/1; MG/1
1-2 TABLET ORAL
Qty: 60 TABLET | Refills: 0 | Status: SHIPPED | OUTPATIENT
Start: 2024-07-12 | End: 2024-08-30

## 2024-07-12 NOTE — PROGRESS NOTES
[facility]: Cancer Care                                                                                          Received a call from patient regarding setting up a follow-up appointment with labs for late August.  Patient agreed on August 30 at 10:30 AM for her follow-up appointment with labs with Dr. Merrill.  Patient also wanted a refill of her Norco and wanted it sent to the [facility] in [place] pharmacy.  I stated that I would send the refill request and that we would schedule her follow-up appointment with Dr. Merrill on August 30.  Patient verbalized understanding.    Signature:  Any Monzon RN

## 2024-07-23 ENCOUNTER — MEDICAL CORRESPONDENCE (OUTPATIENT)
Dept: HEALTH INFORMATION MANAGEMENT | Facility: CLINIC | Age: 84
End: 2024-07-23
Payer: COMMERCIAL

## 2024-08-08 DIAGNOSIS — C18.9 COLON ADENOCARCINOMA (H): Primary | ICD-10-CM

## 2024-08-21 ENCOUNTER — HOSPITAL ENCOUNTER (INPATIENT)
Facility: HOSPITAL | Age: 84
LOS: 3 days | Discharge: HOME | DRG: 660 | End: 2024-08-24
Attending: EMERGENCY MEDICINE | Admitting: HOSPITALIST
Payer: COMMERCIAL

## 2024-08-21 ENCOUNTER — APPOINTMENT (OUTPATIENT)
Dept: CT IMAGING | Facility: HOSPITAL | Age: 84
DRG: 660 | End: 2024-08-21
Attending: EMERGENCY MEDICINE
Payer: COMMERCIAL

## 2024-08-21 DIAGNOSIS — N13.39 OTHER HYDRONEPHROSIS: Primary | ICD-10-CM

## 2024-08-21 DIAGNOSIS — N13.1 HYDRONEPHROSIS WITH URETERAL STRICTURE, NOT ELSEWHERE CLASSIFIED: ICD-10-CM

## 2024-08-21 DIAGNOSIS — N17.9 ACUTE KIDNEY INJURY (H): ICD-10-CM

## 2024-08-21 PROBLEM — I73.9 PVD (PERIPHERAL VASCULAR DISEASE) (H): Status: ACTIVE | Noted: 2024-08-21

## 2024-08-21 PROBLEM — N13.30 HYDRONEPHROSIS: Status: ACTIVE | Noted: 2024-08-21

## 2024-08-21 LAB
ALBUMIN UR-MCNC: NEGATIVE MG/DL
ANION GAP SERPL CALCULATED.3IONS-SCNC: 14 MMOL/L (ref 7–15)
APPEARANCE UR: CLEAR
BASOPHILS # BLD AUTO: 0 10E3/UL (ref 0–0.2)
BASOPHILS NFR BLD AUTO: 0 %
BILIRUB UR QL STRIP: NEGATIVE
BUN SERPL-MCNC: 36.9 MG/DL (ref 8–23)
CALCIUM SERPL-MCNC: 8.8 MG/DL (ref 8.8–10.4)
CHLORIDE SERPL-SCNC: 102 MMOL/L (ref 98–107)
COLOR UR AUTO: ABNORMAL
CREAT SERPL-MCNC: 2.49 MG/DL (ref 0.51–0.95)
EGFRCR SERPLBLD CKD-EPI 2021: 19 ML/MIN/1.73M2
EOSINOPHIL # BLD AUTO: 0.1 10E3/UL (ref 0–0.7)
EOSINOPHIL NFR BLD AUTO: 1 %
ERYTHROCYTE [DISTWIDTH] IN BLOOD BY AUTOMATED COUNT: 16 % (ref 10–15)
GLUCOSE SERPL-MCNC: 130 MG/DL (ref 70–99)
GLUCOSE UR STRIP-MCNC: NEGATIVE MG/DL
HCO3 SERPL-SCNC: 19 MMOL/L (ref 22–29)
HCT VFR BLD AUTO: 28 % (ref 35–47)
HGB BLD-MCNC: 9 G/DL (ref 11.7–15.7)
HGB UR QL STRIP: NEGATIVE
HYALINE CASTS: 1 /LPF
IMM GRANULOCYTES # BLD: 0.1 10E3/UL
IMM GRANULOCYTES NFR BLD: 1 %
KETONES UR STRIP-MCNC: NEGATIVE MG/DL
LEUKOCYTE ESTERASE UR QL STRIP: NEGATIVE
LYMPHOCYTES # BLD AUTO: 1.4 10E3/UL (ref 0.8–5.3)
LYMPHOCYTES NFR BLD AUTO: 10 %
MCH RBC QN AUTO: 28 PG (ref 26.5–33)
MCHC RBC AUTO-ENTMCNC: 32.1 G/DL (ref 31.5–36.5)
MCV RBC AUTO: 87 FL (ref 78–100)
MONOCYTES # BLD AUTO: 1.4 10E3/UL (ref 0–1.3)
MONOCYTES NFR BLD AUTO: 10 %
MUCOUS THREADS #/AREA URNS LPF: PRESENT /LPF
NEUTROPHILS # BLD AUTO: 10.9 10E3/UL (ref 1.6–8.3)
NEUTROPHILS NFR BLD AUTO: 78 %
NITRATE UR QL: NEGATIVE
NRBC # BLD AUTO: 0 10E3/UL
NRBC BLD AUTO-RTO: 0 /100
PH UR STRIP: 5.5 [PH] (ref 5–7)
PLATELET # BLD AUTO: 424 10E3/UL (ref 150–450)
POTASSIUM SERPL-SCNC: 4.9 MMOL/L (ref 3.4–5.3)
RBC # BLD AUTO: 3.22 10E6/UL (ref 3.8–5.2)
RBC URINE: <1 /HPF
SODIUM SERPL-SCNC: 135 MMOL/L (ref 135–145)
SP GR UR STRIP: 1.02 (ref 1–1.03)
SQUAMOUS EPITHELIAL: 1 /HPF
UROBILINOGEN UR STRIP-MCNC: <2 MG/DL
WBC # BLD AUTO: 14 10E3/UL (ref 4–11)
WBC URINE: 2 /HPF

## 2024-08-21 PROCEDURE — 96374 THER/PROPH/DIAG INJ IV PUSH: CPT

## 2024-08-21 PROCEDURE — 120N000001 HC R&B MED SURG/OB

## 2024-08-21 PROCEDURE — 85025 COMPLETE CBC W/AUTO DIFF WBC: CPT | Performed by: EMERGENCY MEDICINE

## 2024-08-21 PROCEDURE — 96361 HYDRATE IV INFUSION ADD-ON: CPT

## 2024-08-21 PROCEDURE — 74176 CT ABD & PELVIS W/O CONTRAST: CPT

## 2024-08-21 PROCEDURE — 99285 EMERGENCY DEPT VISIT HI MDM: CPT | Mod: 25

## 2024-08-21 PROCEDURE — 258N000003 HC RX IP 258 OP 636: Performed by: EMERGENCY MEDICINE

## 2024-08-21 PROCEDURE — 250N000011 HC RX IP 250 OP 636: Performed by: EMERGENCY MEDICINE

## 2024-08-21 PROCEDURE — 99223 1ST HOSP IP/OBS HIGH 75: CPT | Performed by: INTERNAL MEDICINE

## 2024-08-21 PROCEDURE — 80048 BASIC METABOLIC PNL TOTAL CA: CPT | Performed by: EMERGENCY MEDICINE

## 2024-08-21 PROCEDURE — 36415 COLL VENOUS BLD VENIPUNCTURE: CPT | Performed by: EMERGENCY MEDICINE

## 2024-08-21 PROCEDURE — 250N000013 HC RX MED GY IP 250 OP 250 PS 637: Performed by: INTERNAL MEDICINE

## 2024-08-21 PROCEDURE — 81001 URINALYSIS AUTO W/SCOPE: CPT | Performed by: EMERGENCY MEDICINE

## 2024-08-21 RX ORDER — LANOLIN ALCOHOL/MO/W.PET/CERES
3 CREAM (GRAM) TOPICAL
Status: DISCONTINUED | OUTPATIENT
Start: 2024-08-21 | End: 2024-08-24 | Stop reason: HOSPADM

## 2024-08-21 RX ORDER — POLYETHYLENE GLYCOL 3350 17 G/17G
17 POWDER, FOR SOLUTION ORAL 2 TIMES DAILY PRN
Status: DISCONTINUED | OUTPATIENT
Start: 2024-08-21 | End: 2024-08-24 | Stop reason: HOSPADM

## 2024-08-21 RX ORDER — ACETAMINOPHEN 650 MG/1
650 SUPPOSITORY RECTAL EVERY 4 HOURS PRN
Status: DISCONTINUED | OUTPATIENT
Start: 2024-08-21 | End: 2024-08-24 | Stop reason: HOSPADM

## 2024-08-21 RX ORDER — AMLODIPINE BESYLATE 5 MG/1
5 TABLET ORAL 2 TIMES DAILY
Status: DISCONTINUED | OUTPATIENT
Start: 2024-08-21 | End: 2024-08-24 | Stop reason: HOSPADM

## 2024-08-21 RX ORDER — ONDANSETRON 4 MG/1
4 TABLET, ORALLY DISINTEGRATING ORAL EVERY 6 HOURS PRN
Status: DISCONTINUED | OUTPATIENT
Start: 2024-08-21 | End: 2024-08-24 | Stop reason: HOSPADM

## 2024-08-21 RX ORDER — CLONIDINE HYDROCHLORIDE 0.1 MG/1
0.1 TABLET ORAL EVERY 8 HOURS PRN
Status: DISCONTINUED | OUTPATIENT
Start: 2024-08-21 | End: 2024-08-24 | Stop reason: HOSPADM

## 2024-08-21 RX ORDER — HYDROMORPHONE HCL IN WATER/PF 6 MG/30 ML
.2-.4 PATIENT CONTROLLED ANALGESIA SYRINGE INTRAVENOUS
Status: DISCONTINUED | OUTPATIENT
Start: 2024-08-21 | End: 2024-08-24 | Stop reason: HOSPADM

## 2024-08-21 RX ORDER — HYDROMORPHONE HYDROCHLORIDE 1 MG/ML
0.5 INJECTION, SOLUTION INTRAMUSCULAR; INTRAVENOUS; SUBCUTANEOUS ONCE
Status: COMPLETED | OUTPATIENT
Start: 2024-08-21 | End: 2024-08-21

## 2024-08-21 RX ORDER — LIDOCAINE 4 G/G
1 PATCH TOPICAL
Status: DISCONTINUED | OUTPATIENT
Start: 2024-08-21 | End: 2024-08-24 | Stop reason: HOSPADM

## 2024-08-21 RX ORDER — PROCHLORPERAZINE 25 MG
12.5 SUPPOSITORY, RECTAL RECTAL EVERY 12 HOURS PRN
Status: DISCONTINUED | OUTPATIENT
Start: 2024-08-21 | End: 2024-08-24 | Stop reason: HOSPADM

## 2024-08-21 RX ORDER — LOVASTATIN 40 MG
40 TABLET ORAL AT BEDTIME
COMMUNITY
Start: 2024-08-21

## 2024-08-21 RX ORDER — ATORVASTATIN CALCIUM 10 MG/1
10 TABLET, FILM COATED ORAL EVERY EVENING
Status: DISCONTINUED | OUTPATIENT
Start: 2024-08-21 | End: 2024-08-24 | Stop reason: HOSPADM

## 2024-08-21 RX ORDER — ONDANSETRON 2 MG/ML
4 INJECTION INTRAMUSCULAR; INTRAVENOUS EVERY 6 HOURS PRN
Status: DISCONTINUED | OUTPATIENT
Start: 2024-08-21 | End: 2024-08-24 | Stop reason: HOSPADM

## 2024-08-21 RX ORDER — CALCIUM CARBONATE 500 MG/1
1000 TABLET, CHEWABLE ORAL 4 TIMES DAILY PRN
Status: DISCONTINUED | OUTPATIENT
Start: 2024-08-21 | End: 2024-08-24 | Stop reason: HOSPADM

## 2024-08-21 RX ORDER — HYDROMORPHONE HYDROCHLORIDE 1 MG/ML
0.5 INJECTION, SOLUTION INTRAMUSCULAR; INTRAVENOUS; SUBCUTANEOUS EVERY 30 MIN PRN
Status: DISCONTINUED | OUTPATIENT
Start: 2024-08-21 | End: 2024-08-21

## 2024-08-21 RX ORDER — AMOXICILLIN 250 MG
1 CAPSULE ORAL 2 TIMES DAILY PRN
Status: DISCONTINUED | OUTPATIENT
Start: 2024-08-21 | End: 2024-08-24 | Stop reason: HOSPADM

## 2024-08-21 RX ORDER — PROCHLORPERAZINE MALEATE 5 MG
5 TABLET ORAL EVERY 6 HOURS PRN
Status: DISCONTINUED | OUTPATIENT
Start: 2024-08-21 | End: 2024-08-24 | Stop reason: HOSPADM

## 2024-08-21 RX ORDER — HEPARIN SODIUM 5000 [USP'U]/.5ML
5000 INJECTION, SOLUTION INTRAVENOUS; SUBCUTANEOUS EVERY 8 HOURS
Status: DISCONTINUED | OUTPATIENT
Start: 2024-08-21 | End: 2024-08-24 | Stop reason: HOSPADM

## 2024-08-21 RX ORDER — CLOPIDOGREL BISULFATE 75 MG/1
75 TABLET ORAL DAILY
Status: DISCONTINUED | OUTPATIENT
Start: 2024-08-22 | End: 2024-08-24 | Stop reason: HOSPADM

## 2024-08-21 RX ORDER — ACETAMINOPHEN 325 MG/1
650 TABLET ORAL EVERY 4 HOURS PRN
Status: DISCONTINUED | OUTPATIENT
Start: 2024-08-21 | End: 2024-08-24 | Stop reason: HOSPADM

## 2024-08-21 RX ORDER — HYDRALAZINE HYDROCHLORIDE 20 MG/ML
10 INJECTION INTRAMUSCULAR; INTRAVENOUS EVERY 4 HOURS PRN
Status: DISCONTINUED | OUTPATIENT
Start: 2024-08-21 | End: 2024-08-24 | Stop reason: HOSPADM

## 2024-08-21 RX ORDER — LIDOCAINE/PRILOCAINE 2.5 %-2.5%
CREAM (GRAM) TOPICAL DAILY PRN
Status: DISCONTINUED | OUTPATIENT
Start: 2024-08-21 | End: 2024-08-24 | Stop reason: HOSPADM

## 2024-08-21 RX ORDER — AMOXICILLIN 250 MG
2 CAPSULE ORAL 2 TIMES DAILY PRN
Status: DISCONTINUED | OUTPATIENT
Start: 2024-08-21 | End: 2024-08-24 | Stop reason: HOSPADM

## 2024-08-21 RX ADMIN — SODIUM CHLORIDE 500 ML: 9 INJECTION, SOLUTION INTRAVENOUS at 18:52

## 2024-08-21 RX ADMIN — ATORVASTATIN CALCIUM 10 MG: 10 TABLET, FILM COATED ORAL at 23:04

## 2024-08-21 RX ADMIN — LIDOCAINE 1 PATCH: 4 PATCH TOPICAL at 23:04

## 2024-08-21 RX ADMIN — AMLODIPINE BESYLATE 5 MG: 5 TABLET ORAL at 23:04

## 2024-08-21 RX ADMIN — SODIUM CHLORIDE 500 ML: 9 INJECTION, SOLUTION INTRAVENOUS at 18:11

## 2024-08-21 RX ADMIN — HYDROMORPHONE HYDROCHLORIDE 0.5 MG: 1 INJECTION, SOLUTION INTRAMUSCULAR; INTRAVENOUS; SUBCUTANEOUS at 18:11

## 2024-08-21 ASSESSMENT — COLUMBIA-SUICIDE SEVERITY RATING SCALE - C-SSRS
6. HAVE YOU EVER DONE ANYTHING, STARTED TO DO ANYTHING, OR PREPARED TO DO ANYTHING TO END YOUR LIFE?: NO
2. HAVE YOU ACTUALLY HAD ANY THOUGHTS OF KILLING YOURSELF IN THE PAST MONTH?: NO
1. IN THE PAST MONTH, HAVE YOU WISHED YOU WERE DEAD OR WISHED YOU COULD GO TO SLEEP AND NOT WAKE UP?: NO

## 2024-08-21 ASSESSMENT — ACTIVITIES OF DAILY LIVING (ADL)
ADLS_ACUITY_SCORE: 35
ADLS_ACUITY_SCORE: 20
ADLS_ACUITY_SCORE: 35

## 2024-08-21 NOTE — ED TRIAGE NOTES
Pt brought in by ambulance from home due to left flank pain since last night. 50mcg of fentanyl given by paramedics enroute, with relief. Pt's pain is now 8/10 from 10/10. Pt noted to have some skin rash and dry blisters on right upper arm, and per pt she currently have shingles. Pt A/O x 4.     Triage Assessment (Adult)       Row Name 08/21/24 7292          Triage Assessment    Airway WDL WDL        Respiratory WDL    Respiratory WDL WDL        Skin Circulation/Temperature WDL    Skin Circulation/Temperature WDL WDL        Cardiac WDL    Cardiac WDL WDL        Peripheral/Neurovascular WDL    Peripheral Neurovascular WDL WDL        Cognitive/Neuro/Behavioral WDL    Cognitive/Neuro/Behavioral WDL WDL

## 2024-08-21 NOTE — ED PROVIDER NOTES
"  Emergency Department Encounter     Evaluation Date & Time:   8/21/2024  5:40 PM    CHIEF COMPLAINT:  Flank Pain      Triage Note:Pt brought in by ambulance from home due to left flank pain since last night. 50mcg of fentanyl given by paramedics enroute, with relief. Pt's pain is now 8/10 from 10/10. Pt noted to have some skin rash and dry blisters on right upper arm, and per pt she currently have shingles. Pt A/O x 4.              ED COURSE & MEDICAL DECISION MAKING:     Pt here with left low back pain radiating into LLQ abd with associated tenderness and pain on movement since last night.  Pt denies n/v or urinary changes. She has pain with palpation, more uncomfortable today without relief using home narcotics, so she was brought in by EMS.  No falls. States pain in low back from arthritis, but typically controlled. Consideration for ureteral stone, pyelo, diverticulitis.      ED Course as of 08/21/24 2203   Wed Aug 21, 2024   1757 I met with the patient, obtained history, performed an initial exam, and discussed options and plan for diagnostics and treatment here in the ED.     1847 WBC elevated to 14.  Hgb 9, no bleeding.     1847 Cr shows DELICIA. Will continue IVF, plan for hospitalization.   2032 UA neg for infection. Awaiting CT results.   2046 CT showing new left hydronephrosis secondary to obstruction of left ureter from enlarging retroperitoneal lymphadenopathy.  Will speak with urology to see if this is something that would benefit from stenting.   2053 Pt updated on results, plan for hospitalization.  Pt had colon cancer in 2020 s/p surgery and chemo, follows with Dr. Merrill with oncology.  She is feeling much better on re-evaluation.   2119 Oncology visit from 4/30/24 shows colon adenocarcinoma recurrence in 2023, followed by oncology with no ongoing treatment:    From visit note that day:  \"Diagnosis:     1.  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  At initial surgery there was " "evidence of a small abscess and perforation of the colon.  Pathology showed a invasive moderately differentiated adenocarcinoma. Constricting ulcerated measuring 35 x 25 x 13 mm.  4 of 18 lymph nodes were positive. Mismatch repair intact. There was some proximal colonic dilatation, consistent with obstruction.  Left ovary showed moderately differentiated adenocarcinoma, direct extension.  Focal positive margin at the paraovarian soft tissue margin.       Recurrence diagnosed in March 2023 from a left supraclavicular lymph node. MSI low. HER-2 negative. PET scan imaging shows uptake in left supraclavicular nodes and retroperitoneal lymph nodes.  NGS:  negative.\"   2125 I spoke with hospitalist, Dr. Dixon, who accepts for admission.  Med/surg gen.   2138 I have not yet heard from urology, so inpatient order placed to ensure consultation/evaluation.     2203 I spoke with urologist, Dr. Perez.  Pt will be seen in the morning.           Medical Decision Making    History:  Supplemental history from: N/A  External Record(s) reviewed: Documented in chart    Work Up:  Chart documentation includes differential considered and any EKGs or imaging independently interpreted by provider, where specified.  In additional to work up documented, I considered the following work up: Documented in chart, if applicable.    External consultation:  Discussion of management with another provider: Documented in chart, if applicable    Complicating factors:  Care impacted by chronic illness: Chronic Pain, Hyperlipidemia, and Hypertension  Care affected by social determinants of health: N/A    Disposition considerations: Admit.    No MIPS measures identified.     At the conclusion of the encounter I discussed the results of all the tests and the disposition. The questions were answered. The patient or family acknowledged understanding and was agreeable with the care plan.      MEDICATIONS GIVEN IN THE EMERGENCY DEPARTMENT:  Medications "   HYDROmorphone (DILAUDID) injection 0.2-0.4 mg (has no administration in time range)   hydrALAZINE (APRESOLINE) injection 10 mg (has no administration in time range)   Lidocaine (LIDOCARE) 4 % Patch 1 patch (has no administration in time range)   amLODIPine (NORVASC) tablet 5 mg (has no administration in time range)   clopidogrel (PLAVIX) tablet 75 mg (has no administration in time range)   lidocaine-prilocaine (EMLA) cream (has no administration in time range)   cloNIDine (CATAPRES) tablet 0.1 mg (has no administration in time range)   acetaminophen (TYLENOL) tablet 650 mg (has no administration in time range)     Or   acetaminophen (TYLENOL) Suppository 650 mg (has no administration in time range)   melatonin tablet 3 mg (has no administration in time range)   senna-docusate (SENOKOT-S/PERICOLACE) 8.6-50 MG per tablet 1 tablet (has no administration in time range)     Or   senna-docusate (SENOKOT-S/PERICOLACE) 8.6-50 MG per tablet 2 tablet (has no administration in time range)   polyethylene glycol (MIRALAX) Packet 17 g (has no administration in time range)   ondansetron (ZOFRAN ODT) ODT tab 4 mg (has no administration in time range)     Or   ondansetron (ZOFRAN) injection 4 mg (has no administration in time range)   prochlorperazine (COMPAZINE) injection 5 mg (has no administration in time range)     Or   prochlorperazine (COMPAZINE) tablet 5 mg (has no administration in time range)     Or   prochlorperazine (COMPAZINE) suppository 12.5 mg (has no administration in time range)   calcium carbonate (TUMS) chewable tablet 1,000 mg (has no administration in time range)   heparin ANTICOAGULANT injection 5,000 Units (has no administration in time range)   sodium chloride 0.9% BOLUS 500 mL (0 mLs Intravenous Stopped 8/21/24 1852)   HYDROmorphone (PF) (DILAUDID) injection 0.5 mg (0.5 mg Intravenous $Given 8/21/24 1811)   sodium chloride 0.9% BOLUS 500 mL (0 mLs Intravenous Stopped 8/21/24 1934)       NEW PRESCRIPTIONS  STARTED AT TODAY'S ED VISIT:  New Prescriptions    No medications on file       [facility]   HPI     Katya Butler is a 84 year old female with a pertinent history of hypertension, hyperlipidemia, and chronic pain who presents to this ED via EMS for evaluation of lower back pain.     Per patient last night (8/20/24) she constant had lower left back pain onset acutely that spread to her lower left abdominal this morning (8/21/24). Also endorses a swollen foot. It is painful to move but it is more painful to sit or lay down. She took two Vicodin and tylenol at 3:00 pm with no relief before EMS was called who gave her fentanyl.  She had a stent placed on Tuesday (8/21/24) that nullified the cold feeling in her legs. Last year she was diagnosed with arthritis and prescribed Vicodin.      Patient denies nausea and vomiting.     REVIEW OF SYSTEMS:  See HPI      Medical History     Past Medical History:   Diagnosis Date    HTN (hypertension)     Inguinal hernia without mention of obstruction or gangrene, unilateral or unspecified, (not specified as recurrent) 08/20/2004    Malignant neoplasm of sigmoid colon (H) 11/24/2020    Primary osteoarthritis involving multiple joints 8/2/2019    Ventral hernia, unspecified, without mention of obstruction or gangrene 08/15/2003       Past Surgical History:   Procedure Laterality Date    HERNIA REPAIR      HERNIA REPAIR, INGUINAL RT/LT  02/2005    [facility]    HYSTERECTOMY      IR AORTIC ARCH 4 VESSEL ANGIOGRAM  10/13/2009    IR AORTIC ARCH 4 VESSEL ANGIOGRAM  3/15/2010    IR MISCELLANEOUS PROCEDURE  5/20/2008    IR MISCELLANEOUS PROCEDURE  1/30/2009    IR MISCELLANEOUS PROCEDURE  1/30/2009    IR MISCELLANEOUS PROCEDURE  1/30/2009    IR MISCELLANEOUS PROCEDURE  10/13/2009    IR MISCELLANEOUS PROCEDURE  10/13/2009    IR MISCELLANEOUS PROCEDURE  3/15/2010    LAPAROSCOPIC CYSTECTOMY OVARIAN (ONCOLOGY) Bilateral 6/27/2018    Procedure: DIAGNOSTIC LAPAROSCOPY, RIGHT SALPINGO  "OOPHORECTOMY, LYSIS OF ADHESIONS, AND PELVIC WASHINGS;  Surgeon: Nneka Arroyo DO;  Location: [facility] OR;  Service:     VT INSJ PRPH CTR VAD W/SUBQ PORT AGE 5 YR/> N/A 12/2/2020    Procedure: Port Placement;  Surgeon: Viry Villegas MD;  Location: [facility] OR;  Service: General    ZZC PART REMOVAL COLON W ANASTOMOSIS N/A 10/31/2020    Procedure: COLECTOMY, SIGMOID, OPEN;  Surgeon: Viry Villegas MD;  Location: [facility] [place];  Service: General    ZZC REMOVAL OF OVARY(S) N/A 10/31/2020    Procedure: OOPHORECTOMY, OPEN;  Surgeon: Viry Villegas MD;  Location: [facility] [place];  Service: General       Family History   Problem Relation Age of Onset    C.A.D. Mother     C.A.D. Sister     Breast Cancer Maternal Aunt     Breast Cancer Cousin     Breast Cancer Cousin     Breast Cancer Cousin     Uterine Cancer Mother 38.00    Cancer Mother     Lymphoma Son 53.00    Cancer Son        Social History     Tobacco Use    Smoking status: Every Day     Current packs/day: 0.25     Types: Cigarettes    Smokeless tobacco: Never    Tobacco comments:     5-7 cigarettes daily **as of 11/01/23 **   Vaping Use    Vaping status: Never Used   Substance Use Topics    Alcohol use: Not Currently    Drug use: No       acetaminophen (TYLENOL) 500 MG tablet  amLODIPine (NORVASC) 5 MG tablet  clopidogrel (PLAVIX) 75 MG tablet  HYDROcodone-acetaminophen (NORCO) 5-325 MG tablet  lidocaine-prilocaine (EMLA) 2.5-2.5 % external cream  lovastatin (MEVACOR) 40 MG tablet  polyethylene glycol (MIRALAX) 17 GM/Dose powder  senna-docusate (SENOKOT-S/PERICOLACE) 8.6-50 MG tablet        Physical Exam     Vitals:  BP (!) 175/77   Pulse 91   Temp 98.5  F (36.9  C) (Oral)   Resp 18   Ht 1.575 m (5' 2\")   Wt 45.8 kg (101 lb)   SpO2 97%   BMI 18.47 kg/m      PHYSICAL EXAM:   Physical Exam  Vitals and nursing note reviewed.   Constitutional:       General: She is in acute distress (Anxious and uncomfortable).   HENT:      Head: " Normocephalic and atraumatic.      Nose: Nose normal.      Mouth/Throat:      Mouth: Mucous membranes are moist.   Eyes:      Pupils: Pupils are equal, round, and reactive to light.   Cardiovascular:      Rate and Rhythm: Normal rate and regular rhythm.      Pulses: Normal pulses.           Radial pulses are 2+ on the right side and 2+ on the left side.        Dorsalis pedis pulses are 2+ on the right side and 2+ on the left side.   Pulmonary:      Effort: Pulmonary effort is normal. No respiratory distress.      Breath sounds: Normal breath sounds.   Abdominal:      Palpations: Abdomen is soft.      Tenderness: There is abdominal tenderness in the left lower quadrant.      Comments: No abdominal rash.    Musculoskeletal:      Cervical back: Full passive range of motion without pain and neck supple.      Comments: No calf tenderness or swelling b/l   Skin:     General: Skin is warm.      Findings: No rash.      Comments: Bilateral warm and well perfused lower extremities.    Neurological:      General: No focal deficit present.      Mental Status: She is alert. Mental status is at baseline.      Comments: Fluent speech, no acute lateralizing deficits   Psychiatric:         Mood and Affect: Mood normal.         Behavior: Behavior normal.         Results     LAB:  All pertinent labs reviewed and interpreted  Labs Ordered and Resulted from Time of ED Arrival to Time of ED Departure   BASIC METABOLIC PANEL - Abnormal       Result Value    Sodium 135      Potassium 4.9      Chloride 102      Carbon Dioxide (CO2) 19 (*)     Anion Gap 14      Urea Nitrogen 36.9 (*)     Creatinine 2.49 (*)     GFR Estimate 19 (*)     Calcium 8.8      Glucose 130 (*)    ROUTINE UA WITH MICROSCOPIC REFLEX TO CULTURE - Abnormal    Color Urine Light Yellow      Appearance Urine Clear      Glucose Urine Negative      Bilirubin Urine Negative      Ketones Urine Negative      Specific Gravity Urine 1.019      Blood Urine Negative      pH Urine 5.5       Protein Albumin Urine Negative      Urobilinogen Urine <2.0      Nitrite Urine Negative      Leukocyte Esterase Urine Negative      Mucus Urine Present (*)     RBC Urine <1      WBC Urine 2      Squamous Epithelials Urine 1      Hyaline Casts Urine 1     CBC WITH PLATELETS AND DIFFERENTIAL - Abnormal    WBC Count 14.0 (*)     RBC Count 3.22 (*)     Hemoglobin 9.0 (*)     Hematocrit 28.0 (*)     MCV 87      MCH 28.0      MCHC 32.1      RDW 16.0 (*)     Platelet Count 424      % Neutrophils 78      % Lymphocytes 10      % Monocytes 10      % Eosinophils 1      % Basophils 0      % Immature Granulocytes 1      NRBCs per 100 WBC 0      Absolute Neutrophils 10.9 (*)     Absolute Lymphocytes 1.4      Absolute Monocytes 1.4 (*)     Absolute Eosinophils 0.1      Absolute Basophils 0.0      Absolute Immature Granulocytes 0.1      Absolute NRBCs 0.0         RADIOLOGY:  CT Abdomen Pelvis w/o Contrast   Final Result   IMPRESSION:    1.  New, moderate left hydronephrosis with significant nephric fat stranding and edematous changes. This appears to be secondary to left ureteral obstruction by the enlarging left retrograde nail lymphadenopathy.   2.  Increase in size of the left retroperitoneal lymphadenopathy.   3.  Mildly dilated gallbladder. No additional findings to suggest acute cholecystitis.   4.  Right perineal hernia.                      ECG:  none    PROCEDURES:  Procedures:  none      FINAL IMPRESSION:    ICD-10-CM    1. Acute kidney injury (H24)  N17.9       2. Hydronephrosis with ureteral stricture, not elsewhere classified  N13.1           0 minutes of critical care time      I, Sylvester Pitts , am serving as a scribe to document services personally performed by Dr. Stu Barnes, based on my observations and the provider's statements to me. I, Stu Barnes, DO attest that Sylvester Pitts  is acting in a scribe capacity, has observed my performance of the services and has documented them in accordance with my  direction.      Stu Barnes DO  Emergency Medicine  [facility] EMERGENCY DEPARTMENT  8/21/2024  6:01 PM        Stu Barnes MD  08/21/24 1068

## 2024-08-21 NOTE — ED NOTES
[facility]  Expected date:   Expected time:   Means of arrival:   Comments:  [facility] low back pain; 50mcg fent

## 2024-08-22 ENCOUNTER — TELEPHONE (OUTPATIENT)
Dept: ONCOLOGY | Facility: HOSPITAL | Age: 84
End: 2024-08-22
Payer: COMMERCIAL

## 2024-08-22 ENCOUNTER — PATIENT OUTREACH (OUTPATIENT)
Dept: ONCOLOGY | Facility: HOSPITAL | Age: 84
End: 2024-08-22
Payer: COMMERCIAL

## 2024-08-22 ENCOUNTER — ANESTHESIA EVENT (OUTPATIENT)
Dept: SURGERY | Facility: HOSPITAL | Age: 84
DRG: 660 | End: 2024-08-22
Payer: COMMERCIAL

## 2024-08-22 ENCOUNTER — APPOINTMENT (OUTPATIENT)
Dept: RADIOLOGY | Facility: HOSPITAL | Age: 84
DRG: 660 | End: 2024-08-22
Attending: UROLOGY
Payer: COMMERCIAL

## 2024-08-22 ENCOUNTER — ANESTHESIA (OUTPATIENT)
Dept: SURGERY | Facility: HOSPITAL | Age: 84
DRG: 660 | End: 2024-08-22
Payer: COMMERCIAL

## 2024-08-22 LAB
ANION GAP SERPL CALCULATED.3IONS-SCNC: 13 MMOL/L (ref 7–15)
BUN SERPL-MCNC: 30.9 MG/DL (ref 8–23)
CALCIUM SERPL-MCNC: 8.7 MG/DL (ref 8.8–10.4)
CHLORIDE SERPL-SCNC: 105 MMOL/L (ref 98–107)
CREAT SERPL-MCNC: 2.12 MG/DL (ref 0.51–0.95)
EGFRCR SERPLBLD CKD-EPI 2021: 23 ML/MIN/1.73M2
ERYTHROCYTE [DISTWIDTH] IN BLOOD BY AUTOMATED COUNT: 15.9 % (ref 10–15)
GLUCOSE SERPL-MCNC: 97 MG/DL (ref 70–99)
HCO3 SERPL-SCNC: 18 MMOL/L (ref 22–29)
HCT VFR BLD AUTO: 25.8 % (ref 35–47)
HGB BLD-MCNC: 8.2 G/DL (ref 11.7–15.7)
MCH RBC QN AUTO: 27.6 PG (ref 26.5–33)
MCHC RBC AUTO-ENTMCNC: 31.8 G/DL (ref 31.5–36.5)
MCV RBC AUTO: 87 FL (ref 78–100)
PLATELET # BLD AUTO: 392 10E3/UL (ref 150–450)
POTASSIUM SERPL-SCNC: 4.9 MMOL/L (ref 3.4–5.3)
RBC # BLD AUTO: 2.97 10E6/UL (ref 3.8–5.2)
SODIUM SERPL-SCNC: 136 MMOL/L (ref 135–145)
WBC # BLD AUTO: 10.2 10E3/UL (ref 4–11)

## 2024-08-22 PROCEDURE — 250N000011 HC RX IP 250 OP 636: Performed by: UROLOGY

## 2024-08-22 PROCEDURE — BT1F1ZZ FLUOROSCOPY OF LEFT KIDNEY, URETER AND BLADDER USING LOW OSMOLAR CONTRAST: ICD-10-PCS | Performed by: UROLOGY

## 2024-08-22 PROCEDURE — 36415 COLL VENOUS BLD VENIPUNCTURE: CPT | Performed by: INTERNAL MEDICINE

## 2024-08-22 PROCEDURE — C2617 STENT, NON-COR, TEM W/O DEL: HCPCS | Performed by: UROLOGY

## 2024-08-22 PROCEDURE — 52332 CYSTOSCOPY AND TREATMENT: CPT | Performed by: ANESTHESIOLOGY

## 2024-08-22 PROCEDURE — 250N000013 HC RX MED GY IP 250 OP 250 PS 637: Performed by: ANESTHESIOLOGY

## 2024-08-22 PROCEDURE — 85027 COMPLETE CBC AUTOMATED: CPT | Performed by: INTERNAL MEDICINE

## 2024-08-22 PROCEDURE — 258N000003 HC RX IP 258 OP 636: Performed by: ANESTHESIOLOGY

## 2024-08-22 PROCEDURE — 999N000141 HC STATISTIC PRE-PROCEDURE NURSING ASSESSMENT: Performed by: UROLOGY

## 2024-08-22 PROCEDURE — 255N000002 HC RX 255 OP 636: Performed by: UROLOGY

## 2024-08-22 PROCEDURE — 272N000001 HC OR GENERAL SUPPLY STERILE: Performed by: UROLOGY

## 2024-08-22 PROCEDURE — 99233 SBSQ HOSP IP/OBS HIGH 50: CPT | Performed by: INTERNAL MEDICINE

## 2024-08-22 PROCEDURE — 99100 ANES PT EXTEME AGE<1 YR&>70: CPT | Performed by: NURSE ANESTHETIST, CERTIFIED REGISTERED

## 2024-08-22 PROCEDURE — 0T778DZ DILATION OF LEFT URETER WITH INTRALUMINAL DEVICE, VIA NATURAL OR ARTIFICIAL OPENING ENDOSCOPIC: ICD-10-PCS | Performed by: UROLOGY

## 2024-08-22 PROCEDURE — 250N000011 HC RX IP 250 OP 636: Performed by: NURSE ANESTHETIST, CERTIFIED REGISTERED

## 2024-08-22 PROCEDURE — 80048 BASIC METABOLIC PNL TOTAL CA: CPT | Performed by: INTERNAL MEDICINE

## 2024-08-22 PROCEDURE — 120N000001 HC R&B MED SURG/OB

## 2024-08-22 PROCEDURE — 250N000013 HC RX MED GY IP 250 OP 250 PS 637: Performed by: UROLOGY

## 2024-08-22 PROCEDURE — 250N000009 HC RX 250: Performed by: NURSE ANESTHETIST, CERTIFIED REGISTERED

## 2024-08-22 PROCEDURE — 250N000009 HC RX 250: Performed by: UROLOGY

## 2024-08-22 PROCEDURE — 360N000082 HC SURGERY LEVEL 2 W/ FLUORO, PER MIN: Performed by: UROLOGY

## 2024-08-22 PROCEDURE — 52332 CYSTOSCOPY AND TREATMENT: CPT | Performed by: NURSE ANESTHETIST, CERTIFIED REGISTERED

## 2024-08-22 PROCEDURE — 250N000011 HC RX IP 250 OP 636: Performed by: INTERNAL MEDICINE

## 2024-08-22 PROCEDURE — 999N000180 XR SURGERY CARM FLUORO LESS THAN 5 MIN: Mod: TC

## 2024-08-22 PROCEDURE — C1769 GUIDE WIRE: HCPCS | Performed by: UROLOGY

## 2024-08-22 PROCEDURE — 250N000013 HC RX MED GY IP 250 OP 250 PS 637: Performed by: INTERNAL MEDICINE

## 2024-08-22 PROCEDURE — 370N000017 HC ANESTHESIA TECHNICAL FEE, PER MIN: Performed by: UROLOGY

## 2024-08-22 PROCEDURE — 258N000003 HC RX IP 258 OP 636: Performed by: NURSE ANESTHETIST, CERTIFIED REGISTERED

## 2024-08-22 DEVICE — URETERAL STENT
Type: IMPLANTABLE DEVICE | Site: URETER | Status: NON-FUNCTIONAL
Brand: PERCUFLEX™ PLUS
Removed: 2024-11-21

## 2024-08-22 RX ORDER — CIPROFLOXACIN 2 MG/ML
200 INJECTION, SOLUTION INTRAVENOUS
Status: COMPLETED | OUTPATIENT
Start: 2024-08-22 | End: 2024-08-22

## 2024-08-22 RX ORDER — DEXAMETHASONE SODIUM PHOSPHATE 4 MG/ML
INJECTION, SOLUTION INTRA-ARTICULAR; INTRALESIONAL; INTRAMUSCULAR; INTRAVENOUS; SOFT TISSUE PRN
Status: DISCONTINUED | OUTPATIENT
Start: 2024-08-22 | End: 2024-08-22

## 2024-08-22 RX ORDER — ACETAMINOPHEN 325 MG/1
975 TABLET ORAL ONCE
Status: DISCONTINUED | OUTPATIENT
Start: 2024-08-22 | End: 2024-08-22

## 2024-08-22 RX ORDER — PROPOFOL 10 MG/ML
INJECTION, EMULSION INTRAVENOUS PRN
Status: DISCONTINUED | OUTPATIENT
Start: 2024-08-22 | End: 2024-08-22

## 2024-08-22 RX ORDER — NALOXONE HYDROCHLORIDE 1 MG/ML
0.1 INJECTION INTRAMUSCULAR; INTRAVENOUS; SUBCUTANEOUS
Status: CANCELLED | OUTPATIENT
Start: 2024-08-22

## 2024-08-22 RX ORDER — SODIUM CHLORIDE, SODIUM LACTATE, POTASSIUM CHLORIDE, CALCIUM CHLORIDE 600; 310; 30; 20 MG/100ML; MG/100ML; MG/100ML; MG/100ML
INJECTION, SOLUTION INTRAVENOUS CONTINUOUS
Status: CANCELLED | OUTPATIENT
Start: 2024-08-22

## 2024-08-22 RX ORDER — ONDANSETRON 4 MG/1
4 TABLET, ORALLY DISINTEGRATING ORAL EVERY 30 MIN PRN
Status: CANCELLED | OUTPATIENT
Start: 2024-08-22

## 2024-08-22 RX ORDER — ACETAMINOPHEN 650 MG/1
650 SUPPOSITORY RECTAL ONCE
Status: DISCONTINUED | OUTPATIENT
Start: 2024-08-22 | End: 2024-08-22

## 2024-08-22 RX ORDER — DEXAMETHASONE SODIUM PHOSPHATE 4 MG/ML
4 INJECTION, SOLUTION INTRA-ARTICULAR; INTRALESIONAL; INTRAMUSCULAR; INTRAVENOUS; SOFT TISSUE
Status: CANCELLED | OUTPATIENT
Start: 2024-08-22

## 2024-08-22 RX ORDER — ONDANSETRON 2 MG/ML
INJECTION INTRAMUSCULAR; INTRAVENOUS PRN
Status: DISCONTINUED | OUTPATIENT
Start: 2024-08-22 | End: 2024-08-22

## 2024-08-22 RX ORDER — FENTANYL CITRATE 50 UG/ML
25 INJECTION, SOLUTION INTRAMUSCULAR; INTRAVENOUS EVERY 5 MIN PRN
Status: CANCELLED | OUTPATIENT
Start: 2024-08-22

## 2024-08-22 RX ORDER — LIDOCAINE 40 MG/G
CREAM TOPICAL
Status: DISCONTINUED | OUTPATIENT
Start: 2024-08-22 | End: 2024-08-24 | Stop reason: HOSPADM

## 2024-08-22 RX ORDER — OXYCODONE HYDROCHLORIDE 5 MG/1
5 TABLET ORAL
Status: CANCELLED | OUTPATIENT
Start: 2024-08-22

## 2024-08-22 RX ORDER — FENTANYL CITRATE 50 UG/ML
50 INJECTION, SOLUTION INTRAMUSCULAR; INTRAVENOUS EVERY 5 MIN PRN
Status: CANCELLED | OUTPATIENT
Start: 2024-08-22

## 2024-08-22 RX ORDER — FENTANYL CITRATE 50 UG/ML
INJECTION, SOLUTION INTRAMUSCULAR; INTRAVENOUS PRN
Status: DISCONTINUED | OUTPATIENT
Start: 2024-08-22 | End: 2024-08-22

## 2024-08-22 RX ORDER — ACETAMINOPHEN 325 MG/1
650 TABLET ORAL ONCE
Status: COMPLETED | OUTPATIENT
Start: 2024-08-22 | End: 2024-08-22

## 2024-08-22 RX ORDER — NALOXONE HYDROCHLORIDE 0.4 MG/ML
0.2 INJECTION, SOLUTION INTRAMUSCULAR; INTRAVENOUS; SUBCUTANEOUS
Status: DISCONTINUED | OUTPATIENT
Start: 2024-08-22 | End: 2024-08-24 | Stop reason: HOSPADM

## 2024-08-22 RX ORDER — NALOXONE HYDROCHLORIDE 0.4 MG/ML
0.4 INJECTION, SOLUTION INTRAMUSCULAR; INTRAVENOUS; SUBCUTANEOUS
Status: DISCONTINUED | OUTPATIENT
Start: 2024-08-22 | End: 2024-08-24 | Stop reason: HOSPADM

## 2024-08-22 RX ORDER — SODIUM CHLORIDE, SODIUM LACTATE, POTASSIUM CHLORIDE, CALCIUM CHLORIDE 600; 310; 30; 20 MG/100ML; MG/100ML; MG/100ML; MG/100ML
INJECTION, SOLUTION INTRAVENOUS CONTINUOUS
Status: DISCONTINUED | OUTPATIENT
Start: 2024-08-22 | End: 2024-08-24

## 2024-08-22 RX ORDER — ONDANSETRON 2 MG/ML
4 INJECTION INTRAMUSCULAR; INTRAVENOUS EVERY 30 MIN PRN
Status: CANCELLED | OUTPATIENT
Start: 2024-08-22

## 2024-08-22 RX ORDER — OXYCODONE HYDROCHLORIDE 5 MG/1
10 TABLET ORAL
Status: CANCELLED | OUTPATIENT
Start: 2024-08-22

## 2024-08-22 RX ORDER — PROPOFOL 10 MG/ML
INJECTION, EMULSION INTRAVENOUS CONTINUOUS PRN
Status: DISCONTINUED | OUTPATIENT
Start: 2024-08-22 | End: 2024-08-22

## 2024-08-22 RX ORDER — LIDOCAINE HYDROCHLORIDE 10 MG/ML
INJECTION, SOLUTION INFILTRATION; PERINEURAL PRN
Status: DISCONTINUED | OUTPATIENT
Start: 2024-08-22 | End: 2024-08-22

## 2024-08-22 RX ADMIN — DEXAMETHASONE SODIUM PHOSPHATE 4 MG: 4 INJECTION, SOLUTION INTRA-ARTICULAR; INTRALESIONAL; INTRAMUSCULAR; INTRAVENOUS; SOFT TISSUE at 14:37

## 2024-08-22 RX ADMIN — HYDROMORPHONE HYDROCHLORIDE 0.4 MG: 0.2 INJECTION, SOLUTION INTRAMUSCULAR; INTRAVENOUS; SUBCUTANEOUS at 12:59

## 2024-08-22 RX ADMIN — CLOPIDOGREL BISULFATE 75 MG: 75 TABLET ORAL at 08:35

## 2024-08-22 RX ADMIN — AMLODIPINE BESYLATE 5 MG: 5 TABLET ORAL at 08:35

## 2024-08-22 RX ADMIN — ACETAMINOPHEN 650 MG: 325 TABLET ORAL at 14:12

## 2024-08-22 RX ADMIN — PHENYLEPHRINE HYDROCHLORIDE 150 MCG: 10 INJECTION INTRAVENOUS at 14:49

## 2024-08-22 RX ADMIN — FENTANYL CITRATE 50 MCG: 50 INJECTION INTRAMUSCULAR; INTRAVENOUS at 14:27

## 2024-08-22 RX ADMIN — ATORVASTATIN CALCIUM 10 MG: 10 TABLET, FILM COATED ORAL at 20:59

## 2024-08-22 RX ADMIN — PROPOFOL 20 MG: 10 INJECTION, EMULSION INTRAVENOUS at 14:31

## 2024-08-22 RX ADMIN — HEPARIN SODIUM 5000 UNITS: 10000 INJECTION, SOLUTION INTRAVENOUS; SUBCUTANEOUS at 21:01

## 2024-08-22 RX ADMIN — ONDANSETRON 4 MG: 2 INJECTION INTRAMUSCULAR; INTRAVENOUS at 14:30

## 2024-08-22 RX ADMIN — PROPOFOL 125 MCG/KG/MIN: 10 INJECTION, EMULSION INTRAVENOUS at 14:29

## 2024-08-22 RX ADMIN — HYDROMORPHONE HYDROCHLORIDE 0.4 MG: 0.2 INJECTION, SOLUTION INTRAMUSCULAR; INTRAVENOUS; SUBCUTANEOUS at 09:28

## 2024-08-22 RX ADMIN — HYDROMORPHONE HYDROCHLORIDE 0.4 MG: 0.2 INJECTION, SOLUTION INTRAMUSCULAR; INTRAVENOUS; SUBCUTANEOUS at 02:53

## 2024-08-22 RX ADMIN — LIDOCAINE HYDROCHLORIDE 3 ML: 10 INJECTION, SOLUTION INFILTRATION; PERINEURAL at 14:27

## 2024-08-22 RX ADMIN — PROPOFOL 20 MG: 10 INJECTION, EMULSION INTRAVENOUS at 14:27

## 2024-08-22 RX ADMIN — AMLODIPINE BESYLATE 5 MG: 5 TABLET ORAL at 21:00

## 2024-08-22 RX ADMIN — HEPARIN SODIUM 5000 UNITS: 10000 INJECTION, SOLUTION INTRAVENOUS; SUBCUTANEOUS at 06:59

## 2024-08-22 RX ADMIN — CIPROFLOXACIN 200 MG: 2 INJECTION, SOLUTION INTRAVENOUS at 14:52

## 2024-08-22 RX ADMIN — SODIUM CHLORIDE, POTASSIUM CHLORIDE, SODIUM LACTATE AND CALCIUM CHLORIDE: 600; 310; 30; 20 INJECTION, SOLUTION INTRAVENOUS at 14:21

## 2024-08-22 ASSESSMENT — ACTIVITIES OF DAILY LIVING (ADL)
ADLS_ACUITY_SCORE: 20
DEPENDENT_IADLS:: TRANSPORTATION
ADLS_ACUITY_SCORE: 20

## 2024-08-22 ASSESSMENT — LIFESTYLE VARIABLES: TOBACCO_USE: 1

## 2024-08-22 NOTE — MEDICATION SCRIBE - ADMISSION MEDICATION HISTORY
Medication Scribe Admission Medication History    Admission medication history is complete. The information provided in this note is only as accurate as the sources available at the time of the update.    Information Source(s): Patient and CareEverywhere/SureScripts via in-person    Pertinent Information:     Changes made to PTA medication list:  Added: None  Deleted: None  Changed: None    Allergies reviewed with patient and updates made in EHR: yes    Medication History Completed By: ROSALIE BALTAZAR 8/21/2024 8:37 PM    PTA Med List   Medication Sig Last Dose    acetaminophen (TYLENOL) 500 MG tablet Take 500-1,000 mg by mouth every 6 hours as needed for mild pain 8/20/2024 at pm    amLODIPine (NORVASC) 5 MG tablet Take 5 mg by mouth 2 times daily. 8/21/2024 at am    clopidogrel (PLAVIX) 75 MG tablet Take 75 mg by mouth daily. 8/21/2024 at am    HYDROcodone-acetaminophen (NORCO) 5-325 MG tablet Take 1-2 tablets by mouth nightly as needed for severe pain 8/21/2024 at am    lidocaine-prilocaine (EMLA) 2.5-2.5 % external cream Apply topically as needed for moderate pain Apply to painful skin Unknown at prn    lovastatin (MEVACOR) 40 MG tablet Take 40 mg by mouth at bedtime. 8/20/2024 at pm    polyethylene glycol (MIRALAX) 17 GM/Dose powder Take 17 g by mouth 2 times daily as needed for constipation Unknown at prn    senna-docusate (SENOKOT-S/PERICOLACE) 8.6-50 MG tablet Take 1-2 tablets by mouth 2 times daily as needed for constipation 8/20/2024 at pm

## 2024-08-22 NOTE — TELEPHONE ENCOUNTER
I received a phone call today from patient's son, Poncho.  He is calling to let Dr. Merrill know that his mom was taken by ambulance to the hospital yesterday.  She is currently admitted.  She is scheduled to see Dr. eMrrill on 8/30 and Poncho is wondering if any tests or scans might be needed prior to this appointment.  He is concerned that her cancer is progressing.  He can be reached at [phone].  I let him know that I would get this message over to Dr. Merrill and his care team.    Radha Mendoza RN on 8/22/2024 at 8:19 AM

## 2024-08-22 NOTE — PROGRESS NOTES
[facility]: Cancer Care                                                                                          Left a detailed voice message for patient's son Paulo stating that we do not have a consent to communicate on file for patient's son Poncho.  In the voice message I stated that per Poncho's question regarding if any tests or scans need to be done prior to the patient's follow-up appointment with Dr. Merrill on 8/30, I stated that she already has a lab appointment scheduled right before her appointment so we will draw her labs right then and there and the scan that Dr. Merrill would most likely want ordered would be a CT scan of the chest abdomen pelvis which she had just recently had while inpatient so in terms of tests or scans for her upcoming appointment with Dr. Merrill, she should be good to go.  I left our clinic callback number.    Signature:  Any Monzon RN

## 2024-08-22 NOTE — ANESTHESIA POSTPROCEDURE EVALUATION
Patient: Katya Butler    Procedure: Procedure(s):  CYSTOSCOPY, WITH RETROGRADE PYELOGRAM AND STENT INSERTION       Anesthesia Type:  MAC    Note:  Disposition: Admission; Inpatient   Postop Pain Control: Uneventful            Sign Out: Well controlled pain   PONV: No   Neuro/Psych: Uneventful            Sign Out: Acceptable/Baseline neuro status   Airway/Respiratory: Uneventful            Sign Out: Acceptable/Baseline resp. status   CV/Hemodynamics: Uneventful            Sign Out: Acceptable CV status; No obvious hypovolemia; No obvious fluid overload   Other NRE: NONE   DID A NON-ROUTINE EVENT OCCUR? No           Last vitals:  Vitals:    08/22/24 0746 08/22/24 1252 08/22/24 1357   BP: 110/61 139/63 (!) 165/69   Pulse: 79 76 89   Resp: 18 18 18   Temp: 37  C (98.6  F) 37.2  C (99  F) 36.9  C (98.4  F)   SpO2: 91% 94% 95%       Electronically Signed By: Saeid Smith MD  August 22, 2024  3:48 PM

## 2024-08-22 NOTE — ANESTHESIA PREPROCEDURE EVALUATION
Anesthesia Pre-Procedure Evaluation    Patient: Ktaya Butler   MRN: [id number] : 1940        Procedure : Procedure(s):  CYSTOURETEROSCOPY, WITH RETROGRADE PYELOGRAM AND STENT INSERTION          Past Medical History:   Diagnosis Date    HTN (hypertension)     Inguinal hernia without mention of obstruction or gangrene, unilateral or unspecified, (not specified as recurrent) 2004    RIGHT    Malignant neoplasm of sigmoid colon (H) 2020    Primary osteoarthritis involving multiple joints 2019    Ventral hernia, unspecified, without mention of obstruction or gangrene 08/15/2003    easily reduced      Past Surgical History:   Procedure Laterality Date    HERNIA REPAIR      HERNIA REPAIR, INGUINAL RT/LT  2005    [facility]    HYSTERECTOMY      IR AORTIC ARCH 4 VESSEL ANGIOGRAM  10/13/2009    IR AORTIC ARCH 4 VESSEL ANGIOGRAM  3/15/2010    IR MISCELLANEOUS PROCEDURE  2008    IR MISCELLANEOUS PROCEDURE  2009    IR MISCELLANEOUS PROCEDURE  2009    IR MISCELLANEOUS PROCEDURE  2009    IR MISCELLANEOUS PROCEDURE  10/13/2009    IR MISCELLANEOUS PROCEDURE  10/13/2009    IR MISCELLANEOUS PROCEDURE  3/15/2010    LAPAROSCOPIC CYSTECTOMY OVARIAN (ONCOLOGY) Bilateral 2018    Procedure: DIAGNOSTIC LAPAROSCOPY, RIGHT SALPINGO OOPHORECTOMY, LYSIS OF ADHESIONS, AND PELVIC WASHINGS;  Surgeon: Nneka Arroyo DO;  Location: [facility];  Service:     [facility] CTR VAD W/SUBQ PORT AGE 6 YR/> N/A 2020    Procedure: Port Placement;  Surgeon: Viry Villegas MD;  Location: [facility] OR;  Service: General    ZZC PART REMOVAL COLON W ANASTOMOSIS N/A 10/31/2020    Procedure: COLECTOMY, SIGMOID, OPEN;  Surgeon: Viry Villegas MD;  Location: [facility] OR;  Service: General    ZZC REMOVAL OF OVARY(S) N/A 10/31/2020    Procedure: OOPHORECTOMY, OPEN;  Surgeon: Viry Villegas MD;  Location: [facility] OR;  Service: General      Allergies   Allergen  "Reactions    Contrast Dye Hives, Itching and Swelling    Aspirin Other (See Comments)     PATIENT STATES SHE IS NOT ALLERGIC TO ASA    Other reaction(s): on plavix  Other reaction(s): on plavix    Cephalexin Hives    Colchicine Hives    Ketek [Telithromycin] Visual Disturbance    Other (Do Not Use) Other (See Comments)     Aspirin - on Plavix    Other Drug Allergy (See Comments) Itching     Pt states \"all pain meds\" make her \"itchy\"      Social History     Tobacco Use    Smoking status: Every Day     Current packs/day: 0.25     Types: Cigarettes    Smokeless tobacco: Never    Tobacco comments:     5-7 cigarettes daily **as of 11/01/23 **   Substance Use Topics    Alcohol use: Not Currently      Wt Readings from Last 1 Encounters:   08/22/24 48.2 kg (106 lb 3.2 oz)        Anesthesia Evaluation   Pt has had prior anesthetic.     No history of anesthetic complications       ROS/MED HX  ENT/Pulmonary:     (+)                tobacco use, Current use,                       Neurologic:     (+)    peripheral neuropathy,                         (-) no CVA   Cardiovascular:     (+) Dyslipidemia hypertension- Peripheral Vascular Disease-  CAD -  - -   Taking blood thinners  Instructions Given to patient: Last dose of plavix this AM.                                 METS/Exercise Tolerance:     Hematologic:     (+)      anemia,          Musculoskeletal:   (+)  arthritis,             GI/Hepatic:  - neg GI/hepatic ROS     Renal/Genitourinary:     (+) renal disease, type: ARF, Pt does not require dialysis,    Nephrolithiasis ,       Endo:  - neg endo ROS     Psychiatric/Substance Use:     (+) psychiatric history anxiety       Infectious Disease:  - neg infectious disease ROS     Malignancy:   (+) Malignancy, History of GI.GI CA  Remission status post Surgery and Chemo.      Other:  - neg other ROS          Physical Exam    Airway  airway exam normal      Mallampati: II   TM distance: > 3 FB   Neck ROM: full   Mouth opening: > 3 " "cm    Respiratory Devices and Support         Dental  no notable dental history   Comment: Upper dentures         Cardiovascular   cardiovascular exam normal       Rhythm and rate: regular and normal     Pulmonary   pulmonary exam normal        breath sounds clear to auscultation           OUTSIDE LABS:  CBC:   Lab Results   Component Value Date    WBC 10.2 08/22/2024    WBC 14.0 (H) 08/21/2024    HGB 8.2 (L) 08/22/2024    HGB 9.0 (L) 08/21/2024    HCT 25.8 (L) 08/22/2024    HCT 28.0 (L) 08/21/2024     08/22/2024     08/21/2024     BMP:   Lab Results   Component Value Date     08/22/2024     08/21/2024    POTASSIUM 4.9 08/22/2024    POTASSIUM 4.9 08/21/2024    CHLORIDE 105 08/22/2024    CHLORIDE 102 08/21/2024    CO2 18 (L) 08/22/2024    CO2 19 (L) 08/21/2024    BUN 30.9 (H) 08/22/2024    BUN 36.9 (H) 08/21/2024    CR 2.12 (H) 08/22/2024    CR 2.49 (H) 08/21/2024    GLC 97 08/22/2024     (H) 08/21/2024     COAGS: No results found for: \"PTT\", \"INR\", \"FIBR\"  POC: No results found for: \"BGM\", \"HCG\", \"HCGS\"  HEPATIC:   Lab Results   Component Value Date    ALBUMIN 4.0 04/30/2024    PROTTOTAL 7.3 04/30/2024    ALT 8 04/30/2024    AST 17 04/30/2024    ALKPHOS 81 04/30/2024    BILITOTAL <0.2 04/30/2024     OTHER:   Lab Results   Component Value Date    LACT 0.7 10/29/2020    SUDHIR 8.7 (L) 08/22/2024    MAG 1.8 04/20/2021    LIPASE 78 (H) 05/22/2023       Anesthesia Plan    ASA Status:  3    NPO Status:  NPO Appropriate    Anesthesia Type: MAC.     - Reason for MAC: straight local not clinically adequate              Consents    Anesthesia Plan(s) and associated risks, benefits, and realistic alternatives discussed. Questions answered and patient/representative(s) expressed understanding.     - Discussed:     - Discussed with:  Patient       - Patient is DNR/DNI Status: Yes             Suspend during perioperative period? No.         Postoperative Care    Pain management: IV analgesics, Oral " pain medications, Multi-modal analgesia.   PONV prophylaxis: Ondansetron (or other 5HT-3), Dexamethasone or Solumedrol, Droperidol or Haldol     Comments:               Demario Dougherty MD    I have reviewed the pertinent notes and labs in the chart from the past 30 days and (re)examined the patient.  Any updates or changes from those notes are reflected in this note.     # Hyponatremia: Lowest Na = 135 mmol/L in last 30 days, will monitor as appropriate         # Drug Induced Platelet Defect: home medication list includes an antiplatelet medication

## 2024-08-22 NOTE — H&P
[facility]    History and Physical - Hospitalist Service       Date of Admission:  8/21/2024    Assessment & Plan   Principal Problem:    DELICIA (acute kidney injury) (H24)  Active Problems:    Hyperlipemia    Tobacco use disorder    Malignant neoplasm of sigmoid colon (H)    Accelerated hypertension    Anxiety    Drug-induced polyneuropathy (H24)    Coronary atherosclerosis    Essential hypertension    Idiopathic chronic gout with tophus, unspecified site    Raynaud phenomenon    Other chronic pain    Hydronephrosis    Acute kidney injury (H24)    Hydronephrosis with ureteral stricture, not elsewhere classified    PVD (peripheral vascular disease) (H24)       Katya Butler is a 84 year old female with history of colon cancer, PAD s/p recent left lower extremity stenting on plavix, CAD, HTN, gout, raynaud's admitted on 8/21/2024 with DELICIA secondary to left sided hydronephrosis due to worsening retroperitoneal LAD.       DELICIA  Hydronephrosis  UA negative for infection  CT shows moderate left hydronephrosis with significant nephric fat stranding and edematous changes likely secondary to left ureteral obstruction due to increased left retroperitoneal lymphadenopathy.  -- urology consult  -- NPO after midnight  -- trend renal labs  -- pain control      Leukocytosis:   UA negative  Denies any other localizing complaints besides left flank pain so suspect this is reactive  -- trend WBC for improvement       H/O colon cancer, diagnosed 2020  Not treated   On active surveillance, follows with Dr. Merrill  PET scan imaging showed uptake in left supraclavicular nodes and retroperitoneal lymph nodes.   Now with increased left retroperitoneal lymphadenopathy causing hydronephrosis  -- will consult Oncology      PAD:  Reportedly had peripheral stenting of the left lower extremity and is on Plavix  Cannot find detailed records of any recent vascular interventions  --Continue home Plavix and statin      HTN:  Continue home amlodipine      Chronic constipation: continue laxatives      Remote history of shingles of the right arm, now improved, no vesicles noted  -- continue home Emla cream       Diet: NPO for Medical/Clinical Reasons Except for: Meds, Ice Chips  Regular Diet Adult    DVT Prophylaxis: Heparin SQ  Lima Catheter: Not present  Lines: PRESENT             Cardiac Monitoring: None  Code Status: No CPR- Do NOT Intubate      Clinically Significant Risk Factors Present on Admission                # Drug Induced Platelet Defect: home medication list includes an antiplatelet medication   # Hypertension: Noted on problem list    # Anemia: based on hgb <11                        Disposition Plan      Expected Discharge Date: 08/23/2024             Medically Ready for Discharge: Anticipated in 2-4 Days      Mark Dixon DO  Hospitalist Service  [facility]  Securely message with [employer] (more info)  Text page via [employer] Paging/Directory     ______________________________________________________________________    Chief Complaint   Left flank pain    History is obtained from the patient    History of Present Illness   Katya Butler is a 84 year old female who presents with increased left flank pain for the past day.      Past Medical History    Past Medical History:   Diagnosis Date    HTN (hypertension)     Inguinal hernia without mention of obstruction or gangrene, unilateral or unspecified, (not specified as recurrent) 08/20/2004    RIGHT    Malignant neoplasm of sigmoid colon (H) 11/24/2020    Primary osteoarthritis involving multiple joints 8/2/2019    Ventral hernia, unspecified, without mention of obstruction or gangrene 08/15/2003    easily reduced       Past Surgical History   Past Surgical History:   Procedure Laterality Date    HERNIA REPAIR      HERNIA REPAIR, INGUINAL RT/LT  02/2005    [facility]    HYSTERECTOMY      IR AORTIC ARCH 4 VESSEL ANGIOGRAM  10/13/2009    IR  AORTIC ARCH 4 VESSEL ANGIOGRAM  3/15/2010    IR MISCELLANEOUS PROCEDURE  5/20/2008    IR MISCELLANEOUS PROCEDURE  1/30/2009    IR MISCELLANEOUS PROCEDURE  1/30/2009    IR MISCELLANEOUS PROCEDURE  1/30/2009    IR MISCELLANEOUS PROCEDURE  10/13/2009    IR MISCELLANEOUS PROCEDURE  10/13/2009    IR MISCELLANEOUS PROCEDURE  3/15/2010    LAPAROSCOPIC CYSTECTOMY OVARIAN (ONCOLOGY) Bilateral 6/27/2018    Procedure: DIAGNOSTIC LAPAROSCOPY, RIGHT SALPINGO OOPHORECTOMY, LYSIS OF ADHESIONS, AND PELVIC WASHINGS;  Surgeon: Nneka Arroyo DO;  Location: [facility] OR;  Service:     ME INSJ PRP CTR VAD W/SUBQ PORT AGE 5 YR/> N/A 12/2/2020    Procedure: Port Placement;  Surgeon: Viry Villegas MD;  Location: [facility] OR;  Service: General    ZZC PART REMOVAL COLON W ANASTOMOSIS N/A 10/31/2020    Procedure: COLECTOMY, SIGMOID, OPEN;  Surgeon: Viry Villegas MD;  Location: [facility];  Service: General    ZZC REMOVAL OF OVARY(S) N/A 10/31/2020    Procedure: OOPHORECTOMY, OPEN;  Surgeon: Viry Villegas MD;  Location: [facility];  Service: General       Prior to Admission Medications   Prior to Admission Medications   Prescriptions Last Dose Informant Patient Reported? Taking?   HYDROcodone-acetaminophen (NORCO) 5-325 MG tablet 8/21/2024 at am  No Yes   Sig: Take 1-2 tablets by mouth nightly as needed for severe pain   acetaminophen (TYLENOL) 500 MG tablet 8/20/2024 at pm  Yes Yes   Sig: Take 500-1,000 mg by mouth every 6 hours as needed for mild pain   amLODIPine (NORVASC) 5 MG tablet 8/21/2024 at am  Yes Yes   Sig: Take 5 mg by mouth 2 times daily.   clopidogrel (PLAVIX) 75 MG tablet 8/21/2024 at am  Yes Yes   Sig: Take 75 mg by mouth daily.   lidocaine-prilocaine (EMLA) 2.5-2.5 % external cream Unknown at prn  No Yes   Sig: Apply topically as needed for moderate pain Apply to painful skin   lovastatin (MEVACOR) 40 MG tablet 8/20/2024 at pm  Yes Yes   Sig: Take 40 mg by mouth at bedtime.    polyethylene glycol (MIRALAX) 17 GM/Dose powder Unknown at prn  No Yes   Sig: Take 17 g by mouth 2 times daily as needed for constipation   senna-docusate (SENOKOT-S/PERICOLACE) 8.6-50 MG tablet 8/20/2024 at pm  No Yes   Sig: Take 1-2 tablets by mouth 2 times daily as needed for constipation      Facility-Administered Medications: None           Physical Exam   Vital Signs: Temp: 98.3  F (36.8  C) Temp src: Oral BP: (!) 153/74 Pulse: 93   Resp: 18 SpO2: 95 % O2 Device: None (Room air)    Weight: 101 lbs 0 oz    General Appearance: In no acute distress  RESPIRATORY: respirations nonlabored  CARDIOVASCULAR: No le edema bilat.  GENITOURINARY: + left flank pain on palpation  SKIN/HAIR/NAILS: Chronic rash RUE  NEUROLOGIC: No focal arm or leg  weakness, speech is clear         Medical Decision Making       >75 MINUTES SPENT BY ME on the date of service doing chart review, history, exam, documentation & further activities per the note.      Data

## 2024-08-22 NOTE — PLAN OF CARE
Problem: Adult Inpatient Plan of Care  Goal: Absence of Hospital-Acquired Illness or Injury  Outcome: Progressing  Intervention: Identify and Manage Fall Risk  Recent Flowsheet Documentation  Taken 8/21/2024 2222 by Stu Aguirre RN  Safety Promotion/Fall Prevention:   activity supervised   assistive device/personal items within reach   clutter free environment maintained   lighting adjusted   nonskid shoes/slippers when out of bed   patient and family education   safety round/check completed   supervised activity     Problem: Adult Inpatient Plan of Care  Goal: Optimal Comfort and Wellbeing  Outcome: Progressing     Problem: Risk for Delirium  Goal: Optimal Coping  Outcome: Progressing  Goal: Improved Behavioral Control  Outcome: Progressing  Intervention: Minimize Safety Risk  Recent Flowsheet Documentation  Taken 8/21/2024 2222 by Stu Aguirre RN  Enhanced Safety Measures:   assistive devices when indicated   pain management   review medications for side effects with activity  Goal: Improved Attention and Thought Clarity  Outcome: Progressing  Goal: Improved Sleep  Outcome: Progressing     Problem: Acute Kidney Injury/Impairment  Goal: Fluid and Electrolyte Balance  Outcome: Progressing  Goal: Improved Oral Intake  Outcome: Progressing  Goal: Effective Renal Function  Outcome: Progressing     Problem: Pain Acute  Goal: Optimal Pain Control and Function  Outcome: Progressing     Problem: Comorbidity Management  Goal: Blood Pressure in Desired Range  Outcome: Progressing    Patient was alert and oriented. Dx: Left hydronephrosis secondary to obstruction of left ureter from enlarging retroperitoneal lymphadenopathy. Hx: colon cancer, HTN, and PVD. Urology and hematology/oncology consulted. Patient stated she has chronic shingles, and that her RUE is tender and burning. Dr. Dixon assessed patient and deemed no acute shingles at this time. Port-a-cath not accessed as it is no longer functional per patient. PIV to  left forearm was saline lock. Patient is steady in her feet and denies dizziness/lightheadedness. Patient is independent with transfers in her room. Patient currently at a regular diet and will be NPO at midnight pending plan of care from urology.

## 2024-08-22 NOTE — CONSULTS
[facility] CONSULT     Type of consult: inpatient  Place of service: [facility]   Reason for consult: Left hydronephrosis from ureteral obstruction due to retroperitoneal lymphadenopathy   Requested by: Dr. Barnes     History of Present Illness:   Katya Butler is a 84 year old female with hx of anxiety, CAD, HTN, idiopathic chronic gout, PVD, osteoarthritis, s/p hysterectomy, colon CA s/p resection 10/31/2020 and adjuvant chemotherapy with FOLFOX x 10 cycles (currently on active surveillance); Admitted 8/21 for left flank pain, DELICIA (acute kidney injury) (H24) and CT finding of left hydronephrosis 2/2 left ureteral compression by enlarging left retroperitoneal lymphadenopathy. A urology consult was placed for further evaluation and recommendations regarding hydronephrosis. History obtained through patient and chart review.    Pt reports she has had some chronic back pain but experienced acute worsening of left flank pain and left abdominal pain beginning 8/20/24. Reports accompanying nausea without vomiting. Denies dysuria. Does note chronic urinary frequency (small volume) without urgency. Denies recent fever, chills, hematuria.     CT Abd/Pelvis 8/21 showed moderate left hydronephrosis with significant fat stranding and edematous changes likely 2/2 ureteral obstruction by enlarging left retroperitoneal lymphadenopathy, increased size of left retroperitoneal lymphadenopathy, mildly dilated gallbladder, right perineal hernia.     UA benign for infection. WBC 10.2 today, 14.0 on 8/21 admission. Afebrile. Creatinine 2.12 today, 2.49 on 8/21, 0.90 on 4/30/24.       Past Medical History  Past Medical History:   Diagnosis Date    HTN (hypertension)     Inguinal hernia without mention of obstruction or gangrene, unilateral or unspecified, (not specified as recurrent) 08/20/2004    RIGHT    Malignant neoplasm of sigmoid colon (H) 11/24/2020    Primary osteoarthritis involving multiple joints 8/2/2019     Ventral hernia, unspecified, without mention of obstruction or gangrene 08/15/2003    easily reduced       Past Surgical History  Past Surgical History:   Procedure Laterality Date    HERNIA REPAIR      HERNIA REPAIR, INGUINAL RT/LT  02/2005    [facility]    HYSTERECTOMY      IR AORTIC ARCH 4 VESSEL ANGIOGRAM  10/13/2009    IR AORTIC ARCH 4 VESSEL ANGIOGRAM  3/15/2010    IR MISCELLANEOUS PROCEDURE  5/20/2008    IR MISCELLANEOUS PROCEDURE  1/30/2009    IR MISCELLANEOUS PROCEDURE  1/30/2009    IR MISCELLANEOUS PROCEDURE  1/30/2009    IR MISCELLANEOUS PROCEDURE  10/13/2009    IR MISCELLANEOUS PROCEDURE  10/13/2009    IR MISCELLANEOUS PROCEDURE  3/15/2010    LAPAROSCOPIC CYSTECTOMY OVARIAN (ONCOLOGY) Bilateral 6/27/2018    Procedure: DIAGNOSTIC LAPAROSCOPY, RIGHT SALPINGO OOPHORECTOMY, LYSIS OF ADHESIONS, AND PELVIC WASHINGS;  Surgeon: Nneka Arroyo DO;  Location: [facility];  Service:     [place] INSJ PRPH CTR VAD W/SUBQ PORT AGE 5 YR/> N/A 12/2/2020    Procedure: Port Placement;  Surgeon: Viry Villegas MD;  Location: [facility] OR;  Service: General    ZZC PART REMOVAL COLON W ANASTOMOSIS N/A 10/31/2020    Procedure: COLECTOMY, SIGMOID, OPEN;  Surgeon: Viry Villegas MD;  Location: [facility] [place];  Service: General    ZZC REMOVAL OF OVARY(S) N/A 10/31/2020    Procedure: OOPHORECTOMY, OPEN;  Surgeon: Viry Villegas MD;  Location: [facility];  Service: General       Social History  Social History     Socioeconomic History    Marital status:      Spouse name: Not on file    Number of children: Not on file    Years of education: Not on file    Highest education level: Not on file   Occupational History    Not on file   Tobacco Use    Smoking status: Every Day     Current packs/day: 0.25     Types: Cigarettes    Smokeless tobacco: Never    Tobacco comments:     5-7 cigarettes daily **as of 11/01/23 **   Vaping Use    Vaping status: Never Used   Substance and Sexual Activity     Alcohol use: Not Currently    Drug use: No    Sexual activity: Not Currently   Other Topics Concern    Not on file   Social History Narrative    Not on file     Social Determinants of Health     Financial Resource Strain: Low Risk  (8/21/2024)    Financial Resource Strain     Within the past 12 months, have you or your family members you live with been unable to get utilities (heat, electricity) when it was really needed?: No   Food Insecurity: Low Risk  (8/21/2024)    Food Insecurity     Within the past 12 months, did you worry that your food would run out before you got money to buy more?: No     Within the past 12 months, did the food you bought just not last and you didn t have money to get more?: No   Transportation Needs: Low Risk  (8/21/2024)    Transportation Needs     Within the past 12 months, has lack of transportation kept you from medical appointments, getting your medicines, non-medical meetings or appointments, work, or from getting things that you need?: No   Physical Activity: Not on file   Stress: Not on file   Social Connections: Not on file   Interpersonal Safety: Not on file   Housing Stability: Low Risk  (8/21/2024)    Housing Stability     Do you have housing? : Yes     Are you worried about losing your housing?: No       Medications  Current Facility-Administered Medications   Medication Dose Route Frequency Provider Last Rate Last Admin    acetaminophen (TYLENOL) tablet 650 mg  650 mg Oral Q4H PRN Mark Dixon DO        Or    acetaminophen (TYLENOL) Suppository 650 mg  650 mg Rectal Q4H PRN Mark Dixon DO        amLODIPine (NORVASC) tablet 5 mg  5 mg Oral BID Mark Dixon DO   5 mg at 08/22/24 0835    atorvastatin (LIPITOR) tablet 10 mg  10 mg Oral QPM Mark Dixon DO   10 mg at 08/21/24 2304    calcium carbonate (TUMS) chewable tablet 1,000 mg  1,000 mg Oral 4x Daily PRN Mark Dixon DO        ciprofloxacin (CIPRO) infusion 200 mg  200 mg Intravenous  On Call to OR Galo Perez MD        cloNIDine (CATAPRES) tablet 0.1 mg  0.1 mg Oral Q8H PRN Mark Dixon DO        clopidogrel (PLAVIX) tablet 75 mg  75 mg Oral Daily Mark Dixon DO   75 mg at 08/22/24 0835    heparin ANTICOAGULANT injection 5,000 Units  5,000 Units Subcutaneous Q8H Mark Dixon DO   5,000 Units at 08/22/24 0659    hydrALAZINE (APRESOLINE) injection 10 mg  10 mg Intravenous Q4H PRN Mark Dixon DO        HYDROmorphone (DILAUDID) injection 0.2-0.4 mg  0.2-0.4 mg Intravenous Q2H PRN Mark Dixon DO   0.4 mg at 08/22/24 0928    Lidocaine (LIDOCARE) 4 % Patch 1 patch  1 patch Transdermal Q24h Mark Dixon DO   1 patch at 08/21/24 2304    lidocaine-prilocaine (EMLA) cream   Topical Daily PRN Mark Dixon DO        melatonin tablet 3 mg  3 mg Oral At Bedtime PRN Mark Dixon DO        naloxone (NARCAN) injection 0.2 mg  0.2 mg Intravenous Q2 Min PRN Domenic Fleming MD        Or    naloxone (NARCAN) injection 0.4 mg  0.4 mg Intravenous Q2 Min PRN Domenic Fleming MD        Or    naloxone (NARCAN) injection 0.2 mg  0.2 mg Intramuscular Q2 Min PRN Domenic Fleming MD        Or    naloxone (NARCAN) injection 0.4 mg  0.4 mg Intramuscular Q2 Min PRDomenic Multani MD        ondansetron (ZOFRAN ODT) ODT tab 4 mg  4 mg Oral Q6H PRN Mark Dixon DO        Or    ondansetron (ZOFRAN) injection 4 mg  4 mg Intravenous Q6H PRN Mark Dixon DO        polyethylene glycol (MIRALAX) Packet 17 g  17 g Oral BID PRN Mark Dixon DO        prochlorperazine (COMPAZINE) injection 5 mg  5 mg Intravenous Q6H PRN Mark Dixon DO        Or    prochlorperazine (COMPAZINE) tablet 5 mg  5 mg Oral Q6H PRN Mark Dixon DO        Or    prochlorperazine (COMPAZINE) suppository 12.5 mg  12.5 mg Rectal Q12H PRN Mark Dixon DO        senna-docusate (SENOKOT-S/PERICOLACE) 8.6-50 MG per tablet 1 tablet  1 tablet Oral BID PRN Rossini,  "Mark JORDAN DO        Or    senna-docusate (SENOKOT-S/PERICOLACE) 8.6-50 MG per tablet 2 tablet  2 tablet Oral BID PRN Mark Dixon DO           Allergies  Allergies   Allergen Reactions    Contrast Dye Hives, Itching and Swelling    Aspirin Other (See Comments)     PATIENT STATES SHE IS NOT ALLERGIC TO ASA    Other reaction(s): on plavix  Other reaction(s): on plavix    Cephalexin Hives    Colchicine Hives    Ketek [Telithromycin] Visual Disturbance    Other (Do Not Use) Other (See Comments)     Aspirin - on Plavix    Other Drug Allergy (See Comments) Itching     Pt states \"all pain meds\" make her \"itchy\"       ROS:   A full 12 point review of systems was taken and is negative aside from what is noted above in the HPI.    Physical exam  /61 (BP Location: Right arm)   Pulse 79   Temp 98.6  F (37  C) (Oral)   Resp 18   Ht 1.575 m (5' 2\")   Wt 48.2 kg (106 lb 3.2 oz)   SpO2 91%   BMI 19.42 kg/m    General: NAD, alert, cooperative  Head: normocephalic, without abnormality / atraumatic  Abdomen: soft, tender left lower quadrant,non distended. no suprapubic fullness/tenderness. Left CVA tenderness noted, no right CVA tenderness.   Geniturinary: Deferred.     Musculoskeletal: moves all four extremities equally.   Psychological: alert and oriented, answers questions appropriately.     Labs  Lab Results   Component Value Date    WBC 10.2 08/22/2024    HGB 8.2 (L) 08/22/2024    HCT 25.8 (L) 08/22/2024     08/22/2024    CHOL 203 (H) 08/23/2022    TRIG 142 08/23/2022    HDL 63 08/23/2022    ALT 8 04/30/2024    AST 17 04/30/2024     08/22/2024    BUN 30.9 (H) 08/22/2024    CO2 18 (L) 08/22/2024     Creatinine   Date Value Ref Range Status   08/22/2024 2.12 (H) 0.51 - 0.95 mg/dL Final     UA RESULTS:  Recent Labs   Lab Test 08/21/24  1934 05/22/23  1211 10/29/20  2018   COLOR Light Yellow   < > Yellow   APPEARANCE Clear   < > Cloudy*   URINEGLC Negative   < > Negative   URINEBILI Negative   < > " Negative   URINEKETONE Negative   < > 20 mg/dL*   SG 1.019   < > 1.022   UBLD Negative   < > Negative   URINEPH 5.5   < > 5.5   PROTEIN Negative   < > 50 mg/dL*   UROBILINOGEN  --   --  2.0 E.U./dL   NITRITE Negative   < > Negative   LEUKEST Negative   < > Trace*   RBCU <1   < > 0-2   WBCU 2   < > 0-5    < > = values in this interval not displayed.       Lab Results: personally reviewed     Imaging:  EXAM: CT ABDOMEN PELVIS W/O CONTRAST  LOCATION: [facility]  DATE: 8/21/2024     INDICATION: LLQ pain tenderness. IV contrast allergy  COMPARISON: CT chest, abdomen and pelvis 4/17/2024  TECHNIQUE: CT scan of the abdomen and pelvis was performed without IV contrast. Multiplanar reformats were obtained. Dose reduction techniques were used.  CONTRAST: None.     FINDINGS:   LOWER CHEST: Normal.     HEPATOBILIARY: Mildly distended gallbladder. No radiopaque gallstones. No perihilar cholecystic inflammatory changes. Stable benign-appearing calcifications adjacent to the middle hepatic vein.     PANCREAS: Normal.     SPLEEN: Normal.     ADRENAL GLANDS: Normal.     KIDNEYS/BLADDER: New, moderate left hydronephrosis. Significant amount of perinephric fat stranding and edema. Moderately dilated left ureter, down to an area of masslike left retroperitoneal adenopathy. No urolithiasis. Right kidney is negative for   hydronephrosis.     BOWEL: No obstruction or inflammatory change. Right perineal hernia with protrusion of a portion of the rectum through the right levator ani muscle.     LYMPH NODES: Increased left para-aortic lymphadenopathy. A representative masslike lymph node measures 3.4 x 2.2 cm. Previously measured 2.5 x 1.1 cm.     VASCULATURE: Moderate calcified atherosclerotic changes. Infrarenal dominant aortic aneurysm measuring 3.3 cm. Previously measured 3.3 cm, also.     PELVIC ORGANS: Hysterectomy     MUSCULOSKELETAL: Degenerative changes lower lumbar facet joints and bilateral sacroiliac  joints. Mild degenerative changes, bilateral hips.                                                                      IMPRESSION:   1.  New, moderate left hydronephrosis with significant nephric fat stranding and edematous changes. This appears to be secondary to left ureteral obstruction by the enlarging left retrograde nail lymphadenopathy.  2.  Increase in size of the left retroperitoneal lymphadenopathy.  3.  Mildly dilated gallbladder. No additional findings to suggest acute cholecystitis.  4.  Right perineal hernia.     I have personally reviewed the imaging reports above.       Assessment/plan  Katya Butler is being seen by [facility] for left hydronephrosis, DELICIA     - 84 yr old female with hx of colon CA s/p resection and chemotherapy 2020; Admitted with left flank/abdominal pain, DELICIA and CT findings of left hydronephrosis 2/2 enlarging retroperitoneal lymphadenopathy  - UA is not suggestive of infection   - Creatinine 2.12 (base 0.90 in 4/2024).   - Procedure / Surgery recommended: Cystoscopy, left retrograde pyelogram, left ureteral stent placement by Dr. Perez. Keep NPO for procedure, anticipate around 1400 today.   [facility]     This case was discussed with: Dr. Galo Perez    Thank you for consulting [facility] regarding this patient's care. Please contact us with questions or concerns.     NAVEEN Marti, CNP  [facility]  Office Phone: #[phone]

## 2024-08-22 NOTE — OP NOTE
Date of surgery: 8/22/2024  Location:[facility] OR      Surgeon: Galo Perez MD    Anesthesia: MAC    Preoperative diagnosis: Left ureteral obstruction secondary to malignancy    Postoperative diagnosis: Same    Procedure: Cystoscopy with retrograde pyelogram and left ureteral stent placement    Operative indication: Katya Butler is a 84 year old female left ureteral obstruction secondary to malignancy and lymphadenopathy.  Counseled for ureteral stent placement    Operative findings: Ureteral stent placed without difficulty    Operative procedure: The patient was brought to the operating room.  Lithotomy.  MAC anesthesia.  Sterile conditions.    Cystoscopy is performed urethra is normal the bladder is inspected and is normal.  Ureteral orifice ease normal in character size and position    Bentson guidewire passed into the left ureter up to the left renal pelvis and a 5 Emirati open-ended ureteral catheter was passed over this to the left renal pelvis.    3 to 5 cc of Omnipaque allows for visualization of the collecting system which shows hydroureteronephrosis and caliectasis consistent with a ureteral obstruction    Ureteral catheter was removed leaving a Bentson guidewire in place.  Over this we attempt a 6 x 22 Emirati ureteral stent but there is coiling of the distal coil secondary to resistance from stent placement over the Bentson guidewire.    The Bentson guidewire is replaced by a Super Stiff Amplatz guidewire following which we can easily pass the 6 x 22 Emirati stent over the Super Stiff wire coiled in the renal pelvis and within the bladder.    There is good drainage from the kidney.  Stent is left in place the bladder is drained    She is awoken from anesthesia and transferred recovery stable    Drains: 6/22 stent     Specimens: none     Estimated blood loss: minimal     Complications: none      Galo Perez MD

## 2024-08-22 NOTE — ANESTHESIA CARE TRANSFER NOTE
Patient: Katya Butler    Procedure: Procedure(s):  CYSTOSCOPY, WITH RETROGRADE PYELOGRAM AND STENT INSERTION       Diagnosis: Acute kidney injury (H24) [N17.9]  Other hydronephrosis [N13.39]  Diagnosis Additional Information: No value filed.    Anesthesia Type:   MAC     Note:    Oropharynx: oropharynx clear of all foreign objects  Level of Consciousness: awake  Oxygen Supplementation: room air    Independent Airway: airway patency satisfactory and stable    Vital Signs Stable: post-procedure vital signs reviewed and stable  Report to RN Given: handoff report given  Patient transferred to: Phase II  Comments: Report called to receiving RNNaeem Lemus         Vitals:  Vitals Value Taken Time   BP     Temp     Pulse     Resp     SpO2         Electronically Signed By: NAVEEN Aden CRNA  August 22, 2024  3:06 PM

## 2024-08-22 NOTE — PLAN OF CARE
Problem: Adult Inpatient Plan of Care  Goal: Plan of Care Review  Description: The Plan of Care Review/Shift note should be completed every shift.  The Outcome Evaluation is a brief statement about your assessment that the patient is improving, declining, or no change.  This information will be displayed automatically on your shift  note.  Outcome: Progressing     Problem: Adult Inpatient Plan of Care  Goal: Optimal Comfort and Wellbeing  Outcome: Progressing     Problem: Pain Acute  Goal: Optimal Pain Control and Function  Outcome: Progressing   Goal Outcome Evaluation:

## 2024-08-22 NOTE — INTERVAL H&P NOTE
I have reviewed the surgical (or preoperative) H&P that is linked to this encounter, and examined the patient. There are no significant changes    Galo KENRICK Perez MD      Clinical Conditions Present on Arrival:  Clinically Significant Risk Factors Present on Admission         # Hyponatremia: Lowest Na = 135 mmol/L in last 30 days, will monitor as appropriate         # Drug Induced Platelet Defect: home medication list includes an antiplatelet medication

## 2024-08-22 NOTE — CONSULTS
Care Management Initial Consult    General Information  Assessment completed with: Patient, Patient  Type of CM/SW Visit: Initial Assessment    Primary Care Provider verified and updated as needed: Yes   Readmission within the last 30 days: no previous admission in last 30 days      Reason for Consult: discharge planning  Advance Care Planning: Advance Care Planning Reviewed: verified with patient        Communication Assessment  Patient's communication style: spoken language (English or Bilingual)    Hearing Difficulty or Deaf: no   Wear Glasses or Blind: yes    Cognitive  Cognitive/Neuro/Behavioral: WDL                      Living Environment:   People in home: child(candi), adult     Current living Arrangements: town home      Able to return to prior arrangements: yes     Family/Social Support:  Care provided by: self  Provides care for: no one  Marital Status:   Children          Description of Support System: Supportive    Support Assessment: Adequate family and caregiver support    Current Resources:   Patient receiving home care services: No     Community Resources: None  Equipment currently used at home: none  Supplies currently used at home: None    Employment/Financial:  Employment Status: retired        Financial Concerns: none   Referral to Financial Worker: No     Does the patient's insurance plan have a 3 day qualifying hospital stay waiver?  No    Lifestyle & Psychosocial Needs:  Social Determinants of Health     Food Insecurity: Low Risk  (8/21/2024)    Food Insecurity     Within the past 12 months, did you worry that your food would run out before you got money to buy more?: No     Within the past 12 months, did the food you bought just not last and you didn t have money to get more?: No   Depression: Not at risk (5/15/2023)    PHQ-2     PHQ-2 Score: 0   Housing Stability: Low Risk  (8/21/2024)    Housing Stability     Do you have housing? : Yes     Are you worried about losing your housing?:  No   Tobacco Use: High Risk (4/30/2024)    Patient History     Smoking Tobacco Use: Every Day     Smokeless Tobacco Use: Never     Passive Exposure: Not on file   Financial Resource Strain: Low Risk  (8/21/2024)    Financial Resource Strain     Within the past 12 months, have you or your family members you live with been unable to get utilities (heat, electricity) when it was really needed?: No   Alcohol Use: Not on file   Transportation Needs: Low Risk  (8/21/2024)    Transportation Needs     Within the past 12 months, has lack of transportation kept you from medical appointments, getting your medicines, non-medical meetings or appointments, work, or from getting things that you need?: No   Physical Activity: Not on file   Interpersonal Safety: Not on file   Stress: Not on file   Social Connections: Not on file   Health Literacy: Not on file     Functional Status:  Prior to admission patient needed assistance:   Dependent ADLs:: Independent  Dependent IADLs:: Transportation     Additional Information:  Writer met with patient at bedside to review role of care management services, discuss goals of care and assess need for any possible services at discharge. Patient alert, answering questions appropriately and engaged in the conversation.  Address, phone number and PCP confirmed. Patient reported she has a HCD, but no papers available. Adult son lives with patient in a town house. Report independent with ADLs/IADLs, except no driving. No community resources and no DME. Goal is to return home. Anticipate son will transport.       Zehra Batres RN

## 2024-08-22 NOTE — PLAN OF CARE
Problem: Adult Inpatient Plan of Care  Goal: Plan of Care Review  Description: The Plan of Care Review/Shift note should be completed every shift.  The Outcome Evaluation is a brief statement about your assessment that the patient is improving, declining, or no change.  This information will be displayed automatically on your shift  note.  Outcome: Progressing  Patient alert, oriented and engaged in plan of care.    Problem: Pain Acute  Goal: Optimal Pain Control and Function  Outcome: Progressing  Left flank pain tolerable with current regimen.    NPO this shift (except meds) for procedure.  Currently in surgery dept.    Mariah Leong RN

## 2024-08-22 NOTE — ED NOTES
"[facility] ED Handoff Report    ED Chief Complaint: Left flank pain    ED Diagnosis:  (N17.9) Acute kidney injury (H24)  Comment:   Plan:     (N13.1) Hydronephrosis with ureteral stricture, not elsewhere classified  Comment:   Plan:        PMH:    Past Medical History:   Diagnosis Date    HTN (hypertension)     Inguinal hernia without mention of obstruction or gangrene, unilateral or unspecified, (not specified as recurrent) 08/20/2004    RIGHT    Malignant neoplasm of sigmoid colon (H) 11/24/2020    Primary osteoarthritis involving multiple joints 8/2/2019    Ventral hernia, unspecified, without mention of obstruction or gangrene 08/15/2003    easily reduced        Code Status:  No Order     Falls Risk: No Band: Not applicable    Current Living Situation/Residence: lives with their son or daughter     Elimination Status: Continent: Yes     Activity Level: SBA    Patients Preferred Language:  English     Needed: No    Vital Signs:  BP (!) 175/77   Pulse 91   Temp 98.5  F (36.9  C) (Oral)   Resp 18   Ht 1.575 m (5' 2\")   Wt 45.8 kg (101 lb)   SpO2 97%   BMI 18.47 kg/m       Cardiac Rhythm: N/A    Pain Score: 0/10    Is the Patient Confused:  No    Last Food or Drink: 08/21/24 at 12nn    Focused Assessment:  Pt sent here by ambulance due to severe left flank pain. Received 50mcg of fentanyl enroute with relief for a short time. Pt complained of 10/10 flank pin again 30 minutes after arrival at ED. Dilaudid IV given. Pt denies any pain right now. T showing new left hydronephrosis secondary to obstruction of left ureter from enlarging retroperitoneal lymphadenopathy. Urology consulted.Pt has hx of colon cancer last 2020 and had surgery and chemo treatment. with recurrence last April 2024 without any treatment. Pt A/O x 4    Tests Performed: Done: Labs and Imaging    Treatments Provided:  Saline bolus, Dilaudid 1mg IV     Family Dynamics/Concerns: No    Family Updated On Visitor Policy: Yes, pt " informed her son    Plan of Care Communicated to Family: Yes    Who Was Updated about Plan of Care: Pt called the son    Medications sent with patient: none    Covid: asymptomatic , not tested    Additional Information: pt felt better after dilaudid, did not ask for any pain meds after.    RN: Rhys Valverde RN 8/21/2024 9:48 PM

## 2024-08-23 LAB
ANION GAP SERPL CALCULATED.3IONS-SCNC: 13 MMOL/L (ref 7–15)
BUN SERPL-MCNC: 29.5 MG/DL (ref 8–23)
CALCIUM SERPL-MCNC: 8.7 MG/DL (ref 8.8–10.4)
CHLORIDE SERPL-SCNC: 104 MMOL/L (ref 98–107)
CREAT SERPL-MCNC: 1.57 MG/DL (ref 0.51–0.95)
EGFRCR SERPLBLD CKD-EPI 2021: 32 ML/MIN/1.73M2
GLUCOSE BLDC GLUCOMTR-MCNC: 145 MG/DL (ref 70–99)
GLUCOSE SERPL-MCNC: 182 MG/DL (ref 70–99)
HCO3 SERPL-SCNC: 20 MMOL/L (ref 22–29)
HOLD SPECIMEN: NORMAL
POTASSIUM SERPL-SCNC: 4.4 MMOL/L (ref 3.4–5.3)
SODIUM SERPL-SCNC: 137 MMOL/L (ref 135–145)

## 2024-08-23 PROCEDURE — 120N000001 HC R&B MED SURG/OB

## 2024-08-23 PROCEDURE — 80048 BASIC METABOLIC PNL TOTAL CA: CPT | Performed by: INTERNAL MEDICINE

## 2024-08-23 PROCEDURE — 250N000013 HC RX MED GY IP 250 OP 250 PS 637: Performed by: UROLOGY

## 2024-08-23 PROCEDURE — 36415 COLL VENOUS BLD VENIPUNCTURE: CPT | Performed by: INTERNAL MEDICINE

## 2024-08-23 PROCEDURE — 250N000011 HC RX IP 250 OP 636: Performed by: UROLOGY

## 2024-08-23 PROCEDURE — 99232 SBSQ HOSP IP/OBS MODERATE 35: CPT | Performed by: INTERNAL MEDICINE

## 2024-08-23 PROCEDURE — 258N000003 HC RX IP 258 OP 636: Performed by: INTERNAL MEDICINE

## 2024-08-23 PROCEDURE — 258N000003 HC RX IP 258 OP 636: Performed by: UROLOGY

## 2024-08-23 RX ORDER — SODIUM CHLORIDE 9 MG/ML
INJECTION, SOLUTION INTRAVENOUS CONTINUOUS
Status: DISCONTINUED | OUTPATIENT
Start: 2024-08-23 | End: 2024-08-24

## 2024-08-23 RX ADMIN — CLOPIDOGREL BISULFATE 75 MG: 75 TABLET ORAL at 08:59

## 2024-08-23 RX ADMIN — HEPARIN SODIUM 5000 UNITS: 10000 INJECTION, SOLUTION INTRAVENOUS; SUBCUTANEOUS at 13:07

## 2024-08-23 RX ADMIN — ACETAMINOPHEN 650 MG: 325 TABLET ORAL at 23:24

## 2024-08-23 RX ADMIN — AMLODIPINE BESYLATE 5 MG: 5 TABLET ORAL at 20:56

## 2024-08-23 RX ADMIN — Medication 3 MG: at 23:23

## 2024-08-23 RX ADMIN — AMLODIPINE BESYLATE 5 MG: 5 TABLET ORAL at 08:59

## 2024-08-23 RX ADMIN — SODIUM CHLORIDE: 9 INJECTION, SOLUTION INTRAVENOUS at 16:21

## 2024-08-23 RX ADMIN — LIDOCAINE 1 PATCH: 4 PATCH TOPICAL at 20:54

## 2024-08-23 RX ADMIN — SODIUM CHLORIDE, POTASSIUM CHLORIDE, SODIUM LACTATE AND CALCIUM CHLORIDE: 600; 310; 30; 20 INJECTION, SOLUTION INTRAVENOUS at 03:55

## 2024-08-23 RX ADMIN — HEPARIN SODIUM 5000 UNITS: 10000 INJECTION, SOLUTION INTRAVENOUS; SUBCUTANEOUS at 06:38

## 2024-08-23 RX ADMIN — HEPARIN SODIUM 5000 UNITS: 10000 INJECTION, SOLUTION INTRAVENOUS; SUBCUTANEOUS at 21:00

## 2024-08-23 RX ADMIN — ATORVASTATIN CALCIUM 10 MG: 10 TABLET, FILM COATED ORAL at 20:56

## 2024-08-23 ASSESSMENT — ACTIVITIES OF DAILY LIVING (ADL)
ADLS_ACUITY_SCORE: 20

## 2024-08-23 NOTE — PLAN OF CARE
Problem: Adult Inpatient Plan of Care  Goal: Plan of Care Review  Description: The Plan of Care Review/Shift note should be completed every shift.  The Outcome Evaluation is a brief statement about your assessment that the patient is improving, declining, or no change.  This information will be displayed automatically on your shift  note.  Outcome: Progressing  Goal: Absence of Hospital-Acquired Illness or Injury  Intervention: Identify and Manage Fall Risk  Recent Flowsheet Documentation  Taken 8/23/2024 0100 by Johnathan Tyson RN  Safety Promotion/Fall Prevention:   clutter free environment maintained   increased rounding and observation   increase visualization of patient   safety round/check completed  Intervention: Prevent Skin Injury  Recent Flowsheet Documentation  Taken 8/23/2024 0100 by Johnathan Tyson RN  Body Position: position changed independently     Problem: Pain Acute  Goal: Optimal Pain Control and Function  Outcome: Progressing     Problem: Comorbidity Management  Goal: Blood Pressure in Desired Range  Outcome: Progressing   Goal Outcome Evaluation:       Pt alert & oriented. Denies any pain. IVF LR running at 100ml/hr. On room air, Vital Signs stable. Will continue to monitor.

## 2024-08-23 NOTE — PROGRESS NOTES
Place of Service:  [facility]     Reason for follow up: Left hydronephrosis from ureteral obstruction due to retroperitoneal lymphadenopathy     SUBJECTIVE:  Events: no acute events overnight    Patient reports pain has resolved since stent placement yesterday. Denies issues with voiding, does have urinary frequency and now using bedside commode. Tolerating diet without nausea.    OBJECTIVE:  PHYSICAL EXAM:  Temp: 98.6  F (37  C) Temp src: Oral BP: 127/69 Pulse: 80   Resp: 18 SpO2: 98 % O2 Device: None (Room air)    General: NAD, alert, cooperative  Head: normocephalic, without abnormality / atraumatic  Abdomen: soft, non tender, non distended. no suprapubic fullness, no suprapubic tenderness. no CVA tenderness,   Genitourinary: voiding on her own  Skin: No rashes or lesions  Musculoskeletal: moves all four extremities equally; no calf edema or tenderness  Psychological: alert and oriented, answers questions appropriately    LABS:  Creatinine   Date Value Ref Range Status   08/23/2024 1.57 (H) 0.51 - 0.95 mg/dL Final     WBC Count   Date Value Ref Range Status   08/22/2024 10.2 4.0 - 11.0 10e3/uL Final     Hemoglobin   Date Value Ref Range Status   08/22/2024 8.2 (L) 11.7 - 15.7 g/dL Final   ]  Platelet Count   Date Value Ref Range Status   08/22/2024 392 150 - 450 10e3/uL Final       Lab Results: personally reviewed.     ASSESSMENT/PLAN:  Katya Butler is being seen by [facility] for Left hydronephrosis from ureteral obstruction due to retroperitoneal lymphadenopathy     - 84 yr old female with hx of colon CA s/p resection and chemotherapy 2020; Admitted with left flank/abdominal pain, DELICIA and CT findings of left hydronephrosis 2/2 enlarging retroperitoneal lymphadenopathy  - POD1 Cystoscopy, left ureteral stent placement by Dr. Perez 8/22.   - Creatinine improved from 2.12 yesterday to 1.57 today (base 0.90 in 4/2024).   - Supportives per primary team.  - Will help arrange outpatient follow up  for stent management. Will need stent exchanged every 3 months.   - Okay to discharge from urology standpoint.   - urology will sign off. Please call with questions or concerns. An e-mail has been sent to our schedulers to help facilitate scheduling a follow up appointment. Discharge orders placed in the discharge navigator.      Will update: Dr. Galo Klein PA-C  [facility]   [phone]

## 2024-08-23 NOTE — PROGRESS NOTES
[facility]    Medicine Progress Note - Hospitalist Service    Date of Admission:  8/21/2024    Assessment & Plan       Katya Butler is a 84 year old female with history of colon cancer, PAD s/p recent left lower extremity stenting on plavix, CAD, HTN, gout, raynaud's admitted on 8/21/2024 with DELICIA secondary to left sided hydronephrosis due to worsening retroperitoneal LAD.        8/22 :     S/p Cystoscopy with retrograde pyelogram and left ureteral stent placement   Creatinine elevated at 2.12 from 2.49  Continue iv fluids, monitor renal functions  Urology following    Discharge in 1-2 days once renal functions stable.      A/p :        DELICIA  Hydronephrosis  UA negative for infection  CT shows moderate left hydronephrosis with significant nephric fat stranding and edematous changes likely secondary to left ureteral obstruction due to increased left retroperitoneal lymphadenopathy.  -- urology consult  -- NPO after midnight  -- trend renal labs  -- pain control        Leukocytosis:   UA negative  Denies any other localizing complaints besides left flank pain so suspect this is reactive  -- trend WBC for improvement         H/O colon cancer, diagnosed 2020  Not treated   On active surveillance, follows with Dr. Merrill  PET scan imaging showed uptake in left supraclavicular nodes and retroperitoneal lymph nodes.   Now with increased left retroperitoneal lymphadenopathy causing hydronephrosis  -- will consult Oncology        PAD:  Reportedly had peripheral stenting of the left lower extremity and is on Plavix  Cannot find detailed records of any recent vascular interventions  --Continue home Plavix and statin        HTN: Continue home amlodipine        Chronic constipation: continue laxatives        Remote history of shingles of the right arm, now improved, no vesicles noted  -- continue home Emla cream              Diet: Regular Diet Adult    DVT Prophylaxis: Heparin SQ  Lima Catheter: Not  present  Lines: PRESENT             Cardiac Monitoring: None  Code Status: No CPR- Do NOT Intubate      Clinically Significant Risk Factors                  # Hypertension: Noted on problem list               # Financial/Environmental Concerns: none               Disposition Plan     Medically Ready for Discharge: Anticipated in 2-4 Days             Domenic Fleming MD  Hospitalist Service  [facility]  Securely message with [employer] (more info)  Text page via [employer] Paging/Directory   ______________________________________________________________________      Physical Exam   Vital Signs: Temp: 98.1  F (36.7  C) Temp src: Oral BP: 116/58 Pulse: 89   Resp: 18 SpO2: 94 % O2 Device: None (Room air)    Weight: 106 lbs 3.2 oz       GENERAL: The patient is not in any acute distressed. Awake and alert.  HEENT: Nonicteric sclerae, PERRLA, EOMI. Oropharynx clear. Moist mucous membranes. Conjunctivae appear well perfused.  HEART: Regular rate and rhythm without murmurs.  LUNGS: Clear to auscultation bilaterally. No wheezing or crackles.  ABDOMEN: Soft, positive bowel sounds, nontender.  SKIN: No rash, no excessive bruising, petechiae, or purpura.  EXTREMITIES : no rashes, no swelling in legs.  NEUROLOGIC: conscious and oriented, follows commands, no obvious focal deficits.  ROS: All other systems negative       Medical Decision Making       60 MINUTES SPENT BY ME on the date of service doing chart review, history, exam, documentation & further activities per the note.  MANAGEMENT DISCUSSED with the following over the past 24 hours: rn, patient       Data     I have personally reviewed the following data over the past 24 hrs:    10.2  \   8.2 (L)   / 392     136 105 30.9 (H) /  97   4.9 18 (L) 2.12 (H) \

## 2024-08-23 NOTE — PROGRESS NOTES
"CLINICAL NUTRITION SERVICES - ASSESSMENT NOTE     Nutrition Prescription    RECOMMENDATIONS FOR MDs/PROVIDERS TO ORDER:    Malnutrition Status:    Moderate in acute on chronic illness    Recommendations already ordered by Registered Dietitian (RD):  Chocolate ensure enlive twice daily between meals    Future/Additional Recommendations:  Monitor po/supplement intake, weight, labs, GOC     REASON FOR ASSESSMENT  Katya Butler is a/an 84 year old female assessed by the dietitian for Admission Nutrition Risk Screen for positive/ unsure about weight loss PTA and decreased appetite    Pt with a past medical history of colon cancer ( s/p surgery and chemotherapy), PAD s/p recent lower extremity stenting on plavix, CAD, hypertension, gout, Raynaud's admitted on 8/21/2024 with DELICIA secondary to left sided hydronephrosis due to worsening retroperitoneal LAD>    NUTRITION HISTORY  Pt states back in 2020 before cancer diagnosis she weighed 117 lb. She lost weight after surgery for cancer and chemotherapy. She states the past week she has been eating very poorly (< 50%) but she has actually gained some weight recently.  She states she has been golfing a lot    CURRENT NUTRITION ORDERS  Diet: Regular  Intake/Tolerance: Ate 100% supper meal on 8/22/2024    LABS  Labs reviewed: Na 137, K 4.4, urea nitrogen 29.5 (H), Cr 1.57 (H), Ca 8.7 (L), Glu 182 (H)    MEDICATIONS  Medications reviewed: norvasc, lipitor, plavix    ANTHROPOMETRICS  Height: 157.5 cm (5' 2\")  Most Recent Weight: 48.2 kg (106 lb 3.2 oz)    IBW: 50 kg  BMI: Normal BMI  Weight History:   Wt Readings from Last 10 Encounters:   08/22/24 48.2 kg (106 lb 3.2 oz)   04/30/24 46.3 kg (102 lb 1.6 oz)   01/31/24 47.5 kg (104 lb 12.8 oz)   11/01/23 47.9 kg (105 lb 11.2 oz)   07/19/23 50.1 kg (110 lb 8 oz)   05/22/23 49 kg (108 lb)   04/28/23 49.4 kg (108 lb 14.4 oz)   03/20/23 49 kg (108 lb)   02/24/23 49 kg (108 lb)   02/14/23 47.6 kg (105 lb)       Dosing Weight: 48.2 " kg    ASSESSED NUTRITION NEEDS  Estimated Energy Needs: 1446+ kcals/day (30+ Calories/Kg)  Justification: Increased needs  Estimated Protein Needs: 48 grams protein/day (1g/Kg)  Justification: Maintenance  Estimated Fluid Needs: 1446 mL/day (1 mL/kcal)   Justification: Maintenance    PHYSICAL FINDINGS  See malnutrition section below.  Skin: Bruised right and left forearm and upper arm  Anuj score=19, nutrition noted as adequate  GI: WDL  Last BM 8/21/2024    MALNUTRITION:  % Weight Loss:  Weight loss does not meet criteria for malnutrition   % Intake:  </= 50% for >/= 5 days (severe malnutrition)  Subcutaneous Fat Loss:  Orbital region mild depletion and Upper arm region moderate depletion  Muscle Loss:  Temporal region mild depletion, Patellar region moderate depletion, Anterior thigh region moderate depletion, and Posterior calf region moderate depletion  Fluid Retention:  2+ right ankle, foot, and leg    Malnutrition Diagnosis: Moderate malnutrition  In Context of:  Acute illness or injury  Chronic illness or disease    NUTRITION DIAGNOSIS  Malnutrition related to acute on chronic illness as evidenced by inadequate oral intake < 50%>/= 5 days and mild to moderate subcutaneous fat loss and mild to moderate muscle loss      INTERVENTIONS  Implementation  Nutrition Education: No education needs assessed at this time   Medical food supplement therapy -chocolate ensure twice daily    Goals  Patient to consume % of nutritionally adequate meals three times per day, or the equivalent with supplements/snacks.  Glu < 180  Maintain weight     Monitoring/Evaluation  Progress toward goals will be monitored and evaluated per protocol.

## 2024-08-23 NOTE — PLAN OF CARE
Problem: Adult Inpatient Plan of Care  Goal: Absence of Hospital-Acquired Illness or Injury  Outcome: Progressing  Intervention: Identify and Manage Fall Risk  Recent Flowsheet Documentation  Taken 8/22/2024 1554 by Stu Aguirre RN  Safety Promotion/Fall Prevention:   assistive device/personal items within reach   clutter free environment maintained   lighting adjusted   nonskid shoes/slippers when out of bed   patient and family education   safety round/check completed     Problem: Adult Inpatient Plan of Care  Goal: Optimal Comfort and Wellbeing  Outcome: Progressing     Problem: Risk for Delirium  Goal: Optimal Coping  Outcome: Progressing  Goal: Improved Behavioral Control  Outcome: Progressing  Intervention: Minimize Safety Risk  Recent Flowsheet Documentation  Taken 8/22/2024 1554 by Stu Aguirre RN  Enhanced Safety Measures:   pain management   review medications for side effects with activity  Goal: Improved Attention and Thought Clarity  Outcome: Progressing  Goal: Improved Sleep  Outcome: Progressing     Problem: Acute Kidney Injury/Impairment  Goal: Fluid and Electrolyte Balance  Outcome: Progressing  Goal: Improved Oral Intake  Outcome: Progressing  Goal: Effective Renal Function  Outcome: Progressing     Problem: Pain Acute  Goal: Optimal Pain Control and Function  Outcome: Progressing     Problem: Comorbidity Management  Goal: Blood Pressure in Desired Range  Outcome: Progressing    Patient was alert and oriented. Denied pain at this time. IV L.R. was infusing at 100 mL/hr, but now patient awaiting new PIV placement by SWAT RN. IV fluids needs to continue per on-call urologist. Patient has been drinking PO fluids and voiding without difficulty, Patient denied nausea/vomiting, and tolerated a regular diet. Patient was a stand by assist with transfers to the bathroom.

## 2024-08-23 NOTE — PLAN OF CARE
Problem: Risk for Delirium  Goal: Improved Behavioral Control  Outcome: Progressing  Intervention: Minimize Safety Risk  Recent Flowsheet Documentation  Taken 8/23/2024 1200 by Myra Taylor RN  Enhanced Safety Measures: pain management     Problem: Risk for Delirium  Goal: Improved Attention and Thought Clarity  Outcome: Progressing     Problem: Acute Kidney Injury/Impairment  Goal: Fluid and Electrolyte Balance  Outcome: Progressing     Problem: Pain Acute  Goal: Optimal Pain Control and Function  Outcome: Progressing   Goal Outcome Evaluation:       Pt is A/O X4, denies pain, VSS, eating and voiding fine, urine had been light pink

## 2024-08-24 VITALS
TEMPERATURE: 98.2 F | HEART RATE: 75 BPM | DIASTOLIC BLOOD PRESSURE: 59 MMHG | OXYGEN SATURATION: 96 % | HEIGHT: 62 IN | BODY MASS INDEX: 18.82 KG/M2 | SYSTOLIC BLOOD PRESSURE: 122 MMHG | WEIGHT: 102.3 LBS | RESPIRATION RATE: 18 BRPM

## 2024-08-24 PROBLEM — N13.1 HYDRONEPHROSIS WITH URETERAL STRICTURE, NOT ELSEWHERE CLASSIFIED: Status: RESOLVED | Noted: 2024-08-21 | Resolved: 2024-08-24

## 2024-08-24 PROBLEM — N17.9 ACUTE KIDNEY INJURY (H): Status: RESOLVED | Noted: 2024-08-21 | Resolved: 2024-08-24

## 2024-08-24 PROBLEM — N13.30 HYDRONEPHROSIS: Status: RESOLVED | Noted: 2024-08-21 | Resolved: 2024-08-24

## 2024-08-24 PROBLEM — N17.9 AKI (ACUTE KIDNEY INJURY) (H): Status: RESOLVED | Noted: 2024-08-21 | Resolved: 2024-08-24

## 2024-08-24 LAB
ALBUMIN SERPL BCG-MCNC: 3.6 G/DL (ref 3.5–5.2)
ANION GAP SERPL CALCULATED.3IONS-SCNC: 11 MMOL/L (ref 7–15)
BUN SERPL-MCNC: 33.3 MG/DL (ref 8–23)
CALCIUM SERPL-MCNC: 8.2 MG/DL (ref 8.8–10.4)
CHLORIDE SERPL-SCNC: 106 MMOL/L (ref 98–107)
CREAT SERPL-MCNC: 1.25 MG/DL (ref 0.51–0.95)
EGFRCR SERPLBLD CKD-EPI 2021: 43 ML/MIN/1.73M2
GLUCOSE SERPL-MCNC: 88 MG/DL (ref 70–99)
HCO3 SERPL-SCNC: 23 MMOL/L (ref 22–29)
HOLD SPECIMEN: NORMAL
PHOSPHATE SERPL-MCNC: 2.9 MG/DL (ref 2.5–4.5)
POTASSIUM SERPL-SCNC: 4.4 MMOL/L (ref 3.4–5.3)
SODIUM SERPL-SCNC: 140 MMOL/L (ref 135–145)

## 2024-08-24 PROCEDURE — 36415 COLL VENOUS BLD VENIPUNCTURE: CPT | Performed by: HOSPITALIST

## 2024-08-24 PROCEDURE — 250N000011 HC RX IP 250 OP 636: Performed by: UROLOGY

## 2024-08-24 PROCEDURE — 250N000013 HC RX MED GY IP 250 OP 250 PS 637: Performed by: UROLOGY

## 2024-08-24 PROCEDURE — 258N000003 HC RX IP 258 OP 636: Performed by: INTERNAL MEDICINE

## 2024-08-24 PROCEDURE — 99239 HOSP IP/OBS DSCHRG MGMT >30: CPT | Performed by: HOSPITALIST

## 2024-08-24 PROCEDURE — 80069 RENAL FUNCTION PANEL: CPT | Performed by: HOSPITALIST

## 2024-08-24 RX ADMIN — AMLODIPINE BESYLATE 5 MG: 5 TABLET ORAL at 08:17

## 2024-08-24 RX ADMIN — HEPARIN SODIUM 5000 UNITS: 10000 INJECTION, SOLUTION INTRAVENOUS; SUBCUTANEOUS at 06:11

## 2024-08-24 RX ADMIN — CLOPIDOGREL BISULFATE 75 MG: 75 TABLET ORAL at 08:17

## 2024-08-24 RX ADMIN — SODIUM CHLORIDE: 9 INJECTION, SOLUTION INTRAVENOUS at 06:10

## 2024-08-24 RX ADMIN — HEPARIN SODIUM 5000 UNITS: 10000 INJECTION, SOLUTION INTRAVENOUS; SUBCUTANEOUS at 13:08

## 2024-08-24 RX ADMIN — HYDROMORPHONE HYDROCHLORIDE 0.4 MG: 0.2 INJECTION, SOLUTION INTRAMUSCULAR; INTRAVENOUS; SUBCUTANEOUS at 01:07

## 2024-08-24 ASSESSMENT — ACTIVITIES OF DAILY LIVING (ADL)
ADLS_ACUITY_SCORE: 24
ADLS_ACUITY_SCORE: 20
ADLS_ACUITY_SCORE: 24
ADLS_ACUITY_SCORE: 20

## 2024-08-24 NOTE — CONSULTS
Care Management Discharge Note    Discharge Date: 08/24/2024       Discharge Disposition: Home    Discharge Services: None    Discharge DME: None    Discharge Transportation: family or friend will provide    Private pay costs discussed: Not applicable    Does the patient's insurance plan have a 3 day qualifying hospital stay waiver?  No    PAS Confirmation Code: N/A  Patient/family educated on Medicare website which has current facility and service quality ratings: no    Education Provided on the Discharge Plan: Yes  Persons Notified of Discharge Plans: Patient  Patient/Family in Agreement with the Plan: yes    Handoff Referral Completed: Yes    Additional Information:  Plan is for Pt to discharge home with family. Pt lives in a [facility] with her son. Pt is independent at baseline. Family to transport at discharge. No CM needs requested or recommended for discharge.     CM will sign off. Please contact CM if any additional needs arise prior to discharge.       SIENNA Moran

## 2024-08-24 NOTE — DISCHARGE SUMMARY
[facility]  Hospitalist Discharge Summary      Date of Admission:  8/21/2024  Date of Discharge:  8/24/2024  2:24 PM  Discharging Provider: Alexandro Forrest MD  Discharge Service: Hospitalist Service    Discharge Diagnoses   Obstructive nephropathy  Left hydronephrosis  DELICIA  Retroperitoneal lymphadenopathy  History of colon cancer    Clinically Significant Risk Factors     # Moderate Malnutrition: based on nutrition assessment      Follow-ups Needed After Discharge   Follow-up Appointments     Follow-up and recommended labs and tests       Maintain follow up with [facility] as previously scheduled. If not   previously scheduled, [facility] will call you to arrange follow up. You   may confirm your appointment by calling [facility] at [phone].        Follow-up and recommended labs and tests       Follow up with primary care provider, Keith Bui, within 7 days to   evaluate medication change.  The following labs/tests are recommended:   chemistry panel.    Follow-up with urology as instructed, they will call you in the next   couple of weeks to arrange follow up.            Unresulted Labs Ordered in the Past 30 Days of this Admission       No orders found from 7/22/2024 to 8/22/2024.        These results will be followed up by na    Discharge Disposition   Discharged to home  Condition at discharge: Good    Hospital Course   Katya Butler is a 84 year old female with history of colon cancer, PAD s/p recent left lower extremity stenting on plavix, CAD, HTN, gout, raynaud's admitted on 8/21/2024 with DELICIA secondary to left sided hydronephrosis due to worsening retroperitoneal LAD.     #DELICIA  #Hydronephrosis, obstructive nephropathy  -Underwent cystoscopy with left ureteral stent placement 8/22  -Creatinine down trended after relief of obstruction  -Pain also improved after relief of obstruction  -Follow-up with urology for exchanges every 3 months    #History of colon  cancer  #Retroperitoneal lymphadenopathy  -Outpatient follow-up given clinical progression of disease    #Peripheral arterial disease  -Previously had stenting of the left lower extremity vessels  -Continue usual Plavix and statin    #Essential hypertension  -Resume usual amlodipine    Consultations This Hospital Stay   HEMATOLOGY & ONCOLOGY IP CONSULT  UROLOGY IP CONSULT  UROLOGY IP CONSULT  CARE MANAGEMENT / SOCIAL WORK IP CONSULT  CARE MANAGEMENT / SOCIAL WORK IP CONSULT    Code Status   No CPR- Do NOT Intubate    Time Spent on this Encounter   I, Alexandro Forrest MD, personally saw the patient today and spent greater than 30 minutes discharging this patient.       Alexandro Forrest MD  [address] [address]  Phone: [phone]  Fax: [phone]  ______________________________________________________________________    Physical Exam   Vital Signs: Temp: 98.2  F (36.8  C) Temp src: Oral BP: 122/59 Pulse: 75   Resp: 18 SpO2: 96 % O2 Device: None (Room air)    Weight: 102 lbs 4.8 oz  Well-appearing, no acute distress, minimal tenderness throughout her abdomen, no edema       Primary Care Physician   Keith Bui    Discharge Orders      Primary Care - Care Coordination Referral      Reason for your hospital stay    S/p cystoscopy with left ureteral stent placement.     Follow-up and recommended labs and tests     Maintain follow up with [facility] as previously scheduled. If not previously scheduled, [facility] will call you to arrange follow up. You may confirm your appointment by calling [facility] at [phone].     Activity    Your activity upon discharge: Resume activities as tolerated.     Discharge Instructions    - While you were in the hospital you had left ureteral stent(s) placed.  - A ureteral stent, is a small hollow drainage tube that since inside the ureter. One tip of the stent curls in the kidney and the other in the bladder to keep  the stent in place.  - You may notice stent irritation in the form of: urinary frequency, urinary urgency, burning when you urinate, an ache in the back or pelvis.    What you can do to help with these symptoms:  - Minimize activity  - Take a warm bath  - Take pain medications as prescribed  - There are some prescription medications that may help such as flomax, pyridium, detrol and ditropan     Reason for your hospital stay    You were admitted for swelling of your ureter and back pain.     Follow-up and recommended labs and tests     Follow up with primary care provider, Keith Bui, within 7 days to evaluate medication change.  The following labs/tests are recommended: chemistry panel.    Follow-up with urology as instructed, they will call you in the next couple of weeks to arrange follow up.     Activity    Your activity upon discharge: activity as tolerated     Diet    Follow this diet upon discharge: Current Diet:Orders Placed This Encounter      Snacks/Supplements Adult: Ensure Enlive; Between Meals      Regular Diet Adult       Significant Results and Procedures   Most Recent 3 CBC's:  Recent Labs   Lab Test 08/22/24  0650 08/21/24  1811 04/30/24  1312   WBC 10.2 14.0* 8.9   HGB 8.2* 9.0* 11.0*   MCV 87 87 93    424 425     Most Recent 3 BMP's:  Recent Labs   Lab Test 08/24/24  0801 08/23/24  1702 08/23/24  1035 08/22/24  0650     --  137 136   POTASSIUM 4.4  --  4.4 4.9   CHLORIDE 106  --  104 105   CO2 23  --  20* 18*   BUN 33.3*  --  29.5* 30.9*   CR 1.25*  --  1.57* 2.12*   ANIONGAP 11  --  13 13   SUDHIR 8.2*  --  8.7* 8.7*   GLC 88 145* 182* 97     Most Recent 2 LFT's:  Recent Labs   Lab Test 04/30/24  1312 04/08/24  1056   AST 17 16   ALT 8 9   ALKPHOS 81 82   BILITOTAL <0.2 <0.2   ,   Results for orders placed or performed during the hospital encounter of 08/21/24   CT Abdomen Pelvis w/o Contrast    Narrative    EXAM: CT ABDOMEN PELVIS W/O CONTRAST  LOCATION: [facility]  HOSPITAL  DATE: 8/21/2024    INDICATION: LLQ pain tenderness. IV contrast allergy  COMPARISON: CT chest, abdomen and pelvis 4/17/2024  TECHNIQUE: CT scan of the abdomen and pelvis was performed without IV contrast. Multiplanar reformats were obtained. Dose reduction techniques were used.  CONTRAST: None.    FINDINGS:   LOWER CHEST: Normal.    HEPATOBILIARY: Mildly distended gallbladder. No radiopaque gallstones. No perihilar cholecystic inflammatory changes. Stable benign-appearing calcifications adjacent to the middle hepatic vein.    PANCREAS: Normal.    SPLEEN: Normal.    ADRENAL GLANDS: Normal.    KIDNEYS/BLADDER: New, moderate left hydronephrosis. Significant amount of perinephric fat stranding and edema. Moderately dilated left ureter, down to an area of masslike left retroperitoneal adenopathy. No urolithiasis. Right kidney is negative for   hydronephrosis.    BOWEL: No obstruction or inflammatory change. Right perineal hernia with protrusion of a portion of the rectum through the right levator ani muscle.    LYMPH NODES: Increased left para-aortic lymphadenopathy. A representative masslike lymph node measures 3.4 x 2.2 cm. Previously measured 2.5 x 1.1 cm.    VASCULATURE: Moderate calcified atherosclerotic changes. Infrarenal dominant aortic aneurysm measuring 3.3 cm. Previously measured 3.3 cm, also.    PELVIC ORGANS: Hysterectomy    MUSCULOSKELETAL: Degenerative changes lower lumbar facet joints and bilateral sacroiliac joints. Mild degenerative changes, bilateral hips.      Impression    IMPRESSION:   1.  New, moderate left hydronephrosis with significant nephric fat stranding and edematous changes. This appears to be secondary to left ureteral obstruction by the enlarging left retrograde nail lymphadenopathy.  2.  Increase in size of the left retroperitoneal lymphadenopathy.  3.  Mildly dilated gallbladder. No additional findings to suggest acute cholecystitis.  4.  Right perineal hernia.     XR Surgery  "YVETTE L/T 5 Min Fluoro    Narrative    This exam was marked as non-reportable because it will not be read by a   radiologist or a [place] non-radiologist provider.             Discharge Medications   Discharge Medication List as of 8/24/2024  2:12 PM        CONTINUE these medications which have NOT CHANGED    Details   acetaminophen (TYLENOL) 500 MG tablet Take 500-1,000 mg by mouth every 6 hours as needed for mild pain, Historical      amLODIPine (NORVASC) 5 MG tablet Take 5 mg by mouth 2 times daily., Historical      clopidogrel (PLAVIX) 75 MG tablet Take 75 mg by mouth daily., Historical      HYDROcodone-acetaminophen (NORCO) 5-325 MG tablet Take 1-2 tablets by mouth nightly as needed for severe pain, Disp-60 tablet, R-0, E-Prescribe      lidocaine-prilocaine (EMLA) 2.5-2.5 % external cream Apply topically as needed for moderate pain Apply to painful skinDisp-30 g, [id number]      lovastatin (MEVACOR) 40 MG tablet Take 40 mg by mouth at bedtime., Historical      polyethylene glycol (MIRALAX) 17 GM/Dose powder Take 17 g by mouth 2 times daily as needed for constipation, Disp-510 g, R-0, Local Print      senna-docusate (SENOKOT-S/PERICOLACE) 8.6-50 MG tablet Take 1-2 tablets by mouth 2 times daily as needed for constipation, Disp-60 tablet, R-5, E-Prescribe           Allergies   Allergies   Allergen Reactions    Contrast Dye Hives, Itching and Swelling    Aspirin Other (See Comments)     PATIENT STATES SHE IS NOT ALLERGIC TO ASA    Other reaction(s): on plavix  Other reaction(s): on plavix    Cephalexin Hives    Colchicine Hives    Ketek [Telithromycin] Visual Disturbance    Other (Do Not Use) Other (See Comments)     Aspirin - on Plavix    Other Drug Allergy (See Comments) Itching     Pt states \"all pain meds\" make her \"itchy\"     "

## 2024-08-24 NOTE — PLAN OF CARE
Problem: Acute Kidney Injury/Impairment  Goal: Fluid and Electrolyte Balance  Outcome: Progressing     Problem: Pain Acute  Goal: Optimal Pain Control and Function  Outcome: Progressing  Intervention: Prevent or Manage Pain  Recent Flowsheet Documentation  Taken 8/23/2024 1627 by Reta Tran RN  Bowel Elimination Promotion:   adequate fluid intake promoted   ambulation promoted   oal Outcome Evaluation:  Patient spent a quiet shift. Alert and oriented x 4. Up to the bathroom  x 1. With the I.V fluid infusing, patient requested to have the bedside commode nearby. Emptied 500 ml pink lemonade color urine. Patient would like a sleep aid tonight. Will have night nurse administer sleep aid.

## 2024-08-24 NOTE — PLAN OF CARE
Problem: Adult Inpatient Plan of Care  Goal: Plan of Care Review  Description: The Plan of Care Review/Shift note should be completed every shift.  The Outcome Evaluation is a brief statement about your assessment that the patient is improving, declining, or no change.  This information will be displayed automatically on your shift  note.  Outcome: Progressing  Goal: Absence of Hospital-Acquired Illness or Injury  Intervention: Prevent Skin Injury  Recent Flowsheet Documentation  Taken 8/24/2024 0100 by Johnathan Tyson RN  Body Position: position changed independently     Problem: Pain Acute  Goal: Optimal Pain Control and Function  Outcome: Progressing     Problem: Comorbidity Management  Goal: Blood Pressure in Desired Range  Outcome: Progressing   Goal Outcome Evaluation:       Pt alert & oriented. Pain managed by prn IV dilaudid with relief. IVF NS running at 75ml/hr. On room air, Vital Signs stable.

## 2024-08-24 NOTE — PROGRESS NOTES
[facility]    Medicine Progress Note - Hospitalist Service    Date of Admission:  8/21/2024    Assessment & Plan       Katya Butler is a 84 year old female with history of colon cancer, PAD s/p recent left lower extremity stenting on plavix, CAD, HTN, gout, raynaud's admitted on 8/21/2024 with DELICIA secondary to left sided hydronephrosis due to worsening retroperitoneal LAD.        8/22 :     S/p Cystoscopy with retrograde pyelogram and left ureteral stent placement         8/23 :     Post procedure day 1  Creatinine elevated at 2.12 from 2.49---1.57  Continue iv fluids, monitor renal functions  Urology signed off    Discharge in am if creatinine stable        A/p :        DELICIA  Hydronephrosis  UA negative for infection  CT shows moderate left hydronephrosis with significant nephric fat stranding and edematous changes likely secondary to left ureteral obstruction due to increased left retroperitoneal lymphadenopathy.  -- urology consult  8/22 : S/p Cystoscopy with retrograde pyelogram and left ureteral stent placement   8/23 :     Post procedure day 1  Creatinine elevated at 2.12 from 2.49---1.57  Continue iv fluids, monitor renal functions  Urology signed off    Discharge in am if creatinine stable       Leukocytosis:   UA negative  Denies any other localizing complaints besides left flank pain so suspect this is reactive  -- trend WBC for improvement         H/O colon cancer, diagnosed 2020  Not treated   On active surveillance, follows with Dr. Merrill  PET scan imaging showed uptake in left supraclavicular nodes and retroperitoneal lymph nodes.   Now with increased left retroperitoneal lymphadenopathy causing hydronephrosis  -- will consult Oncology        PAD:  Reportedly had peripheral stenting of the left lower extremity and is on Plavix  Cannot find detailed records of any recent vascular interventions  --Continue home Plavix and statin        HTN: Continue home amlodipine         Chronic constipation: continue laxatives        Remote history of shingles of the right arm, now improved, no vesicles noted  -- continue home Emla cream              Diet: Regular Diet Adult  Snacks/Supplements Adult: Ensure Enlive; Between Meals    DVT Prophylaxis: Heparin SQ  Lima Catheter: Not present  Lines: PRESENT             Cardiac Monitoring: None  Code Status: No CPR- Do NOT Intubate      Clinically Significant Risk Factors                  # Hypertension: Noted on problem list            # Moderate Malnutrition: based on nutrition assessment, PRESENT ON ADMISSION   # Financial/Environmental Concerns: none               Disposition Plan     Medically Ready for Discharge: Anticipated in 2-4 Days             Domenic Fleming MD  Hospitalist Service  [facility]  Securely message with [employer] (more info)  Text page via [employer] Paging/Directory   ______________________________________________________________________      Physical Exam   Vital Signs: Temp: 97.9  F (36.6  C) Temp src: Oral BP: 123/58 Pulse: 86   Resp: 19 SpO2: 96 % O2 Device: None (Room air)    Weight: 106 lbs 3.2 oz       GENERAL: The patient is not in any acute distressed. Awake and alert.  HEENT: Nonicteric sclerae, PERRLA, EOMI. Oropharynx clear. Moist mucous membranes. Conjunctivae appear well perfused.  HEART: Regular rate and rhythm without murmurs.  LUNGS: Clear to auscultation bilaterally. No wheezing or crackles.  ABDOMEN: Soft, positive bowel sounds, nontender.  SKIN: No rash, no excessive bruising, petechiae, or purpura.  EXTREMITIES : no rashes, no swelling in legs.  NEUROLOGIC: conscious and oriented, follows commands, no obvious focal deficits.  ROS: All other systems negative       Medical Decision Making       60 MINUTES SPENT BY ME on the date of service doing chart review, history, exam, documentation & further activities per the note.  MANAGEMENT DISCUSSED with the following over the past 24 hours: rn,  patient       Data     I have personally reviewed the following data over the past 24 hrs:    N/A  \   N/A   / N/A     137 104 29.5 (H) /  145 (H)   4.4 20 (L) 1.57 (H) \

## 2024-08-30 ENCOUNTER — TELEPHONE (OUTPATIENT)
Dept: ONCOLOGY | Facility: HOSPITAL | Age: 84
End: 2024-08-30

## 2024-08-30 ENCOUNTER — ONCOLOGY VISIT (OUTPATIENT)
Dept: ONCOLOGY | Facility: HOSPITAL | Age: 84
End: 2024-08-30
Attending: INTERNAL MEDICINE
Payer: COMMERCIAL

## 2024-08-30 ENCOUNTER — LAB (OUTPATIENT)
Dept: INFUSION THERAPY | Facility: HOSPITAL | Age: 84
End: 2024-08-30
Attending: INTERNAL MEDICINE
Payer: COMMERCIAL

## 2024-08-30 VITALS
OXYGEN SATURATION: 98 % | HEIGHT: 62 IN | TEMPERATURE: 97.9 F | SYSTOLIC BLOOD PRESSURE: 153 MMHG | HEART RATE: 81 BPM | WEIGHT: 101.1 LBS | DIASTOLIC BLOOD PRESSURE: 70 MMHG | BODY MASS INDEX: 18.61 KG/M2 | RESPIRATION RATE: 18 BRPM

## 2024-08-30 DIAGNOSIS — C18.9 COLON ADENOCARCINOMA (H): ICD-10-CM

## 2024-08-30 DIAGNOSIS — G89.29 CHRONIC MIDLINE LOW BACK PAIN WITHOUT SCIATICA: ICD-10-CM

## 2024-08-30 DIAGNOSIS — M54.50 CHRONIC MIDLINE LOW BACK PAIN WITHOUT SCIATICA: ICD-10-CM

## 2024-08-30 LAB
ALBUMIN SERPL BCG-MCNC: 3.7 G/DL (ref 3.5–5.2)
ALP SERPL-CCNC: 84 U/L (ref 40–150)
ALT SERPL W P-5'-P-CCNC: 8 U/L (ref 0–50)
ANION GAP SERPL CALCULATED.3IONS-SCNC: 11 MMOL/L (ref 7–15)
AST SERPL W P-5'-P-CCNC: 14 U/L (ref 0–45)
BASOPHILS # BLD AUTO: 0 10E3/UL (ref 0–0.2)
BASOPHILS NFR BLD AUTO: 1 %
BILIRUB SERPL-MCNC: 0.2 MG/DL
BUN SERPL-MCNC: 24.3 MG/DL (ref 8–23)
CALCIUM SERPL-MCNC: 8.8 MG/DL (ref 8.8–10.4)
CHLORIDE SERPL-SCNC: 104 MMOL/L (ref 98–107)
CREAT SERPL-MCNC: 1.24 MG/DL (ref 0.51–0.95)
EGFRCR SERPLBLD CKD-EPI 2021: 43 ML/MIN/1.73M2
EOSINOPHIL # BLD AUTO: 0.2 10E3/UL (ref 0–0.7)
EOSINOPHIL NFR BLD AUTO: 3 %
ERYTHROCYTE [DISTWIDTH] IN BLOOD BY AUTOMATED COUNT: 16.8 % (ref 10–15)
GLUCOSE SERPL-MCNC: 208 MG/DL (ref 70–99)
HCO3 SERPL-SCNC: 24 MMOL/L (ref 22–29)
HCT VFR BLD AUTO: 27.5 % (ref 35–47)
HGB BLD-MCNC: 8.6 G/DL (ref 11.7–15.7)
IMM GRANULOCYTES # BLD: 0.1 10E3/UL
IMM GRANULOCYTES NFR BLD: 1 %
LYMPHOCYTES # BLD AUTO: 1.8 10E3/UL (ref 0.8–5.3)
LYMPHOCYTES NFR BLD AUTO: 22 %
MCH RBC QN AUTO: 28.2 PG (ref 26.5–33)
MCHC RBC AUTO-ENTMCNC: 31.3 G/DL (ref 31.5–36.5)
MCV RBC AUTO: 90 FL (ref 78–100)
MONOCYTES # BLD AUTO: 0.8 10E3/UL (ref 0–1.3)
MONOCYTES NFR BLD AUTO: 9 %
NEUTROPHILS # BLD AUTO: 5.5 10E3/UL (ref 1.6–8.3)
NEUTROPHILS NFR BLD AUTO: 66 %
NRBC # BLD AUTO: 0 10E3/UL
NRBC BLD AUTO-RTO: 0 /100
PLATELET # BLD AUTO: 502 10E3/UL (ref 150–450)
POTASSIUM SERPL-SCNC: 4.1 MMOL/L (ref 3.4–5.3)
PROT SERPL-MCNC: 6.7 G/DL (ref 6.4–8.3)
RBC # BLD AUTO: 3.05 10E6/UL (ref 3.8–5.2)
SODIUM SERPL-SCNC: 139 MMOL/L (ref 135–145)
WBC # BLD AUTO: 8.4 10E3/UL (ref 4–11)

## 2024-08-30 PROCEDURE — 85048 AUTOMATED LEUKOCYTE COUNT: CPT

## 2024-08-30 PROCEDURE — G0463 HOSPITAL OUTPT CLINIC VISIT: HCPCS | Performed by: INTERNAL MEDICINE

## 2024-08-30 PROCEDURE — G2211 COMPLEX E/M VISIT ADD ON: HCPCS | Performed by: INTERNAL MEDICINE

## 2024-08-30 PROCEDURE — 36591 DRAW BLOOD OFF VENOUS DEVICE: CPT

## 2024-08-30 PROCEDURE — 84155 ASSAY OF PROTEIN SERUM: CPT

## 2024-08-30 PROCEDURE — 84460 ALANINE AMINO (ALT) (SGPT): CPT

## 2024-08-30 PROCEDURE — 99214 OFFICE O/P EST MOD 30 MIN: CPT | Performed by: INTERNAL MEDICINE

## 2024-08-30 RX ORDER — HYDROCODONE BITARTRATE AND ACETAMINOPHEN 5; 325 MG/1; MG/1
1-2 TABLET ORAL EVERY 4 HOURS PRN
Qty: 120 TABLET | Refills: 0 | Status: SHIPPED | OUTPATIENT
Start: 2024-08-30

## 2024-08-30 ASSESSMENT — PAIN SCALES - GENERAL: PAINLEVEL: NO PAIN (0)

## 2024-08-30 NOTE — PROGRESS NOTES
"Oncology Rooming Note    August 30, 2024 11:24 AM   Katya Butler is a 84 year old female who presents for:    Chief Complaint   Patient presents with    Oncology Clinic Visit     Colon adenocarcinoma (H)  Malignant neoplasm of sigmoid colon (H)       Initial Vitals: BP (!) 153/70   Pulse 81   Temp 97.9  F (36.6  C)   Resp 18   Ht 1.575 m (5' 2\")   Wt 45.9 kg (101 lb 1.6 oz)   SpO2 98%   BMI 18.49 kg/m   Estimated body mass index is 18.49 kg/m  as calculated from the following:    Height as of this encounter: 1.575 m (5' 2\").    Weight as of this encounter: 45.9 kg (101 lb 1.6 oz). Body surface area is 1.42 meters squared.  No Pain (0) Comment: Data Unavailable   No LMP recorded. Patient has had a hysterectomy.  Allergies reviewed: Yes  Medications reviewed: Yes    Medications: yes. yes  Pharmacy name entered into [employer]: [id number] - [place] [id number]    Frailty Screening:   Is the patient here for a new oncology consult visit in cancer care? 2. No      Clinical concerns: None      Rayne Acevedo LPN             "

## 2024-08-30 NOTE — TELEPHONE ENCOUNTER
I received a phone call today from Katya's son, Paulo.  He is calling to check to see if Dr. Merrill sent his mom's hydrocodone over to the pharmacy.  I attempted to call him back but he did not answer.  I called Katya back to let her know that it was sent over to the pharmacy at noon today.  She verbalized understanding and has no other questions at this time.    Radha Mendoza RN on 8/30/2024 at 1:56 PM

## 2024-08-30 NOTE — LETTER
"8/30/2024      Katya Butler  [address] [facility] [address]  [place] [address]      Dear Colleague,    Thank you for referring your patient, Katya Butler, to the [facility]. Please see a copy of my visit note below.    Oncology Rooming Note    August 30, 2024 11:24 AM   Katya Butler is a 84 year old female who presents for:    Chief Complaint   Patient presents with     Oncology Clinic Visit     Colon adenocarcinoma (H)  Malignant neoplasm of sigmoid colon (H)       Initial Vitals: BP (!) 153/70   Pulse 81   Temp 97.9  F (36.6  C)   Resp 18   Ht 1.575 m (5' 2\")   Wt 45.9 kg (101 lb 1.6 oz)   SpO2 98%   BMI 18.49 kg/m   Estimated body mass index is 18.49 kg/m  as calculated from the following:    Height as of this encounter: 1.575 m (5' 2\").    Weight as of this encounter: 45.9 kg (101 lb 1.6 oz). Body surface area is 1.42 meters squared.  No Pain (0) Comment: Data Unavailable   No LMP recorded. Patient has had a hysterectomy.  Allergies reviewed: Yes  Medications reviewed: Yes    Medications: yes. yes  Pharmacy name entered into [employer]: [id number] [place] [id number]    Frailty Screening:   Is the patient here for a new oncology consult visit in cancer care? 2. No      Clinical concerns: None      Rayne Acevedo LPN               [facility] Hematology and Oncology Progress Note    Patient: Katya Butler  MRN: [id number]  Date of Service: Aug 30, 2024        Assessment and Plan:    Cancer Staging  Malignant neoplasm of sigmoid colon (H)  Staging form: Colon And Rectum, AJCC 8th Edition  - Clinical: No stage assigned - Unsigned  - Pathologic stage from 11/24/2020: Stage IIIC (pT4b, pN2, cM0) - Signed by Walt Merrill MD on 11/24/2020     1.  Colon cancer: She just got discharged from the hospital after being admitted for ureteral obstruction.  Stenting is in place now.  Her pain is better.  She had CT imaging performed on August 21.  I " personally reviewed the images and shared them with Katya.  Overall her retroperitoneal lymphadenopathy has increased some with the largest node measures up to 3.4 x 2.2 cm.  Biopsying no areas of cancer when compared to April of this year.  Overall she continues to have slow progression.  Will have her return to clinic in 2 months with labs.  We can consider repeat imaging in December.    2.  Large cecal polyp found on colonoscopy: This polyp was biopsied on February 4, 2021 at [facility] and showed high-grade dysplasia, negative for invasive malignancy.    3.  Parotid lesion: SUV max of 4.7 on PET scan.  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  Stable.  No plan to biopsy given metastatic cancer.  Currently asymptomatic    4.  Ureteral obstruction: She had stents placed in the left ureter on August 22, 2024.  CMP from today is reviewed showing a BUN of 24 and a creatinine of 1.24.  Creatinine on August 21 of 2.49.    ECOG Performance  0    Diagnosis:    1.  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  At initial surgery there was evidence of a small abscess and perforation of the colon.  Pathology showed a invasive moderately differentiated adenocarcinoma. Constricting ulcerated measuring 35 x 25 x 13 mm.  4 of 18 lymph nodes were positive. Mismatch repair intact. There was some proximal colonic dilatation, consistent with obstruction.  Left ovary showed moderately differentiated adenocarcinoma, direct extension.  Focal positive margin at the paraovarian soft tissue margin.      Recurrence diagnosed in March 2023 from a left supraclavicular lymph node. MSI low. HER-2 negative. PET scan imaging shows uptake in left supraclavicular nodes and retroperitoneal lymph nodes.  NGS:  negative.    Treatment:    Surgical resection on October 31, 2020.  Adjuvant chemotherapy with FOLFOX was initiated on December 8, 2020.  25% initial dose reduction of all medications. Cycle 10  "completed on April 20, 2021.    Interim History:    Katya returns today for follow-up visit. Overall she is doing okay.  She is recently in the hospital for left ureteral obstruction.  Status post stent placement.    Review of Systems:    As above in the history.     Review of Systems otherwise Negative for:  General: chills, fever or night sweats  Psychological: anxiety or depression  Ophthalmic: blurry vision, double vision or loss of vision, vision change  ENT: epistaxis, oral lesions, hearing changes  Hematological and Lymphatic: bleeding, bruising, jaundice, swollen lymph nodes  Endocrine: hot flashes, unexpected weight changes  Respiratory: cough, hemoptysis, orthopnea or shortness of breath/KLEIN  Cardiovascular: chest pain, edema, palpitations or PND  Gastrointestinal: blood in stools, change in bowel habits, constipation, diarrhea or nausea/vomiting  Genito-Urinary: change in urinary stream, incontinence, frequency/urgency  Musculoskeletal: joint pain, stiffness, swelling  Neurological: dizziness, headaches, numbness/tingling  Dermatological: lumps and rash    Past History:    Past Medical History:   Diagnosis Date     HTN (hypertension)      Inguinal hernia without mention of obstruction or gangrene, unilateral or unspecified, (not specified as recurrent) 08/20/2004    RIGHT     Malignant neoplasm of sigmoid colon (H) 11/24/2020     Primary osteoarthritis involving multiple joints 8/2/2019     Ventral hernia, unspecified, without mention of obstruction or gangrene 08/15/2003    easily reduced     Physical Exam:    BP (!) 153/70   Pulse 81   Temp 97.9  F (36.6  C)   Resp 18   Ht 1.575 m (5' 2\")   Wt 45.9 kg (101 lb 1.6 oz)   SpO2 98%   BMI 18.49 kg/m      General: patient appears stated age of 81 year old. Nontoxic and in no distress.   HEENT: Head: atraumatic, normocephalic. Sclerae anicteric.  Chest:  Normal respiratory effort  Cardiac:  No edema.   Abdomen: abdomen is non-distended  Extremities: " normal tone and muscle bulk.  Skin: no lesions or rash on visible skin.  CNS: alert and oriented. Grossly non-focal.   Psychiatric: normal mood and affect.     Lab Results:    Recent Results (from the past 168 hour(s))   Comprehensive metabolic panel   Result Value Ref Range    Sodium 139 135 - 145 mmol/L    Potassium 4.1 3.4 - 5.3 mmol/L    Carbon Dioxide (CO2) 24 22 - 29 mmol/L    Anion Gap 11 7 - 15 mmol/L    Urea Nitrogen 24.3 (H) 8.0 - 23.0 mg/dL    Creatinine 1.24 (H) 0.51 - 0.95 mg/dL    GFR Estimate 43 (L) >60 mL/min/1.73m2    Calcium 8.8 8.8 - 10.4 mg/dL    Chloride 104 98 - 107 mmol/L    Glucose 208 (H) 70 - 99 mg/dL    Alkaline Phosphatase 84 40 - 150 U/L    AST 14 0 - 45 U/L    ALT 8 0 - 50 U/L    Protein Total 6.7 6.4 - 8.3 g/dL    Albumin 3.7 3.5 - 5.2 g/dL    Bilirubin Total 0.2 <=1.2 mg/dL   CBC with platelets and differential   Result Value Ref Range    WBC Count 8.4 4.0 - 11.0 10e3/uL    RBC Count 3.05 (L) 3.80 - 5.20 10e6/uL    Hemoglobin 8.6 (L) 11.7 - 15.7 g/dL    Hematocrit 27.5 (L) 35.0 - 47.0 %    MCV 90 78 - 100 fL    MCH 28.2 26.5 - 33.0 pg    MCHC 31.3 (L) 31.5 - 36.5 g/dL    RDW 16.8 (H) 10.0 - 15.0 %    Platelet Count 502 (H) 150 - 450 10e3/uL    % Neutrophils 66 %    % Lymphocytes 22 %    % Monocytes 9 %    % Eosinophils 3 %    % Basophils 1 %    % Immature Granulocytes 1 %    NRBCs per 100 WBC 0 <1 /100    Absolute Neutrophils 5.5 1.6 - 8.3 10e3/uL    Absolute Lymphocytes 1.8 0.8 - 5.3 10e3/uL    Absolute Monocytes 0.8 0.0 - 1.3 10e3/uL    Absolute Eosinophils 0.2 0.0 - 0.7 10e3/uL    Absolute Basophils 0.0 0.0 - 0.2 10e3/uL    Absolute Immature Granulocytes 0.1 <=0.4 10e3/uL    Absolute NRBCs 0.0 10e3/uL       Signed by: Walt Merrill MD      Again, thank you for allowing me to participate in the care of your patient.        Sincerely,        Walt Merrill MD

## 2024-09-03 NOTE — PROGRESS NOTES
[facility] Hematology and Oncology Progress Note    Patient: Katya Butler  MRN: [id number]  Date of Service: Aug 30, 2024        Assessment and Plan:    Cancer Staging  Malignant neoplasm of sigmoid colon (H)  Staging form: Colon And Rectum, AJCC 8th Edition  - Clinical: No stage assigned - Unsigned  - Pathologic stage from 11/24/2020: Stage IIIC (pT4b, pN2, cM0) - Signed by Walt Merrill MD on 11/24/2020     1.  Colon cancer: She just got discharged from the hospital after being admitted for ureteral obstruction.  Stenting is in place now.  Her pain is better.  She had CT imaging performed on August 21.  I personally reviewed the images and shared them with Katya.  Overall her retroperitoneal lymphadenopathy has increased some with the largest node measures up to 3.4 x 2.2 cm.  Biopsying no areas of cancer when compared to April of this year.  Overall she continues to have slow progression.  Will have her return to clinic in 2 months with labs.  We can consider repeat imaging in December.    2.  Large cecal polyp found on colonoscopy: This polyp was biopsied on February 4, 2021 at [facility] and showed high-grade dysplasia, negative for invasive malignancy.    3.  Parotid lesion: SUV max of 4.7 on PET scan.  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  Stable.  No plan to biopsy given metastatic cancer.  Currently asymptomatic    4.  Ureteral obstruction: She had stents placed in the left ureter on August 22, 2024.  CMP from today is reviewed showing a BUN of 24 and a creatinine of 1.24.  Creatinine on August 21 of 2.49.    ECOG Performance  0    Diagnosis:    1.  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  At initial surgery there was evidence of a small abscess and perforation of the colon.  Pathology showed a invasive moderately differentiated adenocarcinoma. Constricting ulcerated measuring 35 x 25 x 13 mm.  4 of 18 lymph nodes were positive. Mismatch  repair intact. There was some proximal colonic dilatation, consistent with obstruction.  Left ovary showed moderately differentiated adenocarcinoma, direct extension.  Focal positive margin at the paraovarian soft tissue margin.      Recurrence diagnosed in March 2023 from a left supraclavicular lymph node. MSI low. HER-2 negative. PET scan imaging shows uptake in left supraclavicular nodes and retroperitoneal lymph nodes.  NGS:  negative.    Treatment:    Surgical resection on October 31, 2020.  Adjuvant chemotherapy with FOLFOX was initiated on December 8, 2020.  25% initial dose reduction of all medications. Cycle 10 completed on April 20, 2021.    Interim History:    Katya returns today for follow-up visit. Overall she is doing okay.  She is recently in the hospital for left ureteral obstruction.  Status post stent placement.    Review of Systems:    As above in the history.     Review of Systems otherwise Negative for:  General: chills, fever or night sweats  Psychological: anxiety or depression  Ophthalmic: blurry vision, double vision or loss of vision, vision change  ENT: epistaxis, oral lesions, hearing changes  Hematological and Lymphatic: bleeding, bruising, jaundice, swollen lymph nodes  Endocrine: hot flashes, unexpected weight changes  Respiratory: cough, hemoptysis, orthopnea or shortness of breath/KLEIN  Cardiovascular: chest pain, edema, palpitations or PND  Gastrointestinal: blood in stools, change in bowel habits, constipation, diarrhea or nausea/vomiting  Genito-Urinary: change in urinary stream, incontinence, frequency/urgency  Musculoskeletal: joint pain, stiffness, swelling  Neurological: dizziness, headaches, numbness/tingling  Dermatological: lumps and rash    Past History:    Past Medical History:   Diagnosis Date    HTN (hypertension)     Inguinal hernia without mention of obstruction or gangrene, unilateral or unspecified, (not specified as recurrent) 08/20/2004    RIGHT    Malignant  "neoplasm of sigmoid colon (H) 11/24/2020    Primary osteoarthritis involving multiple joints 8/2/2019    Ventral hernia, unspecified, without mention of obstruction or gangrene 08/15/2003    easily reduced     Physical Exam:    BP (!) 153/70   Pulse 81   Temp 97.9  F (36.6  C)   Resp 18   Ht 1.575 m (5' 2\")   Wt 45.9 kg (101 lb 1.6 oz)   SpO2 98%   BMI 18.49 kg/m      General: patient appears stated age of 81 year old. Nontoxic and in no distress.   HEENT: Head: atraumatic, normocephalic. Sclerae anicteric.  Chest:  Normal respiratory effort  Cardiac:  No edema.   Abdomen: abdomen is non-distended  Extremities: normal tone and muscle bulk.  Skin: no lesions or rash on visible skin.  CNS: alert and oriented. Grossly non-focal.   Psychiatric: normal mood and affect.     Lab Results:    Recent Results (from the past 168 hour(s))   Comprehensive metabolic panel   Result Value Ref Range    Sodium 139 135 - 145 mmol/L    Potassium 4.1 3.4 - 5.3 mmol/L    Carbon Dioxide (CO2) 24 22 - 29 mmol/L    Anion Gap 11 7 - 15 mmol/L    Urea Nitrogen 24.3 (H) 8.0 - 23.0 mg/dL    Creatinine 1.24 (H) 0.51 - 0.95 mg/dL    GFR Estimate 43 (L) >60 mL/min/1.73m2    Calcium 8.8 8.8 - 10.4 mg/dL    Chloride 104 98 - 107 mmol/L    Glucose 208 (H) 70 - 99 mg/dL    Alkaline Phosphatase 84 40 - 150 U/L    AST 14 0 - 45 U/L    ALT 8 0 - 50 U/L    Protein Total 6.7 6.4 - 8.3 g/dL    Albumin 3.7 3.5 - 5.2 g/dL    Bilirubin Total 0.2 <=1.2 mg/dL   CBC with platelets and differential   Result Value Ref Range    WBC Count 8.4 4.0 - 11.0 10e3/uL    RBC Count 3.05 (L) 3.80 - 5.20 10e6/uL    Hemoglobin 8.6 (L) 11.7 - 15.7 g/dL    Hematocrit 27.5 (L) 35.0 - 47.0 %    MCV 90 78 - 100 fL    MCH 28.2 26.5 - 33.0 pg    MCHC 31.3 (L) 31.5 - 36.5 g/dL    RDW 16.8 (H) 10.0 - 15.0 %    Platelet Count 502 (H) 150 - 450 10e3/uL    % Neutrophils 66 %    % Lymphocytes 22 %    % Monocytes 9 %    % Eosinophils 3 %    % Basophils 1 %    % Immature Granulocytes 1 " %    NRBCs per 100 WBC 0 <1 /100    Absolute Neutrophils 5.5 1.6 - 8.3 10e3/uL    Absolute Lymphocytes 1.8 0.8 - 5.3 10e3/uL    Absolute Monocytes 0.8 0.0 - 1.3 10e3/uL    Absolute Eosinophils 0.2 0.0 - 0.7 10e3/uL    Absolute Basophils 0.0 0.0 - 0.2 10e3/uL    Absolute Immature Granulocytes 0.1 <=0.4 10e3/uL    Absolute NRBCs 0.0 10e3/uL       Signed by: Walt Merrill MD

## 2024-09-16 DIAGNOSIS — C18.9 COLON ADENOCARCINOMA (H): Primary | ICD-10-CM

## 2024-09-20 ENCOUNTER — LAB (OUTPATIENT)
Dept: INFUSION THERAPY | Facility: HOSPITAL | Age: 84
End: 2024-09-20
Attending: INTERNAL MEDICINE
Payer: COMMERCIAL

## 2024-09-20 DIAGNOSIS — C18.9 COLON ADENOCARCINOMA (H): ICD-10-CM

## 2024-09-20 LAB
ALBUMIN SERPL BCG-MCNC: 4 G/DL (ref 3.5–5.2)
ALP SERPL-CCNC: 106 U/L (ref 40–150)
ALT SERPL W P-5'-P-CCNC: 7 U/L (ref 0–50)
ANION GAP SERPL CALCULATED.3IONS-SCNC: 12 MMOL/L (ref 7–15)
AST SERPL W P-5'-P-CCNC: 15 U/L (ref 0–45)
BASOPHILS # BLD AUTO: 0 10E3/UL (ref 0–0.2)
BASOPHILS NFR BLD AUTO: 0 %
BILIRUB SERPL-MCNC: 0.2 MG/DL
BUN SERPL-MCNC: 25 MG/DL (ref 8–23)
CALCIUM SERPL-MCNC: 9.2 MG/DL (ref 8.8–10.4)
CEA SERPL-MCNC: 23.9 NG/ML
CHLORIDE SERPL-SCNC: 107 MMOL/L (ref 98–107)
CREAT SERPL-MCNC: 1.23 MG/DL (ref 0.51–0.95)
EGFRCR SERPLBLD CKD-EPI 2021: 43 ML/MIN/1.73M2
EOSINOPHIL # BLD AUTO: 0.2 10E3/UL (ref 0–0.7)
EOSINOPHIL NFR BLD AUTO: 3 %
ERYTHROCYTE [DISTWIDTH] IN BLOOD BY AUTOMATED COUNT: 16 % (ref 10–15)
GLUCOSE SERPL-MCNC: 125 MG/DL (ref 70–99)
HCO3 SERPL-SCNC: 22 MMOL/L (ref 22–29)
HCT VFR BLD AUTO: 29 % (ref 35–47)
HGB BLD-MCNC: 9.1 G/DL (ref 11.7–15.7)
IMM GRANULOCYTES # BLD: 0 10E3/UL
IMM GRANULOCYTES NFR BLD: 0 %
LYMPHOCYTES # BLD AUTO: 1.6 10E3/UL (ref 0.8–5.3)
LYMPHOCYTES NFR BLD AUTO: 20 %
MCH RBC QN AUTO: 28.5 PG (ref 26.5–33)
MCHC RBC AUTO-ENTMCNC: 31.4 G/DL (ref 31.5–36.5)
MCV RBC AUTO: 91 FL (ref 78–100)
MONOCYTES # BLD AUTO: 0.7 10E3/UL (ref 0–1.3)
MONOCYTES NFR BLD AUTO: 9 %
NEUTROPHILS # BLD AUTO: 5.4 10E3/UL (ref 1.6–8.3)
NEUTROPHILS NFR BLD AUTO: 67 %
NRBC # BLD AUTO: 0 10E3/UL
NRBC BLD AUTO-RTO: 0 /100
PLATELET # BLD AUTO: 412 10E3/UL (ref 150–450)
POTASSIUM SERPL-SCNC: 4.4 MMOL/L (ref 3.4–5.3)
PROT SERPL-MCNC: 6.9 G/DL (ref 6.4–8.3)
RBC # BLD AUTO: 3.19 10E6/UL (ref 3.8–5.2)
SODIUM SERPL-SCNC: 141 MMOL/L (ref 135–145)
WBC # BLD AUTO: 8 10E3/UL (ref 4–11)

## 2024-09-20 PROCEDURE — 82374 ASSAY BLOOD CARBON DIOXIDE: CPT

## 2024-09-20 PROCEDURE — 82378 CARCINOEMBRYONIC ANTIGEN: CPT

## 2024-09-20 PROCEDURE — 36591 DRAW BLOOD OFF VENOUS DEVICE: CPT

## 2024-09-20 PROCEDURE — 85025 COMPLETE CBC W/AUTO DIFF WBC: CPT

## 2024-09-20 PROCEDURE — 82040 ASSAY OF SERUM ALBUMIN: CPT

## 2024-10-30 ENCOUNTER — ONCOLOGY VISIT (OUTPATIENT)
Dept: ONCOLOGY | Facility: HOSPITAL | Age: 84
End: 2024-10-30
Attending: INTERNAL MEDICINE
Payer: COMMERCIAL

## 2024-10-30 VITALS
OXYGEN SATURATION: 98 % | HEART RATE: 88 BPM | SYSTOLIC BLOOD PRESSURE: 190 MMHG | RESPIRATION RATE: 16 BRPM | TEMPERATURE: 97.8 F | WEIGHT: 102.4 LBS | DIASTOLIC BLOOD PRESSURE: 79 MMHG | BODY MASS INDEX: 18.73 KG/M2

## 2024-10-30 DIAGNOSIS — G89.29 CHRONIC MIDLINE LOW BACK PAIN WITHOUT SCIATICA: ICD-10-CM

## 2024-10-30 DIAGNOSIS — C18.9 COLON ADENOCARCINOMA (H): ICD-10-CM

## 2024-10-30 DIAGNOSIS — M54.50 CHRONIC MIDLINE LOW BACK PAIN WITHOUT SCIATICA: ICD-10-CM

## 2024-10-30 PROCEDURE — G0463 HOSPITAL OUTPT CLINIC VISIT: HCPCS | Performed by: NURSE PRACTITIONER

## 2024-10-30 PROCEDURE — G2211 COMPLEX E/M VISIT ADD ON: HCPCS | Performed by: NURSE PRACTITIONER

## 2024-10-30 PROCEDURE — 99215 OFFICE O/P EST HI 40 MIN: CPT | Performed by: NURSE PRACTITIONER

## 2024-10-30 RX ORDER — HYDROCODONE BITARTRATE AND ACETAMINOPHEN 5; 325 MG/1; MG/1
1-2 TABLET ORAL EVERY 4 HOURS PRN
Qty: 120 TABLET | Refills: 0 | Status: SHIPPED | OUTPATIENT
Start: 2024-10-30

## 2024-10-30 ASSESSMENT — PAIN SCALES - GENERAL: PAINLEVEL_OUTOF10: NO PAIN (0)

## 2024-10-30 NOTE — PROGRESS NOTES
"Oncology Rooming Note    October 30, 2024 10:39 AM   Katya Butler is a 84 year old female who presents for:    Chief Complaint   Patient presents with    Oncology Clinic Visit     Return visit 2 months. Colon adenocarcinoma.     Initial Vitals: BP (!) 190/79 (BP Location: Left arm, Patient Position: Sitting, Cuff Size: Adult Regular)   Pulse 88   Temp 97.8  F (36.6  C) (Oral)   Resp 16   Wt 46.4 kg (102 lb 6.4 oz)   SpO2 98%   BMI 18.73 kg/m   Estimated body mass index is 18.73 kg/m  as calculated from the following:    Height as of 8/30/24: 1.575 m (5' 2\").    Weight as of this encounter: 46.4 kg (102 lb 6.4 oz). Body surface area is 1.42 meters squared.  No Pain (0) Comment: Data Unavailable   No LMP recorded. Patient has had a hysterectomy.  Allergies reviewed: Yes  Medications reviewed: Yes    Medications: Medication refills not needed today.  Pharmacy name entered into [employer]: [id number] - [place] [id number]    Frailty Screening:   Is the patient here for a new oncology consult visit in cancer care? 2. No      Clinical concerns: none       Isha Amin CMA            "

## 2024-10-30 NOTE — PROGRESS NOTES
[facility] Hematology and Oncology Progress Note    Patient: Katya Butler  MRN: [id number]  Date of Service: Oct 30, 2024          Reason for Visit    Chief Complaint   Patient presents with    Oncology Clinic Visit     Return visit 2 months. Colon adenocarcinoma.       Assessment and Plan     Cancer Staging   Colon adenocarcinoma (H)  Staging form: Colon and Rectum, AJCC 8th Edition  - Clinical stage from 3/13/2023: Stage IVB (cT0, cNX, cM1b) - Signed by Jody Tolentino APRN CNP on 3/20/2023  No loss of nuclear expression of MMR proteins  Her2/adeel negative  No KRAS, NRAS, BRAF mutation.   TMB score 0, low  Oncology fusion event: negative      1.  Colon cancer, stage IIIc diagnosed in October 2020, recurrent disease with stage IV found in March 2023, retroperitoneal lymphadenopathy as well as supraclavicular lymphadenopathy: Patient has decided that she doesn't want to do treatment for the cancer. She states if it is not curable, she does not think the treatment is worth the side effects. Had a hard time with previous treatment.  Has continued to just be on observation.  She did have an issue a couple of months ago with lymphadenopathy causing ureteral obstruction so she now has a ureteral stent.  Patient continues to not want any treatment.  She talked today about getting a scan to evaluate the cancer and after further discussion she states if it is not can to change anything more doing she would prefer not to do it so we will just have patient come back to the clinic in about 2 months with repeat labs.  She would like to follow the CEA level to see how late it is increasing.  Did tell patient that we really just want to focus on her symptom management at this point and offer the support that she needs as her cancer will slowly continue to grow.  Patient understands and will let us know if she has any new issues.  We did briefly talk about hospice and the services they provide.     2.  Ureteral  stent: This needs to be replaced every 3 months.  She is set up to get that done November 21.  She does have fairly significant pain with the stent and discomfort.  She generally takes 1-1-1/2 Norco each night.  That seems to manage her pain so she can sleep well.  We will continue that and refill given.    ECOG Performance    1 - Can't do physically strenuous work, but fully ambyulatory and can do light sedentary work    Distress Screening (within last 30 days)    No data recorded     Pain  Pain Score: No Pain (0)    Problem List    Patient Active Problem List   Diagnosis    Hyperlipemia    Tobacco use disorder    Malignant neoplasm of sigmoid colon (H)    Accelerated hypertension    Acute left-sided low back pain with left-sided sciatica    Allergic rhinitis    Anxiety    Calcium pyrophosphate arthropathy of multiple sites    Drug-induced polyneuropathy (H)    Chronic anemia    Colon adenocarcinoma (H)    Colonic diverticular abscess    Coronary atherosclerosis    Disorder of artery (H)    Diverticulitis of colon    Essential hypertension    Idiopathic chronic gout with tophus, unspecified site    Intra-abdominal abscess (H)    Periarticular calcification    Postherpetic neuralgia    Postoperative ileus (H)    Primary osteoarthritis involving multiple joints    Unspecified visual loss    Raynaud phenomenon    Other chronic pain    Arm pain, right    PVD (peripheral vascular disease) (H)        ______________________________________________________________________________    History of Present Illness    Diagnosis:     1.  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  At initial surgery there was evidence of a small abscess and perforation of the colon.  Pathology showed a invasive moderately differentiated adenocarcinoma. Constricting ulcerated measuring 35 x 25 x 13 mm.  4 of 18 lymph nodes were positive. Mismatch repair intact. There was some proximal colonic dilatation, consistent with  obstruction.  Left ovary showed moderately differentiated adenocarcinoma, direct extension.  Focal positive margin at the paraovarian soft tissue margin.    -PET scan on 2/17/2023 shows suspicious for left supraclavicular and retroperitoneal lymph node mets.  -Biopsy done of super clavicular lymph node on the left side that is positive for malignant cells, consistent with moderately differentiated adenocarcinoma of the colon.  HER2/adeel negative.  MMR intact.  CEA slightly elevated at 8.2.     Treatment:     Surgical resection on October 31, 2020.  Adjuvant chemotherapy with FOLFOX was initiated on December 8, 2020.  25% initial dose reduction of all medications. Cycle 10 completed on April 20, 2021.  Observation since then.      Interim History:    Patient is here today for a follow up visit. She is overall doing ok. Has a lot of fatigue feels like that is getting worse.  She also states that she does not have much of an appetite and does not really feel like eating a lot.  Her family really tries to push her to eat and drink better.  She states that she really does not have significant pain but she does have chronic back pain which is stable and then where she has her stent in her ureter that is been quite painful.  She says she generally takes 1 Vicodin at night which helps her relax and helps her sleep.  She says will sometimes take an extra half a tablet if the pain continues to be an issue.  She denies any factious complaints.  Denies any nausea or vomiting or bowel issues.  Has some questions about whether she should be doing scans regularly or blood work.    Review of Systems    Pertinent items are noted in HPI.    Past History    Past Medical History:   Diagnosis Date    HTN (hypertension)     Inguinal hernia without mention of obstruction or gangrene, unilateral or unspecified, (not specified as recurrent) 08/20/2004    RIGHT    Malignant neoplasm of sigmoid colon (H) 11/24/2020    Primary osteoarthritis  involving multiple joints 8/2/2019    Ventral hernia, unspecified, without mention of obstruction or gangrene 08/15/2003    easily reduced       PHYSICAL EXAM  BP (!) 190/79 (BP Location: Left arm, Patient Position: Sitting, Cuff Size: Adult Regular)   Pulse 88   Temp 97.8  F (36.6  C) (Oral)   Resp 16   Wt 46.4 kg (102 lb 6.4 oz)   SpO2 98%   BMI 18.73 kg/m      GENERAL: no acute distress. Cooperative in conversation. Here with son.   RESP: Regular respiratory rate. No expiratory wheezes   MUSCULOSKELETAL: no bilateral leg swelling  NEURO: non focal. Alert and oriented x3.   PSYCH: within normal limits. No depression or anxiety.  SKIN: exposed skin is dry intact.     Lab Results    No results found for this or any previous visit (from the past week).  Labs that were drawn in September.  We also reviewed the CEA level over the last year and a half.    Lab Results   Component Value Date    GHCEA 23.9 09/20/2024    GHCEA 16.0 01/31/2024    GHCEA 15.8 11/01/2023    GHCEA 8.2 02/03/2023    GHCEA 4.2 (H) 12/08/2021    GHCEA 3.9 (H) 09/07/2021    GHCEA 3.7 (H) 06/08/2021    GHCEA 3.8 (H) 11/10/2020    GHCEA 8.3 (H) 10/30/2020   ]    Imaging    No results found.  Total time spent with patient in face to face time, chart review and documentation was 40 minutes.        Signed by: NAVEEN Lanier CNP

## 2024-10-30 NOTE — LETTER
"10/30/2024      Katya Butler  [address] [facility] [address]      Dear Colleague,    Thank you for referring your patient, Katya Butler, to the [facility]. Please see a copy of my visit note below.    Oncology Rooming Note    October 30, 2024 10:39 AM   Katya Butler is a 84 year old female who presents for:    Chief Complaint   Patient presents with     Oncology Clinic Visit     Return visit 2 months. Colon adenocarcinoma.     Initial Vitals: BP (!) 190/79 (BP Location: Left arm, Patient Position: Sitting, Cuff Size: Adult Regular)   Pulse 88   Temp 97.8  F (36.6  C) (Oral)   Resp 16   Wt 46.4 kg (102 lb 6.4 oz)   SpO2 98%   BMI 18.73 kg/m   Estimated body mass index is 18.73 kg/m  as calculated from the following:    Height as of 8/30/24: 1.575 m (5' 2\").    Weight as of this encounter: 46.4 kg (102 lb 6.4 oz). Body surface area is 1.42 meters squared.  No Pain (0) Comment: Data Unavailable   No LMP recorded. Patient has had a hysterectomy.  Allergies reviewed: Yes  Medications reviewed: Yes    Medications: Medication refills not needed today.  Pharmacy name entered into [employer]: [id number] - [place] [phone]    Frailty Screening:   Is the patient here for a new oncology consult visit in cancer care? 2. No      Clinical concerns: none       Isha Amin, [facility] Progress Note    Patient: Katya Butler  MRN: [id number]  Date of Service: Oct 30, 2024          Reason for Visit    Chief Complaint   Patient presents with     Oncology Clinic Visit     Return visit 2 months. Colon adenocarcinoma.       Assessment and Plan     Cancer Staging   Colon adenocarcinoma (H)  Staging form: Colon and Rectum, AJCC 8th Edition  - Clinical stage from 3/13/2023: Stage IVB (cT0, cNX, cM1b) - Signed by Jody Tolentino APRN CNP on 3/20/2023  No loss of nuclear expression of MMR proteins  Her2/adeel " negative  No KRAS, NRAS, BRAF mutation.   TMB score 0, low  Oncology fusion event: negative      1.  Colon cancer, stage IIIc diagnosed in October 2020, recurrent disease with stage IV found in March 2023, retroperitoneal lymphadenopathy as well as supraclavicular lymphadenopathy: Patient has decided that she doesn't want to do treatment for the cancer. She states if it is not curable, she does not think the treatment is worth the side effects. Had a hard time with previous treatment.  Has continued to just be on observation.  She did have an issue a couple of months ago with lymphadenopathy causing ureteral obstruction so she now has a ureteral stent.  Patient continues to not want any treatment.  She talked today about getting a scan to evaluate the cancer and after further discussion she states if it is not can to change anything more doing she would prefer not to do it so we will just have patient come back to the clinic in about 2 months with repeat labs.  She would like to follow the CEA level to see how late it is increasing.  Did tell patient that we really just want to focus on her symptom management at this point and offer the support that she needs as her cancer will slowly continue to grow.  Patient understands and will let us know if she has any new issues.  We did briefly talk about hospice and the services they provide.     2.  Ureteral stent: This needs to be replaced every 3 months.  She is set up to get that done November 21.  She does have fairly significant pain with the stent and discomfort.  She generally takes 1-1-1/2 Norco each night.  That seems to manage her pain so she can sleep well.  We will continue that and refill given.    ECOG Performance    1 - Can't do physically strenuous work, but fully ambyulatory and can do light sedentary work    Distress Screening (within last 30 days)    No data recorded     Pain  Pain Score: No Pain (0)    Problem List    Patient Active Problem List    Diagnosis     Hyperlipemia     Tobacco use disorder     Malignant neoplasm of sigmoid colon (H)     Accelerated hypertension     Acute left-sided low back pain with left-sided sciatica     Allergic rhinitis     Anxiety     Calcium pyrophosphate arthropathy of multiple sites     Drug-induced polyneuropathy (H)     Chronic anemia     Colon adenocarcinoma (H)     Colonic diverticular abscess     Coronary atherosclerosis     Disorder of artery (H)     Diverticulitis of colon     Essential hypertension     Idiopathic chronic gout with tophus, unspecified site     Intra-abdominal abscess (H)     Periarticular calcification     Postherpetic neuralgia     Postoperative ileus (H)     Primary osteoarthritis involving multiple joints     Unspecified visual loss     Raynaud phenomenon     Other chronic pain     Arm pain, right     PVD (peripheral vascular disease) (H)        ______________________________________________________________________________    History of Present Illness    Diagnosis:     1.  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  At initial surgery there was evidence of a small abscess and perforation of the colon.  Pathology showed a invasive moderately differentiated adenocarcinoma. Constricting ulcerated measuring 35 x 25 x 13 mm.  4 of 18 lymph nodes were positive. Mismatch repair intact. There was some proximal colonic dilatation, consistent with obstruction.  Left ovary showed moderately differentiated adenocarcinoma, direct extension.  Focal positive margin at the paraovarian soft tissue margin.    -PET scan on 2/17/2023 shows suspicious for left supraclavicular and retroperitoneal lymph node mets.  -Biopsy done of super clavicular lymph node on the left side that is positive for malignant cells, consistent with moderately differentiated adenocarcinoma of the colon.  HER2/adeel negative.  MMR intact.  CEA slightly elevated at 8.2.     Treatment:     Surgical resection on October 31,  2020.  Adjuvant chemotherapy with FOLFOX was initiated on December 8, 2020.  25% initial dose reduction of all medications. Cycle 10 completed on April 20, 2021.  Observation since then.      Interim History:    Patient is here today for a follow up visit. She is overall doing ok. Has a lot of fatigue feels like that is getting worse.  She also states that she does not have much of an appetite and does not really feel like eating a lot.  Her family really tries to push her to eat and drink better.  She states that she really does not have significant pain but she does have chronic back pain which is stable and then where she has her stent in her ureter that is been quite painful.  She says she generally takes 1 Vicodin at night which helps her relax and helps her sleep.  She says will sometimes take an extra half a tablet if the pain continues to be an issue.  She denies any factious complaints.  Denies any nausea or vomiting or bowel issues.  Has some questions about whether she should be doing scans regularly or blood work.    Review of Systems    Pertinent items are noted in HPI.    Past History    Past Medical History:   Diagnosis Date     HTN (hypertension)      Inguinal hernia without mention of obstruction or gangrene, unilateral or unspecified, (not specified as recurrent) 08/20/2004    RIGHT     Malignant neoplasm of sigmoid colon (H) 11/24/2020     Primary osteoarthritis involving multiple joints 8/2/2019     Ventral hernia, unspecified, without mention of obstruction or gangrene 08/15/2003    easily reduced       PHYSICAL EXAM  BP (!) 190/79 (BP Location: Left arm, Patient Position: Sitting, Cuff Size: Adult Regular)   Pulse 88   Temp 97.8  F (36.6  C) (Oral)   Resp 16   Wt 46.4 kg (102 lb 6.4 oz)   SpO2 98%   BMI 18.73 kg/m      GENERAL: no acute distress. Cooperative in conversation. Here with son.   RESP: Regular respiratory rate. No expiratory wheezes   MUSCULOSKELETAL: no bilateral leg  swelling  NEURO: non focal. Alert and oriented x3.   PSYCH: within normal limits. No depression or anxiety.  SKIN: exposed skin is dry intact.     Lab Results    No results found for this or any previous visit (from the past week).  Labs that were drawn in September.  We also reviewed the CEA level over the last year and a half.    Lab Results   Component Value Date    GHCEA 23.9 09/20/2024    GHCEA 16.0 01/31/2024    GHCEA 15.8 11/01/2023    GHCEA 8.2 02/03/2023    GHCEA 4.2 (H) 12/08/2021    GHCEA 3.9 (H) 09/07/2021    GHCEA 3.7 (H) 06/08/2021    GHCEA 3.8 (H) 11/10/2020    GHCEA 8.3 (H) 10/30/2020   ]    Imaging    No results found.  Total time spent with patient in face to face time, chart review and documentation was 40 minutes.        Signed by: NAVEEN Lanier CNP      Again, thank you for allowing me to participate in the care of your patient.        Sincerely,        NAVEEN Lanier CNP

## 2024-11-05 ENCOUNTER — PATIENT OUTREACH (OUTPATIENT)
Dept: CARE COORDINATION | Facility: CLINIC | Age: 84
End: 2024-11-05
Payer: COMMERCIAL

## 2024-11-05 NOTE — PROGRESS NOTES
Social Work - Distress Screen Intervention  [facility]    Identified Concern and Score from Distress Screenin. How concerned are you about your ability to eat? 6     2. How concerned are you about unintended weight loss or your current weight? (!) 9     3. How concerned are you about feeling depressed or very sad?  6     4. How concerned are you about feeling anxious or very scared?  (!) 9     5. Do you struggle with the loss of meaning and katelin in your life?  Somewhat     6. How concerned are you about work and home life issues that may be affected by your cancer?  0     7. How concerned are you about knowing what resources are available to help you?  0     8. Do you currently have what you would describe as Orthodox or spiritual struggles? Not at all     9. If you want to be contacted by one of our professionals, I can send a message to them right now.  No data recorded     Date of Distress Screen: 10/30/24  Data: At time of last visit, patient scored positive on distress screening.  outreached to patient today to follow up on elevated distress and introduce psychosocial services and support.  Intervention/Education provided:  contacted patient by phone to discuss distress screening results.     SW oriented Katya to role of oncology social worker as a part of care team. Katya denied psychosocial concern or need at present time. Katya endorsed that she feels well supported at home with assistance of her son. Denies worry at present time. Appreciative of outreach and reports that she will outreach in future in case of concern or need.     Follow-up Required:  will remain available to patient for support as needed    Claudine Vivar, SAY, LICSW, OSW-C  Clinical - Adult Oncology  Phone: [phone]  She/Her/Hers  [facility]: Paz LOPEZ  8am-4:30pm  [facility]: MIGUEL ÁNGEL Nguyen F 8am-4:30pm   Support  Groups at [facility]: Social Work Services for Cancer Patients ([email or web address])

## 2024-11-06 ENCOUNTER — LAB REQUISITION (OUTPATIENT)
Dept: LAB | Facility: CLINIC | Age: 84
End: 2024-11-06

## 2024-11-06 DIAGNOSIS — I10 ESSENTIAL (PRIMARY) HYPERTENSION: ICD-10-CM

## 2024-11-06 PROCEDURE — 80048 BASIC METABOLIC PNL TOTAL CA: CPT | Performed by: PHYSICIAN ASSISTANT

## 2024-11-07 ENCOUNTER — TELEPHONE (OUTPATIENT)
Dept: ONCOLOGY | Facility: HOSPITAL | Age: 84
End: 2024-11-07
Payer: COMMERCIAL

## 2024-11-07 LAB
ANION GAP SERPL CALCULATED.3IONS-SCNC: 15 MMOL/L (ref 7–15)
BUN SERPL-MCNC: 32.7 MG/DL (ref 8–23)
CALCIUM SERPL-MCNC: 9.1 MG/DL (ref 8.8–10.4)
CHLORIDE SERPL-SCNC: 107 MMOL/L (ref 98–107)
CREAT SERPL-MCNC: 1.55 MG/DL (ref 0.51–0.95)
EGFRCR SERPLBLD CKD-EPI 2021: 33 ML/MIN/1.73M2
GLUCOSE SERPL-MCNC: 109 MG/DL (ref 70–99)
HCO3 SERPL-SCNC: 18 MMOL/L (ref 22–29)
POTASSIUM SERPL-SCNC: 4.9 MMOL/L (ref 3.4–5.3)
SODIUM SERPL-SCNC: 140 MMOL/L (ref 135–145)

## 2024-11-07 NOTE — TELEPHONE ENCOUNTER
Oncology Distress Screen    Called Katya in regards to her positive oncology nutrition distress screen:    2. How concerned are you about unintended weight loss or your current weight? : 9    Katya reports no questions or concerns related to her weight at this time. Encouraged her to reach out in the future if she needs any nutrition support.      Lakesha Martinez, MS, RD, LD  [phone]

## 2024-11-21 ENCOUNTER — APPOINTMENT (OUTPATIENT)
Dept: RADIOLOGY | Facility: HOSPITAL | Age: 84
End: 2024-11-21
Attending: UROLOGY
Payer: COMMERCIAL

## 2024-11-21 ENCOUNTER — HOSPITAL ENCOUNTER (OUTPATIENT)
Facility: HOSPITAL | Age: 84
Discharge: HOME | End: 2024-11-21
Attending: UROLOGY | Admitting: UROLOGY
Payer: COMMERCIAL

## 2024-11-21 ENCOUNTER — ANESTHESIA (OUTPATIENT)
Dept: SURGERY | Facility: HOSPITAL | Age: 84
End: 2024-11-21
Payer: COMMERCIAL

## 2024-11-21 ENCOUNTER — ANESTHESIA EVENT (OUTPATIENT)
Dept: SURGERY | Facility: HOSPITAL | Age: 84
End: 2024-11-21
Payer: COMMERCIAL

## 2024-11-21 VITALS
SYSTOLIC BLOOD PRESSURE: 140 MMHG | TEMPERATURE: 97.5 F | HEART RATE: 69 BPM | OXYGEN SATURATION: 97 % | WEIGHT: 100.3 LBS | DIASTOLIC BLOOD PRESSURE: 59 MMHG | BODY MASS INDEX: 18.35 KG/M2 | RESPIRATION RATE: 20 BRPM

## 2024-11-21 DIAGNOSIS — N13.5 OBSTRUCTION OF LEFT URETER: Primary | ICD-10-CM

## 2024-11-21 PROCEDURE — 258N000003 HC RX IP 258 OP 636: Performed by: REGISTERED NURSE

## 2024-11-21 PROCEDURE — 999N000141 HC STATISTIC PRE-PROCEDURE NURSING ASSESSMENT: Performed by: UROLOGY

## 2024-11-21 PROCEDURE — 250N000011 HC RX IP 250 OP 636: Performed by: REGISTERED NURSE

## 2024-11-21 PROCEDURE — 255N000002 HC RX 255 OP 636: Performed by: UROLOGY

## 2024-11-21 PROCEDURE — 272N000001 HC OR GENERAL SUPPLY STERILE: Performed by: UROLOGY

## 2024-11-21 PROCEDURE — 370N000017 HC ANESTHESIA TECHNICAL FEE, PER MIN: Performed by: UROLOGY

## 2024-11-21 PROCEDURE — C1769 GUIDE WIRE: HCPCS | Performed by: UROLOGY

## 2024-11-21 PROCEDURE — 710N000012 HC RECOVERY PHASE 2, PER MINUTE: Performed by: UROLOGY

## 2024-11-21 PROCEDURE — 250N000009 HC RX 250: Performed by: REGISTERED NURSE

## 2024-11-21 PROCEDURE — 999N000180 XR SURGERY CARM FLUORO LESS THAN 5 MIN: Mod: TC

## 2024-11-21 PROCEDURE — 250N000011 HC RX IP 250 OP 636: Performed by: NURSE PRACTITIONER

## 2024-11-21 PROCEDURE — 360N000082 HC SURGERY LEVEL 2 W/ FLUORO, PER MIN: Performed by: UROLOGY

## 2024-11-21 PROCEDURE — C2617 STENT, NON-COR, TEM W/O DEL: HCPCS | Performed by: UROLOGY

## 2024-11-21 DEVICE — URETERAL STENT
Type: IMPLANTABLE DEVICE | Site: URETER | Status: FUNCTIONAL
Brand: PERCUFLEX™ PLUS

## 2024-11-21 RX ORDER — SULFAMETHOXAZOLE AND TRIMETHOPRIM 800; 160 MG/1; MG/1
1 TABLET ORAL 2 TIMES DAILY
Qty: 4 TABLET | Refills: 0 | Status: SHIPPED | OUTPATIENT
Start: 2024-11-21

## 2024-11-21 RX ORDER — ACETAMINOPHEN 325 MG/1
975 TABLET ORAL ONCE
Status: DISCONTINUED | OUTPATIENT
Start: 2024-11-21 | End: 2024-11-21 | Stop reason: HOSPADM

## 2024-11-21 RX ORDER — LIDOCAINE HYDROCHLORIDE 10 MG/ML
INJECTION, SOLUTION INFILTRATION; PERINEURAL PRN
Status: DISCONTINUED | OUTPATIENT
Start: 2024-11-21 | End: 2024-11-21

## 2024-11-21 RX ORDER — CEFAZOLIN SODIUM/WATER 2 G/20 ML
2 SYRINGE (ML) INTRAVENOUS SEE ADMIN INSTRUCTIONS
Status: DISCONTINUED | OUTPATIENT
Start: 2024-11-21 | End: 2024-11-21 | Stop reason: HOSPADM

## 2024-11-21 RX ORDER — SODIUM CHLORIDE, SODIUM LACTATE, POTASSIUM CHLORIDE, CALCIUM CHLORIDE 600; 310; 30; 20 MG/100ML; MG/100ML; MG/100ML; MG/100ML
INJECTION, SOLUTION INTRAVENOUS CONTINUOUS PRN
Status: DISCONTINUED | OUTPATIENT
Start: 2024-11-21 | End: 2024-11-21

## 2024-11-21 RX ORDER — ONDANSETRON 2 MG/ML
INJECTION INTRAMUSCULAR; INTRAVENOUS PRN
Status: DISCONTINUED | OUTPATIENT
Start: 2024-11-21 | End: 2024-11-21

## 2024-11-21 RX ORDER — SODIUM CHLORIDE, SODIUM LACTATE, POTASSIUM CHLORIDE, CALCIUM CHLORIDE 600; 310; 30; 20 MG/100ML; MG/100ML; MG/100ML; MG/100ML
INJECTION, SOLUTION INTRAVENOUS CONTINUOUS
Status: DISCONTINUED | OUTPATIENT
Start: 2024-11-21 | End: 2024-11-21 | Stop reason: HOSPADM

## 2024-11-21 RX ORDER — CEFAZOLIN SODIUM/WATER 2 G/20 ML
2 SYRINGE (ML) INTRAVENOUS
Status: COMPLETED | OUTPATIENT
Start: 2024-11-21 | End: 2024-11-21

## 2024-11-21 RX ORDER — LIDOCAINE 40 MG/G
CREAM TOPICAL
Status: DISCONTINUED | OUTPATIENT
Start: 2024-11-21 | End: 2024-11-21 | Stop reason: HOSPADM

## 2024-11-21 RX ORDER — FENTANYL CITRATE 50 UG/ML
INJECTION, SOLUTION INTRAMUSCULAR; INTRAVENOUS PRN
Status: DISCONTINUED | OUTPATIENT
Start: 2024-11-21 | End: 2024-11-21

## 2024-11-21 RX ORDER — EPHEDRINE SULFATE 50 MG/ML
INJECTION, SOLUTION INTRAVENOUS PRN
Status: DISCONTINUED | OUTPATIENT
Start: 2024-11-21 | End: 2024-11-21

## 2024-11-21 RX ORDER — PROPOFOL 10 MG/ML
INJECTION, EMULSION INTRAVENOUS CONTINUOUS PRN
Status: DISCONTINUED | OUTPATIENT
Start: 2024-11-21 | End: 2024-11-21

## 2024-11-21 RX ADMIN — PROPOFOL 120 MCG/KG/MIN: 10 INJECTION, EMULSION INTRAVENOUS at 13:28

## 2024-11-21 RX ADMIN — Medication 2 G: at 13:20

## 2024-11-21 RX ADMIN — EPHEDRINE SULFATE 5 MG: 50 INJECTION INTRAVENOUS at 13:51

## 2024-11-21 RX ADMIN — DEXMEDETOMIDINE HYDROCHLORIDE 8 MCG: 100 INJECTION, SOLUTION INTRAVENOUS at 13:28

## 2024-11-21 RX ADMIN — ONDANSETRON 4 MG: 2 INJECTION INTRAMUSCULAR; INTRAVENOUS at 13:56

## 2024-11-21 RX ADMIN — LIDOCAINE HYDROCHLORIDE 4 ML: 10 INJECTION, SOLUTION INFILTRATION; PERINEURAL at 13:26

## 2024-11-21 RX ADMIN — DEXMEDETOMIDINE HYDROCHLORIDE 4 MCG: 100 INJECTION, SOLUTION INTRAVENOUS at 13:38

## 2024-11-21 RX ADMIN — SODIUM CHLORIDE, POTASSIUM CHLORIDE, SODIUM LACTATE AND CALCIUM CHLORIDE: 600; 310; 30; 20 INJECTION, SOLUTION INTRAVENOUS at 13:22

## 2024-11-21 RX ADMIN — FENTANYL CITRATE 25 MCG: 50 INJECTION, SOLUTION INTRAMUSCULAR; INTRAVENOUS at 13:38

## 2024-11-21 RX ADMIN — DEXMEDETOMIDINE HYDROCHLORIDE 4 MCG: 100 INJECTION, SOLUTION INTRAVENOUS at 13:40

## 2024-11-21 RX ADMIN — DEXMEDETOMIDINE HYDROCHLORIDE 4 MCG: 100 INJECTION, SOLUTION INTRAVENOUS at 13:35

## 2024-11-21 RX ADMIN — FENTANYL CITRATE 25 MCG: 50 INJECTION, SOLUTION INTRAMUSCULAR; INTRAVENOUS at 13:22

## 2024-11-21 RX ADMIN — EPHEDRINE SULFATE 5 MG: 50 INJECTION INTRAVENOUS at 13:52

## 2024-11-21 ASSESSMENT — ACTIVITIES OF DAILY LIVING (ADL)
ADLS_ACUITY_SCORE: 0

## 2024-11-21 NOTE — ANESTHESIA PREPROCEDURE EVALUATION
Anesthesia Pre-Procedure Evaluation    Patient: Katya Butler   MRN: [id number] : 1940        Procedure : Procedure(s):  CYSTOSCOPY WITH LEFT URETERAL STENT EXCHANGE AND RETROGRADE PYELOGRAM          Past Medical History:   Diagnosis Date    Diverticulitis of colon     Heart murmur     HLD (hyperlipidemia)     HTN (hypertension)     Inguinal hernia without mention of obstruction or gangrene, unilateral or unspecified, (not specified as recurrent) 2004    RIGHT    Malignant neoplasm of sigmoid colon (H) 2020    Primary osteoarthritis involving multiple joints 2019    PVD (peripheral vascular disease) (H)     Renal disease     Stented coronary artery     Ureteral stent present     Ventral hernia, unspecified, without mention of obstruction or gangrene 08/15/2003    easily reduced      Past Surgical History:   Procedure Laterality Date    COMBINED CYSTOSCOPY, RETROGRADES, URETEROSCOPY, INSERT STENT Left 2024    Procedure: CYSTOSCOPY, WITH LEFT RETROGRADE PYELOGRAM AND LEFT STENT INSERTION;  Surgeon: Galo Perez MD;  Location: [facility] [place]    EYE SURGERY      GI SURGERY      HERNIA REPAIR      HERNIA REPAIR, INGUINAL RT/LT  2005    [facility]    HYSTERECTOMY      IR AORTIC ARCH 4 VESSEL ANGIOGRAM  10/13/2009    IR AORTIC ARCH 4 VESSEL ANGIOGRAM  03/15/2010    IR MISCELLANEOUS PROCEDURE  2008    IR MISCELLANEOUS PROCEDURE  2009    IR MISCELLANEOUS PROCEDURE  2009    IR MISCELLANEOUS PROCEDURE  2009    IR MISCELLANEOUS PROCEDURE  10/13/2009    IR MISCELLANEOUS PROCEDURE  10/13/2009    IR MISCELLANEOUS PROCEDURE  03/15/2010    LAPAROSCOPIC CYSTECTOMY OVARIAN (ONCOLOGY) Bilateral 2018    Procedure: DIAGNOSTIC LAPAROSCOPY, RIGHT SALPINGO OOPHORECTOMY, LYSIS OF ADHESIONS, AND PELVIC WASHINGS;  Surgeon: Nneka Arroyo DO;  Location: [facility];  Service:     MS INSJ PRPH CTR VAD W/SUBQ PORT AGE 5 YR/> N/A 2020     "Procedure: Port Placement;  Surgeon: Viry Villegas MD;  Location: [facility];  Service: General    ZZC PART REMOVAL COLON W ANASTOMOSIS N/A 10/31/2020    Procedure: COLECTOMY, SIGMOID, OPEN;  Surgeon: Viry Villegas MD;  Location: [facility] OR;  Service: General    ZZC REMOVAL OF OVARY(S) N/A 10/31/2020    Procedure: OOPHORECTOMY, OPEN;  Surgeon: Viry Villegas MD;  Location: [facility] [place];  Service: General      Allergies   Allergen Reactions    Contrast Dye Hives, Itching and Swelling    Aspirin Other (See Comments)     PATIENT STATES SHE IS NOT ALLERGIC TO ASA    Other reaction(s): on plavix  Other reaction(s): on plavix    Cephalexin Hives    Colchicine Hives    Colcrys [Colchicine] Hives    Ketek [Telithromycin] Visual Disturbance    Other (Do Not Use) Other (See Comments)     Aspirin - on Plavix    Other Drug Allergy (See Comments) Itching     Pt states \"all pain meds\" make her \"itchy\"      Social History     Tobacco Use    Smoking status: Every Day     Current packs/day: 0.25     Types: Cigarettes    Smokeless tobacco: Never    Tobacco comments:     5-7 cigarettes daily **as of 11/01/23 **   Substance Use Topics    Alcohol use: Not Currently      Wt Readings from Last 1 Encounters:   11/21/24 45.5 kg (100 lb 4.8 oz)        Anesthesia Evaluation            ROS/MED HX  ENT/Pulmonary:       Neurologic:       Cardiovascular:     (+)  hypertension- -  CAD -  - -                                      METS/Exercise Tolerance:     Hematologic:       Musculoskeletal:       GI/Hepatic:       Renal/Genitourinary:     (+) renal disease,             Endo:       Psychiatric/Substance Use:       Infectious Disease:       Malignancy:       Other:      (+)  , H/O Chronic Pain,         Physical Exam    Airway  airway exam normal      Mallampati: II   TM distance: > 3 FB   Neck ROM: full   Mouth opening: > 3 cm    Respiratory Devices and Support         Dental     Comment: Upper dentures     (+) Other - " "document in comments      Cardiovascular   cardiovascular exam normal       Rhythm and rate: regular and normal   (+) murmur       Pulmonary   pulmonary exam normal        breath sounds clear to auscultation           OUTSIDE LABS:  CBC:   Lab Results   Component Value Date    WBC 8.0 09/20/2024    WBC 8.4 08/30/2024    HGB 9.1 (L) 09/20/2024    HGB 8.6 (L) 08/30/2024    HCT 29.0 (L) 09/20/2024    HCT 27.5 (L) 08/30/2024     09/20/2024     (H) 08/30/2024     BMP:   Lab Results   Component Value Date     11/06/2024     09/20/2024    POTASSIUM 4.9 11/06/2024    POTASSIUM 4.4 09/20/2024    CHLORIDE 107 11/06/2024    CHLORIDE 107 09/20/2024    CO2 18 (L) 11/06/2024    CO2 22 09/20/2024    BUN 32.7 (H) 11/06/2024    BUN 25.0 (H) 09/20/2024    CR 1.55 (H) 11/06/2024    CR 1.23 (H) 09/20/2024     (H) 11/06/2024     (H) 09/20/2024     COAGS: No results found for: \"PTT\", \"INR\", \"FIBR\"  POC: No results found for: \"BGM\", \"HCG\", \"HCGS\"  HEPATIC:   Lab Results   Component Value Date    ALBUMIN 4.0 09/20/2024    PROTTOTAL 6.9 09/20/2024    ALT 7 09/20/2024    AST 15 09/20/2024    ALKPHOS 106 09/20/2024    BILITOTAL 0.2 09/20/2024     OTHER:   Lab Results   Component Value Date    LACT 0.7 10/29/2020    SUDHIR 9.1 11/06/2024    PHOS 2.9 08/24/2024    MAG 1.8 04/20/2021    LIPASE 78 (H) 05/22/2023       Anesthesia Plan    ASA Status:  3    NPO Status:  NPO Appropriate    Anesthesia Type: MAC.     - Reason for MAC: chronic cardiopulmonary disease              Consents    Anesthesia Plan(s) and associated risks, benefits, and realistic alternatives discussed. Questions answered and patient/representative(s) expressed understanding.     - Discussed: Risks, Benefits and Alternatives for BOTH SEDATION and the PROCEDURE were discussed     - Discussed with:  Patient      - Extended Intubation/Ventilatory Support Discussed: No.      - Patient is DNR/DNI Status: No     Use of blood products discussed: No " .     Postoperative Care    Pain management: IV analgesics, Oral pain medications.   PONV prophylaxis: Ondansetron (or other 5HT-3), Dexamethasone or Solumedrol     Comments:               Gume Ayon MD    I have reviewed the pertinent notes and labs in the chart from the past 30 days and (re)examined the patient.  Any updates or changes from those notes are reflected in this note.             # Drug Induced Platelet Defect: home medication list includes an antiplatelet medication   # Hypertension: Noted on problem list               # Financial/Environmental Concerns:

## 2024-11-21 NOTE — ANESTHESIA POSTPROCEDURE EVALUATION
Patient: Katya Butler    Procedure: Procedure(s):  CYSTOSCOPY WITH LEFT URETERAL STENT EXCHANGE AND RETROGRADE PYELOGRAM       Anesthesia Type:  MAC    Note:  Disposition: Outpatient   Postop Pain Control: Uneventful            Sign Out: Well controlled pain   PONV: No   Neuro/Psych: Uneventful            Sign Out: Acceptable/Baseline neuro status   Airway/Respiratory: Uneventful            Sign Out: Acceptable/Baseline resp. status   CV/Hemodynamics: Uneventful            Sign Out: Acceptable CV status; No obvious hypovolemia; No obvious fluid overload   Other NRE: NONE   DID A NON-ROUTINE EVENT OCCUR? No           Last vitals:  Vitals Value Taken Time   /64 11/21/24 1430   Temp 36.4  C (97.52  F) 11/21/24 1433   Pulse 64 11/21/24 1433   Resp 15 11/21/24 1433   SpO2 95 % 11/21/24 1433   Vitals shown include unfiled device data.    Electronically Signed By: Gume Ayon MD  November 21, 2024  2:34 PM

## 2024-11-21 NOTE — ANESTHESIA CARE TRANSFER NOTE
Patient: Katya Butler    Procedure: Procedure(s):  CYSTOSCOPY WITH LEFT URETERAL STENT EXCHANGE AND RETROGRADE PYELOGRAM       Diagnosis: Hydronephrosis [N13.30]  Diagnosis Additional Information: No value filed.    Anesthesia Type:   MAC     Note:    Oropharynx: oropharynx clear of all foreign objects and spontaneously breathing  Level of Consciousness: awake  Oxygen Supplementation: room air    Independent Airway: airway patency satisfactory and stable  Dentition: dentition unchanged  Vital Signs Stable: post-procedure vital signs reviewed and stable  Report to RN Given: handoff report given  Patient transferred to:  Recovery    Handoff Report: Identifed the Patient, Identified the Reponsible Provider, Reviewed the pertinent medical history, Discussed the surgical course, Reviewed Intra-OP anesthesia mangement and issues during anesthesia, Set expectations for post-procedure period and Allowed opportunity for questions and acknowledgement of understanding      Vitals:  Vitals Value Taken Time   /87 11/21/24 1413   Temp 36.5  C (97.7  F) 11/21/24 1413   Pulse 70 11/21/24 1413   Resp 14 11/21/24 1413   SpO2 98 % 11/21/24 1413   Vitals shown include unfiled device data.    Electronically Signed By: NAVEEN Orellana CRNA  November 21, 2024  2:14 PM

## 2024-11-21 NOTE — OP NOTE
Date of surgery: 11/21/2024  Location:[facility]      Surgeon: Galo Perez MD    Anesthesia: General    Preoperative diagnosis: left ureteral obstruction    Postoperative diagnosis: left ureteral obstruction    Procedure: cystosocpy left ureteral stent exchange with retrograde pyelogram    Operative indication: Katya Butler is a 84 year old female left ureteral obstruction secondary to malignancy    Operative findings: Left ureteral stent exchange without difficulty    Operative procedure: The patient was brought to the operating room.  Lithotomy.  MAC anesthesia.  Sterile conditions.  Cystoscopy was performed.  Urethra was normal bladder was inspected there is a left ureteral stent quelled within the bladder.  This is easily grasped and withdrawn.  A Bentson guidewire was passed into the left ureter up to the level of the renal pelvis and a 5 Dutch open-ended ureteral catheter was passed over this.    Retrograde pyelograms performed through the 5 Dutch ureteral catheter using several cc of Omnipaque were out lining the collecting system without complication.  There is no evidence of filling defect.  There is no large amount of hydronephrosis    Over a Bentson guidewire 6/22 Dutch ureteral stent is passed coiled in the renal pelvis and within the bladder and is seen to drain.    The bladder is drained and she is awoken from anesthesia and transferred recovery stable    Drains: 6/22 stent    Specimens: none    Estimated blood loss: none    Complications: none     Galo Perez MD

## 2024-12-23 ENCOUNTER — LAB REQUISITION (OUTPATIENT)
Dept: LAB | Facility: CLINIC | Age: 84
End: 2024-12-23
Payer: COMMERCIAL

## 2024-12-23 DIAGNOSIS — I10 ESSENTIAL (PRIMARY) HYPERTENSION: ICD-10-CM

## 2024-12-23 LAB
ANION GAP SERPL CALCULATED.3IONS-SCNC: 13 MMOL/L (ref 7–15)
BUN SERPL-MCNC: 33.3 MG/DL (ref 8–23)
CALCIUM SERPL-MCNC: 9.5 MG/DL (ref 8.8–10.4)
CHLORIDE SERPL-SCNC: 109 MMOL/L (ref 98–107)
CREAT SERPL-MCNC: 2.05 MG/DL (ref 0.51–0.95)
EGFRCR SERPLBLD CKD-EPI 2021: 24 ML/MIN/1.73M2
GLUCOSE SERPL-MCNC: 105 MG/DL (ref 70–99)
HCO3 SERPL-SCNC: 18 MMOL/L (ref 22–29)
POTASSIUM SERPL-SCNC: 5.8 MMOL/L (ref 3.4–5.3)
SODIUM SERPL-SCNC: 140 MMOL/L (ref 135–145)

## 2024-12-23 PROCEDURE — 80048 BASIC METABOLIC PNL TOTAL CA: CPT | Mod: ORL | Performed by: PHYSICIAN ASSISTANT

## 2025-01-10 ENCOUNTER — ONCOLOGY VISIT (OUTPATIENT)
Dept: ONCOLOGY | Facility: HOSPITAL | Age: 85
End: 2025-01-10
Payer: COMMERCIAL

## 2025-01-10 VITALS
RESPIRATION RATE: 20 BRPM | DIASTOLIC BLOOD PRESSURE: 80 MMHG | HEART RATE: 74 BPM | WEIGHT: 97 LBS | OXYGEN SATURATION: 99 % | BODY MASS INDEX: 17.85 KG/M2 | HEIGHT: 62 IN | SYSTOLIC BLOOD PRESSURE: 196 MMHG | TEMPERATURE: 98.4 F

## 2025-01-10 DIAGNOSIS — M54.50 CHRONIC MIDLINE LOW BACK PAIN WITHOUT SCIATICA: ICD-10-CM

## 2025-01-10 DIAGNOSIS — G89.29 CHRONIC MIDLINE LOW BACK PAIN WITHOUT SCIATICA: ICD-10-CM

## 2025-01-10 DIAGNOSIS — C18.9 COLON ADENOCARCINOMA (H): ICD-10-CM

## 2025-01-10 DIAGNOSIS — G89.3 CANCER ASSOCIATED PAIN: Primary | ICD-10-CM

## 2025-01-10 PROCEDURE — 85004 AUTOMATED DIFF WBC COUNT: CPT | Performed by: NURSE PRACTITIONER

## 2025-01-10 PROCEDURE — G2211 COMPLEX E/M VISIT ADD ON: HCPCS | Performed by: INTERNAL MEDICINE

## 2025-01-10 PROCEDURE — G0463 HOSPITAL OUTPT CLINIC VISIT: HCPCS | Performed by: INTERNAL MEDICINE

## 2025-01-10 PROCEDURE — 85014 HEMATOCRIT: CPT | Performed by: NURSE PRACTITIONER

## 2025-01-10 PROCEDURE — 99214 OFFICE O/P EST MOD 30 MIN: CPT | Performed by: INTERNAL MEDICINE

## 2025-01-10 PROCEDURE — 80053 COMPREHEN METABOLIC PANEL: CPT | Performed by: INTERNAL MEDICINE

## 2025-01-10 PROCEDURE — 82378 CARCINOEMBRYONIC ANTIGEN: CPT | Performed by: NURSE PRACTITIONER

## 2025-01-10 RX ORDER — CELECOXIB 50 MG/1
50 CAPSULE ORAL 2 TIMES DAILY
Qty: 30 CAPSULE | Refills: 1 | Status: SHIPPED | OUTPATIENT
Start: 2025-01-10

## 2025-01-10 RX ORDER — HYDROCODONE BITARTRATE AND ACETAMINOPHEN 5; 325 MG/1; MG/1
1-2 TABLET ORAL EVERY 4 HOURS PRN
Qty: 60 TABLET | Refills: 0 | Status: SHIPPED | OUTPATIENT
Start: 2025-01-10 | End: 2025-02-24

## 2025-01-10 ASSESSMENT — PAIN SCALES - GENERAL: PAINLEVEL_OUTOF10: NO PAIN (0)

## 2025-01-10 NOTE — PROGRESS NOTES
[facility] Hematology and Oncology Progress Note    Patient: Katya Butler  MRN: [id number]  Date of Service: Constantino 10, 2025        Assessment and Plan:    Cancer Staging  Malignant neoplasm of sigmoid colon (H)  Staging form: Colon And Rectum, AJCC 8th Edition  - Clinical: No stage assigned - Unsigned  - Pathologic stage from 11/24/2020: Stage IIIC (pT4b, pN2, cM0) - Signed by Walt Merrill MD on 11/24/2020     1.  Colon cancer, metastatic: Overall things are generally stable.  She is not on any treatment.  Retreating her palliatively.  She has not had any recent body imaging.  In August, her CT showed increasing retroperitoneal lymphadenopathy.  Will continue to see her every 4 weeks for now.  I reviewed her CEA from today and it is 30.  1 year ago was 16.    2.  Large cecal polyp found on colonoscopy: This polyp was biopsied on February 4, 2021 at [facility] and showed high-grade dysplasia, negative for invasive malignancy.    3.  Parotid lesion: SUV max of 4.7 on PET scan.  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     4.  Ureteral obstruction: This is secondary to bulky retroperitoneal lymphadenopathy.  She had a stent exchange in November 21, 2024.  She had stents placed in the left ureter on August 22, 2024.    5.  Anemia: Hemoglobin from today was 8.8 with a mean cell volume of 82.  In September her hemoglobin was 9.1.    ECOG Performance  0    Diagnosis:    1.  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  At initial surgery there was evidence of a small abscess and perforation of the colon.  Pathology showed a invasive moderately differentiated adenocarcinoma. Constricting ulcerated measuring 35 x 25 x 13 mm.  4 of 18 lymph nodes were positive. Mismatch repair intact. There was some proximal colonic dilatation, consistent with obstruction.  Left ovary showed moderately differentiated adenocarcinoma, direct extension.  Focal positive margin at the  paraovarian soft tissue margin.      Recurrence diagnosed in March 2023 from a left supraclavicular lymph node. MSI low. HER-2 negative. PET scan imaging shows uptake in left supraclavicular nodes and retroperitoneal lymph nodes.  NGS:  negative.    Treatment:    Surgical resection on October 31, 2020.  Adjuvant chemotherapy with FOLFOX was initiated on December 8, 2020.  25% initial dose reduction of all medications. Cycle 10 completed on April 20, 2021.    Interim History:    Katya returns today for follow-up visit.  She has a little constipation.  She takes 1 pain pill at night but she does not like taking 2 secondary to itching.  No nausea or vomiting.  No acute complaints today.    Review of Systems:    As above in the history.     Review of Systems otherwise Negative for:  General: chills, fever or night sweats  Psychological: anxiety or depression  Ophthalmic: blurry vision, double vision or loss of vision, vision change  ENT: epistaxis, oral lesions, hearing changes  Hematological and Lymphatic: bleeding, bruising, jaundice, swollen lymph nodes  Endocrine: hot flashes, unexpected weight changes  Respiratory: cough, hemoptysis, orthopnea or shortness of breath/KLEIN  Cardiovascular: chest pain, edema, palpitations or PND  Gastrointestinal: blood in stools, change in bowel habits, constipation, diarrhea or nausea/vomiting  Genito-Urinary: change in urinary stream, incontinence, frequency/urgency  Musculoskeletal: joint pain, stiffness, swelling  Neurological: dizziness, headaches, numbness/tingling  Dermatological: lumps and rash    Past History:    Past Medical History:   Diagnosis Date    Diverticulitis of colon     Heart murmur     HLD (hyperlipidemia)     HTN (hypertension)     Inguinal hernia without mention of obstruction or gangrene, unilateral or unspecified, (not specified as recurrent) 08/20/2004    RIGHT    Malignant neoplasm of sigmoid colon (H) 11/24/2020    Primary osteoarthritis involving multiple  joints 08/02/2019    PVD (peripheral vascular disease)     Renal disease     Stented coronary artery     Ureteral stent present     Ventral hernia, unspecified, without mention of obstruction or gangrene 08/15/2003    easily reduced     Physical Exam:    There were no vitals taken for this visit.    General: patient appears stated age of 81 year old. Nontoxic and in no distress.   HEENT: Head: atraumatic, normocephalic. Sclerae anicteric.  Chest:  Normal respiratory effort  Cardiac:  No edema.   Abdomen: abdomen is non-distended  Extremities: normal tone and muscle bulk.  Skin: no lesions or rash on visible skin.  CNS: alert and oriented. Grossly non-focal.   Psychiatric: normal mood and affect.     Lab Results:    Recent Results (from the past week)   Comprehensive metabolic panel   Result Value Ref Range    Sodium 138 135 - 145 mmol/L    Potassium 4.9 3.4 - 5.3 mmol/L    Carbon Dioxide (CO2) 21 (L) 22 - 29 mmol/L    Anion Gap 12 7 - 15 mmol/L    Urea Nitrogen 28.2 (H) 8.0 - 23.0 mg/dL    Creatinine 1.33 (H) 0.51 - 0.95 mg/dL    GFR Estimate 40 (L) >60 mL/min/1.73m2    Calcium 9.7 8.8 - 10.4 mg/dL    Chloride 105 98 - 107 mmol/L    Glucose 91 70 - 99 mg/dL    Alkaline Phosphatase 95 40 - 150 U/L    AST 16 0 - 45 U/L    ALT 7 0 - 50 U/L    Protein Total 7.1 6.4 - 8.3 g/dL    Albumin 4.0 3.5 - 5.2 g/dL    Bilirubin Total <0.2 <=1.2 mg/dL   CBC with platelets and differential   Result Value Ref Range    WBC Count 8.8 4.0 - 11.0 10e3/uL    RBC Count 3.45 (L) 3.80 - 5.20 10e6/uL    Hemoglobin 8.8 (L) 11.7 - 15.7 g/dL    Hematocrit 28.4 (L) 35.0 - 47.0 %    MCV 82 78 - 100 fL    MCH 25.5 (L) 26.5 - 33.0 pg    MCHC 31.0 (L) 31.5 - 36.5 g/dL    RDW 16.3 (H) 10.0 - 15.0 %    Platelet Count 370 150 - 450 10e3/uL    % Neutrophils 62 %    % Lymphocytes 25 %    % Monocytes 10 %    % Eosinophils 2 %    % Basophils 1 %    % Immature Granulocytes 0 %    NRBCs per 100 WBC 0 <1 /100    Absolute Neutrophils 5.5 1.6 - 8.3 10e3/uL     Absolute Lymphocytes 2.2 0.8 - 5.3 10e3/uL    Absolute Monocytes 0.9 0.0 - 1.3 10e3/uL    Absolute Eosinophils 0.2 0.0 - 0.7 10e3/uL    Absolute Basophils 0.1 0.0 - 0.2 10e3/uL    Absolute Immature Granulocytes 0.0 <=0.4 10e3/uL    Absolute NRBCs 0.0 10e3/uL       Signed by: Walt Merrill MD

## 2025-01-10 NOTE — Clinical Note
1/10/2025      Katya Butler  [address]  [place] [address]      Dear Colleague,    Thank you for referring your patient, Katya Butler, to the [facility] CANCER [facility]. Please see a copy of my visit note below.    [facility] Hematology and Oncology Progress Note    Patient: aKtya Butler  MRN: [id number]  Date of Service: Constantino 10, 2025        Assessment and Plan:    Cancer Staging  Malignant neoplasm of sigmoid colon (H)  Staging form: Colon And Rectum, AJCC 8th Edition  - Clinical: No stage assigned - Unsigned  - Pathologic stage from 11/24/2020: Stage IIIC (pT4b, pN2, cM0) - Signed by Walt Merrill MD on 11/24/2020     1.  Colon cancer:       She just got discharged from the hospital after being admitted for ureteral obstruction.  Stenting is in place now.  Her pain is better.  She had CT imaging performed on August 21.  I personally reviewed the images and shared them with Katya.  Overall her retroperitoneal lymphadenopathy has increased some with the largest node measures up to 3.4 x 2.2 cm.  Biopsying no areas of cancer when compared to April of this year.  Overall she continues to have slow progression.  Will have her return to clinic in 2 months with labs.  We can consider repeat imaging in December.    2.  Large cecal polyp found on colonoscopy: This polyp was biopsied on February 4, 2021 at [facility] and showed high-grade dysplasia, negative for invasive malignancy.    3.  Parotid lesion: SUV max of 4.7 on PET scan.  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  Stable.  No plan to biopsy given metastatic cancer.  Currently asymptomatic    4.  Ureteral obstruction: She had stents placed in the left ureter on August 22, 2024.  CMP from today is reviewed showing a BUN of 24 and a creatinine of 1.24.  Creatinine on August 21 of 2.49.    ECOG Performance  0    Diagnosis:    1.  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  At initial  surgery there was evidence of a small abscess and perforation of the colon.  Pathology showed a invasive moderately differentiated adenocarcinoma. Constricting ulcerated measuring 35 x 25 x 13 mm.  4 of 18 lymph nodes were positive. Mismatch repair intact. There was some proximal colonic dilatation, consistent with obstruction.  Left ovary showed moderately differentiated adenocarcinoma, direct extension.  Focal positive margin at the paraovarian soft tissue margin.      Recurrence diagnosed in March 2023 from a left supraclavicular lymph node. MSI low. HER-2 negative. PET scan imaging shows uptake in left supraclavicular nodes and retroperitoneal lymph nodes.  NGS:  negative.    Treatment:    Surgical resection on October 31, 2020.  Adjuvant chemotherapy with FOLFOX was initiated on December 8, 2020.  25% initial dose reduction of all medications. Cycle 10 completed on April 20, 2021.    Interim History:    Katya returns today for follow-up visit. Overall she is doing okay.  She is recently in the hospital for left ureteral obstruction.  Status post stent placement.    Review of Systems:    As above in the history.     Review of Systems otherwise Negative for:  General: chills, fever or night sweats  Psychological: anxiety or depression  Ophthalmic: blurry vision, double vision or loss of vision, vision change  ENT: epistaxis, oral lesions, hearing changes  Hematological and Lymphatic: bleeding, bruising, jaundice, swollen lymph nodes  Endocrine: hot flashes, unexpected weight changes  Respiratory: cough, hemoptysis, orthopnea or shortness of breath/KLEIN  Cardiovascular: chest pain, edema, palpitations or PND  Gastrointestinal: blood in stools, change in bowel habits, constipation, diarrhea or nausea/vomiting  Genito-Urinary: change in urinary stream, incontinence, frequency/urgency  Musculoskeletal: joint pain, stiffness, swelling  Neurological: dizziness, headaches, numbness/tingling  Dermatological: lumps and  rash    Past History:    Past Medical History:   Diagnosis Date    Diverticulitis of colon     Heart murmur     HLD (hyperlipidemia)     HTN (hypertension)     Inguinal hernia without mention of obstruction or gangrene, unilateral or unspecified, (not specified as recurrent) 08/20/2004    RIGHT    Malignant neoplasm of sigmoid colon (H) 11/24/2020    Primary osteoarthritis involving multiple joints 08/02/2019    PVD (peripheral vascular disease)     Renal disease     Stented coronary artery     Ureteral stent present     Ventral hernia, unspecified, without mention of obstruction or gangrene 08/15/2003    easily reduced     Physical Exam:    There were no vitals taken for this visit.    General: patient appears stated age of 81 year old. Nontoxic and in no distress.   HEENT: Head: atraumatic, normocephalic. Sclerae anicteric.  Chest:  Normal respiratory effort  Cardiac:  No edema.   Abdomen: abdomen is non-distended  Extremities: normal tone and muscle bulk.  Skin: no lesions or rash on visible skin.  CNS: alert and oriented. Grossly non-focal.   Psychiatric: normal mood and affect.     Lab Results:    Recent Results (from the past week)   Comprehensive metabolic panel   Result Value Ref Range    Sodium 138 135 - 145 mmol/L    Potassium 4.9 3.4 - 5.3 mmol/L    Carbon Dioxide (CO2) 21 (L) 22 - 29 mmol/L    Anion Gap 12 7 - 15 mmol/L    Urea Nitrogen 28.2 (H) 8.0 - 23.0 mg/dL    Creatinine 1.33 (H) 0.51 - 0.95 mg/dL    GFR Estimate 40 (L) >60 mL/min/1.73m2    Calcium 9.7 8.8 - 10.4 mg/dL    Chloride 105 98 - 107 mmol/L    Glucose 91 70 - 99 mg/dL    Alkaline Phosphatase 95 40 - 150 U/L    AST 16 0 - 45 U/L    ALT 7 0 - 50 U/L    Protein Total 7.1 6.4 - 8.3 g/dL    Albumin 4.0 3.5 - 5.2 g/dL    Bilirubin Total <0.2 <=1.2 mg/dL   CBC with platelets and differential   Result Value Ref Range    WBC Count 8.8 4.0 - 11.0 10e3/uL    RBC Count 3.45 (L) 3.80 - 5.20 10e6/uL    Hemoglobin 8.8 (L) 11.7 - 15.7 g/dL    Hematocrit  "28.4 (L) 35.0 - 47.0 %    MCV 82 78 - 100 fL    MCH 25.5 (L) 26.5 - 33.0 pg    MCHC 31.0 (L) 31.5 - 36.5 g/dL    RDW 16.3 (H) 10.0 - 15.0 %    Platelet Count 370 150 - 450 10e3/uL    % Neutrophils 62 %    % Lymphocytes 25 %    % Monocytes 10 %    % Eosinophils 2 %    % Basophils 1 %    % Immature Granulocytes 0 %    NRBCs per 100 WBC 0 <1 /100    Absolute Neutrophils 5.5 1.6 - 8.3 10e3/uL    Absolute Lymphocytes 2.2 0.8 - 5.3 10e3/uL    Absolute Monocytes 0.9 0.0 - 1.3 10e3/uL    Absolute Eosinophils 0.2 0.0 - 0.7 10e3/uL    Absolute Basophils 0.1 0.0 - 0.2 10e3/uL    Absolute Immature Granulocytes 0.0 <=0.4 10e3/uL    Absolute NRBCs 0.0 10e3/uL       Signed by: Walt Merrill MD      Oncology Rooming Note    January 10, 2025 2:49 PM   Katya Butler is a 84 year old female who presents for:    Chief Complaint   Patient presents with    Oncology Clinic Visit     Return visit with labs     Initial Vitals: BP (!) 196/80 (BP Location: Left arm, Patient Position: Sitting, Cuff Size: Adult Small)   Pulse 74   Temp 98.4  F (36.9  C) (Tympanic)   Resp 20   Ht 1.575 m (5' 2\")   Wt 44 kg (97 lb)   SpO2 99%   BMI 17.74 kg/m   Estimated body mass index is 17.74 kg/m  as calculated from the following:    Height as of this encounter: 1.575 m (5' 2\").    Weight as of this encounter: 44 kg (97 lb). Body surface area is 1.39 meters squared.  No Pain (0) Comment: Data Unavailable   No LMP recorded. Patient has had a hysterectomy.  Allergies reviewed: Yes  Medications reviewed: Yes    Medications: MEDICATION REFILLS NEEDED TODAY. Provider was notified.  Pharmacy name entered into [employer]:    [id number] - [place], [place] - [address] [facility] [place] - [address] [address] [facility] [address]    Frailty Screening:   Is the patient here for a new oncology consult visit in cancer care? 2. No      Clinical concerns: discuss pain  Dr Merrill was notified.      CHRISTIANO PICKARD CMA                [facility] " Hematology and Oncology Progress Note    Patient: Katya Butler  MRN: [id number]  Date of Service: Constantino 10, 2025        Assessment and Plan:    Cancer Staging  Malignant neoplasm of sigmoid colon (H)  Staging form: Colon And Rectum, AJCC 8th Edition  - Clinical: No stage assigned - Unsigned  - Pathologic stage from 11/24/2020: Stage IIIC (pT4b, pN2, cM0) - Signed by Walt Merrill MD on 11/24/2020     1.  Colon cancer:       She just got discharged from the hospital after being admitted for ureteral obstruction.  Stenting is in place now.  Her pain is better.  She had CT imaging performed on August 21.  I personally reviewed the images and shared them with Katya.  Overall her retroperitoneal lymphadenopathy has increased some with the largest node measures up to 3.4 x 2.2 cm.  Biopsying no areas of cancer when compared to April of this year.  Overall she continues to have slow progression.  Will have her return to clinic in 2 months with labs.  We can consider repeat imaging in December.    2.  Large cecal polyp found on colonoscopy: This polyp was biopsied on February 4, 2021 at [facility] and showed high-grade dysplasia, negative for invasive malignancy.    3.  Parotid lesion: SUV max of 4.7 on PET scan.  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  Stable.  No plan to biopsy given metastatic cancer.  Currently asymptomatic    4.  Ureteral obstruction: She had stents placed in the left ureter on August 22, 2024.  CMP from today is reviewed showing a BUN of 24 and a creatinine of 1.24.  Creatinine on August 21 of 2.49.    ECOG Performance  0    Diagnosis:    1.  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  At initial surgery there was evidence of a small abscess and perforation of the colon.  Pathology showed a invasive moderately differentiated adenocarcinoma. Constricting ulcerated measuring 35 x 25 x 13 mm.  4 of 18 lymph nodes were positive. Mismatch repair intact.  There was some proximal colonic dilatation, consistent with obstruction.  Left ovary showed moderately differentiated adenocarcinoma, direct extension.  Focal positive margin at the paraovarian soft tissue margin.      Recurrence diagnosed in March 2023 from a left supraclavicular lymph node. MSI low. HER-2 negative. PET scan imaging shows uptake in left supraclavicular nodes and retroperitoneal lymph nodes.  NGS:  negative.    Treatment:    Surgical resection on October 31, 2020.  Adjuvant chemotherapy with FOLFOX was initiated on December 8, 2020.  25% initial dose reduction of all medications. Cycle 10 completed on April 20, 2021.    Interim History:    Katya returns today for follow-up visit. Overall she is doing okay.  She is recently in the hospital for left ureteral obstruction.  Status post stent placement.    Pain pi at night. Usu 1. Doesn't like taking 2.     Makes her itch  Consi[ation.        Review of Systems:    As above in the history.     Review of Systems otherwise Negative for:  General: chills, fever or night sweats  Psychological: anxiety or depression  Ophthalmic: blurry vision, double vision or loss of vision, vision change  ENT: epistaxis, oral lesions, hearing changes  Hematological and Lymphatic: bleeding, bruising, jaundice, swollen lymph nodes  Endocrine: hot flashes, unexpected weight changes  Respiratory: cough, hemoptysis, orthopnea or shortness of breath/KLEIN  Cardiovascular: chest pain, edema, palpitations or PND  Gastrointestinal: blood in stools, change in bowel habits, constipation, diarrhea or nausea/vomiting  Genito-Urinary: change in urinary stream, incontinence, frequency/urgency  Musculoskeletal: joint pain, stiffness, swelling  Neurological: dizziness, headaches, numbness/tingling  Dermatological: lumps and rash    Past History:    Past Medical History:   Diagnosis Date     Diverticulitis of colon      Heart murmur      HLD (hyperlipidemia)      HTN (hypertension)      Inguinal  hernia without mention of obstruction or gangrene, unilateral or unspecified, (not specified as recurrent) 08/20/2004    RIGHT     Malignant neoplasm of sigmoid colon (H) 11/24/2020     Primary osteoarthritis involving multiple joints 08/02/2019     PVD (peripheral vascular disease)      Renal disease      Stented coronary artery      Ureteral stent present      Ventral hernia, unspecified, without mention of obstruction or gangrene 08/15/2003    easily reduced     Physical Exam:    There were no vitals taken for this visit.    General: patient appears stated age of 81 year old. Nontoxic and in no distress.   HEENT: Head: atraumatic, normocephalic. Sclerae anicteric.  Chest:  Normal respiratory effort  Cardiac:  No edema.   Abdomen: abdomen is non-distended  Extremities: normal tone and muscle bulk.  Skin: no lesions or rash on visible skin.  CNS: alert and oriented. Grossly non-focal.   Psychiatric: normal mood and affect.     Lab Results:    Recent Results (from the past week)   Comprehensive metabolic panel   Result Value Ref Range    Sodium 138 135 - 145 mmol/L    Potassium 4.9 3.4 - 5.3 mmol/L    Carbon Dioxide (CO2) 21 (L) 22 - 29 mmol/L    Anion Gap 12 7 - 15 mmol/L    Urea Nitrogen 28.2 (H) 8.0 - 23.0 mg/dL    Creatinine 1.33 (H) 0.51 - 0.95 mg/dL    GFR Estimate 40 (L) >60 mL/min/1.73m2    Calcium 9.7 8.8 - 10.4 mg/dL    Chloride 105 98 - 107 mmol/L    Glucose 91 70 - 99 mg/dL    Alkaline Phosphatase 95 40 - 150 U/L    AST 16 0 - 45 U/L    ALT 7 0 - 50 U/L    Protein Total 7.1 6.4 - 8.3 g/dL    Albumin 4.0 3.5 - 5.2 g/dL    Bilirubin Total <0.2 <=1.2 mg/dL   CBC with platelets and differential   Result Value Ref Range    WBC Count 8.8 4.0 - 11.0 10e3/uL    RBC Count 3.45 (L) 3.80 - 5.20 10e6/uL    Hemoglobin 8.8 (L) 11.7 - 15.7 g/dL    Hematocrit 28.4 (L) 35.0 - 47.0 %    MCV 82 78 - 100 fL    MCH 25.5 (L) 26.5 - 33.0 pg    MCHC 31.0 (L) 31.5 - 36.5 g/dL    RDW 16.3 (H) 10.0 - 15.0 %    Platelet Count 370  150 - 450 10e3/uL    % Neutrophils 62 %    % Lymphocytes 25 %    % Monocytes 10 %    % Eosinophils 2 %    % Basophils 1 %    % Immature Granulocytes 0 %    NRBCs per 100 WBC 0 <1 /100    Absolute Neutrophils 5.5 1.6 - 8.3 10e3/uL    Absolute Lymphocytes 2.2 0.8 - 5.3 10e3/uL    Absolute Monocytes 0.9 0.0 - 1.3 10e3/uL    Absolute Eosinophils 0.2 0.0 - 0.7 10e3/uL    Absolute Basophils 0.1 0.0 - 0.2 10e3/uL    Absolute Immature Granulocytes 0.0 <=0.4 10e3/uL    Absolute NRBCs 0.0 10e3/uL       Signed by: Walt Merrill MD        Again, thank you for allowing me to participate in the care of your patient.        Sincerely,        Walt Merrill MD    Electronically signed

## 2025-01-10 NOTE — PROGRESS NOTES
"Oncology Rooming Note    January 10, 2025 2:49 PM   Katya Butler is a 84 year old female who presents for:    Chief Complaint   Patient presents with    Oncology Clinic Visit     Return visit with labs     Initial Vitals: BP (!) 196/80 (BP Location: Left arm, Patient Position: Sitting, Cuff Size: Adult Small)   Pulse 74   Temp 98.4  F (36.9  C) (Tympanic)   Resp 20   Ht 1.575 m (5' 2\")   Wt 44 kg (97 lb)   SpO2 99%   BMI 17.74 kg/m   Estimated body mass index is 17.74 kg/m  as calculated from the following:    Height as of this encounter: 1.575 m (5' 2\").    Weight as of this encounter: 44 kg (97 lb). Body surface area is 1.39 meters squared.  No Pain (0) Comment: Data Unavailable   No LMP recorded. Patient has had a hysterectomy.  Allergies reviewed: Yes  Medications reviewed: Yes    Medications: MEDICATION REFILLS NEEDED TODAY. Provider was notified.  Pharmacy name entered into [employer]:    [id number] - [address] [place], [place] - [address] [facility]    Frailty Screening:   Is the patient here for a new oncology consult visit in cancer care? 2. No      Clinical concerns: discuss pain  Dr Merrill was notified.      CHRISTIANO PICKARD CMA              "

## 2025-01-30 DIAGNOSIS — G89.3 CANCER ASSOCIATED PAIN: Primary | ICD-10-CM

## 2025-01-30 DIAGNOSIS — C18.9 COLON ADENOCARCINOMA (H): ICD-10-CM

## 2025-02-10 ENCOUNTER — PATIENT OUTREACH (OUTPATIENT)
Dept: ONCOLOGY | Facility: HOSPITAL | Age: 85
End: 2025-02-10

## 2025-02-10 ENCOUNTER — ONCOLOGY VISIT (OUTPATIENT)
Dept: ONCOLOGY | Facility: HOSPITAL | Age: 85
End: 2025-02-10
Payer: COMMERCIAL

## 2025-02-10 VITALS
BODY MASS INDEX: 18.05 KG/M2 | SYSTOLIC BLOOD PRESSURE: 172 MMHG | OXYGEN SATURATION: 96 % | WEIGHT: 98.7 LBS | RESPIRATION RATE: 16 BRPM | TEMPERATURE: 97.8 F | HEART RATE: 71 BPM | DIASTOLIC BLOOD PRESSURE: 72 MMHG

## 2025-02-10 DIAGNOSIS — C18.9 COLON ADENOCARCINOMA (H): Primary | ICD-10-CM

## 2025-02-10 PROCEDURE — 99214 OFFICE O/P EST MOD 30 MIN: CPT | Performed by: NURSE PRACTITIONER

## 2025-02-10 PROCEDURE — G0463 HOSPITAL OUTPT CLINIC VISIT: HCPCS | Performed by: NURSE PRACTITIONER

## 2025-02-10 PROCEDURE — G2211 COMPLEX E/M VISIT ADD ON: HCPCS | Performed by: NURSE PRACTITIONER

## 2025-02-10 ASSESSMENT — PAIN SCALES - GENERAL: PAINLEVEL_OUTOF10: MODERATE PAIN (5)

## 2025-02-10 NOTE — PROGRESS NOTES
[facility] Hematology and Oncology Progress Note    Patient: Katya Butler  MRN: [id number]  Date of Service: Feb 10, 2025          Reason for Visit    Chief Complaint   Patient presents with    Oncology Clinic Visit     Return visit 1 month with lab. Colon adenocarcinoma.       Assessment and Plan     Cancer Staging   Colon adenocarcinoma (H)  Staging form: Colon and Rectum, AJCC 8th Edition  - Clinical stage from 3/13/2023: Stage IVB (cT0, cNX, cM1b) - Signed by Jody Tolentino APRN CNP on 3/20/2023  No loss of nuclear expression of MMR proteins  Her2/adeel negative  No KRAS, NRAS, BRAF mutation.   TMB score 0, low  Oncology fusion event: negative      1.  Colon cancer, stage IIIc diagnosed in October 2020, recurrent disease with stage IV found in March 2023, retroperitoneal lymphadenopathy as well as supraclavicular lymphadenopathy: Patient has decided that she doesn't want to do treatment for the cancer. She states if it is not curable, she does not think the treatment is worth the side effects. Had a hard time with previous treatment.  Has continued to just be on observation.  She did have an issue a couple of months ago with lymphadenopathy causing ureteral obstruction so she now has a ureteral stent.  Has been doing overall okay since then.  She does continue on observation and has no interest in getting chemotherapy for her metastatic cancer.  Patient's tumor marker is slowly increasing which suggest that her cancer is slowly getting worse.  At this time patient is still independent and would like to remain that way for as long as she can.  She does have good support with her family.  And is requesting to only come every 2 to 3 months since it is hard for her to get here because she is no longer wanting to drive.  I told her that would be okay.  She will return in about 2 to 3 months to see Dr. Merrill with repeat labs.  I have a nurse give her a call in a month to see how she is doing.    2.   Ureteral stent: Had this last replaced in November.  We will send a message over to Dr. Perez's team to see if they need to do that again anytime in the next month or 2.    3.  Cancer related pain: Patient takes Norco 1 tablet every night.  This works quite well for her she is tolerating this quite well.  She has tried other medications and is not tolerate them as well.  She states that she tried the Celebrex and it made her dizzy.  Tylenol was not strong enough and the pain was moderate to severe when she was taking it.  Would recommend patient continuing to take 1 to 2 tablets of Norco as needed for her cancer pain.  No refills needed today.  Patient is taking appropriately.  Tell patient she could try to take 2 extra strength Tylenol in the morning instead of the Celebrex which is with Dr. Merrill that she should try.  She says she did try for a couple of days and it made her feel lightheaded and dizzy so she did not like that.    ECOG Performance    1 - Can't do physically strenuous work, but fully ambyulatory and can do light sedentary work    Distress Screening (within last 30 days)    No data recorded     Pain  Pain Score: Moderate Pain (5)  Pain Loc: Hip    Problem List    Patient Active Problem List   Diagnosis    Hyperlipemia    Tobacco use disorder    Malignant neoplasm of sigmoid colon (H)    Accelerated hypertension    Acute left-sided low back pain with left-sided sciatica    Allergic rhinitis    Anxiety    Calcium pyrophosphate arthropathy of multiple sites    Drug-induced polyneuropathy    Chronic anemia    Colon adenocarcinoma (H)    Colonic diverticular abscess    Coronary atherosclerosis    Disorder of artery    Diverticulitis of colon    Essential hypertension    Idiopathic chronic gout with tophus, unspecified site    Intra-abdominal abscess (H)    Periarticular calcification    Postherpetic neuralgia    Postoperative ileus (H)    Primary osteoarthritis involving multiple joints    Unspecified  visual loss    Raynaud phenomenon    Other chronic pain    Arm pain, right    PVD (peripheral vascular disease)        ______________________________________________________________________________    History of Present Illness    Diagnosis:     1.  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  At initial surgery there was evidence of a small abscess and perforation of the colon.  Pathology showed a invasive moderately differentiated adenocarcinoma. Constricting ulcerated measuring 35 x 25 x 13 mm.  4 of 18 lymph nodes were positive. Mismatch repair intact. There was some proximal colonic dilatation, consistent with obstruction.  Left ovary showed moderately differentiated adenocarcinoma, direct extension.  Focal positive margin at the paraovarian soft tissue margin.    -PET scan on 2/17/2023 shows suspicious for left supraclavicular and retroperitoneal lymph node mets.  -Biopsy done of super clavicular lymph node on the left side that is positive for malignant cells, consistent with moderately differentiated adenocarcinoma of the colon.  HER2/adeel negative.  MMR intact.  CEA slightly elevated at 8.2.     Treatment:     Surgical resection on October 31, 2020.  Adjuvant chemotherapy with FOLFOX was initiated on December 8, 2020.  25% initial dose reduction of all medications. Cycle 10 completed on April 20, 2021.  Observation since then.      Interim History:    Patient is here today for a follow up visit.  Overall she is doing okay.  She says she does feel like her fatigue is getting worse.  She also feels like she does not have much of an appetite.  She says she does force herself to eat because she knows she needs to but she really does not have much desire.  Weight is stable.  She is lost a couple of pounds since the fall but over the last 1 to 2 months it is stable.  She does continue to have a fair amount of pain.  She says that she does not like to take medications in the morning because she  wants to be able to do everything she needs to around the house but then as the day goes on her pain builds and gets to be quite severe by the evening.  At that time she generally takes 1 or 1-1/2 [place] to help with the pain.  Works quite well for her.  She has no problems tolerating this medication.  She has tried other medications and they are not as effective or they caused too many side effects.    Review of Systems    Pertinent items are noted in HPI.    Past History    Past Medical History:   Diagnosis Date    Diverticulitis of colon     Heart murmur     HLD (hyperlipidemia)     HTN (hypertension)     Inguinal hernia without mention of obstruction or gangrene, unilateral or unspecified, (not specified as recurrent) 08/20/2004    RIGHT    Malignant neoplasm of sigmoid colon (H) 11/24/2020    Primary osteoarthritis involving multiple joints 08/02/2019    PVD (peripheral vascular disease)     Renal disease     Stented coronary artery     Ureteral stent present     Ventral hernia, unspecified, without mention of obstruction or gangrene 08/15/2003    easily reduced       PHYSICAL EXAM  BP (!) 172/72 (BP Location: Left arm, Patient Position: Sitting, Cuff Size: Adult Small)   Pulse 71   Temp 97.8  F (36.6  C) (Oral)   Resp 16   Wt 44.8 kg (98 lb 11.2 oz)   SpO2 96%   BMI 18.05 kg/m      GENERAL: no acute distress. Cooperative in conversation. Here with son.   RESP: Regular respiratory rate. No expiratory wheezes   MUSCULOSKELETAL: no bilateral leg swelling  NEURO: non focal. Alert and oriented x3.   PSYCH: within normal limits. No depression or anxiety.  SKIN: exposed skin is dry intact.     Lab Results    No results found for this or any previous visit (from the past week).  Labs were drawn in January.  Patient did not want them done again today.    Lab Results   Component Value Date    GHCEA 30.3 01/10/2025    GHCEA 23.9 09/20/2024    GHCEA 16.0 01/31/2024    GHCEA 15.8 11/01/2023    GHCEA 8.2 02/03/2023     GHCEA 4.2 (H) 12/08/2021    GHCEA 3.9 (H) 09/07/2021    GHCEA 3.7 (H) 06/08/2021    GHCEA 3.8 (H) 11/10/2020    GHCEA 8.3 (H) 10/30/2020   ]    Imaging    No results found.  Total time spent with patient in face to face time, chart review and documentation was 30 minutes.        Signed by: NAVEEN Lanier CNP

## 2025-02-10 NOTE — PROGRESS NOTES
"Oncology Rooming Note    February 10, 2025 1:09 PM   Katya Butler is a 84 year old female who presents for:    Chief Complaint   Patient presents with    Oncology Clinic Visit     Return visit 1 month with lab. Colon adenocarcinoma.     Initial Vitals: BP (!) 172/72 (BP Location: Left arm, Patient Position: Sitting, Cuff Size: Adult Small)   Pulse 71   Temp 97.8  F (36.6  C) (Oral)   Resp 16   Wt 44.8 kg (98 lb 11.2 oz)   SpO2 96%   BMI 18.05 kg/m   Estimated body mass index is 18.05 kg/m  as calculated from the following:    Height as of 1/10/25: 1.575 m (5' 2\").    Weight as of this encounter: 44.8 kg (98 lb 11.2 oz). Body surface area is 1.4 meters squared.  Moderate Pain (5) Comment: Data Unavailable   No LMP recorded. Patient has had a hysterectomy.  Allergies reviewed: Yes  Medications reviewed: Yes    Medications: Medication refills not needed today.  Pharmacy name entered into [place]:    [id number] - [place], [place] - [address] - [address]    Frailty Screening:   Is the patient here for a new oncology consult visit in cancer care? 2. No      Clinical concerns: lower back pain at night 8/10.  Jody Tolentino [facility] was notified.      Isha Amin CMA            "

## 2025-02-10 NOTE — LETTER
"2/10/2025      Katya Butler  [address]  [place] [address]      Dear Colleague,    Thank you for referring your patient, Katya Butler, to the [facility] [place]. Please see a copy of my visit note below.    Oncology Rooming Note    February 10, 2025 1:09 PM   Katya Butler is a 84 year old female who presents for:    Chief Complaint   Patient presents with     Oncology Clinic Visit     Return visit 1 month with lab. Colon adenocarcinoma.     Initial Vitals: BP (!) 172/72 (BP Location: Left arm, Patient Position: Sitting, Cuff Size: Adult Small)   Pulse 71   Temp 97.8  F (36.6  C) (Oral)   Resp 16   Wt 44.8 kg (98 lb 11.2 oz)   SpO2 96%   BMI 18.05 kg/m   Estimated body mass index is 18.05 kg/m  as calculated from the following:    Height as of 1/10/25: 1.575 m (5' 2\").    Weight as of this encounter: 44.8 kg (98 lb 11.2 oz). Body surface area is 1.4 meters squared.  Moderate Pain (5) Comment: Data Unavailable   No LMP recorded. Patient has had a hysterectomy.  Allergies reviewed: Yes  Medications reviewed: Yes    Medications: Medication refills not needed today.  Pharmacy name entered into [facility]:    [id number] - [place], [place] - [address] [facility] [place], [place] - [address]    Frailty Screening:   Is the patient here for a new oncology consult visit in cancer care? 2. No      Clinical concerns: lower back pain at night 8/10.  Jody Tolentino [facility] was notified.      Isha Amin, [facility] Hematology and Oncology Progress Note    Patient: Katya Butler  MRN: [id number]  Date of Service: Feb 10, 2025          Reason for Visit    Chief Complaint   Patient presents with     Oncology Clinic Visit     Return visit 1 month with lab. Colon adenocarcinoma.       Assessment and Plan     Cancer Staging   Colon adenocarcinoma (H)  Staging form: Colon and Rectum, AJCC 8th Edition  - Clinical stage from " 3/13/2023: Stage IVB (cT0, cNX, cM1b) - Signed by Jody Tolentino APRN CNP on 3/20/2023  No loss of nuclear expression of MMR proteins  Her2/adeel negative  No KRAS, NRAS, BRAF mutation.   TMB score 0, low  Oncology fusion event: negative      1.  Colon cancer, stage IIIc diagnosed in October 2020, recurrent disease with stage IV found in March 2023, retroperitoneal lymphadenopathy as well as supraclavicular lymphadenopathy: Patient has decided that she doesn't want to do treatment for the cancer. She states if it is not curable, she does not think the treatment is worth the side effects. Had a hard time with previous treatment.  Has continued to just be on observation.  She did have an issue a couple of months ago with lymphadenopathy causing ureteral obstruction so she now has a ureteral stent.  Has been doing overall okay since then.  She does continue on observation and has no interest in getting chemotherapy for her metastatic cancer.  Patient's tumor marker is slowly increasing which suggest that her cancer is slowly getting worse.  At this time patient is still independent and would like to remain that way for as long as she can.  She does have good support with her family.  And is requesting to only come every 2 to 3 months since it is hard for her to get here because she is no longer wanting to drive.  I told her that would be okay.  She will return in about 2 to 3 months to see Dr. Merrill with repeat labs.  I have a nurse give her a call in a month to see how she is doing.    2.  Ureteral stent: Had this last replaced in November.  We will send a message over to Dr. Perez's team to see if they need to do that again anytime in the next month or 2.    3.  Cancer related pain: Patient takes Norco 1 tablet every night.  This works quite well for her she is tolerating this quite well.  She has tried other medications and is not tolerate them as well.  She states that she tried the Celebrex and it made her  dizzy.  Tylenol was not strong enough and the pain was moderate to severe when she was taking it.  Would recommend patient continuing to take 1 to 2 tablets of Norco as needed for her cancer pain.  No refills needed today.  Patient is taking appropriately.  Tell patient she could try to take 2 extra strength Tylenol in the morning instead of the Celebrex which is with Dr. Merrill that she should try.  She says she did try for a couple of days and it made her feel lightheaded and dizzy so she did not like that.    ECOG Performance    1 - Can't do physically strenuous work, but fully ambyulatory and can do light sedentary work    Distress Screening (within last 30 days)    No data recorded     Pain  Pain Score: Moderate Pain (5)  Pain Loc: Hip    Problem List    Patient Active Problem List   Diagnosis     Hyperlipemia     Tobacco use disorder     Malignant neoplasm of sigmoid colon (H)     Accelerated hypertension     Acute left-sided low back pain with left-sided sciatica     Allergic rhinitis     Anxiety     Calcium pyrophosphate arthropathy of multiple sites     Drug-induced polyneuropathy     Chronic anemia     Colon adenocarcinoma (H)     Colonic diverticular abscess     Coronary atherosclerosis     Disorder of artery     Diverticulitis of colon     Essential hypertension     Idiopathic chronic gout with tophus, unspecified site     Intra-abdominal abscess (H)     Periarticular calcification     Postherpetic neuralgia     Postoperative ileus (H)     Primary osteoarthritis involving multiple joints     Unspecified visual loss     Raynaud phenomenon     Other chronic pain     Arm pain, right     PVD (peripheral vascular disease)        ______________________________________________________________________________    History of Present Illness    Diagnosis:     1.  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  At initial surgery there was evidence of a small abscess and perforation of the colon.   Pathology showed a invasive moderately differentiated adenocarcinoma. Constricting ulcerated measuring 35 x 25 x 13 mm.  4 of 18 lymph nodes were positive. Mismatch repair intact. There was some proximal colonic dilatation, consistent with obstruction.  Left ovary showed moderately differentiated adenocarcinoma, direct extension.  Focal positive margin at the paraovarian soft tissue margin.    -PET scan on 2/17/2023 shows suspicious for left supraclavicular and retroperitoneal lymph node mets.  -Biopsy done of super clavicular lymph node on the left side that is positive for malignant cells, consistent with moderately differentiated adenocarcinoma of the colon.  HER2/adeel negative.  MMR intact.  CEA slightly elevated at 8.2.     Treatment:     Surgical resection on October 31, 2020.  Adjuvant chemotherapy with FOLFOX was initiated on December 8, 2020.  25% initial dose reduction of all medications. Cycle 10 completed on April 20, 2021.  Observation since then.      Interim History:    Patient is here today for a follow up visit.  Overall she is doing okay.  She says she does feel like her fatigue is getting worse.  She also feels like she does not have much of an appetite.  She says she does force herself to eat because she knows she needs to but she really does not have much desire.  Weight is stable.  She is lost a couple of pounds since the fall but over the last 1 to 2 months it is stable.  She does continue to have a fair amount of pain.  She says that she does not like to take medications in the morning because she wants to be able to do everything she needs to around the house but then as the day goes on her pain builds and gets to be quite severe by the evening.  At that time she generally takes 1 or 1-1/2 [place] to help with the pain.  Works quite well for her.  She has no problems tolerating this medication.  She has tried other medications and they are not as effective or they caused too many side  effects.    Review of Systems    Pertinent items are noted in HPI.    Past History    Past Medical History:   Diagnosis Date     Diverticulitis of colon      Heart murmur      HLD (hyperlipidemia)      HTN (hypertension)      Inguinal hernia without mention of obstruction or gangrene, unilateral or unspecified, (not specified as recurrent) 08/20/2004    RIGHT     Malignant neoplasm of sigmoid colon (H) 11/24/2020     Primary osteoarthritis involving multiple joints 08/02/2019     PVD (peripheral vascular disease)      Renal disease      Stented coronary artery      Ureteral stent present      Ventral hernia, unspecified, without mention of obstruction or gangrene 08/15/2003    easily reduced       PHYSICAL EXAM  BP (!) 172/72 (BP Location: Left arm, Patient Position: Sitting, Cuff Size: Adult Small)   Pulse 71   Temp 97.8  F (36.6  C) (Oral)   Resp 16   Wt 44.8 kg (98 lb 11.2 oz)   SpO2 96%   BMI 18.05 kg/m      GENERAL: no acute distress. Cooperative in conversation. Here with son.   RESP: Regular respiratory rate. No expiratory wheezes   MUSCULOSKELETAL: no bilateral leg swelling  NEURO: non focal. Alert and oriented x3.   PSYCH: within normal limits. No depression or anxiety.  SKIN: exposed skin is dry intact.     Lab Results    No results found for this or any previous visit (from the past week).  Labs were drawn in January.  Patient did not want them done again today.    Lab Results   Component Value Date    GHCEA 30.3 01/10/2025    GHCEA 23.9 09/20/2024    GHCEA 16.0 01/31/2024    GHCEA 15.8 11/01/2023    GHCEA 8.2 02/03/2023    GHCEA 4.2 (H) 12/08/2021    GHCEA 3.9 (H) 09/07/2021    GHCEA 3.7 (H) 06/08/2021    GHCEA 3.8 (H) 11/10/2020    GHCEA 8.3 (H) 10/30/2020   ]    Imaging    No results found.  Total time spent with patient in face to face time, chart review and documentation was 30 minutes.        Signed by: NAVEEN Lanier CNP      Again, thank you for allowing me to participate in the  care of your patient.        Sincerely,        NAVEEN Lanier CNP    Electronically signed

## 2025-02-10 NOTE — PROGRESS NOTES
[facility]: Cancer Care                                                                                          Situation: Chart reviewed by RN Care Coordinator.    Background: Patient was seen in clinic today for follow-up regarding colon cancer.     Assessment: On observation, has no interest in getting chemotherapy.     Plan/Recommendations: She is to return in 2-3 months for follow-up.       Signature:  Daria Reyez  RN Care Coordinator  [facility]

## 2025-02-11 ENCOUNTER — TELEPHONE (OUTPATIENT)
Dept: ONCOLOGY | Facility: HOSPITAL | Age: 85
End: 2025-02-11
Payer: COMMERCIAL

## 2025-02-11 NOTE — TELEPHONE ENCOUNTER
Prior Authorization Approval    Medication: HYDROCODONE-ACETAMINOPHEN 5-325 MG PO TABS  Authorization Effective Date: 1/1/2025  Authorization Expiration Date: 2/11/2026  Approved Dose/Quantity: 60/8days  Reference #: [id number]   Insurance Company: [employer] [place] - Phone [phone] Fax [phone]

## 2025-02-24 DIAGNOSIS — G89.29 CHRONIC MIDLINE LOW BACK PAIN WITHOUT SCIATICA: ICD-10-CM

## 2025-02-24 DIAGNOSIS — M54.50 CHRONIC MIDLINE LOW BACK PAIN WITHOUT SCIATICA: ICD-10-CM

## 2025-02-24 DIAGNOSIS — C18.9 COLON ADENOCARCINOMA (H): ICD-10-CM

## 2025-02-24 RX ORDER — HYDROCODONE BITARTRATE AND ACETAMINOPHEN 5; 325 MG/1; MG/1
1-2 TABLET ORAL EVERY 4 HOURS PRN
Qty: 60 TABLET | Refills: 0 | Status: SHIPPED | OUTPATIENT
Start: 2025-02-24

## 2025-02-24 NOTE — TELEPHONE ENCOUNTER
I received a phone call today from Katya.  She is calling to request a refill of her hydrocodone.  She states she takes 1-2/day, mostly at night before bed.  It was last refilled 1/10/2025, #60, no refills.  Will route this request to Jody Tolentino CNP to review and refill.    Radha Mendoza RN on 2/24/2025 at 10:33 AM

## 2025-02-26 ENCOUNTER — LAB REQUISITION (OUTPATIENT)
Dept: LAB | Facility: CLINIC | Age: 85
End: 2025-02-26
Payer: COMMERCIAL

## 2025-02-26 DIAGNOSIS — I10 ESSENTIAL (PRIMARY) HYPERTENSION: ICD-10-CM

## 2025-02-26 PROCEDURE — 80048 BASIC METABOLIC PNL TOTAL CA: CPT | Mod: ORL | Performed by: PHYSICIAN ASSISTANT

## 2025-02-27 ENCOUNTER — PATIENT OUTREACH (OUTPATIENT)
Dept: ONCOLOGY | Facility: HOSPITAL | Age: 85
End: 2025-02-27
Payer: COMMERCIAL

## 2025-02-27 LAB
ANION GAP SERPL CALCULATED.3IONS-SCNC: 13 MMOL/L (ref 7–15)
BUN SERPL-MCNC: 25.5 MG/DL (ref 8–23)
CALCIUM SERPL-MCNC: 9 MG/DL (ref 8.8–10.4)
CHLORIDE SERPL-SCNC: 104 MMOL/L (ref 98–107)
CREAT SERPL-MCNC: 1.58 MG/DL (ref 0.51–0.95)
EGFRCR SERPLBLD CKD-EPI 2021: 32 ML/MIN/1.73M2
GLUCOSE SERPL-MCNC: 124 MG/DL (ref 70–99)
HCO3 SERPL-SCNC: 20 MMOL/L (ref 22–29)
POTASSIUM SERPL-SCNC: 5.1 MMOL/L (ref 3.4–5.3)
SODIUM SERPL-SCNC: 137 MMOL/L (ref 135–145)

## 2025-02-27 NOTE — PROGRESS NOTES
[facility]: Cancer Care                                                                                          Writer called [facility] per the request of Jody SMITH to ask when the patient's next ureteral stent replacement procedure with Dr. Perez was going to be. Per triage nurse at [facility], the patient's next stent replacement procedure will be on March 12th.     Signature:  Any Monzon RN

## 2025-03-06 RX ORDER — CLOPIDOGREL BISULFATE 75 MG/1
75 TABLET ORAL DAILY
OUTPATIENT
Start: 2025-03-06

## 2025-04-07 ENCOUNTER — HOSPITAL ENCOUNTER (INPATIENT)
Facility: HOSPITAL | Age: 85
LOS: 2 days | Discharge: HOME | DRG: 389 | End: 2025-04-09
Attending: EMERGENCY MEDICINE | Admitting: FAMILY MEDICINE
Payer: COMMERCIAL

## 2025-04-07 ENCOUNTER — APPOINTMENT (OUTPATIENT)
Dept: CT IMAGING | Facility: HOSPITAL | Age: 85
DRG: 389 | End: 2025-04-07
Attending: EMERGENCY MEDICINE
Payer: COMMERCIAL

## 2025-04-07 DIAGNOSIS — J44.1 CHRONIC OBSTRUCTIVE PULMONARY DISEASE WITH ACUTE EXACERBATION (H): Primary | ICD-10-CM

## 2025-04-07 DIAGNOSIS — R10.9 RIGHT FLANK PAIN: ICD-10-CM

## 2025-04-07 DIAGNOSIS — C19 COLORECTAL CANCER (H): ICD-10-CM

## 2025-04-07 DIAGNOSIS — K56.2 VOLVULUS (H): ICD-10-CM

## 2025-04-07 DIAGNOSIS — R59.0 INTRA-ABDOMINAL LYMPHADENOPATHY: ICD-10-CM

## 2025-04-07 DIAGNOSIS — Z96.0 URETERAL STENT PRESENT: ICD-10-CM

## 2025-04-07 DIAGNOSIS — R11.0 NAUSEA: ICD-10-CM

## 2025-04-07 LAB
ALBUMIN SERPL BCG-MCNC: 3.8 G/DL (ref 3.5–5.2)
ALBUMIN UR-MCNC: 10 MG/DL
ALP SERPL-CCNC: 96 U/L (ref 40–150)
ALT SERPL W P-5'-P-CCNC: 6 U/L (ref 0–50)
ANION GAP SERPL CALCULATED.3IONS-SCNC: 11 MMOL/L (ref 7–15)
APPEARANCE UR: CLEAR
AST SERPL W P-5'-P-CCNC: 15 U/L (ref 0–45)
BACTERIA #/AREA URNS HPF: ABNORMAL /HPF
BASOPHILS # BLD AUTO: 0 10E3/UL (ref 0–0.2)
BASOPHILS NFR BLD AUTO: 0 %
BILIRUB SERPL-MCNC: <0.2 MG/DL
BILIRUB UR QL STRIP: NEGATIVE
BUN SERPL-MCNC: 23.4 MG/DL (ref 8–23)
CALCIUM SERPL-MCNC: 9.2 MG/DL (ref 8.8–10.4)
CEA SERPL-MCNC: 34 NG/ML
CHLORIDE SERPL-SCNC: 107 MMOL/L (ref 98–107)
COLOR UR AUTO: COLORLESS
CREAT SERPL-MCNC: 1.18 MG/DL (ref 0.51–0.95)
CRP SERPL-MCNC: 8.2 MG/L
EGFRCR SERPLBLD CKD-EPI 2021: 46 ML/MIN/1.73M2
EOSINOPHIL # BLD AUTO: 0.9 10E3/UL (ref 0–0.7)
EOSINOPHIL NFR BLD AUTO: 9 %
ERYTHROCYTE [DISTWIDTH] IN BLOOD BY AUTOMATED COUNT: 17.2 % (ref 10–15)
FLUAV RNA SPEC QL NAA+PROBE: NEGATIVE
FLUBV RNA RESP QL NAA+PROBE: NEGATIVE
GLUCOSE SERPL-MCNC: 112 MG/DL (ref 70–99)
GLUCOSE UR STRIP-MCNC: NEGATIVE MG/DL
HCO3 SERPL-SCNC: 20 MMOL/L (ref 22–29)
HCT VFR BLD AUTO: 24.6 % (ref 35–47)
HGB BLD-MCNC: 7.8 G/DL (ref 11.7–15.7)
HGB UR QL STRIP: ABNORMAL
IMM GRANULOCYTES # BLD: 0.1 10E3/UL
IMM GRANULOCYTES NFR BLD: 1 %
KETONES UR STRIP-MCNC: NEGATIVE MG/DL
LACTATE SERPL-SCNC: 1.1 MMOL/L (ref 0.7–2)
LEUKOCYTE ESTERASE UR QL STRIP: ABNORMAL
LYMPHOCYTES # BLD AUTO: 3.4 10E3/UL (ref 0.8–5.3)
LYMPHOCYTES NFR BLD AUTO: 33 %
MCH RBC QN AUTO: 25.1 PG (ref 26.5–33)
MCHC RBC AUTO-ENTMCNC: 31.7 G/DL (ref 31.5–36.5)
MCV RBC AUTO: 79 FL (ref 78–100)
MONOCYTES # BLD AUTO: 1 10E3/UL (ref 0–1.3)
MONOCYTES NFR BLD AUTO: 10 %
NEUTROPHILS # BLD AUTO: 4.9 10E3/UL (ref 1.6–8.3)
NEUTROPHILS NFR BLD AUTO: 48 %
NITRATE UR QL: NEGATIVE
NRBC # BLD AUTO: 0 10E3/UL
NRBC BLD AUTO-RTO: 0 /100
PH UR STRIP: 5.5 [PH] (ref 5–7)
PLATELET # BLD AUTO: 431 10E3/UL (ref 150–450)
POTASSIUM SERPL-SCNC: 4.6 MMOL/L (ref 3.4–5.3)
PROCALCITONIN SERPL IA-MCNC: 0.08 NG/ML
PROT SERPL-MCNC: 7.2 G/DL (ref 6.4–8.3)
RBC # BLD AUTO: 3.11 10E6/UL (ref 3.8–5.2)
RBC URINE: 81 /HPF
RSV RNA SPEC NAA+PROBE: NEGATIVE
SARS-COV-2 RNA RESP QL NAA+PROBE: NEGATIVE
SODIUM SERPL-SCNC: 138 MMOL/L (ref 135–145)
SP GR UR STRIP: 1.01 (ref 1–1.03)
SQUAMOUS EPITHELIAL: 1 /HPF
UROBILINOGEN UR STRIP-MCNC: NORMAL MG/DL
WBC # BLD AUTO: 10.3 10E3/UL (ref 4–11)
WBC URINE: 8 /HPF

## 2025-04-07 PROCEDURE — 81003 URINALYSIS AUTO W/O SCOPE: CPT | Performed by: STUDENT IN AN ORGANIZED HEALTH CARE EDUCATION/TRAINING PROGRAM

## 2025-04-07 PROCEDURE — 255N000002 HC RX 255 OP 636

## 2025-04-07 PROCEDURE — 36415 COLL VENOUS BLD VENIPUNCTURE: CPT | Performed by: STUDENT IN AN ORGANIZED HEALTH CARE EDUCATION/TRAINING PROGRAM

## 2025-04-07 PROCEDURE — 258N000003 HC RX IP 258 OP 636: Performed by: STUDENT IN AN ORGANIZED HEALTH CARE EDUCATION/TRAINING PROGRAM

## 2025-04-07 PROCEDURE — 258N000003 HC RX IP 258 OP 636: Performed by: FAMILY MEDICINE

## 2025-04-07 PROCEDURE — 80053 COMPREHEN METABOLIC PANEL: CPT | Performed by: STUDENT IN AN ORGANIZED HEALTH CARE EDUCATION/TRAINING PROGRAM

## 2025-04-07 PROCEDURE — 999N000157 HC STATISTIC RCP TIME EA 10 MIN

## 2025-04-07 PROCEDURE — 96374 THER/PROPH/DIAG INJ IV PUSH: CPT

## 2025-04-07 PROCEDURE — 250N000011 HC RX IP 250 OP 636: Mod: JZ | Performed by: FAMILY MEDICINE

## 2025-04-07 PROCEDURE — 99291 CRITICAL CARE FIRST HOUR: CPT | Mod: 25

## 2025-04-07 PROCEDURE — 71250 CT THORAX DX C-: CPT

## 2025-04-07 PROCEDURE — 250N000013 HC RX MED GY IP 250 OP 250 PS 637: Performed by: FAMILY MEDICINE

## 2025-04-07 PROCEDURE — 99223 1ST HOSP IP/OBS HIGH 75: CPT | Mod: FS | Performed by: INTERNAL MEDICINE

## 2025-04-07 PROCEDURE — 99222 1ST HOSP IP/OBS MODERATE 55: CPT

## 2025-04-07 PROCEDURE — 250N000009 HC RX 250

## 2025-04-07 PROCEDURE — 87086 URINE CULTURE/COLONY COUNT: CPT | Performed by: STUDENT IN AN ORGANIZED HEALTH CARE EDUCATION/TRAINING PROGRAM

## 2025-04-07 PROCEDURE — 250N000011 HC RX IP 250 OP 636: Mod: JZ | Performed by: EMERGENCY MEDICINE

## 2025-04-07 PROCEDURE — 83605 ASSAY OF LACTIC ACID: CPT | Performed by: STUDENT IN AN ORGANIZED HEALTH CARE EDUCATION/TRAINING PROGRAM

## 2025-04-07 PROCEDURE — 99223 1ST HOSP IP/OBS HIGH 75: CPT | Mod: AI | Performed by: FAMILY MEDICINE

## 2025-04-07 PROCEDURE — 96361 HYDRATE IV INFUSION ADD-ON: CPT

## 2025-04-07 PROCEDURE — 82378 CARCINOEMBRYONIC ANTIGEN: CPT | Performed by: FAMILY MEDICINE

## 2025-04-07 PROCEDURE — 84145 PROCALCITONIN (PCT): CPT | Performed by: FAMILY MEDICINE

## 2025-04-07 PROCEDURE — 120N000001 HC R&B MED SURG/OB

## 2025-04-07 PROCEDURE — 85025 COMPLETE CBC W/AUTO DIFF WBC: CPT | Performed by: STUDENT IN AN ORGANIZED HEALTH CARE EDUCATION/TRAINING PROGRAM

## 2025-04-07 PROCEDURE — 94640 AIRWAY INHALATION TREATMENT: CPT

## 2025-04-07 PROCEDURE — 36415 COLL VENOUS BLD VENIPUNCTURE: CPT | Performed by: FAMILY MEDICINE

## 2025-04-07 PROCEDURE — 250N000011 HC RX IP 250 OP 636: Performed by: STUDENT IN AN ORGANIZED HEALTH CARE EDUCATION/TRAINING PROGRAM

## 2025-04-07 PROCEDURE — 96375 TX/PRO/DX INJ NEW DRUG ADDON: CPT

## 2025-04-07 PROCEDURE — 87637 SARSCOV2&INF A&B&RSV AMP PRB: CPT | Performed by: FAMILY MEDICINE

## 2025-04-07 PROCEDURE — 250N000011 HC RX IP 250 OP 636

## 2025-04-07 PROCEDURE — 86140 C-REACTIVE PROTEIN: CPT | Performed by: EMERGENCY MEDICINE

## 2025-04-07 PROCEDURE — 250N000009 HC RX 250: Performed by: FAMILY MEDICINE

## 2025-04-07 PROCEDURE — 94640 AIRWAY INHALATION TREATMENT: CPT | Mod: 76

## 2025-04-07 RX ORDER — DIPHENHYDRAMINE HYDROCHLORIDE 50 MG/ML
50 INJECTION, SOLUTION INTRAMUSCULAR; INTRAVENOUS ONCE
Status: COMPLETED | OUTPATIENT
Start: 2025-04-07 | End: 2025-04-07

## 2025-04-07 RX ORDER — AMOXICILLIN 250 MG
1 CAPSULE ORAL 2 TIMES DAILY PRN
Status: DISCONTINUED | OUTPATIENT
Start: 2025-04-07 | End: 2025-04-09 | Stop reason: HOSPADM

## 2025-04-07 RX ORDER — HYDROMORPHONE HCL IN WATER/PF 6 MG/30 ML
0.2 PATIENT CONTROLLED ANALGESIA SYRINGE INTRAVENOUS
Status: DISCONTINUED | OUTPATIENT
Start: 2025-04-07 | End: 2025-04-08

## 2025-04-07 RX ORDER — GUAIFENESIN 600 MG/1
1200 TABLET, EXTENDED RELEASE ORAL 2 TIMES DAILY
Status: DISCONTINUED | OUTPATIENT
Start: 2025-04-07 | End: 2025-04-09 | Stop reason: HOSPADM

## 2025-04-07 RX ORDER — SODIUM CHLORIDE, SODIUM LACTATE, POTASSIUM CHLORIDE, CALCIUM CHLORIDE 600; 310; 30; 20 MG/100ML; MG/100ML; MG/100ML; MG/100ML
INJECTION, SOLUTION INTRAVENOUS CONTINUOUS
Status: DISCONTINUED | OUTPATIENT
Start: 2025-04-07 | End: 2025-04-08

## 2025-04-07 RX ORDER — LIDOCAINE 40 MG/G
CREAM TOPICAL
Status: DISCONTINUED | OUTPATIENT
Start: 2025-04-07 | End: 2025-04-09 | Stop reason: HOSPADM

## 2025-04-07 RX ORDER — HYDRALAZINE HYDROCHLORIDE 10 MG/1
10 TABLET, FILM COATED ORAL EVERY 4 HOURS PRN
Status: DISCONTINUED | OUTPATIENT
Start: 2025-04-07 | End: 2025-04-09 | Stop reason: HOSPADM

## 2025-04-07 RX ORDER — AMOXICILLIN 250 MG
2 CAPSULE ORAL 2 TIMES DAILY PRN
Status: DISCONTINUED | OUTPATIENT
Start: 2025-04-07 | End: 2025-04-09 | Stop reason: HOSPADM

## 2025-04-07 RX ORDER — HYDRALAZINE HYDROCHLORIDE 20 MG/ML
10 INJECTION INTRAMUSCULAR; INTRAVENOUS EVERY 4 HOURS PRN
Status: DISCONTINUED | OUTPATIENT
Start: 2025-04-07 | End: 2025-04-09 | Stop reason: HOSPADM

## 2025-04-07 RX ORDER — METHYLPREDNISOLONE SODIUM SUCCINATE 40 MG/ML
40 INJECTION INTRAMUSCULAR; INTRAVENOUS DAILY
Status: DISCONTINUED | OUTPATIENT
Start: 2025-04-07 | End: 2025-04-08

## 2025-04-07 RX ORDER — CALCIUM CARBONATE 500 MG/1
1000 TABLET, CHEWABLE ORAL 4 TIMES DAILY PRN
Status: DISCONTINUED | OUTPATIENT
Start: 2025-04-07 | End: 2025-04-09 | Stop reason: HOSPADM

## 2025-04-07 RX ORDER — ONDANSETRON 2 MG/ML
4 INJECTION INTRAMUSCULAR; INTRAVENOUS ONCE
Status: COMPLETED | OUTPATIENT
Start: 2025-04-07 | End: 2025-04-07

## 2025-04-07 RX ORDER — NALOXONE HYDROCHLORIDE 0.4 MG/ML
0.4 INJECTION, SOLUTION INTRAMUSCULAR; INTRAVENOUS; SUBCUTANEOUS
Status: DISCONTINUED | OUTPATIENT
Start: 2025-04-07 | End: 2025-04-09 | Stop reason: HOSPADM

## 2025-04-07 RX ORDER — ACETAMINOPHEN 325 MG/1
650 TABLET ORAL EVERY 4 HOURS PRN
Status: DISCONTINUED | OUTPATIENT
Start: 2025-04-07 | End: 2025-04-09 | Stop reason: HOSPADM

## 2025-04-07 RX ORDER — ACETAMINOPHEN 650 MG/1
650 SUPPOSITORY RECTAL EVERY 4 HOURS PRN
Status: DISCONTINUED | OUTPATIENT
Start: 2025-04-07 | End: 2025-04-09 | Stop reason: HOSPADM

## 2025-04-07 RX ORDER — ONDANSETRON 4 MG/1
4 TABLET, ORALLY DISINTEGRATING ORAL EVERY 6 HOURS PRN
Status: DISCONTINUED | OUTPATIENT
Start: 2025-04-07 | End: 2025-04-09 | Stop reason: HOSPADM

## 2025-04-07 RX ORDER — ATORVASTATIN CALCIUM 10 MG/1
10 TABLET, FILM COATED ORAL EVERY EVENING
Status: DISCONTINUED | OUTPATIENT
Start: 2025-04-07 | End: 2025-04-09 | Stop reason: HOSPADM

## 2025-04-07 RX ORDER — METHYLPREDNISOLONE SODIUM SUCCINATE 40 MG/ML
40 INJECTION INTRAMUSCULAR; INTRAVENOUS ONCE
Status: COMPLETED | OUTPATIENT
Start: 2025-04-07 | End: 2025-04-07

## 2025-04-07 RX ORDER — HYDROMORPHONE HYDROCHLORIDE 1 MG/ML
0.5 INJECTION, SOLUTION INTRAMUSCULAR; INTRAVENOUS; SUBCUTANEOUS EVERY 4 HOURS PRN
Status: DISCONTINUED | OUTPATIENT
Start: 2025-04-07 | End: 2025-04-07

## 2025-04-07 RX ORDER — HYDROMORPHONE HCL IN WATER/PF 6 MG/30 ML
0.4 PATIENT CONTROLLED ANALGESIA SYRINGE INTRAVENOUS
Status: DISCONTINUED | OUTPATIENT
Start: 2025-04-07 | End: 2025-04-08

## 2025-04-07 RX ORDER — ALBUTEROL SULFATE 0.83 MG/ML
2.5 SOLUTION RESPIRATORY (INHALATION) EVERY 4 HOURS PRN
Status: DISCONTINUED | OUTPATIENT
Start: 2025-04-07 | End: 2025-04-09 | Stop reason: HOSPADM

## 2025-04-07 RX ORDER — AMLODIPINE BESYLATE 5 MG/1
5 TABLET ORAL 2 TIMES DAILY
Status: DISCONTINUED | OUTPATIENT
Start: 2025-04-07 | End: 2025-04-09 | Stop reason: HOSPADM

## 2025-04-07 RX ORDER — HYDROMORPHONE HYDROCHLORIDE 1 MG/ML
0.5 INJECTION, SOLUTION INTRAMUSCULAR; INTRAVENOUS; SUBCUTANEOUS ONCE
Status: COMPLETED | OUTPATIENT
Start: 2025-04-07 | End: 2025-04-07

## 2025-04-07 RX ORDER — NALOXONE HYDROCHLORIDE 0.4 MG/ML
0.2 INJECTION, SOLUTION INTRAMUSCULAR; INTRAVENOUS; SUBCUTANEOUS
Status: DISCONTINUED | OUTPATIENT
Start: 2025-04-07 | End: 2025-04-09 | Stop reason: HOSPADM

## 2025-04-07 RX ORDER — AMOXICILLIN 250 MG
2 CAPSULE ORAL AT BEDTIME
Status: DISCONTINUED | OUTPATIENT
Start: 2025-04-07 | End: 2025-04-09 | Stop reason: HOSPADM

## 2025-04-07 RX ORDER — MORPHINE SULFATE 4 MG/ML
4 INJECTION, SOLUTION INTRAMUSCULAR; INTRAVENOUS ONCE
Status: DISCONTINUED | OUTPATIENT
Start: 2025-04-07 | End: 2025-04-07

## 2025-04-07 RX ORDER — AMOXICILLIN 250 MG
2 CAPSULE ORAL AT BEDTIME
COMMUNITY

## 2025-04-07 RX ORDER — ONDANSETRON 2 MG/ML
4 INJECTION INTRAMUSCULAR; INTRAVENOUS EVERY 6 HOURS PRN
Status: DISCONTINUED | OUTPATIENT
Start: 2025-04-07 | End: 2025-04-09 | Stop reason: HOSPADM

## 2025-04-07 RX ORDER — IPRATROPIUM BROMIDE AND ALBUTEROL SULFATE 2.5; .5 MG/3ML; MG/3ML
3 SOLUTION RESPIRATORY (INHALATION)
Status: COMPLETED | OUTPATIENT
Start: 2025-04-07 | End: 2025-04-08

## 2025-04-07 RX ADMIN — SODIUM CHLORIDE, SODIUM LACTATE, POTASSIUM CHLORIDE, AND CALCIUM CHLORIDE 1000 ML: .6; .31; .03; .02 INJECTION, SOLUTION INTRAVENOUS at 05:05

## 2025-04-07 RX ADMIN — GUAIFENESIN 1200 MG: 600 TABLET ORAL at 11:05

## 2025-04-07 RX ADMIN — METHYLPREDNISOLONE SODIUM SUCCINATE 40 MG: 40 INJECTION, POWDER, FOR SOLUTION INTRAMUSCULAR; INTRAVENOUS at 16:55

## 2025-04-07 RX ADMIN — METHYLPREDNISOLONE SODIUM SUCCINATE 40 MG: 40 INJECTION INTRAMUSCULAR; INTRAVENOUS at 11:02

## 2025-04-07 RX ADMIN — HYDROMORPHONE HYDROCHLORIDE 0.4 MG: 0.2 INJECTION, SOLUTION INTRAMUSCULAR; INTRAVENOUS; SUBCUTANEOUS at 16:53

## 2025-04-07 RX ADMIN — DIATRIZOATE MEGLUMINE AND DIATRIZOATE SODIUM 150 ML: 660; 100 LIQUID ORAL; RECTAL at 18:14

## 2025-04-07 RX ADMIN — SENNOSIDES AND DOCUSATE SODIUM 2 TABLET: 50; 8.6 TABLET ORAL at 21:30

## 2025-04-07 RX ADMIN — AMLODIPINE BESYLATE 5 MG: 5 TABLET ORAL at 11:29

## 2025-04-07 RX ADMIN — ATORVASTATIN CALCIUM 10 MG: 10 TABLET, FILM COATED ORAL at 21:30

## 2025-04-07 RX ADMIN — HYDROMORPHONE HYDROCHLORIDE 0.5 MG: 1 INJECTION, SOLUTION INTRAMUSCULAR; INTRAVENOUS; SUBCUTANEOUS at 05:05

## 2025-04-07 RX ADMIN — DIPHENHYDRAMINE HYDROCHLORIDE 50 MG: 50 INJECTION, SOLUTION INTRAMUSCULAR; INTRAVENOUS at 16:51

## 2025-04-07 RX ADMIN — HYDROMORPHONE HYDROCHLORIDE 0.5 MG: 1 INJECTION, SOLUTION INTRAMUSCULAR; INTRAVENOUS; SUBCUTANEOUS at 09:15

## 2025-04-07 RX ADMIN — SODIUM CHLORIDE, SODIUM LACTATE, POTASSIUM CHLORIDE, AND CALCIUM CHLORIDE: .6; .31; .03; .02 INJECTION, SOLUTION INTRAVENOUS at 11:30

## 2025-04-07 RX ADMIN — GUAIFENESIN 1200 MG: 600 TABLET ORAL at 21:30

## 2025-04-07 RX ADMIN — AMLODIPINE BESYLATE 5 MG: 5 TABLET ORAL at 21:30

## 2025-04-07 RX ADMIN — IPRATROPIUM BROMIDE AND ALBUTEROL SULFATE 3 ML: .5; 3 SOLUTION RESPIRATORY (INHALATION) at 13:49

## 2025-04-07 RX ADMIN — IPRATROPIUM BROMIDE AND ALBUTEROL SULFATE 3 ML: .5; 3 SOLUTION RESPIRATORY (INHALATION) at 20:06

## 2025-04-07 RX ADMIN — ONDANSETRON 4 MG: 2 INJECTION, SOLUTION INTRAMUSCULAR; INTRAVENOUS at 05:02

## 2025-04-07 ASSESSMENT — ACTIVITIES OF DAILY LIVING (ADL)
ADLS_ACUITY_SCORE: 58
DEPENDENT_IADLS:: CLEANING;COOKING;LAUNDRY;SHOPPING;MEAL PREPARATION;TRANSPORTATION
ADLS_ACUITY_SCORE: 58
ADLS_ACUITY_SCORE: 62
ADLS_ACUITY_SCORE: 58
ADLS_ACUITY_SCORE: 62
ADLS_ACUITY_SCORE: 58
ADLS_ACUITY_SCORE: 58

## 2025-04-07 ASSESSMENT — COLUMBIA-SUICIDE SEVERITY RATING SCALE - C-SSRS
6. HAVE YOU EVER DONE ANYTHING, STARTED TO DO ANYTHING, OR PREPARED TO DO ANYTHING TO END YOUR LIFE?: NO
1. IN THE PAST MONTH, HAVE YOU WISHED YOU WERE DEAD OR WISHED YOU COULD GO TO SLEEP AND NOT WAKE UP?: NO
2. HAVE YOU ACTUALLY HAD ANY THOUGHTS OF KILLING YOURSELF IN THE PAST MONTH?: NO

## 2025-04-07 NOTE — ED NOTES
"[facility] ED Handoff Report    ED Chief Complaint: Flank pain    ED Diagnosis:  (K56.2) Volvulus (H)  Comment: Plan to go to CT  Plan:     (R10.9) Right flank pain  Comment:   Plan:     (R11.0) Nausea  Comment:   Plan:     (C19) Colorectal cancer (H)  Comment:   Plan:        PMH:    Past Medical History:   Diagnosis Date    Diverticulitis of colon     Heart murmur     HLD (hyperlipidemia)     HTN (hypertension)     Inguinal hernia without mention of obstruction or gangrene, unilateral or unspecified, (not specified as recurrent) 08/20/2004    RIGHT    Malignant neoplasm of sigmoid colon (H) 11/24/2020    Primary osteoarthritis involving multiple joints 08/02/2019    PVD (peripheral vascular disease)     Renal disease     Stented coronary artery     Ureteral stent present     Ventral hernia, unspecified, without mention of obstruction or gangrene 08/15/2003    easily reduced        Code Status:  No CPR- Do NOT Intubate     Falls Risk: Yes Band: Applied    Current Living Situation/Residence: lives alone     Elimination Status: Continent: Yes     Activity Level: SBA w/ walker    Patients Preferred Language:  English     Needed: No    Vital Signs:  /59   Pulse 79   Temp 98.1  F (36.7  C)   Resp 20   Ht 1.575 m (5' 2\")   Wt 43.1 kg (95 lb)   SpO2 95%   BMI 17.38 kg/m       Cardiac Rhythm: NSR    Pain Score: 0/10    Is the Patient Confused:  No    Last Food or Drink: 04/07/25 at sips    Focused Assessment:  Pt will need medicating prior to getting contrast. Plan is to give Diphenhydramine and solumedrol at 1645. Then give Gastrografin. Once Gastrografin given call radiology to schedule scan. Pt has hx of colon cancer. Alert et oriented, up with SBA to commode.    Tests Performed: Done: Imaging    Treatments Provided:  IVFs    Family Dynamics/Concerns: No    Family Updated On Visitor Policy: Yes    Plan of Care Communicated to Family: Yes    Who Was Updated about Plan of Care: " sons    Belongings Checklist Done and Signed by Patient: Yes    Medications sent with patient: Gastrografin    Covid: asymptomatic , negative    Additional Information:     Kell Vargas RN 4/7/2025 3:59 PM

## 2025-04-07 NOTE — PHARMACY-ADMISSION MEDICATION HISTORY
Pharmacist Admission Medication History    Admission medication history is complete. The information provided in this note is only as accurate as the sources available at the time of the update.    Information Source(s): Patient and CareEverywhere/SureScripts via in-person    Pertinent Information: Patient reports she stopped taking clopidogrel without informing her provider in early March, indication is stenting of the left lower extremity vessels. Educated patient on importance of letting her provider know she stopped this medication.    Changes made to PTA medication list:  Added: None  Deleted: APAP, celecoxib, clopidogrel, Bactrim, Miralax  Changed: None    Allergies reviewed with patient and updates made in EHR: yes    Medication History Completed By: Kaity Holt [facility] 4/7/2025 8:15 AM    PTA Med List   Medication Sig Last Dose/Taking    amLODIPine (NORVASC) 5 MG tablet Take 5 mg by mouth 2 times daily. 4/6/2025 Evening    HYDROcodone-acetaminophen (NORCO) 5-325 MG tablet Take 1-2 tablets by mouth every 4 hours as needed for severe pain. 4/6/2025 Evening    lidocaine-prilocaine (EMLA) 2.5-2.5 % external cream Apply topically as needed for moderate pain Apply to painful skin Unknown    lovastatin (MEVACOR) 40 MG tablet Take 40 mg by mouth at bedtime. 4/5/2025 Evening    senna-docusate (SENOKOT-S/PERICOLACE) 8.6-50 MG tablet Take 2 tablets by mouth at bedtime. 4/6/2025

## 2025-04-07 NOTE — ED PROVIDER NOTES
"EMERGENCY DEPARTMENT ENCOUNTER      NAME: Katya Butler  AGE: 84 year old female  YOB: 1940  EVALUATION DATE & TIME: 4/7/2025  4:46 AM    ED PROVIDER: Leanne Anderson MD    Chief Complaint   Patient presents with    Flank Pain       FINAL IMPRESSION  1. Volvulus (H)    2. Right flank pain    3. Nausea    4. Colorectal cancer (H)    5. Ureteral stent present    6. Intra-abdominal lymphadenopathy        MEDICAL DECISION MAKING   Katya Butler is a 84 year old female who presents for evaluation of flank pain.  Patient reports onset of symptoms yesterday morning with progressive worsening since.  She locates discomfort to the right flank area with extension around it to her lateral abdomen on the same side.  She has been taking her Vicodin at home and right flank and describes it as sharp in quality.  Every 4 hours but reports that this \"does not touch the pain.\"  Patient notes that she has a left-sided ureteral stent and she did have similar symptoms prior to that needing to be placed.  The stent on the left side was replaced on 3/6/2024.  She has had some urinary frequency and dysuria but has not noticed any blood.  Patient denies associated fever, diarrhea, cough, difficulty breathing, abdominal pain.  Records reviewed.    Patient has a history of colon cancer, hyperlipidemia, diverticular abscess, diverticulitis, hypertension, anemia, left ureteral obstruction and stent placement.  She follows with urology and underwent cystoscopy, left ureteroscopy with stent exchange on 3/6/2025 with Dr. Perez of urology team.  Appears that when she last saw oncology, she had elected not to pursue any further treatment of colon cancer and was aware that her disease is overall worsening.    I considered a broad differential including but not limited to appendicitis, diverticulitis, ureterolithiasis, pyelonephritis, cystitis, hernia, small bowel obstruction/ileus, perforation, AAA, mesenteric ischemia, malignancy, " "stent occlusion or malfunction, muscular strain/sprain. I have lower suspicion for symptoms secondary to cardiopulmonary or vascular process given history and exam. Also considered ovarian torsion, ovarian cyst, TOA, PID, and pain secondary to vagina/cervical infection but do feel gynecologic/ovarian etiology less likely.  Discussed options for workup with the patient. We agreed on plan for labs, CT, UA, and management of symptoms with IV analgesic/antiemetic.    Shortly after my initial evaluation of the patient, her son called and reported that he is concerned she might have pneumonia.  He notes that when she was intubated recently, they had some difficulty and since then, she has had \"gurgling\" respirations from time to time as well as a cough.  Patient herself did not complain of any respiratory symptoms but out of abundance of caution, will add on CT of the chest.     CMP notable for creatinine of 1.28, improved from most recent and baseline.  No other significant electrolyte derangement, acidosis, hepatobiliary disease.  CBC with hemoglobin of 7.8, appears decreased from baseline and most recent.  Patient not have any report of active bleeding so we will plan to continue monitoring.  CRP elevated at 8.2 but of unclear etiology.  Lactate within normal limits, less likely end-organ ischemia or systemic infectious process.  CT abdomen/pelvis showed evidence of a volvulus in the mid/lower abdominal small bowel and I do suspect this is at least contributing to her symptoms.  Additionally, there is evidence of progression of colon cancer.    I rechecked the patient and updated her with results.  I again recommended admission, which she was agreeable to.  I discussed the case with hospitalist who agreed to facilitate admission.  I also discussed the case with Dr. Covarrubias with general surgery team.  He recommended involving oncology prior to planning any interventions for the obstruction given patient likely needs a " conversation regarding goals of care. I did page oncology team but did not hear back from them prior to the end of my shift so hopefully admitting team can connect with them to discuss next steps.        Additional Considerations in MDM  History:  Supplemental history from: EMS, son  External Record(s) reviewed: Cystoscopy and ureteroscopy with stent exchange 3/6/2025, admission 11/21/2024, oncology visit 2/10/2025    Work Up:  Chart documentation includes differential diagnoses considered and any EKGs or imaging independently interpreted as specified above.   In additional to work up documented, I considered additional advanced imaging and laboratory workup but deferred after shared decision making conversation with patient/family    External Consultation(s):  Discussion of management with another provider as documented above and in ED course     Chronic Illness(es):  Care impacted by chronic illness(es):  colon cancer, hyperlipidemia, diverticular abscess, diverticulitis, hypertension, anemia, left ureteral obstruction    Disposition considerations: Admit.    MIPS: Not Applicable     Sepsis/STEMI/Stroke: None    Critical care: 50 minutes excluding separately billable procedures.  Includes bedside management, time reviewing test results, review of records, discussing the case with staff, documenting the medical record and time spent with family members (or surrogate decision makers) discussing specific treatment issues.     ED COURSE  4:48 AM  I met with the patient to obtain history and perform exam. We discussed plans for workup and management here.    6:40 AM I rechecked the patient.   6:49 AM I discussed the case with Dr. Rivera, hospitalist.   7:02 AM I discussed the case with Dr. Covarrubias, general surgery  7:20 AM Oncology paged.   8:10 AM Oncology has now been paged 3x but have not yet heard from them. Will continue to attempt to reach.     MEDICATIONS GIVEN IN THE ED  Medications   ondansetron (ZOFRAN)  "injection 4 mg (4 mg Intravenous $Given 4/7/25 0502)   lactated ringers BOLUS 1,000 mL (0 mLs Intravenous Stopped 4/7/25 0721)   HYDROmorphone (PF) (DILAUDID) injection 0.5 mg (0.5 mg Intravenous $Given 4/7/25 0520)       NEW PRESCRIPTIONS STARTED AT TODAY'S VISIT  New Prescriptions    No medications on file          =================================================================    HPI:    Use of : N/A      Katya Butler is a 84 year old female who presents for evaluation of right flank pain.  This began yesterday morning and has been progressively worsening since onset.  It extends around to her lateral abdomen on the same side.  She has been taking her Vicodin at home every 4 hours but reports that this \"does not touch the pain.\"  Patient notes that she has a left-sided ureteral stent and she did have similar symptoms prior to that needing to be placed.  The stent on the left side was replaced on 3/6/2024.  She has had some urinary frequency and dysuria but has not noticed any blood.  Patient denies associated fever, diarrhea, cough, difficulty breathing, abdominal pain.  Records reviewed.      RELEVANT HISTORY, MEDICATIONS, & ALLERGIES   Past medical history, surgical history, family history, medications, and allergies reviewed and pertinent noted in HPI.    REVIEW OF SYSTEMS:  A complete review of systems was performed with pertinent positives and negatives noted in the HPI.     PHYSICAL EXAM:    Vitals: /58   Pulse 68   Temp 98.1  F (36.7  C)   Resp 20   Ht 1.575 m (5' 2\")   Wt 43.1 kg (95 lb)   SpO2 94%   BMI 17.38 kg/m     General: Alert and interactive, uncomfortable appearing but in no acute distress.   HENT: Atraumatic. Full AROM of neck. MMM.  Cardiovascular: Regular rate and rhythm.   Chest/Pulmonary: Normal work of breathing. Speaking in complete sentences. Lungs CTAB. No chest wall tenderness or deformities.  Abdomen: Soft, nondistended. TTP to R lateral abdomen without " guarding or rebound.  Back: TTP right flank. No midline tenderness.   Extremities: Normal AROM of all major joints.  Skin: Warm and dry. Normal skin color.   Neuro: Speech clear. CNs grossly intact. Moves all extremities spontaneously.   Psych: Normal affect/mood, cooperative, memory appropriate.      LAB  Labs Ordered and Resulted from Time of ED Arrival to Time of ED Departure   COMPREHENSIVE METABOLIC PANEL - Abnormal       Result Value    Sodium 138      Potassium 4.6      Carbon Dioxide (CO2) 20 (*)     Anion Gap 11      Urea Nitrogen 23.4 (*)     Creatinine 1.18 (*)     GFR Estimate 46 (*)     Calcium 9.2      Chloride 107      Glucose 112 (*)     Alkaline Phosphatase 96      AST 15      ALT 6      Protein Total 7.2      Albumin 3.8      Bilirubin Total <0.2     CBC WITH PLATELETS AND DIFFERENTIAL - Abnormal    WBC Count 10.3      RBC Count 3.11 (*)     Hemoglobin 7.8 (*)     Hematocrit 24.6 (*)     MCV 79      MCH 25.1 (*)     MCHC 31.7      RDW 17.2 (*)     Platelet Count 431      % Neutrophils 48      % Lymphocytes 33      % Monocytes 10      % Eosinophils 9      % Basophils 0      % Immature Granulocytes 1      NRBCs per 100 WBC 0      Absolute Neutrophils 4.9      Absolute Lymphocytes 3.4      Absolute Monocytes 1.0      Absolute Eosinophils 0.9 (*)     Absolute Basophils 0.0      Absolute Immature Granulocytes 0.1      Absolute NRBCs 0.0     CRP INFLAMMATION - Abnormal    CRP Inflammation 8.20 (*)    LACTIC ACID WHOLE BLOOD WITH 1X REPEAT IN 2 HR WHEN >2 - Normal    Lactic Acid, Initial 1.1     ROUTINE UA WITH MICROSCOPIC REFLEX TO CULTURE       RADIOLOGY  CT Chest Abdomen Pelvis w/o Contrast   Final Result   IMPRESSION:      1.  Sharp transition point in the mid/lower abdominal small bowel with swirling morphology of adjacent mesenteric vessels, compatible with volvulus possibly from adhesion. However, the upstream small bowel does not appear significantly distended.      2.  Mildly asymmetric wall  thickening of the right colon with subtle adjacent fat stranding and multiple small pericolonic lymph nodes, not well evaluated on this noncontrast enhanced study and nonspecific for colitis versus colonic malignancy. At    minimum, short-term follow-up CT should be performed for monitoring, preferably with contrast if renal function permissible.      3.  Increased size of retroperitoneal soft tissue encasing the stented left ureter, compatible with progression of metastatic disease. Mild residual hydronephrosis in the left renal collecting system is significantly improved from most recent prior.      4.  Scattered new pulmonary nodules in the bilateral upper lobes with new mediastinal/left supraclavicular lymphadenopathy, suspicious for new metastatic disease.      5.  Stable indeterminate calcific focus in the central liver.      6.  New 1 mm nonobstructing left renal stone.      7.  Stable mild emphysema, 3.3 cm infrarenal abdominal aortic aneurysm and right perineal hernia containing small portions of the urinary bladder and rectum.            Leanne Anderson M.D.  Emergency Medicine  [facility] EMERGENCY DEPARTMENT  [facility] [facility] [id number] [phone]  Dept: [phone]     Leanne Anderson MD  04/07/25 0554       Leanne Anderson MD  04/07/25 7709       Leanne Anderson MD  04/08/25 4057

## 2025-04-07 NOTE — CONSULTS
Care Management Initial Consult    General Information  Assessment completed with: PatientKatya via phone as she's currently on Special Precautions in the ED.   Type of CM/SW Visit: Initial Assessment    Primary Care Provider verified and updated as needed:     Readmission within the last 30 days: no previous admission in last 30 days      Reason for Consult: discharge planning  Advance Care Planning: Advance Care Planning Reviewed: no concerns identified  no ACD's in chart       Communication Assessment  Patient's communication style: spoken language (English or Bilingual)             Cognitive  Cognitive/Neuro/Behavioral: WDL                      Living Environment:   People in home: child(candi), adult  Son Poncho  Current living Arrangements: town home      Able to return to prior arrangements:    Living Arrangement Comments: son Poncho now lives with her    Family/Social Support:  Care provided by: self, child(candi)  Provides care for: no one  Marital Status: Single  Support system: Children, Friend, Neighbor          Description of Support System: Supportive    Support Assessment: Patient communicates needs well met    Current Resources:   Patient receiving home care services: No        Community Resources: None  Equipment currently used at home:  none  Supplies currently used at home:  none    Employment/Financial:  Employment Status: retired     Employment/ Comments: no  background  Financial Concerns: none           Does the patient's insurance plan have a 3 day qualifying hospital stay waiver?  No    Lifestyle & Psychosocial Needs:  Social Drivers of Health     Food Insecurity: Low Risk  (8/21/2024)    Food Insecurity     Within the past 12 months, did you worry that your food would run out before you got money to buy more?: No     Within the past 12 months, did the food you bought just not last and you didn t have money to get more?: No   Depression: Not at risk (5/15/2023)    PHQ-2     PHQ-2  Score: 0   Housing Stability: Low Risk  (8/21/2024)    Housing Stability     Do you have housing? : Yes     Are you worried about losing your housing?: No   Tobacco Use: Medium Risk (4/7/2025)    Patient History     Smoking Tobacco Use: Former     Smokeless Tobacco Use: Never     Passive Exposure: Not on file   Financial Resource Strain: Low Risk  (8/21/2024)    Financial Resource Strain     Within the past 12 months, have you or your family members you live with been unable to get utilities (heat, electricity) when it was really needed?: No   Alcohol Use: Not on file   Transportation Needs: Low Risk  (8/21/2024)    Transportation Needs     Within the past 12 months, has lack of transportation kept you from medical appointments, getting your medicines, non-medical meetings or appointments, work, or from getting things that you need?: No   Physical Activity: Not on file   Interpersonal Safety: Low Risk  (11/21/2024)    Interpersonal Safety     Do you feel physically and emotionally safe where you currently live?: Yes     Within the past 12 months, have you been hit, slapped, kicked or otherwise physically hurt by someone?: No     Within the past 12 months, have you been humiliated or emotionally abused in other ways by your partner or ex-partner?: No   Stress: Not on file   Social Connections: Not on file   Health Literacy: Not on file       Functional Status:  Prior to admission patient needed assistance:   Dependent ADLs:: Independent  Dependent IADLs:: Cleaning, Cooking, Laundry, Shopping, Meal Preparation, Transportation (son Poncho is primarily responsible for IADLs, she helps as she's able.)       Mental Health Status:  Mental Health Status: No Current Concerns       Chemical Dependency Status:  Chemical Dependency Status: No Current Concerns             Values/Beliefs:  Spiritual, Cultural Beliefs, Protestant Practices, Values that affect care: no               Discussed  Partnership in Safe Discharge Planning   "document with patient/family: No    Additional Information:  Still on special precautions here in the ED so called and spoke with Katya on the phone to complete this assessment. She tells me she lives at her [facility] and her son Poncho lives with her. She feels sufficiently supported by her sons and her friends. She does not currently utilize any community resources or home care. She's independent in her ADLs but her son Poncho takes care of most of her IADLs. Her mobility is impacted by this pain she's experiencing so when I asked her if she feels like she'd be able to discharge home once medically ready she said, \"well I guess I just don't know- I'm going to have to see how this pain works out.\"     She was  and upbeat and appreciated my call. Care Management will continue to follow.       Next Steps: continue to follow to evaluate for discharge needs.     Cata Ochoa RN  [facility] ED Care Manager  Desk Phone #: [phone]  Cell #: [phone]        "

## 2025-04-07 NOTE — H&P
[facility]    History and Physical - Hospitalist Service       Date of Admission:  4/7/2025    Assessment & Plan      Ktaya Butler is a 84 year old female with history of known advanced stage IV colon cancer coming into the hospital with volvulus, suspected from adhesion    Volvulus  --- Likely cause acute right sided abdominal pain  --- Suspect related to known underlying malignancy  --- Lactate normal  -- Admit, keep n.p.o.  --- General Surgery consultation  --- IV pain meds and IV fluids    Subacute cough  Metastatic colon cancer with lung mets  Suspected COPD exacerbation  --- Failed 2 rounds of antibiotics with PCP per patient report  --- CT imaging shows emphysema, multiple solid nodules new in bilateral upper lobes.  Mild atelectasis without consolidation or pleural effusion.  --- Get procalcitonin  -- Start prednisone burst when able to gake po -solumedrol for now  --- Nebs as needed    Metastatic colon cancer  --- Follows with Dr. Merrill.  Recent oncology notes reviewed  --- Recheck CEA  --- Has had conversations of hospice and I did bring up briefly today  --- Consider oncology consult. (She wants to see)  ---patient has been clear on not wanting further cancer treatment and confirms that again today  --- DNR status    Normocytic anemia  --- Suspect anemia of chronic disease, anemia related to cancer  --- No symptoms of bleeding  --- Recheck in the morning    CKD  1 mm left renal stone  Abnormal UA  --- Renal function stable/improved from a baseline which appears around 1.5  --- No evidence hydronephrosis.  Stone nonobstructing  --- Await Ucx; no clear evidence infection    PAD  --- Patient self discontinued Plavix.  Has been on due to stenting of left lower extremity vessels.    Hypertension  --- Continue home amlodipine with hold parameters    Hyperlipidemia--continue home statin            Diet: NPO for Medical/Clinical Reasons Except for: Meds, Ice Chips  DVT Prophylaxis:  "Pneumatic Compression Devices  Lima Catheter: Not present  Lines: PRESENT             Cardiac Monitoring: None  Code Status: No CPR- Do NOT Intubate    Clinically Significant Risk Factors Present on Admission                   # Hypertension: Noted on problem list      # Anemia: based on hgb <11       # Cachexia: Estimated body mass index is 17.38 kg/m  as calculated from the following:    Height as of this encounter: 1.575 m (5' 2\").    Weight as of this encounter: 43.1 kg (95 lb).         # Financial/Environmental Concerns:           Disposition Plan     Medically Ready for Discharge:            Vicki Bhatia MD  Hospitalist Service  [facility]  Securely message with [employer] (more info)  Text page via [employer] Paging/Directory     ______________________________________________________________________    Chief Complaint   Right-sided abdominal pain    History is obtained from the patient    History of Present Illness   Katya Butler is a 84 year old female with a known history of stage IV colon cancer declining further treatment who came into the emergency room department for further workup and evaluation of a 1 day history of worsening right sided abdominal pain.  Patient states that she was trying to make potato salad yesterday morning when she began having intermittent but severe waves of right sided abdominal pain.  This is different than the previous left-sided abdominal pain she had had which was felt secondary to ureteral obstruction causing stent from malignant lymphadenopathy.  Throughout the course of the day waves the pain became so severe.  She tried the pain medication, Norco, that she has through her oncologist but pain was relenting.  She came into the emergency department where she was found to have a volvulus and she is being admitted.    She admits less appetite less energy.  States she is frustrated with what she knows are more cancer symptoms.  Does still follow with " oncology for observation.  Lives independently but her son is there as well.    She denies vomiting.  Had normal bowel movement yesterday.  Is most frustrated with an ongoing cough that she has had despite 2 rounds of antibiotics as an outpatient.  Was going to be seen at [facility] for that today.  History previous tobacco abuse      Past Medical History    Past Medical History:   Diagnosis Date    Diverticulitis of colon     Heart murmur     HLD (hyperlipidemia)     HTN (hypertension)     Inguinal hernia without mention of obstruction or gangrene, unilateral or unspecified, (not specified as recurrent) 08/20/2004    RIGHT    Malignant neoplasm of sigmoid colon (H) 11/24/2020    Primary osteoarthritis involving multiple joints 08/02/2019    PVD (peripheral vascular disease)     Renal disease     Stented coronary artery     Ureteral stent present     Ventral hernia, unspecified, without mention of obstruction or gangrene 08/15/2003    easily reduced       Past Surgical History   Past Surgical History:   Procedure Laterality Date    COMBINED CYSTOSCOPY, RETROGRADES, EXCHANGE STENT URETER(S) Left 11/21/2024    Procedure: CYSTOSCOPY WITH LEFT URETERAL STENT EXCHANGE AND RETROGRADE PYELOGRAM;  Surgeon: Galo Perez MD;  Location: [facility] OR    COMBINED CYSTOSCOPY, RETROGRADES, URETEROSCOPY, INSERT STENT Left 08/22/2024    Procedure: CYSTOSCOPY, WITH LEFT RETROGRADE PYELOGRAM AND LEFT STENT INSERTION;  Surgeon: Galo Perez MD;  Location: [facility] OR    EYE SURGERY      GI SURGERY      HERNIA REPAIR      HERNIA REPAIR, INGUINAL RT/LT  02/01/2005    [facility]    HYSTERECTOMY      IR AORTIC ARCH 4 VESSEL ANGIOGRAM  10/13/2009    IR AORTIC ARCH 4 VESSEL ANGIOGRAM  03/15/2010    IR MISCELLANEOUS PROCEDURE  05/20/2008    IR MISCELLANEOUS PROCEDURE  01/30/2009    IR MISCELLANEOUS PROCEDURE  01/30/2009    IR MISCELLANEOUS PROCEDURE  01/30/2009    IR MISCELLANEOUS PROCEDURE  10/13/2009    IR  MISCELLANEOUS PROCEDURE  10/13/2009    IR MISCELLANEOUS PROCEDURE  03/15/2010    LAPAROSCOPIC CYSTECTOMY OVARIAN (ONCOLOGY) Bilateral 06/27/2018    Procedure: DIAGNOSTIC LAPAROSCOPY, RIGHT SALPINGO OOPHORECTOMY, LYSIS OF ADHESIONS, AND PELVIC WASHINGS;  Surgeon: Nneka Arroyo DO;  Location: [facility];  Service:     NE INSJ PRPH CTR VAD W/SUBQ PORT AGE 5 YR/> N/A 12/02/2020    Procedure: Port Placement;  Surgeon: Viry Villegas MD;  Location: [facility];  Service: General    ZZC PART REMOVAL COLON W ANASTOMOSIS N/A 10/31/2020    Procedure: COLECTOMY, SIGMOID, OPEN;  Surgeon: Viry Villegas MD;  Location: [facility];  Service: General    ZZC REMOVAL OF OVARY(S) N/A 10/31/2020    Procedure: OOPHORECTOMY, OPEN;  Surgeon: Viry Villegas MD;  Location: [facility];  Service: General       Prior to Admission Medications   Prior to Admission Medications   Prescriptions Last Dose Informant Patient Reported? Taking?   HYDROcodone-acetaminophen (NORCO) 5-325 MG tablet 4/6/2025 Evening  No Yes   Sig: Take 1-2 tablets by mouth every 4 hours as needed for severe pain.   amLODIPine (NORVASC) 5 MG tablet 4/6/2025 Evening  Yes Yes   Sig: Take 5 mg by mouth 2 times daily.   lidocaine-prilocaine (EMLA) 2.5-2.5 % external cream Unknown  No Yes   Sig: Apply topically as needed for moderate pain Apply to painful skin   lovastatin (MEVACOR) 40 MG tablet 4/5/2025 Evening  Yes Yes   Sig: Take 40 mg by mouth at bedtime.   senna-docusate (SENOKOT-S/PERICOLACE) 8.6-50 MG tablet 4/6/2025  Yes Yes   Sig: Take 2 tablets by mouth at bedtime.      Facility-Administered Medications: None        Review of Systems    The 5 point Review of Systems is negative other than noted in the HPI or here.      Physical Exam   Vital Signs: Temp: 98.1  F (36.7  C)   BP: 119/56 Pulse: 66   Resp: 20 SpO2: 94 %      Weight: 95 lbs 0 oz    General Appearance: Pleasant but anxious appearing female.  No apparent  distress.  Respiratory: Bilateral wheezing heard  Cardiovascular: Regular rate and rhythm  GI: No abdominal tenderness.  She more has right flank tenderness.  No obvious masses that I can palpate  Skin: No significant lower extremity edema  Other: Right flank tenderness.  Neurologically grossly intact without focal deficits appreciated    Medical Decision Making             Data     I have personally reviewed the following data over the past 24 hrs:    10.3  \   7.8 (L)   / 431     138 107 23.4 (H) /  112 (H)   4.6 20 (L) 1.18 (H) \     ALT: 6 AST: 15 AP: 96 TBILI: <0.2   ALB: 3.8 TOT PROTEIN: 7.2 LIPASE: N/A     Procal: N/A CRP: 8.20 (H) Lactic Acid: 1.1         Imaging results reviewed over the past 24 hrs:   Recent Results (from the past 24 hours)   CT Chest Abdomen Pelvis w/o Contrast    Narrative    EXAM: CT CHEST ABDOMEN PELVIS W/O CONTRAST  LOCATION: [facility]  DATE: 4/7/2025    INDICATION: Right flank pain. History of stent on left, possible aspiration pneumonia. History of sigmoid colon cancer.  COMPARISON: CT abdomen pelvis 8/21/2024; CT chest abdomen pelvis 4/17/2024  TECHNIQUE: CT scan of the chest, abdomen, and pelvis was performed without IV contrast. Multiplanar reformats were obtained. Dose reduction techniques were used.   CONTRAST: None.    FINDINGS:   LUNGS AND PLEURA: Stable mild emphysema. Multiple solid nodules in the bilateral upper lobes are new, for example measuring 4 mm in the right upper lobe on series 4 image 70, 5 mm in the left upper lobe on image 66 and up to 4 mm in the left upper lobe   on image 60. Tiny calcified granulomas in the right lower lobe. Scattered mild atelectasis in the bilateral lung bases. No confluent consolidation, pleural effusion or pneumothorax.     MEDIASTINUM/AXILLAE: Mildly enlarged mediastinal and left supraclavicular lymph nodes are new from 4/17/2024, for example measuring 1.8 x 1.4 cm in the right paratracheal station on series 3  image 55. Heart size is normal without pericardial effusion. A   stent is seen in the proximal left subclavian artery. Left chest wall port catheter terminates in the SVC.    CORONARY ARTERY CALCIFICATION: Moderate.    HEPATOBILIARY: Liver is normal in size without surface nodularity. A small irregular calcification measuring 9 x 7 mm in the central liver is unchanged. Gallbladder is normal.    PANCREAS: Normal.    SPLEEN: Normal.    ADRENAL GLANDS: Normal.    KIDNEYS/BLADDER: Stable benign-appearing bilateral renal cysts are visually benign and do not require follow-up. A 1 mm nonobstructing stone seen in the superior pole of the left kidney, new. No right urinary stones. No right hydroureteronephrosis. A   left ureteral stent has been placed with near complete resolution of hydronephrosis on 8/21/2024, now with mild residual fullness of the left renal collecting system. Bladder is normal.    BOWEL: A sharp transition point is seen in the mid/lower abdomen on series 3 image 215, with swelling of the adjacent mesenteric vessels. Small bowel proximal to the transition point is fluid-filled and not significantly distended. Asymmetric wall   thickening of the lateral wall of the right colon at the level of the ileocecal valve (series 3 images 219-230), with ill-defined adjacent fat stranding. No bowel obstruction. A right perineal hernia is again seen with a small portion of the urinary   bladder and a small portion of the rectum herniating through a defect in the right levator ani muscle. No free fluid or free air    LYMPH NODES: A retroperitoneal soft tissue encasing the stent the left ureter has increased in size, for example measuring 3.9 x 3.6 cm in the left para-aortic region on series 3 image 193, previously 3.4 x 2.3 cm. A few subcentimeter lymph nodes seen in   the proximal right colon near the area of mild colonic wall thickening (series 3 image 2:15    VASCULATURE: Advanced aortoiliac atherosclerotic  calcifications. A 3.2 cm AP by 3.3 cm transverse infrarenal abdominal aortic aneurysm is unchanged.    PELVIC ORGANS: Status post hysterectomy. No abnormal adnexal masses.    MUSCULOSKELETAL: Minimal degenerative anterolisthesis of L3 on L4. No suspicious osseous lesions or acute fractures. A chronic fracture deformity again seen in the sternum.        Impression    IMPRESSION:    1.  Sharp transition point in the mid/lower abdominal small bowel with swirling morphology of adjacent mesenteric vessels, compatible with volvulus possibly from adhesion. However, the upstream small bowel does not appear significantly distended.    2.  Mildly asymmetric wall thickening of the right colon with subtle adjacent fat stranding and multiple small pericolonic lymph nodes, not well evaluated on this noncontrast enhanced study and nonspecific for colitis versus colonic malignancy. At   minimum, short-term follow-up CT should be performed for monitoring, preferably with contrast if renal function permissible.    3.  Increased size of retroperitoneal soft tissue encasing the stented left ureter, compatible with progression of metastatic disease. Mild residual hydronephrosis in the left renal collecting system is significantly improved from most recent prior.    4.  Scattered new pulmonary nodules in the bilateral upper lobes with new mediastinal/left supraclavicular lymphadenopathy, suspicious for new metastatic disease.    5.  Stable indeterminate calcific focus in the central liver.    6.  New 1 mm nonobstructing left renal stone.    7.  Stable mild emphysema, 3.3 cm infrarenal abdominal aortic aneurysm and right perineal hernia containing small portions of the urinary bladder and rectum.

## 2025-04-07 NOTE — CONSULTS
General Surgery Consultation  Katya Butler MRN# [id number]   Age/Sex: 84 year old female YOB: 1940     Reason for consult: 1. Volvulus (H)    2. Right flank pain    3. Nausea            Requesting physician: Leanne Anderson MD                   Assessment and Plan:   Assessment:  Katya Butler is a 84yoF with PMHx of stage IV colon cancer, HLD, diverticulitis, HTN with surgical hx of open sigmoidectomy in 10/31/2022 d/t perforated colon cancer who is presenting d/t right flank pain x 1 day, general surgery consulted for c/f SB volvulus.     CT scan revealing mid/lower abdominal SBO with sharp transition associated with swirling of mesenteric vessels c/f volvulus, furthermore notable for mild right asymmetrical right colonic wall thickening with subtle adjacent fat stranding c/f colitis vs malignancy. Labs without hyperlactemia (lactate 1.1) but with mildly elevated CRP measuring 8.2 and anemia (Hgb 7.8) with baseline 8.8. On exam, she is well appearing with reporting right superior gluteal/R flank pain with reassuring abdominal exam. She is without n/v or obstipation. Recommend further evaluation with oral GGC today with plan for premedication d/t contrast allergy.      Plan:  -NPO  -MIVF  -Multimodal pain control  -Oral GGC following premedication with steriod and benadryl  [place] [facility] [place] to follow           Chief Complaint:     Chief Complaint   Patient presents with    Flank Pain        History is obtained from the patient and EMR    HPI:   Katya Butler is a 84yoF with PMHx of stage IV colon cancer, HLD, diverticulitis, HTN with surgical hx of open sigmoidectomy in 10/31/2022 d/t perforated colon cancer who is presenting d/t right flank pain x 1 day.     She reports pain started yesterday morning with right flank pain while in the kitchen making potato salad. States pain with gradual worsening that had been refractory to multiple doses of Norco prompting  presentation. On exam, she points to her right flank and buttock as source of pain who denies recent trauma including falls. She further denies associated fever, chills, n/v, PO intolerance, constipation, diarrhea. Endorses hx of ureteral stents d/t obstructions from LAD and her cancer.     Surgical hx notable for hysterectomy, bilateral inguinal hernia repairs, sigmoid colectomy (10/31/2020) that had been 2/2 perforated colonic mass per chart review. She voices she would like to avoid surgery. Denies use of blood thinning medications.           Past Medical History:     Past Medical History:   Diagnosis Date    Diverticulitis of colon     Heart murmur     HLD (hyperlipidemia)     HTN (hypertension)     Inguinal hernia without mention of obstruction or gangrene, unilateral or unspecified, (not specified as recurrent) 08/20/2004    RIGHT    Malignant neoplasm of sigmoid colon (H) 11/24/2020    Primary osteoarthritis involving multiple joints 08/02/2019    PVD (peripheral vascular disease)     Renal disease     Stented coronary artery     Ureteral stent present     Ventral hernia, unspecified, without mention of obstruction or gangrene 08/15/2003    easily reduced              Past Surgical History:     Past Surgical History:   Procedure Laterality Date    COMBINED CYSTOSCOPY, RETROGRADES, EXCHANGE STENT URETER(S) Left 11/21/2024    Procedure: CYSTOSCOPY WITH LEFT URETERAL STENT EXCHANGE AND RETROGRADE PYELOGRAM;  Surgeon: Galo Perez MD;  [place] [facility] [place]    COMBINED CYSTOSCOPY, RETROGRADES, URETEROSCOPY, INSERT STENT Left 08/22/2024    Procedure: CYSTOSCOPY, WITH LEFT RETROGRADE PYELOGRAM AND LEFT STENT INSERTION;  Surgeon: Galo Perez MD;  [place] [facility] [place] SURGERY      GI SURGERY      HERNIA REPAIR      HERNIA REPAIR, INGUINAL RT/LT  02/01/2005    [facility]    HYSTERECTOMY      IR AORTIC ARCH 4 VESSEL ANGIOGRAM  10/13/2009    IR AORTIC ARCH 4 VESSEL ANGIOGRAM   03/15/2010    IR MISCELLANEOUS PROCEDURE  05/20/2008    IR MISCELLANEOUS PROCEDURE  01/30/2009    IR MISCELLANEOUS PROCEDURE  01/30/2009    IR MISCELLANEOUS PROCEDURE  01/30/2009    IR MISCELLANEOUS PROCEDURE  10/13/2009    IR MISCELLANEOUS PROCEDURE  10/13/2009    IR MISCELLANEOUS PROCEDURE  03/15/2010    LAPAROSCOPIC CYSTECTOMY OVARIAN (ONCOLOGY) Bilateral 06/27/2018    Procedure: DIAGNOSTIC LAPAROSCOPY, RIGHT SALPINGO OOPHORECTOMY, LYSIS OF ADHESIONS, AND PELVIC WASHINGS;  Surgeon: Nneka Arroyo DO;  [place]: [facility] [place];  Service:     [facility] CTR VAD W/SUBQ PORT AGE 5 YR/> N/A 12/02/2020    Procedure: Port Placement;  Surgeon: Viry Villegas MD;  Location: [facility] [place];  Service: General    ZZC PART REMOVAL COLON W ANASTOMOSIS N/A 10/31/2020    Procedure: COLECTOMY, SIGMOID, OPEN;  Surgeon: Viry Villegas MD;  Location: [facility];  Service: General    ZZC REMOVAL OF OVARY(S) N/A 10/31/2020    Procedure: OOPHORECTOMY, OPEN;  Surgeon: Viry Villegas MD;  Location: [facility];  Service: General             Social History:    reports that she has been smoking cigarettes. She has never used smokeless tobacco. She reports that she does not currently use alcohol. She reports that she does not use drugs.           Family History:     Family History   Problem Relation Age of Onset    C.A.D. Mother     C.A.D. Sister     Breast Cancer Maternal Aunt     Breast Cancer Cousin     Breast Cancer Cousin     Breast Cancer Cousin     Uterine Cancer Mother 38.00    Cancer Mother     Lymphoma Son 53.00    Cancer Son               Allergies:     Allergies   Allergen Reactions    Contrast Dye Hives, Itching and Swelling    Aspirin Other (See Comments)     PATIENT STATES SHE IS NOT ALLERGIC TO ASA    Other reaction(s): on plavix  Other reaction(s): on plavix    Cephalexin Hives    Colchicine Hives    Colcrys [Colchicine] Hives    Ketek [Telithromycin] Visual Disturbance    Other (Do Not  "Use) Other (See Comments)     Aspirin - on Plavix    Other Drug Allergy (See Comments) Itching     Pt states \"all pain meds\" make her \"itchy\"              Medications:     Prior to Admission medications    Medication Sig Start Date End Date Taking? Authorizing Provider   amLODIPine (NORVASC) 5 MG tablet Take 5 mg by mouth 2 times daily.   Yes Reported, Patient   HYDROcodone-acetaminophen (NORCO) 5-325 MG tablet Take 1-2 tablets by mouth every 4 hours as needed for severe pain. 3/14/25   Jody Tolentino APRN CNP   lidocaine-prilocaine (EMLA) 2.5-2.5 % external cream Apply topically as needed for moderate pain Apply to painful skin  Patient not taking: Reported on 2/10/2025 7/22/22   Walt Merrill MD   lovastatin (MEVACOR) 40 MG tablet Take 40 mg by mouth at bedtime. 8/21/24   Reported, Patient   polyethylene glycol (MIRALAX) 17 GM/Dose powder Take 17 g by mouth 2 times daily as needed for constipation  Patient not taking: Reported on 2/10/2025 5/22/23   Hiren Crum MD   senna-docusate (SENOKOT-S/PERICOLACE) 8.6-50 MG tablet Take 1-2 tablets by mouth 2 times daily as needed for constipation  Patient taking differently: Take 2 tablets by mouth at bedtime. 7/19/23   Jody Tolentino APRN CNP   sulfamethoxazole-trimethoprim (BACTRIM DS) 800-160 MG tablet Take 1 tablet by mouth 2 times daily.  Patient not taking: Reported on 2/10/2025 11/21/24   Galo Perez MD              Review of Systems:   The Review of Systems is negative other than noted in the HPI            Physical Exam:   Patient Vitals for the past 24 hrs:   BP Temp Pulse Resp SpO2 Height Weight   04/07/25 0800 125/70 -- 72 -- 97 % -- --   04/07/25 0729 114/57 -- 73 -- 97 % -- --   04/07/25 0701 127/58 -- 68 -- 94 % -- --   04/07/25 0602 (!) 149/64 -- 73 -- 97 % -- --   04/07/25 0547 (!) 151/68 -- 68 -- 98 % -- --   04/07/25 0447 (!) 172/72 -- -- -- -- -- --   04/07/25 0445 -- -- -- 20 -- -- --   04/07/25 0444 (!) 162/78 98.1  F (36.7 " " C) 76 -- 100 % 1.575 m (5' 2\") 43.1 kg (95 lb)        No intake or output data in the 24 hours ending 04/07/25 0813   Constitutional:   awake, alert, cooperative, no apparent distress, and appears stated age       Eyes:   PERRL, conjunctiva/corneas clear, EOM's intact; no scleral edema or icterus noted        ENT:   Normocephalic, without obvious abnormality, atraumatic, Lips, mucosa, and tongue normal      Lungs:   Normal respiratory effort, no accessory muscle use       Cardiovascular:   Regular rate and rhythm       Abdomen:   Soft, non distended, non tender. Mid sagittal surgical incision that is CDI, remote appearing.        Musculoskeletal:   No obvious swelling, bruising or deformity       Skin:   Skin color and texture normal for patient, no rashes or lesions              Data:         All imaging studies reviewed by me.    Results for orders placed or performed during the hospital encounter of 04/07/25 (from the past 24 hours)   CBC with platelets differential    Narrative    The following orders were created for panel order CBC with platelets differential.  Procedure                               Abnormality         Status                     ---------                               -----------         ------                     CBC with platelets and ...[[id number]]  Abnormal            Final result                 Please view results for these tests on the individual orders.   Comprehensive metabolic panel   Result Value Ref Range    Sodium 138 135 - 145 mmol/L    Potassium 4.6 3.4 - 5.3 mmol/L    Carbon Dioxide (CO2) 20 (L) 22 - 29 mmol/L    Anion Gap 11 7 - 15 mmol/L    Urea Nitrogen 23.4 (H) 8.0 - 23.0 mg/dL    Creatinine 1.18 (H) 0.51 - 0.95 mg/dL    GFR Estimate 46 (L) >60 mL/min/1.73m2    Calcium 9.2 8.8 - 10.4 mg/dL    Chloride 107 98 - 107 mmol/L    Glucose 112 (H) 70 - 99 mg/dL    Alkaline Phosphatase 96 40 - 150 U/L    AST 15 0 - 45 U/L    ALT 6 0 - 50 U/L    Protein Total 7.2 6.4 - 8.3 g/dL    " Albumin 3.8 3.5 - 5.2 g/dL    Bilirubin Total <0.2 <=1.2 mg/dL   CBC with platelets and differential   Result Value Ref Range    WBC Count 10.3 4.0 - 11.0 10e3/uL    RBC Count 3.11 (L) 3.80 - 5.20 10e6/uL    Hemoglobin 7.8 (L) 11.7 - 15.7 g/dL    Hematocrit 24.6 (L) 35.0 - 47.0 %    MCV 79 78 - 100 fL    MCH 25.1 (L) 26.5 - 33.0 pg    MCHC 31.7 31.5 - 36.5 g/dL    RDW 17.2 (H) 10.0 - 15.0 %    Platelet Count 431 150 - 450 10e3/uL    % Neutrophils 48 %    % Lymphocytes 33 %    % Monocytes 10 %    % Eosinophils 9 %    % Basophils 0 %    % Immature Granulocytes 1 %    NRBCs per 100 WBC 0 <1 /100    Absolute Neutrophils 4.9 1.6 - 8.3 10e3/uL    Absolute Lymphocytes 3.4 0.8 - 5.3 10e3/uL    Absolute Monocytes 1.0 0.0 - 1.3 10e3/uL    Absolute Eosinophils 0.9 (H) 0.0 - 0.7 10e3/uL    Absolute Basophils 0.0 0.0 - 0.2 10e3/uL    Absolute Immature Granulocytes 0.1 <=0.4 10e3/uL    Absolute NRBCs 0.0 10e3/uL   CRP inflammation   Result Value Ref Range    CRP Inflammation 8.20 (H) <5.00 mg/L   CT Chest Abdomen Pelvis w/o Contrast    Narrative    EXAM: CT CHEST ABDOMEN PELVIS W/O CONTRAST  LOCATION: [facility]  DATE: 4/7/2025    INDICATION: Right flank pain. History of stent on left, possible aspiration pneumonia. History of sigmoid colon cancer.  COMPARISON: CT abdomen pelvis 8/21/2024; CT chest abdomen pelvis 4/17/2024  TECHNIQUE: CT scan of the chest, abdomen, and pelvis was performed without IV contrast. Multiplanar reformats were obtained. Dose reduction techniques were used.   CONTRAST: None.    FINDINGS:   LUNGS AND PLEURA: Stable mild emphysema. Multiple solid nodules in the bilateral upper lobes are new, for example measuring 4 mm in the right upper lobe on series 4 image 70, 5 mm in the left upper lobe on image 66 and up to 4 mm in the left upper lobe   on image 60. Tiny calcified granulomas in the right lower lobe. Scattered mild atelectasis in the bilateral lung bases. No confluent  consolidation, pleural effusion or pneumothorax.     MEDIASTINUM/AXILLAE: Mildly enlarged mediastinal and left supraclavicular lymph nodes are new from 4/17/2024, for example measuring 1.8 x 1.4 cm in the right paratracheal station on series 3 image 55. Heart size is normal without pericardial effusion. A   stent is seen in the proximal left subclavian artery. Left chest wall port catheter terminates in the SVC.    CORONARY ARTERY CALCIFICATION: Moderate.    HEPATOBILIARY: Liver is normal in size without surface nodularity. A small irregular calcification measuring 9 x 7 mm in the central liver is unchanged. Gallbladder is normal.    PANCREAS: Normal.    SPLEEN: Normal.    ADRENAL GLANDS: Normal.    KIDNEYS/BLADDER: Stable benign-appearing bilateral renal cysts are visually benign and do not require follow-up. A 1 mm nonobstructing stone seen in the superior pole of the left kidney, new. No right urinary stones. No right hydroureteronephrosis. A   left ureteral stent has been placed with near complete resolution of hydronephrosis on 8/21/2024, now with mild residual fullness of the left renal collecting system. Bladder is normal.    BOWEL: A sharp transition point is seen in the mid/lower abdomen on series 3 image 215, with swelling of the adjacent mesenteric vessels. Small bowel proximal to the transition point is fluid-filled and not significantly distended. Asymmetric wall   thickening of the lateral wall of the right colon at the level of the ileocecal valve (series 3 images 219-230), with ill-defined adjacent fat stranding. No bowel obstruction. A right perineal hernia is again seen with a small portion of the urinary   bladder and a small portion of the rectum herniating through a defect in the right levator ani muscle. No free fluid or free air    LYMPH NODES: A retroperitoneal soft tissue encasing the stent the left ureter has increased in size, for example measuring 3.9 x 3.6 cm in the left para-aortic  region on series 3 image 193, previously 3.4 x 2.3 cm. A few subcentimeter lymph nodes seen in   the proximal right colon near the area of mild colonic wall thickening (series 3 image 2:15    VASCULATURE: Advanced aortoiliac atherosclerotic calcifications. A 3.2 cm AP by 3.3 cm transverse infrarenal abdominal aortic aneurysm is unchanged.    PELVIC ORGANS: Status post hysterectomy. No abnormal adnexal masses.    MUSCULOSKELETAL: Minimal degenerative anterolisthesis of L3 on L4. No suspicious osseous lesions or acute fractures. A chronic fracture deformity again seen in the sternum.        Impression    IMPRESSION:    1.  Sharp transition point in the mid/lower abdominal small bowel with swirling morphology of adjacent mesenteric vessels, compatible with volvulus possibly from adhesion. However, the upstream small bowel does not appear significantly distended.    2.  Mildly asymmetric wall thickening of the right colon with subtle adjacent fat stranding and multiple small pericolonic lymph nodes, not well evaluated on this noncontrast enhanced study and nonspecific for colitis versus colonic malignancy. At   minimum, short-term follow-up CT should be performed for monitoring, preferably with contrast if renal function permissible.    3.  Increased size of retroperitoneal soft tissue encasing the stented left ureter, compatible with progression of metastatic disease. Mild residual hydronephrosis in the left renal collecting system is significantly improved from most recent prior.    4.  Scattered new pulmonary nodules in the bilateral upper lobes with new mediastinal/left supraclavicular lymphadenopathy, suspicious for new metastatic disease.    5.  Stable indeterminate calcific focus in the central liver.    6.  New 1 mm nonobstructing left renal stone.    7.  Stable mild emphysema, 3.3 cm infrarenal abdominal aortic aneurysm and right perineal hernia containing small portions of the urinary bladder and rectum.    Lactic Acid Whole Blood with 1X Repeat in 2 HR when >2   Result Value Ref Range    Lactic Acid, Initial 1.1 0.7 - 2.0 mmol/L   UA with Microscopic reflex to Culture    Specimen: Urine, Midstream   Result Value Ref Range    Color Urine Colorless Colorless, Straw, Light Yellow, Yellow    Appearance Urine Clear Clear    Glucose Urine Negative Negative mg/dL    Bilirubin Urine Negative Negative    Ketones Urine Negative Negative mg/dL    Specific Gravity Urine 1.009 1.001 - 1.030    Blood Urine 0.5 mg/dL (A) Negative    pH Urine 5.5 5.0 - 7.0    Protein Albumin Urine 10 (A) Negative mg/dL    Urobilinogen Urine Normal Normal mg/dL    Nitrite Urine Negative Negative    Leukocyte Esterase Urine 250 Pedro/uL (A) Negative    Bacteria Urine Few (A) None Seen /HPF    RBC Urine 81 (H) <=2 /HPF    WBC Urine 8 (H) <=5 /HPF    Squamous Epithelials Urine 1 <=1 /HPF    Narrative    Urine Culture ordered based on laboratory criteria           Makenzie Pettit PA-C  [facility]  Surgery [address] [address]?  Office: [phone]

## 2025-04-07 NOTE — CONSULTS
[facility] Hematology and Oncology Inpatient Consult Note    Patient: Katya Butler  MRN: [id number]  Date of Service: 4/7/2025      Reason for Visit    I was consulted regarding suspected malignant volvulus with known metastatic colon cancer    Assessment/Plan    ECOG Performance Status: 2    #. Metastatic colon cancer with progressive disease on CT lung nodules and increased abdominal involvement  #. 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 Cancer related pain  Katya was admitted for acute right buttock pain with CT showing progressive colon cancer and volvulus suspected secondary to increasing metastasis. No current signs of bowel obstruction. Last BM was yesterday. Per surgery, currently pursuing conservative management due to metastatic disease and patient's wish to avoid surgery. Has known metastatic disease and has been following with Dr. Merrill outpatient with observation as she has not been wanting treatment. Will not initiate any treatment inpatient. Discussed with pt, will need follow up with Dr. Merrill after discharge to discuss progressive cancer and possible treatment options if she is willing. Pt agrees to this. She is scheduled on 4/11/25. Can reschedule if she is still hospitalized.   ______________________________________________________________________________      Staging History    Cancer Staging   Colon adenocarcinoma (H)  Staging form: Colon and Rectum, AJCC 8th Edition  - Clinical stage from 3/13/2023: Stage IVB (cT0, cNX, cM1b) - Signed by Jody Tolentino APRN CNP on 3/20/2023      History  Ms. Katya Butler is a 84 year old with a history of recurrent and metastatic colon cancer found in March 2023, presented here with acute right buttock pain, found to have volvulus on CT.  No signs of obstruction.  CT also showing concerns for new pulmonary metastasis and increased retroperitoneal soft tissue.  Evaluated by surgery with plans for conservative  treatment if possible due to her metastatic disease.    She was initially diagnosed with adenocarcinoma of the sigmoid colon October 2020 status post surgical resection.  She underwent adjuvant chemotherapy with FOLFOX at 25% dose reduction for 10 cycles.  Unfortunately disease was found in March 2023 with biopsy of supraclavicular lymph node positive for metastatic disease.  She was not interested in treatment at this time and has continued on observation.  She has been taking for cancer related pain.    She reports her pain has been increasing and is severe in her right buttock. She has been having regular bowel movements and reports her last bowel movement was yesterday. She has not passed gas today.     Review of systems.  Pertinent items noted in history.    Past History  Past Medical History:   Diagnosis Date    Diverticulitis of colon     Heart murmur     HLD (hyperlipidemia)     HTN (hypertension)     Inguinal hernia without mention of obstruction or gangrene, unilateral or unspecified, (not specified as recurrent) 08/20/2004    RIGHT    Malignant neoplasm of sigmoid colon (H) 11/24/2020    Primary osteoarthritis involving multiple joints 08/02/2019    PVD (peripheral vascular disease)     Renal disease     Stented coronary artery     Ureteral stent present     Ventral hernia, unspecified, without mention of obstruction or gangrene 08/15/2003    easily reduced     Past Surgical History:   Procedure Laterality Date    COMBINED CYSTOSCOPY, RETROGRADES, EXCHANGE STENT URETER(S) Left 11/21/2024    Procedure: CYSTOSCOPY WITH LEFT URETERAL STENT EXCHANGE AND RETROGRADE PYELOGRAM;  Surgeon: Galo Perez MD;  Location: [facility] OR    COMBINED CYSTOSCOPY, RETROGRADES, URETEROSCOPY, INSERT STENT Left 08/22/2024    Procedure: CYSTOSCOPY, WITH LEFT RETROGRADE PYELOGRAM AND LEFT STENT INSERTION;  Surgeon: Galo Perez MD;  Location: [facility] OR    EYE SURGERY      GI SURGERY      HERNIA REPAIR       HERNIA REPAIR, INGUINAL RT/LT  02/01/2005    [facility]    HYSTERECTOMY      IR AORTIC ARCH 4 VESSEL ANGIOGRAM  10/13/2009    IR AORTIC ARCH 4 VESSEL ANGIOGRAM  03/15/2010    IR MISCELLANEOUS PROCEDURE  05/20/2008    IR MISCELLANEOUS PROCEDURE  01/30/2009    IR MISCELLANEOUS PROCEDURE  01/30/2009    IR MISCELLANEOUS PROCEDURE  01/30/2009    IR MISCELLANEOUS PROCEDURE  10/13/2009    IR MISCELLANEOUS PROCEDURE  10/13/2009    IR MISCELLANEOUS PROCEDURE  03/15/2010    LAPAROSCOPIC CYSTECTOMY OVARIAN (ONCOLOGY) Bilateral 06/27/2018    Procedure: DIAGNOSTIC LAPAROSCOPY, RIGHT SALPINGO OOPHORECTOMY, LYSIS OF ADHESIONS, AND PELVIC WASHINGS;  Surgeon: Nneka Arroyo DO;  Location: [facility] OR;  Service:     IN [facility] CTR VAD W/SUBQ PORT AGE 5 YR/> N/A 12/02/2020    Procedure: Port Placement;  Surgeon: Viry Villegas MD;  Location: [facility] OR;  Service: General    ZZC PART REMOVAL COLON W ANASTOMOSIS N/A 10/31/2020    Procedure: COLECTOMY, SIGMOID, OPEN;  Surgeon: Viry Villegas MD;  Location: [facility] OR;  Service: General    ZZC REMOVAL OF OVARY(S) N/A 10/31/2020    Procedure: OOPHORECTOMY, OPEN;  Surgeon: Viry Villegas MD;  Location: [facility] OR;  Service: General     Family History   Problem Relation Age of Onset    C.A.D. Mother     C.A.D. Sister     Breast Cancer Maternal Aunt     Breast Cancer Cousin     Breast Cancer Cousin     Breast Cancer Cousin     Uterine Cancer Mother 38.00    Cancer Mother     Lymphoma Son 53.00    Cancer Son      Social History     Socioeconomic History    Marital status:      Spouse name: None    Number of children: None    Years of education: None    Highest education level: None   Tobacco Use    Smoking status: Former     Types: Cigarettes    Smokeless tobacco: Never    Tobacco comments:     2-3 cigarettes daily **as of 01/10/25**   Vaping Use    Vaping status: Never Used   Substance and Sexual Activity    Alcohol use: Not Currently  "   Drug use: No    Sexual activity: Not Currently   Social History Narrative    Lives with her son     Social Drivers of Health     Financial Resource Strain: Low Risk  (8/21/2024)    Financial Resource Strain     Within the past 12 months, have you or your family members you live with been unable to get utilities (heat, electricity) when it was really needed?: No   Food Insecurity: Low Risk  (8/21/2024)    Food Insecurity     Within the past 12 months, did you worry that your food would run out before you got money to buy more?: No     Within the past 12 months, did the food you bought just not last and you didn t have money to get more?: No   Transportation Needs: Low Risk  (8/21/2024)    Transportation Needs     Within the past 12 months, has lack of transportation kept you from medical appointments, getting your medicines, non-medical meetings or appointments, work, or from getting things that you need?: No   Interpersonal Safety: Low Risk  (11/21/2024)    Interpersonal Safety     Do you feel physically and emotionally safe where you currently live?: Yes     Within the past 12 months, have you been hit, slapped, kicked or otherwise physically hurt by someone?: No     Within the past 12 months, have you been humiliated or emotionally abused in other ways by your partner or ex-partner?: No   Housing Stability: Low Risk  (8/21/2024)    Housing Stability     Do you have housing? : Yes     Are you worried about losing your housing?: No       Allergies    Allergies   Allergen Reactions    Contrast Dye Hives, Itching and Swelling    Aspirin Other (See Comments)     PATIENT STATES SHE IS NOT ALLERGIC TO ASA    Other reaction(s): on plavix  Other reaction(s): on plavix    Cephalexin Hives    Colcrys [Colchicine] Hives    Other Drug Allergy (See Comments) Itching     Pt states \"all pain meds\" make her \"itchy\"    Telithromycin Visual Disturbance          Physical Exam        4/7/2025    12:00 AM   CHOLESTEROL-MEDICATIONS "   Atorvastatin Calcium 10 mg, Oral, EVERY EVENING, This therapy was substituted for Lovastatin (Altoprev) 40 mg daily.    This therapy was substituted for Lovastatin (Altoprev) 40 mg daily.      Lovastatin Take 40 mg by mouth at bedtime.          Patient-reported       GENERAL: Alert and oriented to time place and person. Uncomfortable, sitting in bed.     HEAD: Atraumatic and normocephalic.    EYES:   No pallor.  No icterus    CHEST:  Symmetrical breath movements bilaterally.    CVS:   No peripheral edema.    EXTREMITIES: Warm.    SKIN: no rash, or bruising or purpura.    Lab Results    Recent Labs   Lab 04/07/25  0502   WBC 10.3   HGB 7.8*   HCT 24.6*        Recent Labs   Lab 04/07/25  0502      CO2 20*   BUN 23.4*   ALBUMIN 3.8   ALKPHOS 96   ALT 6   AST 15       Imaging Results    CT Chest Abdomen Pelvis w/o Contrast    Result Date: 4/7/2025  EXAM: CT CHEST ABDOMEN PELVIS W/O CONTRAST LOCATION: [facility] DATE: 4/7/2025 INDICATION: Right flank pain. History of stent on left, possible aspiration pneumonia. History of sigmoid colon cancer. COMPARISON: CT abdomen pelvis 8/21/2024; CT chest abdomen pelvis 4/17/2024 TECHNIQUE: CT scan of the chest, abdomen, and pelvis was performed without IV contrast. Multiplanar reformats were obtained. Dose reduction techniques were used. CONTRAST: None. FINDINGS: LUNGS AND PLEURA: Stable mild emphysema. Multiple solid nodules in the bilateral upper lobes are new, for example measuring 4 mm in the right upper lobe on series 4 image 70, 5 mm in the left upper lobe on image 66 and up to 4 mm in the left upper lobe on image 60. Tiny calcified granulomas in the right lower lobe. Scattered mild atelectasis in the bilateral lung bases. No confluent consolidation, pleural effusion or pneumothorax. MEDIASTINUM/AXILLAE: Mildly enlarged mediastinal and left supraclavicular lymph nodes are new from 4/17/2024, for example measuring 1.8 x 1.4 cm in the  right paratracheal station on series 3 image 55. Heart size is normal without pericardial effusion. A stent is seen in the proximal left subclavian artery. Left chest wall port catheter terminates in the SVC. CORONARY ARTERY CALCIFICATION: Moderate. HEPATOBILIARY: Liver is normal in size without surface nodularity. A small irregular calcification measuring 9 x 7 mm in the central liver is unchanged. Gallbladder is normal. PANCREAS: Normal. SPLEEN: Normal. ADRENAL GLANDS: Normal. KIDNEYS/BLADDER: Stable benign-appearing bilateral renal cysts are visually benign and do not require follow-up. A 1 mm nonobstructing stone seen in the superior pole of the left kidney, new. No right urinary stones. No right hydroureteronephrosis. A left ureteral stent has been placed with near complete resolution of hydronephrosis on 8/21/2024, now with mild residual fullness of the left renal collecting system. Bladder is normal. BOWEL: A sharp transition point is seen in the mid/lower abdomen on series 3 image 215, with swelling of the adjacent mesenteric vessels. Small bowel proximal to the transition point is fluid-filled and not significantly distended. Asymmetric wall thickening of the lateral wall of the right colon at the level of the ileocecal valve (series 3 images 219-230), with ill-defined adjacent fat stranding. No bowel obstruction. A right perineal hernia is again seen with a small portion of the urinary bladder and a small portion of the rectum herniating through a defect in the right levator ani muscle. No free fluid or free air LYMPH NODES: A retroperitoneal soft tissue encasing the stent the left ureter has increased in size, for example measuring 3.9 x 3.6 cm in the left para-aortic region on series 3 image 193, previously 3.4 x 2.3 cm. A few subcentimeter lymph nodes seen in  the proximal right colon near the area of mild colonic wall thickening (series 3 image 2:15 VASCULATURE: Advanced aortoiliac atherosclerotic  calcifications. A 3.2 cm AP by 3.3 cm transverse infrarenal abdominal aortic aneurysm is unchanged. PELVIC ORGANS: Status post hysterectomy. No abnormal adnexal masses. MUSCULOSKELETAL: Minimal degenerative anterolisthesis of L3 on L4. No suspicious osseous lesions or acute fractures. A chronic fracture deformity again seen in the sternum.     IMPRESSION: 1.  Sharp transition point in the mid/lower abdominal small bowel with swirling morphology of adjacent mesenteric vessels, compatible with volvulus possibly from adhesion. However, the upstream small bowel does not appear significantly distended. 2.  Mildly asymmetric wall thickening of the right colon with subtle adjacent fat stranding and multiple small pericolonic lymph nodes, not well evaluated on this noncontrast enhanced study and nonspecific for colitis versus colonic malignancy. At minimum, short-term follow-up CT should be performed for monitoring, preferably with contrast if renal function permissible. 3.  Increased size of retroperitoneal soft tissue encasing the stented left ureter, compatible with progression of metastatic disease. Mild residual hydronephrosis in the left renal collecting system is significantly improved from most recent prior. 4.  Scattered new pulmonary nodules in the bilateral upper lobes with new mediastinal/left supraclavicular lymphadenopathy, suspicious for new metastatic disease. 5.  Stable indeterminate calcific focus in the central liver. 6.  New 1 mm nonobstructing left renal stone. 7.  Stable mild emphysema, 3.3 cm infrarenal abdominal aortic aneurysm and right perineal hernia containing small portions of the urinary bladder and rectum.       Signed by: Myrna Roberto PA-C

## 2025-04-07 NOTE — CONSULTS
General surgery consult         ASSESSMENT   84-year-old with metastatic colon cancer presents with right buttock pain.  CT scan raise concerns for a small bowel volvulus from adhesions.  There is no obstructive signs on the CT scan or by the patient's symptoms.  1. Volvulus (H)    2. Right flank pain    3. Nausea             PLAN      I would like to see how contrast is moving to your GI tract with a Gastrografin challenge.  I am reluctant to offer a surgical intervention given her metastatic colon cancer and the patient does not want to have surgery.         CHIEF COMPLAINT     Chief Complaint   Patient presents with    Flank Pain         HPI     Katya Butler is a 84 year old female who presents with pain in her right buttock.  She denies vomiting she has had bowel movements as recently as last night.  She was evaluated with a CT scan and the radiology read raise concerns of a volvulus.  Surgery was consulted         PAST MEDICAL HISTORY SURGICAL HISTORY     Past Medical History:   Diagnosis Date    Diverticulitis of colon     Heart murmur     HLD (hyperlipidemia)     HTN (hypertension)     Inguinal hernia without mention of obstruction or gangrene, unilateral or unspecified, (not specified as recurrent) 08/20/2004    RIGHT    Malignant neoplasm of sigmoid colon (H) 11/24/2020    Primary osteoarthritis involving multiple joints 08/02/2019    PVD (peripheral vascular disease)     Renal disease     Stented coronary artery     Ureteral stent present     Ventral hernia, unspecified, without mention of obstruction or gangrene 08/15/2003    easily reduced    Past Surgical History:   Procedure Laterality Date    COMBINED CYSTOSCOPY, RETROGRADES, EXCHANGE STENT URETER(S) Left 11/21/2024    Procedure: CYSTOSCOPY WITH LEFT URETERAL STENT EXCHANGE AND RETROGRADE PYELOGRAM;  Surgeon: Galo Perez MD;  Location: [facility] OR    COMBINED CYSTOSCOPY, RETROGRADES, URETEROSCOPY, INSERT STENT Left 08/22/2024     Procedure: CYSTOSCOPY, WITH LEFT RETROGRADE PYELOGRAM AND LEFT STENT INSERTION;  Surgeon: Galo Perez MD;  Location: [facility]    EYE SURGERY      GI SURGERY      HERNIA REPAIR      HERNIA REPAIR, INGUINAL RT/LT  02/01/2005    [facility]    HYSTERECTOMY      IR AORTIC ARCH 4 VESSEL ANGIOGRAM  10/13/2009    IR AORTIC ARCH 4 VESSEL ANGIOGRAM  03/15/2010    IR MISCELLANEOUS PROCEDURE  05/20/2008    IR MISCELLANEOUS PROCEDURE  01/30/2009    IR MISCELLANEOUS PROCEDURE  01/30/2009    IR MISCELLANEOUS PROCEDURE  01/30/2009    IR MISCELLANEOUS PROCEDURE  10/13/2009    IR MISCELLANEOUS PROCEDURE  10/13/2009    IR MISCELLANEOUS PROCEDURE  03/15/2010    LAPAROSCOPIC CYSTECTOMY OVARIAN (ONCOLOGY) Bilateral 06/27/2018    Procedure: DIAGNOSTIC LAPAROSCOPY, RIGHT SALPINGO OOPHORECTOMY, LYSIS OF ADHESIONS, AND PELVIC WASHINGS;  Surgeon: Nneka Arroyo DO;  Location: [facility];  Service:     MO INSJ PRPH CTR VAD W/SUBQ PORT AGE 5 YR/> N/A 12/02/2020    Procedure: Port Placement;  Surgeon: Viry Villegas MD;  Location: [facility];  Service: General    ZZC PART REMOVAL COLON W ANASTOMOSIS N/A 10/31/2020    Procedure: COLECTOMY, SIGMOID, OPEN;  Surgeon: Viry Villegas MD;  Location: [facility];  Service: General    ZZC REMOVAL OF OVARY(S) N/A 10/31/2020    Procedure: OOPHORECTOMY, OPEN;  Surgeon: Viry Villegas MD;  Location: [facility];  Service: General          CURRENT MEDICATIONS ALLERGIES     Current Outpatient Rx   Medication Sig Dispense Refill    amLODIPine (NORVASC) 5 MG tablet Take 5 mg by mouth 2 times daily.      HYDROcodone-acetaminophen (NORCO) 5-325 MG tablet Take 1-2 tablets by mouth every 4 hours as needed for severe pain. 90 tablet 0    lidocaine-prilocaine (EMLA) 2.5-2.5 % external cream Apply topically as needed for moderate pain Apply to painful skin 30 g 1    lovastatin (MEVACOR) 40 MG tablet Take 40 mg by mouth at bedtime.      senna-docusate  "(SENOKOT-S/PERICOLACE) 8.6-50 MG tablet Take 2 tablets by mouth at bedtime.      Allergies   Allergen Reactions    Contrast Dye Hives, Itching and Swelling    Aspirin Other (See Comments)     PATIENT STATES SHE IS NOT ALLERGIC TO ASA    Other reaction(s): on plavix  Other reaction(s): on plavix    Cephalexin Hives    Colcrys [Colchicine] Hives    Other Drug Allergy (See Comments) Itching     Pt states \"all pain meds\" make her \"itchy\"    Telithromycin Visual Disturbance          FAMILY HISTORY     Family History   Problem Relation Age of Onset    C.A.D. Mother     C.A.D. Sister     Breast Cancer Maternal Aunt     Breast Cancer Cousin     Breast Cancer Cousin     Breast Cancer Cousin     Uterine Cancer Mother 38.00    Cancer Mother     Lymphoma Son 53.00    Cancer Son          SOCIAL HISTORY     Social History     Socioeconomic History    Marital status:      Spouse name: None    Number of children: None    Years of education: None    Highest education level: None   Tobacco Use    Smoking status: Former     Types: Cigarettes    Smokeless tobacco: Never    Tobacco comments:     2-3 cigarettes daily **as of 01/10/25**   Vaping Use    Vaping status: Never Used   Substance and Sexual Activity    Alcohol use: Not Currently    Drug use: No    Sexual activity: Not Currently   Social History Narrative    Lives with her son     Social Drivers of Health     Financial Resource Strain: Low Risk  (8/21/2024)    Financial Resource Strain     Within the past 12 months, have you or your family members you live with been unable to get utilities (heat, electricity) when it was really needed?: No   Food Insecurity: Low Risk  (8/21/2024)    Food Insecurity     Within the past 12 months, did you worry that your food would run out before you got money to buy more?: No     Within the past 12 months, did the food you bought just not last and you didn t have money to get more?: No   Transportation Needs: Low Risk  (8/21/2024)    " "Transportation Needs     Within the past 12 months, has lack of transportation kept you from medical appointments, getting your medicines, non-medical meetings or appointments, work, or from getting things that you need?: No   Interpersonal Safety: Low Risk  (11/21/2024)    Interpersonal Safety     Do you feel physically and emotionally safe where you currently live?: Yes     Within the past 12 months, have you been hit, slapped, kicked or otherwise physically hurt by someone?: No     Within the past 12 months, have you been humiliated or emotionally abused in other ways by your partner or ex-partner?: No   Housing Stability: Low Risk  (8/21/2024)    Housing Stability     Do you have housing? : Yes     Are you worried about losing your housing?: No         REVIEW OF SYSTEMS       12 point review of systems are unremarkable except for the symptoms described in the HPI    PHYSICAL EXAM     VITAL SIGNS: /67   Pulse 68   Temp 98.1  F (36.7  C)   Resp 20   Ht 1.575 m (5' 2\")   Wt 43.1 kg (95 lb)   SpO2 94%   BMI 17.38 kg/m     General : Alert, cooperative, appears stated age , petite  Skin: Skin color, texture, turgor normal, no rashes or lesions   Head: Normocephalic, without obvious abnormality, atraumatic   HEENT:  conjunctiva/corneas clear, EOM's intact, no scleral icterus   Throat: Lips, mucosa, and tongue normal; teeth and gums normal    Back: No CVA tenderness   Lungs: Clear to auscultation bilaterally, respirations unlabored, no rales, rhonchi or wheezes   Chest Wall:No tenderness or deformity   Heart: Regular rate and rhythm, S1, S2 normal, no murmur, rub or gallop   Abdomen: Soft, non-tender, no guarding, + bowel sounds active, lower midline scar  Extremities : No obvious swelling,  Neurologic: moves all extremities equally, no focal findings          RADIOLOGY      CT Chest Abdomen Pelvis w/o Contrast   Final Result   IMPRESSION:      1.  Sharp transition point in the mid/lower abdominal small " bowel with swirling morphology of adjacent mesenteric vessels, compatible with volvulus possibly from adhesion. However, the upstream small bowel does not appear significantly distended.      2.  Mildly asymmetric wall thickening of the right colon with subtle adjacent fat stranding and multiple small pericolonic lymph nodes, not well evaluated on this noncontrast enhanced study and nonspecific for colitis versus colonic malignancy. At    minimum, short-term follow-up CT should be performed for monitoring, preferably with contrast if renal function permissible.      3.  Increased size of retroperitoneal soft tissue encasing the stented left ureter, compatible with progression of metastatic disease. Mild residual hydronephrosis in the left renal collecting system is significantly improved from most recent prior.      4.  Scattered new pulmonary nodules in the bilateral upper lobes with new mediastinal/left supraclavicular lymphadenopathy, suspicious for new metastatic disease.      5.  Stable indeterminate calcific focus in the central liver.      6.  New 1 mm nonobstructing left renal stone.      7.  Stable mild emphysema, 3.3 cm infrarenal abdominal aortic aneurysm and right perineal hernia containing small portions of the urinary bladder and rectum.      XR Gastrografin Challenge    (Results Pending)       EKG     Reviewed        LABS     Results for orders placed or performed during the hospital encounter of 04/07/25   CT Chest Abdomen Pelvis w/o Contrast    Impression    IMPRESSION:    1.  Sharp transition point in the mid/lower abdominal small bowel with swirling morphology of adjacent mesenteric vessels, compatible with volvulus possibly from adhesion. However, the upstream small bowel does not appear significantly distended.    2.  Mildly asymmetric wall thickening of the right colon with subtle adjacent fat stranding and multiple small pericolonic lymph nodes, not well evaluated on this noncontrast enhanced  study and nonspecific for colitis versus colonic malignancy. At   minimum, short-term follow-up CT should be performed for monitoring, preferably with contrast if renal function permissible.    3.  Increased size of retroperitoneal soft tissue encasing the stented left ureter, compatible with progression of metastatic disease. Mild residual hydronephrosis in the left renal collecting system is significantly improved from most recent prior.    4.  Scattered new pulmonary nodules in the bilateral upper lobes with new mediastinal/left supraclavicular lymphadenopathy, suspicious for new metastatic disease.    5.  Stable indeterminate calcific focus in the central liver.    6.  New 1 mm nonobstructing left renal stone.    7.  Stable mild emphysema, 3.3 cm infrarenal abdominal aortic aneurysm and right perineal hernia containing small portions of the urinary bladder and rectum.   Comprehensive metabolic panel   Result Value Ref Range    Sodium 138 135 - 145 mmol/L    Potassium 4.6 3.4 - 5.3 mmol/L    Carbon Dioxide (CO2) 20 (L) 22 - 29 mmol/L    Anion Gap 11 7 - 15 mmol/L    Urea Nitrogen 23.4 (H) 8.0 - 23.0 mg/dL    Creatinine 1.18 (H) 0.51 - 0.95 mg/dL    GFR Estimate 46 (L) >60 mL/min/1.73m2    Calcium 9.2 8.8 - 10.4 mg/dL    Chloride 107 98 - 107 mmol/L    Glucose 112 (H) 70 - 99 mg/dL    Alkaline Phosphatase 96 40 - 150 U/L    AST 15 0 - 45 U/L    ALT 6 0 - 50 U/L    Protein Total 7.2 6.4 - 8.3 g/dL    Albumin 3.8 3.5 - 5.2 g/dL    Bilirubin Total <0.2 <=1.2 mg/dL   UA with Microscopic reflex to Culture    Specimen: Urine, Midstream   Result Value Ref Range    Color Urine Colorless Colorless, Straw, Light Yellow, Yellow    Appearance Urine Clear Clear    Glucose Urine Negative Negative mg/dL    Bilirubin Urine Negative Negative    Ketones Urine Negative Negative mg/dL    Specific Gravity Urine 1.009 1.001 - 1.030    Blood Urine 0.5 mg/dL (A) Negative    pH Urine 5.5 5.0 - 7.0    Protein Albumin Urine 10 (A) Negative  mg/dL    Urobilinogen Urine Normal Normal mg/dL    Nitrite Urine Negative Negative    Leukocyte Esterase Urine 250 Pedro/uL (A) Negative    Bacteria Urine Few (A) None Seen /HPF    RBC Urine 81 (H) <=2 /HPF    WBC Urine 8 (H) <=5 /HPF    Squamous Epithelials Urine 1 <=1 /HPF   CBC with platelets and differential   Result Value Ref Range    WBC Count 10.3 4.0 - 11.0 10e3/uL    RBC Count 3.11 (L) 3.80 - 5.20 10e6/uL    Hemoglobin 7.8 (L) 11.7 - 15.7 g/dL    Hematocrit 24.6 (L) 35.0 - 47.0 %    MCV 79 78 - 100 fL    MCH 25.1 (L) 26.5 - 33.0 pg    MCHC 31.7 31.5 - 36.5 g/dL    RDW 17.2 (H) 10.0 - 15.0 %    Platelet Count 431 150 - 450 10e3/uL    % Neutrophils 48 %    % Lymphocytes 33 %    % Monocytes 10 %    % Eosinophils 9 %    % Basophils 0 %    % Immature Granulocytes 1 %    NRBCs per 100 WBC 0 <1 /100    Absolute Neutrophils 4.9 1.6 - 8.3 10e3/uL    Absolute Lymphocytes 3.4 0.8 - 5.3 10e3/uL    Absolute Monocytes 1.0 0.0 - 1.3 10e3/uL    Absolute Eosinophils 0.9 (H) 0.0 - 0.7 10e3/uL    Absolute Basophils 0.0 0.0 - 0.2 10e3/uL    Absolute Immature Granulocytes 0.1 <=0.4 10e3/uL    Absolute NRBCs 0.0 10e3/uL   Result Value Ref Range    CRP Inflammation 8.20 (H) <5.00 mg/L   Lactic Acid Whole Blood with 1X Repeat in 2 HR when >2   Result Value Ref Range    Lactic Acid, Initial 1.1 0.7 - 2.0 mmol/L   Influenza A/B, RSV and SARS-CoV2 PCR (COVID-19) Nose    Specimen: Nose; Swab   Result Value Ref Range    Influenza A PCR Negative Negative    Influenza B PCR Negative Negative    RSV PCR Negative Negative    SARS CoV2 PCR Negative Negative   Result Value Ref Range    Procalcitonin 0.08 <0.50 ng/mL           [facility]  [phone]

## 2025-04-07 NOTE — ED TRIAGE NOTES
Patient reports left sided back/flank pain that started on her right side that started yesterday. Patient reports she gets a ureter stent replaced every 3 months on her left side but now the right side is painful. She has been taking Vicodin every 4 hours with no relief.      Triage Assessment (Adult)       Row Name 04/07/25 0441          Triage Assessment    Airway WDL WDL        Respiratory WDL    Respiratory WDL WDL        Cardiac WDL    Cardiac WDL WDL        Peripheral/Neurovascular WDL    Peripheral Neurovascular WDL WDL        Cognitive/Neuro/Behavioral WDL    Cognitive/Neuro/Behavioral WDL WDL

## 2025-04-08 ENCOUNTER — APPOINTMENT (OUTPATIENT)
Dept: RADIOLOGY | Facility: HOSPITAL | Age: 85
DRG: 389 | End: 2025-04-08
Payer: COMMERCIAL

## 2025-04-08 LAB
ABO + RH BLD: NORMAL
ANION GAP SERPL CALCULATED.3IONS-SCNC: 12 MMOL/L (ref 7–15)
BACTERIA UR CULT: NORMAL
BLD GP AB SCN SERPL QL: NEGATIVE
BLD PROD TYP BPU: NORMAL
BLOOD COMPONENT TYPE: NORMAL
BUN SERPL-MCNC: 22.1 MG/DL (ref 8–23)
CALCIUM SERPL-MCNC: 9.2 MG/DL (ref 8.8–10.4)
CHLORIDE SERPL-SCNC: 108 MMOL/L (ref 98–107)
CODING SYSTEM: NORMAL
CREAT SERPL-MCNC: 1.1 MG/DL (ref 0.51–0.95)
CROSSMATCH: NORMAL
EGFRCR SERPLBLD CKD-EPI 2021: 50 ML/MIN/1.73M2
ERYTHROCYTE [DISTWIDTH] IN BLOOD BY AUTOMATED COUNT: 17 % (ref 10–15)
GLUCOSE SERPL-MCNC: 123 MG/DL (ref 70–99)
HCO3 SERPL-SCNC: 20 MMOL/L (ref 22–29)
HCT VFR BLD AUTO: 20.7 % (ref 35–47)
HGB BLD-MCNC: 6.5 G/DL (ref 11.7–15.7)
HGB BLD-MCNC: 7.4 G/DL (ref 11.7–15.7)
HGB BLD-MCNC: 8.3 G/DL (ref 11.7–15.7)
ISSUE DATE AND TIME: NORMAL
MCH RBC QN AUTO: 24.4 PG (ref 26.5–33)
MCHC RBC AUTO-ENTMCNC: 31.4 G/DL (ref 31.5–36.5)
MCV RBC AUTO: 78 FL (ref 78–100)
PLATELET # BLD AUTO: 373 10E3/UL (ref 150–450)
POTASSIUM SERPL-SCNC: 4.3 MMOL/L (ref 3.4–5.3)
RBC # BLD AUTO: 2.66 10E6/UL (ref 3.8–5.2)
SODIUM SERPL-SCNC: 140 MMOL/L (ref 135–145)
SPECIMEN EXP DATE BLD: NORMAL
UNIT ABO/RH: NORMAL
UNIT NUMBER: NORMAL
UNIT STATUS: NORMAL
UNIT TYPE ISBT: 6200
WBC # BLD AUTO: 9.4 10E3/UL (ref 4–11)

## 2025-04-08 PROCEDURE — 250N000011 HC RX IP 250 OP 636: Mod: JZ | Performed by: FAMILY MEDICINE

## 2025-04-08 PROCEDURE — 94640 AIRWAY INHALATION TREATMENT: CPT

## 2025-04-08 PROCEDURE — 86900 BLOOD TYPING SEROLOGIC ABO: CPT | Performed by: HOSPITALIST

## 2025-04-08 PROCEDURE — 80048 BASIC METABOLIC PNL TOTAL CA: CPT | Performed by: FAMILY MEDICINE

## 2025-04-08 PROCEDURE — 258N000003 HC RX IP 258 OP 636: Performed by: FAMILY MEDICINE

## 2025-04-08 PROCEDURE — P9016 RBC LEUKOCYTES REDUCED: HCPCS | Performed by: FAMILY MEDICINE

## 2025-04-08 PROCEDURE — 86923 COMPATIBILITY TEST ELECTRIC: CPT | Performed by: FAMILY MEDICINE

## 2025-04-08 PROCEDURE — 85018 HEMOGLOBIN: CPT | Performed by: FAMILY MEDICINE

## 2025-04-08 PROCEDURE — 250N000009 HC RX 250: Performed by: FAMILY MEDICINE

## 2025-04-08 PROCEDURE — 99231 SBSQ HOSP IP/OBS SF/LOW 25: CPT

## 2025-04-08 PROCEDURE — 74018 RADEX ABDOMEN 1 VIEW: CPT

## 2025-04-08 PROCEDURE — 99233 SBSQ HOSP IP/OBS HIGH 50: CPT | Performed by: FAMILY MEDICINE

## 2025-04-08 PROCEDURE — 250N000013 HC RX MED GY IP 250 OP 250 PS 637: Performed by: FAMILY MEDICINE

## 2025-04-08 PROCEDURE — 36415 COLL VENOUS BLD VENIPUNCTURE: CPT | Performed by: FAMILY MEDICINE

## 2025-04-08 PROCEDURE — 120N000001 HC R&B MED SURG/OB

## 2025-04-08 PROCEDURE — 99231 SBSQ HOSP IP/OBS SF/LOW 25: CPT | Mod: FS

## 2025-04-08 RX ORDER — PREDNISONE 20 MG/1
40 TABLET ORAL DAILY
Status: DISCONTINUED | OUTPATIENT
Start: 2025-04-09 | End: 2025-04-09 | Stop reason: HOSPADM

## 2025-04-08 RX ORDER — OXYCODONE HYDROCHLORIDE 5 MG/1
5 TABLET ORAL EVERY 6 HOURS PRN
Status: DISCONTINUED | OUTPATIENT
Start: 2025-04-08 | End: 2025-04-09 | Stop reason: HOSPADM

## 2025-04-08 RX ADMIN — GUAIFENESIN 1200 MG: 600 TABLET ORAL at 08:10

## 2025-04-08 RX ADMIN — AMLODIPINE BESYLATE 5 MG: 5 TABLET ORAL at 19:35

## 2025-04-08 RX ADMIN — Medication 1 MG: at 22:44

## 2025-04-08 RX ADMIN — SODIUM CHLORIDE, SODIUM LACTATE, POTASSIUM CHLORIDE, AND CALCIUM CHLORIDE: .6; .31; .03; .02 INJECTION, SOLUTION INTRAVENOUS at 06:31

## 2025-04-08 RX ADMIN — HYDROMORPHONE HYDROCHLORIDE 0.4 MG: 0.2 INJECTION, SOLUTION INTRAMUSCULAR; INTRAVENOUS; SUBCUTANEOUS at 05:00

## 2025-04-08 RX ADMIN — METHYLPREDNISOLONE SODIUM SUCCINATE 40 MG: 40 INJECTION INTRAMUSCULAR; INTRAVENOUS at 08:10

## 2025-04-08 RX ADMIN — IPRATROPIUM BROMIDE AND ALBUTEROL SULFATE 3 ML: .5; 3 SOLUTION RESPIRATORY (INHALATION) at 01:55

## 2025-04-08 RX ADMIN — ATORVASTATIN CALCIUM 10 MG: 10 TABLET, FILM COATED ORAL at 19:35

## 2025-04-08 RX ADMIN — GUAIFENESIN 1200 MG: 600 TABLET ORAL at 19:35

## 2025-04-08 ASSESSMENT — ACTIVITIES OF DAILY LIVING (ADL)
ADLS_ACUITY_SCORE: 62
ADLS_ACUITY_SCORE: 68
ADLS_ACUITY_SCORE: 64
ADLS_ACUITY_SCORE: 68
ADLS_ACUITY_SCORE: 62
ADLS_ACUITY_SCORE: 68
ADLS_ACUITY_SCORE: 48
ADLS_ACUITY_SCORE: 64
ADLS_ACUITY_SCORE: 62
ADLS_ACUITY_SCORE: 48
ADLS_ACUITY_SCORE: 68
ADLS_ACUITY_SCORE: 48
ADLS_ACUITY_SCORE: 62
ADLS_ACUITY_SCORE: 62
ADLS_ACUITY_SCORE: 68
ADLS_ACUITY_SCORE: 62
ADLS_ACUITY_SCORE: 64
ADLS_ACUITY_SCORE: 48
ADLS_ACUITY_SCORE: 68

## 2025-04-08 NOTE — PROGRESS NOTES
Type and cross screen completed. 1 PRBC transfusing, recheck hgb an hour after transfusion complete. Diet advanced to regular diet. Son Poncho at bedside.     Jaqui Giraldo RN

## 2025-04-08 NOTE — PROGRESS NOTES
[facility]    Medicine Progress Note - Hospitalist Service    Date of Admission:  4/7/2025    Assessment & Plan      Katya Butler is a 84 year old female with history of known advanced stage IV colon cancer coming into the hospital with volvulus, suspected from adhesion    Volvulus  --- resolved  --- Suspect related to known underlying malignancy versus surgical adhesions  --- Lactate normal  -- surgery started on clears; advance slowly  ---surgery signed off today    Subacute cough  Metastatic colon cancer with lung mets  Suspected COPD exacerbation  --- Failed 2 rounds of antibiotics with PCP per patient report  --- CT imaging shows emphysema, multiple solid nodules new in bilateral upper lobes.  Mild atelectasis without consolidation or pleural effusion.  --- low procalcitonin  -- continue prednisone course  --- Nebs as needed    Metastatic colon cancer  --- Follows with Dr. Merrill.  Recent oncology notes reviewed; has appointment on 4/11  --- rising CEA  ---considering hospice   ---oncology has been following  ---patient has been clear on not wanting further cancer treatment in past  --- DNR status  ---has been weakneer - get PT     Normocytic anemia  --- acute worsening with IVF  ---Suspect anemia of chronic disease, anemia related to cancer  --- give 1 unit today    CKD  1 mm left renal stone  Abnormal UA - UC negative  --- Renal function stable/improved from a baseline which appears around 1.5  --- No evidence hydronephrosis.  Stone nonobstructing    PAD  --- Patient self discontinued Plavix.  Has been on due to stenting of left lower extremity vessels.    Hypertension  --- Continue home amlodipine with hold parameters    Hyperlipidemia--continue home statin            Diet: NPO for Medical/Clinical Reasons Except for: Meds, Ice Chips    DVT Prophylaxis: Low Risk/Ambulatory with no VTE prophylaxis indicated  Lima Catheter: Not present  Lines: PRESENT             Cardiac  "Monitoring: None  Code Status: No CPR- Do NOT Intubate      Clinically Significant Risk Factors          # Hyperchloremia: Highest Cl = 108 mmol/L in last 2 days, will monitor as appropriate              # Hypertension: Noted on problem list            # Cachexia: Estimated body mass index is 17.18 kg/m  as calculated from the following:    Height as of this encounter: 1.575 m (5' 2\").    Weight as of this encounter: 42.6 kg (93 lb 14.7 oz)., PRESENT ON ADMISSION     # Financial/Environmental Concerns: none         Social Drivers of Health    Tobacco Use: Medium Risk (4/7/2025)    Patient History     Smoking Tobacco Use: Former     Smokeless Tobacco Use: Never          Disposition Plan     Medically Ready for Discharge: Anticipated Tomorrow             Vicki Bhatia MD  Hospitalist Service  [facility]  Securely message with [employer] (more info)  Text page via [facility] Paging/Directory   ______________________________________________________________________    Interval History   --- Patient seen in the presence of her son  --- Started having diarrhea.  No longer having any buttocks or abdominal pain  --- Questions regarding transfusion indication; clear if she wants this.  Has appointment on Friday with Dr. Merrill.  Considering hospice but does not want to start quite yet.    Physical Exam   Vital Signs: Temp: 99.1  F (37.3  C) Temp src: Oral BP: 97/64 Pulse: 86   Resp: 18 SpO2: 96 % O2 Device: None (Room air)    Weight: 93 lbs 14.66 oz    General Appearance: Pleasant female apparent distress  Respiratory: Clear to auscultation bilaterally  Cardiovascular: Regular rate and rhythm without murmurs or gallops  GI: Abdomen nontender without hepatosplenomegaly  Skin: No significant lower extremity edema  Other: Neurologically gross intact without focal deficit appreciated    Medical Decision Making       50 MINUTES SPENT BY ME on the date of service doing chart review, history, exam, documentation & " further activities per the note.      Data     I have personally reviewed the following data over the past 24 hrs:    9.4  \   8.3 (L)   / 373     140 108 (H) 22.1 /  123 (H)   4.3 20 (L) 1.10 (H) \       Imaging results reviewed over the past 24 hrs:   Recent Results (from the past 24 hours)   XR Gastrografin Challenge    Narrative    EXAM: XR GASTROGRAFIN CHALLENGE  LOCATION: [facility]  DATE: 4/8/2025    INDICATION: Small Bowel Obstruction  COMPARISON: None.  TECHNIQUE: Routine water soluble contrast follow-through challenge.      Impression    IMPRESSION:  1.  Water soluble contrast material IS identified within the colon 9 hours 20 minutes post administration.  2.  No dilated bowel loops.

## 2025-04-08 NOTE — PLAN OF CARE
Problem: Adult Inpatient Plan of Care  Goal: Optimal Comfort and Wellbeing  Outcome: Progressing  Intervention: Monitor Pain and Promote Comfort  Recent Flowsheet Documentation  Taken 4/8/2025 0801 by , Jaqui KENNEDY RN  Pain Management Interventions:   medication offered but refused   emotional support   rest   repositioned       Goal Outcome Evaluation:    Alert and oriented x4. VSS. Independent in room, [facility]. Patient reports having loose dark (black) stools. Diet advanced to regular. Patient tolerates and has good appetite. Blood transfusion infusing, recheck hgb level one hour after infusion complete. Patient denies pain or discomfort all shift, no interventions needed. Patient IV dressing reinforced by ambrose RN. Patient son's were updated by MD. Patient concerns and questions addressed by MD. PT to eval and treat. Continue to monitor.    Jaqui Giraldo, RN

## 2025-04-08 NOTE — PLAN OF CARE
Alert and oriented. Able to make needs known. Somewhat hard of hearing. Upon arrival to unit pain 7/10 at right flank. IV dilaudid given and effective quickly. Pain 0 for remainder of shift. Pt. Reports baseline chronic pain in left flank and stents that are replaced every 3 months on that side. Tolerated gastrograffin with no reaction and slept. Had a medium loose bowel movement. No nausea. Ice given. Lip balm given. Sip of water with meds given. Up to commode at bedside with 1 assist.

## 2025-04-08 NOTE — PLAN OF CARE
X-ray completed tonight.   PRN IV dilaudid given for 7/10 pain, states improved pain to 4/10 and slept rest of night.  Critical lab for hgb of 6.8, MD notified, type and screen ordered, consent to be obtained.     AOx4; VSS on RA; denies CP, SOB, dizziness.  Able to make needs known, call light in reach.    Mara Estrada, RN        Goal Outcome Evaluation:      Plan of Care Reviewed With: patient    Overall Patient Progress: improvingOverall Patient Progress: improving

## 2025-04-08 NOTE — PROGRESS NOTES
[facility] Hematology and Oncology Inpatient Progress Note    Patient: Katya Butler  MRN: [id number]  Date of Service: 4/8/2025    Reason for Visit    Suspected malignant volvulus with known metastatic colon cancer    Assessment/Plan    ECOG Performance Status: 2    #. Metastatic colon cancer with progressive disease on CT lung nodules and increased abdominal involvement  #. 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 Cancer related pain, improving    Per surgery, pursuing conservative management of volvulus. Does not appear to be obstructive. Reported loose stool yesterday. Right flank pain is improving with minimal pain medication. Discussed advanced cancer diagnosis. She does not want to pursue any treatment. Discussed options of comfort cares and hospice. Pt is not ready for hospice at this time and does not want to have a goals of care discussion with palliative care. She feels like her pain is improving and does not want medications to manage this. Will keep current appointment with Dr. Merrill on 4/11/25 to continue to follow metastatic cancer.     ______________________________________________________________________________      Staging History     Cancer Staging   Colon adenocarcinoma (H)  Staging form: Colon and Rectum, AJCC 8th Edition  - Clinical stage from 3/13/2023: Stage IVB (cT0, cNX, cM1b) - Signed by Jody Tolentino APRN CNP on 3/20/2023      Interval History  Katya is feeling much better today. Her right flank/buttock pain has greatly improved. She only got a small amount of dilaudid this morning. She had loose stool yesterday. Otherwise eating well. No other concerns. She is frustrated people keep asking her about hospice.     Review of systems.  Pertinent items noted in history.    Past History  Past Medical History:   Diagnosis Date    Diverticulitis of colon     Heart murmur     HLD (hyperlipidemia)     HTN (hypertension)     Inguinal hernia without  mention of obstruction or gangrene, unilateral or unspecified, (not specified as recurrent) 08/20/2004    RIGHT    Malignant neoplasm of sigmoid colon (H) 11/24/2020    Primary osteoarthritis involving multiple joints 08/02/2019    PVD (peripheral vascular disease)     Renal disease     Stented coronary artery     Ureteral stent present     Ventral hernia, unspecified, without mention of obstruction or gangrene 08/15/2003    easily reduced     Past Surgical History:   Procedure Laterality Date    COMBINED CYSTOSCOPY, RETROGRADES, EXCHANGE STENT URETER(S) Left 11/21/2024    Procedure: CYSTOSCOPY WITH LEFT URETERAL STENT EXCHANGE AND RETROGRADE PYELOGRAM;  Surgeon: Galo Perez MD;  Location: [facility]    COMBINED CYSTOSCOPY, RETROGRADES, URETEROSCOPY, INSERT STENT Left 08/22/2024    Procedure: CYSTOSCOPY, WITH LEFT RETROGRADE PYELOGRAM AND LEFT STENT INSERTION;  Surgeon: Galo Perez MD;  Location: [facility]    EYE SURGERY      GI SURGERY      HERNIA REPAIR      HERNIA REPA[place], [place]GUINAL RT/[place]  [place]    [facility] [place]STERECTOMY      IR AORTIC ARCH 4 VESSEL ANGIOGRAM  10/13/2009    IR AORTIC ARCH 4 VESSEL ANGIOGRAM  03/15/2010    IR MISCELLANEOUS PROCEDURE  05/20/2008    IR MISCELLANEOUS PROCEDURE  01/30/2009    IR MISCELLANEOUS PROCEDURE  01/30/2009    IR MISCELLANEOUS PROCEDURE  01/30/2009    IR MISCELLANEOUS PROCEDURE  10/13/2009    IR MISCELLANEOUS PROCEDURE  10/13/2009    IR MISCELLANEOUS PROCEDURE  03/15/2010    LAPAROSCOPIC CYSTECTOMY OVARIAN (ONCOLOGY) Bilateral 06/27/2018    Procedure: DIAGNOSTIC LAPAROSCOPY, RIGHT SALPINGO OOPHORECTOMY, LYSIS OF ADHESIONS, AND PELVIC WASHINGS;  Surgeon: Nneka Arroyo DO;  Location: [facility];  Service:     IN INSJ PRPH CTR VAD W/SUBQ PORT AGE 5 YR/> N/A 12/02/2020    Procedure: Port Placement;  Surgeon: Viry Villegas MD;  Location: [facility] OR;  Service: General    ZZC PART REMOVAL COLON W ANASTOMOSIS N/A  10/31/2020    Procedure: COLECTOMY, SIGMOID, OPEN;  Surgeon: Viry Villegas MD;  Location: [facility] OR;  Service: General    [facility] REMOVAL OF OVARY(S) N/A 10/31/2020    Procedure: OOPHORECTOMY, OPEN;  Surgeon: Viry Villegas MD;  Location: [facility] OR;  Service: General     Family History   Problem Relation Age of Onset    C.A.D. Mother     C.A.D. Sister     Breast Cancer Maternal Aunt     Breast Cancer Cousin     Breast Cancer Cousin     Breast Cancer Cousin     Uterine Cancer Mother 38.00    Cancer Mother     Lymphoma Son 53.00    Cancer Son      Social History     Socioeconomic History    Marital status:      Spouse name: None    Number of children: None    Years of education: None    Highest education level: None   Tobacco Use    Smoking status: Former     Types: Cigarettes    Smokeless tobacco: Never    Tobacco comments:     2-3 cigarettes daily **as of 01/10/25**   Vaping Use    Vaping status: Never Used   Substance and Sexual Activity    Alcohol use: Not Currently    Drug use: No    Sexual activity: Not Currently   Social History Narrative    Lives with her son     Social Drivers of Health     Financial Resource Strain: Low Risk  (8/21/2024)    Financial Resource Strain     Within the past 12 months, have you or your family members you live with been unable to get utilities (heat, electricity) when it was really needed?: No   Food Insecurity: Low Risk  (8/21/2024)    Food Insecurity     Within the past 12 months, did you worry that your food would run out before you got money to buy more?: No     Within the past 12 months, did the food you bought just not last and you didn t have money to get more?: No   Transportation Needs: Low Risk  (8/21/2024)    Transportation Needs     Within the past 12 months, has lack of transportation kept you from medical appointments, getting your medicines, non-medical meetings or appointments, work, or from getting things that you need?: No   Interpersonal  "Safety: Low Risk  (11/21/2024)    Interpersonal Safety     Do you feel physically and emotionally safe where you currently live?: Yes     Within the past 12 months, have you been hit, slapped, kicked or otherwise physically hurt by someone?: No     Within the past 12 months, have you been humiliated or emotionally abused in other ways by your partner or ex-partner?: No   Housing Stability: Low Risk  (8/21/2024)    Housing Stability     Do you have housing? : Yes     Are you worried about losing your housing?: No       Allergies    Allergies   Allergen Reactions    Contrast Dye Hives, Itching and Swelling    Aspirin Other (See Comments)     PATIENT STATES SHE IS NOT ALLERGIC TO ASA    Other reaction(s): on plavix  Other reaction(s): on plavix    Cephalexin Hives    Colcrys [Colchicine] Hives    Other Drug Allergy (See Comments) Itching     Pt states \"all pain meds\" make her \"itchy\"    Telithromycin Visual Disturbance          Physical Exam        4/8/2025    12:00 AM   CHOLESTEROL-MEDICATIONS   Atorvastatin Calcium 10 mg, Oral, EVERY EVENING, This therapy was substituted for Lovastatin (Altoprev) 40 mg daily.    This therapy was substituted for Lovastatin (Altoprev) 40 mg daily.      Lovastatin Take 40 mg by mouth at bedtime.          Patient-reported       GENERAL: Alert and oriented to time place and person. Lying in bed comfortably      HEAD: Atraumatic and normocephalic.    EYES:   No pallor.  No icterus    CHEST:  Symmetrical breath movements bilaterally.    CVS:   No peripheral edema.    EXTREMITIES: Warm.    SKIN: no rash, or bruising or purpura.    Lab Results    Recent Labs   Lab 04/08/25  1210 04/08/25  0559 04/07/25  0502   WBC  --  9.4 10.3   HGB 7.4* 6.5* 7.8*   HCT  --  20.7* 24.6*   PLT  --  373 431     Recent Labs   Lab 04/08/25  0559 04/07/25  0502    138   CO2 20* 20*   BUN 22.1 23.4*   ALBUMIN  --  3.8   ALKPHOS  --  96   ALT  --  6   AST  --  15       Imaging Results    XR Gastrografin " Challenge    Result Date: 4/8/2025  EXAM: XR GASTROGRAFIN CHALLENGE LOCATION: [facility] DATE: 4/8/2025 INDICATION: Small Bowel Obstruction COMPARISON: None. TECHNIQUE: Routine water soluble contrast follow-through challenge.     IMPRESSION: 1.  Water soluble contrast material IS identified within the colon 9 hours 20 minutes post administration. 2.  No dilated bowel loops.    CT Chest Abdomen Pelvis w/o Contrast    Result Date: 4/7/2025  EXAM: CT CHEST ABDOMEN PELVIS W/O CONTRAST LOCATION: [facility] DATE: 4/7/2025 INDICATION: Right flank pain. History of stent on left, possible aspiration pneumonia. History of sigmoid colon cancer. COMPARISON: CT abdomen pelvis 8/21/2024; CT chest abdomen pelvis 4/17/2024 TECHNIQUE: CT scan of the chest, abdomen, and pelvis was performed without IV contrast. Multiplanar reformats were obtained. Dose reduction techniques were used. CONTRAST: None. FINDINGS: LUNGS AND PLEURA: Stable mild emphysema. Multiple solid nodules in the bilateral upper lobes are new, for example measuring 4 mm in the right upper lobe on series 4 image 70, 5 mm in the left upper lobe on image 66 and up to 4 mm in the left upper lobe on image 60. Tiny calcified granulomas in the right lower lobe. Scattered mild atelectasis in the bilateral lung bases. No confluent consolidation, pleural effusion or pneumothorax. MEDIASTINUM/AXILLAE: Mildly enlarged mediastinal and left supraclavicular lymph nodes are new from 4/17/2024, for example measuring 1.8 x 1.4 cm in the right paratracheal station on series 3 image 55. Heart size is normal without pericardial effusion. A stent is seen in the proximal left subclavian artery. Left chest wall port catheter terminates in the SVC. CORONARY ARTERY CALCIFICATION: Moderate. HEPATOBILIARY: Liver is normal in size without surface nodularity. A small irregular calcification measuring 9 x 7 mm in the central liver is unchanged.  Gallbladder is normal. PANCREAS: Normal. SPLEEN: Normal. ADRENAL GLANDS: Normal. KIDNEYS/BLADDER: Stable benign-appearing bilateral renal cysts are visually benign and do not require follow-up. A 1 mm nonobstructing stone seen in the superior pole of the left kidney, new. No right urinary stones. No right hydroureteronephrosis. A left ureteral stent has been placed with near complete resolution of hydronephrosis on 8/21/2024, now with mild residual fullness of the left renal collecting system. Bladder is normal. BOWEL: A sharp transition point is seen in the mid/lower abdomen on series 3 image 215, with swelling of the adjacent mesenteric vessels. Small bowel proximal to the transition point is fluid-filled and not significantly distended. Asymmetric wall thickening of the lateral wall of the right colon at the level of the ileocecal valve (series 3 images 219-230), with ill-defined adjacent fat stranding. No bowel obstruction. A right perineal hernia is again seen with a small portion of the urinary bladder and a small portion of the rectum herniating through a defect in the right levator ani muscle. No free fluid or free air LYMPH NODES: A retroperitoneal soft tissue encasing the stent the left ureter has increased in size, for example measuring 3.9 x 3.6 cm in the left para-aortic region on series 3 image 193, previously 3.4 x 2.3 cm. A few subcentimeter lymph nodes seen in  the proximal right colon near the area of mild colonic wall thickening (series 3 image 2:15 VASCULATURE: Advanced aortoiliac atherosclerotic calcifications. A 3.2 cm AP by 3.3 cm transverse infrarenal abdominal aortic aneurysm is unchanged. PELVIC ORGANS: Status post hysterectomy. No abnormal adnexal masses. MUSCULOSKELETAL: Minimal degenerative anterolisthesis of L3 on L4. No suspicious osseous lesions or acute fractures. A chronic fracture deformity again seen in the sternum.     IMPRESSION: 1.  Sharp transition point in the mid/lower  abdominal small bowel with swirling morphology of adjacent mesenteric vessels, compatible with volvulus possibly from adhesion. However, the upstream small bowel does not appear significantly distended. 2.  Mildly asymmetric wall thickening of the right colon with subtle adjacent fat stranding and multiple small pericolonic lymph nodes, not well evaluated on this noncontrast enhanced study and nonspecific for colitis versus colonic malignancy. At minimum, short-term follow-up CT should be performed for monitoring, preferably with contrast if renal function permissible. 3.  Increased size of retroperitoneal soft tissue encasing the stented left ureter, compatible with progression of metastatic disease. Mild residual hydronephrosis in the left renal collecting system is significantly improved from most recent prior. 4.  Scattered new pulmonary nodules in the bilateral upper lobes with new mediastinal/left supraclavicular lymphadenopathy, suspicious for new metastatic disease. 5.  Stable indeterminate calcific focus in the central liver. 6.  New 1 mm nonobstructing left renal stone. 7.  Stable mild emphysema, 3.3 cm infrarenal abdominal aortic aneurysm and right perineal hernia containing small portions of the urinary bladder and rectum.       Signed by: Myrna Roberto PA-C

## 2025-04-08 NOTE — PROGRESS NOTES
This patient no longer requires Special Precautions because the COVID/RSV/Flu panel resulted negative 4/7/25.    April 8, 2025  Shauna Milligan, Infection Prevention    Area M Indication Text: Tumors in this location are included in Area M (cheek, forehead, scalp, neck, jawline and pretibial skin).  Mohs surgery is indicated for tumors in these anatomic locations.

## 2025-04-08 NOTE — PROGRESS NOTES
General Surgery Progress Note:    Hospital Day # 1    ASSESSMENT:   1. Volvulus (H)    2. Right flank pain    3. Nausea    4. Colorectal cancer (H)        Katya Butler is a 84 year old female with stage IV colon cancer s/p open sigmoidectomy (10/2022) d/t perforated colonic mass who is currently admitted d/t right flank pain, general surgery following d/t radiographic concern for SB volvulus.     She is s/p oral GGC yesterday revealing passage of water soluble contrast to the colon. She has had multiple BM since study, low concern for ongoing obstruction. As of note, patient with anemia of chronic disease with downtrend this morning. Blood transfusion ordered, she denies dark/bloody stools. Recommend diet advancement as tolerated.     PLAN:   -CLD, can advance further as tolerated.     -Educated to go slowly  -Encourage movement/activity  -Low concern for active obstruction, no surgical plans  -Continue medical mgmt per [facility]  -Surgery to sign off, please page with any questions/concerns    SUBJECTIVE:   Katya Butler is reporting doing okay, states right flank pain greatly improved with analgesics. Denies abdominal pain. Has had multiple loose bowel movements associated with urgency since yesterday evening. Endorses flatus. Looking forward to diet today.     Patient Vitals for the past 24 hrs:   BP Temp Temp src Pulse Resp SpO2 Weight   04/08/25 0900 -- -- -- -- -- -- 42.6 kg (93 lb 14.4 oz)   04/08/25 0720 97/64 99.1  F (37.3  C) Oral 86 18 96 % --   04/08/25 0500 -- -- -- -- -- 98 % --   04/08/25 0048 103/64 98.9  F (37.2  C) Oral 86 18 96 % --   04/07/25 2132 107/58 -- -- 85 -- -- --   04/07/25 2006 -- -- -- -- -- 97 % --   04/07/25 1858 106/57 98.5  F (36.9  C) Oral 81 20 96 % --   04/07/25 1632 (!) 145/65 98.2  F (36.8  C) Oral 87 22 97 % 42.6 kg (93 lb 14.7 oz)   04/07/25 1525 102/59 -- -- 79 -- 95 % --   04/07/25 1500 107/59 -- -- 77 -- 96 % --   04/07/25 1430 108/55 -- -- 74 -- 96 % --   04/07/25 1401  124/64 -- -- 75 -- 97 % --   04/07/25 1330 126/65 -- -- 73 -- 95 % --   04/07/25 1315 -- -- -- 65 -- 95 % --   04/07/25 1300 125/58 -- -- 64 -- 95 % --   04/07/25 1230 100/51 -- -- 61 -- 96 % --   04/07/25 1200 118/57 -- -- 63 -- 95 % --       Physical Exam:  General: patient seen resting in bed, no acute distress  Resp: no respiratory distress, breathing comfortably on room air.  Abdomen: soft, non distended, non tender. Remote appearing mid sagittal surgical incision that is CDI.   Extremities: warm and well perfused    Admission on 04/07/2025   Component Date Value    Sodium 04/07/2025 138     Potassium 04/07/2025 4.6     Carbon Dioxide (CO2) 04/07/2025 20 (L)     Anion Gap 04/07/2025 11     Urea Nitrogen 04/07/2025 23.4 (H)     Creatinine 04/07/2025 1.18 (H)     GFR Estimate 04/07/2025 46 (L)     Calcium 04/07/2025 9.2     Chloride 04/07/2025 107     Glucose 04/07/2025 112 (H)     Alkaline Phosphatase 04/07/2025 96     AST 04/07/2025 15     ALT 04/07/2025 6     Protein Total 04/07/2025 7.2     Albumin 04/07/2025 3.8     Bilirubin Total 04/07/2025 <0.2     Color Urine 04/07/2025 Colorless     Appearance Urine 04/07/2025 Clear     Glucose Urine 04/07/2025 Negative     Bilirubin Urine 04/07/2025 Negative     Ketones Urine 04/07/2025 Negative     Specific Gravity Urine 04/07/2025 1.009     Blood Urine 04/07/2025 0.5 mg/dL (A)     pH Urine 04/07/2025 5.5     Protein Albumin Urine 04/07/2025 10 (A)     Urobilinogen Urine 04/07/2025 Normal     Nitrite Urine 04/07/2025 Negative     Leukocyte Esterase Urine 04/07/2025 250 Pedro/uL (A)     Bacteria Urine 04/07/2025 Few (A)     RBC Urine 04/07/2025 81 (H)     WBC Urine 04/07/2025 8 (H)     Squamous Epithelials Uri* 04/07/2025 1     WBC Count 04/07/2025 10.3     RBC Count 04/07/2025 3.11 (L)     Hemoglobin 04/07/2025 7.8 (L)     Hematocrit 04/07/2025 24.6 (L)     MCV 04/07/2025 79     MCH 04/07/2025 25.1 (L)     MCHC 04/07/2025 31.7     RDW 04/07/2025 17.2 (H)     Platelet  Count 04/07/2025 431     % Neutrophils 04/07/2025 48     % Lymphocytes 04/07/2025 33     % Monocytes 04/07/2025 10     % Eosinophils 04/07/2025 9     % Basophils 04/07/2025 0     % Immature Granulocytes 04/07/2025 1     NRBCs per 100 WBC 04/07/2025 0     Absolute Neutrophils 04/07/2025 4.9     Absolute Lymphocytes 04/07/2025 3.4     Absolute Monocytes 04/07/2025 1.0     Absolute Eosinophils 04/07/2025 0.9 (H)     Absolute Basophils 04/07/2025 0.0     Absolute Immature Granul* 04/07/2025 0.1     Absolute NRBCs 04/07/2025 0.0     CRP Inflammation 04/07/2025 8.20 (H)     Lactic Acid, Initial 04/07/2025 1.1     Culture 04/07/2025 <10,000 CFU/mL Urogenital jessica     CEA 04/07/2025 34.0     Influenza A PCR 04/07/2025 Negative     Influenza B PCR 04/07/2025 Negative     RSV PCR 04/07/2025 Negative     SARS CoV2 PCR 04/07/2025 Negative     Procalcitonin 04/07/2025 0.08     Sodium 04/08/2025 140     Potassium 04/08/2025 4.3     Chloride 04/08/2025 108 (H)     Carbon Dioxide (CO2) 04/08/2025 20 (L)     Anion Gap 04/08/2025 12     Urea Nitrogen 04/08/2025 22.1     Creatinine 04/08/2025 1.10 (H)     GFR Estimate 04/08/2025 50 (L)     Calcium 04/08/2025 9.2     Glucose 04/08/2025 123 (H)     WBC Count 04/08/2025 9.4     RBC Count 04/08/2025 2.66 (L)     Hemoglobin 04/08/2025 6.5 (LL)     Hematocrit 04/08/2025 20.7 (L)     MCV 04/08/2025 78     MCH 04/08/2025 24.4 (L)     MCHC 04/08/2025 31.4 (L)     RDW 04/08/2025 17.0 (H)     Platelet Count 04/08/2025 373     ABO/RH(D) 04/08/2025 A POS     Antibody Screen 04/08/2025 Negative     SPECIMEN EXPIRATION DATE 04/08/2025 [id number]     Blood Component Type 04/08/2025 Red Blood Cells     Product Code 04/08/2025 [id number]     Unit Status 04/08/2025 Ready for issue     Unit Number 04/08/2025 [id number]     CROSSMATCH 04/08/2025 Compatible     CODING SYSTEM 04/08/2025 [id number]         Makenzei Pettit PA-C  [facility]  Surgery [place] [id number]  [address]  [place] [address]?  Office: [phone]

## 2025-04-09 VITALS
WEIGHT: 93.9 LBS | DIASTOLIC BLOOD PRESSURE: 69 MMHG | TEMPERATURE: 98.5 F | BODY MASS INDEX: 17.28 KG/M2 | HEIGHT: 62 IN | RESPIRATION RATE: 18 BRPM | HEART RATE: 69 BPM | OXYGEN SATURATION: 97 % | SYSTOLIC BLOOD PRESSURE: 133 MMHG

## 2025-04-09 LAB
HGB BLD-MCNC: 9.3 G/DL (ref 11.7–15.7)
HOLD SPECIMEN: NORMAL

## 2025-04-09 PROCEDURE — 250N000012 HC RX MED GY IP 250 OP 636 PS 637: Performed by: FAMILY MEDICINE

## 2025-04-09 PROCEDURE — 97116 GAIT TRAINING THERAPY: CPT | Mod: GP

## 2025-04-09 PROCEDURE — 36415 COLL VENOUS BLD VENIPUNCTURE: CPT | Performed by: FAMILY MEDICINE

## 2025-04-09 PROCEDURE — 99239 HOSP IP/OBS DSCHRG MGMT >30: CPT | Performed by: FAMILY MEDICINE

## 2025-04-09 PROCEDURE — 250N000013 HC RX MED GY IP 250 OP 250 PS 637: Performed by: FAMILY MEDICINE

## 2025-04-09 PROCEDURE — 85018 HEMOGLOBIN: CPT | Performed by: FAMILY MEDICINE

## 2025-04-09 PROCEDURE — 97161 PT EVAL LOW COMPLEX 20 MIN: CPT | Mod: GP

## 2025-04-09 PROCEDURE — 97110 THERAPEUTIC EXERCISES: CPT | Mod: GP

## 2025-04-09 RX ORDER — PREDNISONE 20 MG/1
40 TABLET ORAL DAILY
Qty: 4 TABLET | Refills: 0 | Status: SHIPPED | OUTPATIENT
Start: 2025-04-10 | End: 2025-04-12

## 2025-04-09 RX ADMIN — GUAIFENESIN 1200 MG: 600 TABLET ORAL at 08:44

## 2025-04-09 RX ADMIN — OXYCODONE HYDROCHLORIDE 5 MG: 5 TABLET ORAL at 01:59

## 2025-04-09 RX ADMIN — AMLODIPINE BESYLATE 5 MG: 5 TABLET ORAL at 08:44

## 2025-04-09 RX ADMIN — PREDNISONE 40 MG: 20 TABLET ORAL at 08:44

## 2025-04-09 ASSESSMENT — ACTIVITIES OF DAILY LIVING (ADL)
ADLS_ACUITY_SCORE: 48

## 2025-04-09 NOTE — PROGRESS NOTES
04/09/25 1000   Appointment Info   Signing Clinician's Name / Credentials (PT) Carrol Telles, BUBBA   Living Environment   People in Home child(candi), adult  (son)   Current Living Arrangements   (town home)   Living Environment Comments one  level home and no steps   Self-Care   Usual Activity Tolerance good   Current Activity Tolerance moderate   Equipment Currently Used at Home none   Fall history within last six months no   Activity/Exercise/Self-Care Comment Indep with all mobility and ADLs. Assist with driving.  Son does  assist some cooking and some cleaning   General Information   Onset of Illness/Injury or Date of Surgery 04/07/25   Referring Physician Vicki Bhatia MD   Patient/Family Therapy Goals Statement (PT) To go home   Pertinent History of Current Problem (include personal factors and/or comorbidities that impact the POC) Per the chart, Katya Butler is a 84 year old female with history of known advanced stage IV colon cancer coming into the hospital with volvulus, suspected from adhesion   Existing Precautions/Restrictions   (colon Cancer)   Weight-Bearing Status - LLE weight-bearing as tolerated   Weight-Bearing Status - RLE weight-bearing as tolerated   Cognition   Affect/Mental Status (Cognition) WNL   Orientation Status (Cognition) oriented x 4   Follows Commands (Cognition) WNL   Pain Assessment   Patient Currently in Pain   (no)   Posture    Posture Comments good posture   Range of Motion (ROM)   Range of Motion ROM is WNL   ROM Comment bilat LEs   Strength (Manual Muscle Testing)   Strength (Manual Muscle Testing) strength is WNL   Strength Comments bilat LEs   Bed Mobility   Comment, (Bed Mobility) Supine<>sit indep   Transfers   Comment, (Transfers) indep with sit<>stand without AD   Gait/Stairs (Locomotion)   [place] Level (Gait) supervision   Assistive Device (Gait) other (see comments)  (none)   Distance in Feet (Gait) 50'   Pattern (Gait) step-through;swing-through    Deviations/Abnormal Patterns (Gait) gait speed decreased   Balance   Balance Comments supervision without AD and no LOB.   Sensory Examination   Sensory Perception Comments dec sensation bilat hands and feet from chemo per the pt.   Clinical Impression   Criteria for Skilled Therapeutic Intervention Evaluation only  (gait and HEP)   PT Diagnosis (PT) Impaired functional mobility.   Influenced by the following impairments dec endurance   Functional limitations due to impairments gait   Clinical Presentation (PT Evaluation Complexity) stable   Clinical Presentation Rationale Pt presents medically diagnosed.   Clinical Decision Making (Complexity) low complexity   Planned Therapy Interventions (PT) home exercise program;strengthening   Risk & Benefits of therapy have been explained evaluation/treatment results reviewed;care plan/treatment goals reviewed;risks/benefits reviewed;patient;current/potential barriers reviewed   PT Total Evaluation Time   PT Eval, Low Complexity Minutes (01025) 13   Physical Therapy Goals   PT Frequency One time eval and treatment only   PT Predicted Duration/Target Date for Goal Attainment 04/09/25   PT Goals Gait;PT Goal 1   PT: Gait Independent;Greater than 200 feet   PT: Goal 1 Indep with HEP and pt to know to use better body mechanics with mobility   Interventions   Interventions Quick Adds Gait Training;Therapeutic Procedure   Therapeutic Procedure/Exercise   Ther. Procedure: strength, endurance, ROM, flexibillity Minutes (70931) 10   Treatment Detail/Skilled Intervention PT did instruct the pt in a HEP and review body mechanics with mobility to assist with dec back pain.  Pt to walk 3x/day or as dilia and PT did instruct in sitting AP, ankle circles, bilat LAQ, sit and standing marching, GS and bilat, seated gentle wt shifting and gentle back ext and flex ex.   Gait Training   Gait Training Minutes (88061) 13   Symptoms Noted During/After Treatment (Gait Training) shortness of  breath;increased pain  (Pt did have some increased SOB and some back pain.  PT did recommend she have good lumbar support in sitting and in bed.)   Treatment Detail/Skilled Intervention Pt walked slowly and had no LOB with gait.  Pt did not use any AD.   Distance in Feet 300'   [place] Level (Gait Training) independent   Physical Assistance Level (Gait Training) verbal cues;1 person assist   Weight Bearing (Gait Training) weight-bearing as tolerated   Assistive Device (Gait Training) other (see comments)  (none)   Pattern Analysis (Gait Training) swing-through gait   Gait Analysis Deviations decreased eugene;decreased step length   Impairments (Gait Analysis/Training) pain   PT Discharge Planning   PT Plan dc PT, pt is indep   PT Discharge Recommendation (DC Rec) home with assist   PT Rationale for DC Rec The pt is indep with mobility and did not use any AD.  PT did instruct the pt in HEP and body mechanics with mobility and she will continue the HEP as dilia at home.  Home with assist as able.   PT Brief overview of current status PT eval, indep bed mobility, transfers and gait.  Instructed and review HEP and body mechanics.   Physical Therapy Time and Intention   Timed Code Treatment Minutes 23   Total Session Time (sum of timed and untimed services) 36

## 2025-04-09 NOTE — PLAN OF CARE
Problem: Adult Inpatient Plan of Care  Goal: Plan of Care Review  Description: The Plan of Care Review/Shift note should be completed every shift.  The Outcome Evaluation is a brief statement about your assessment that the patient is improving, declining, or no change.  This information will be displayed automatically on your shiftnote.  Outcome: Progressing   Goal Outcome Evaluation:       Oxycodone given for back pain. refused bed alarm. Slept most the night. VSS.

## 2025-04-09 NOTE — PLAN OF CARE
Problem: Anemia  Goal: Anemia Symptom Improvement  Outcome: Progressing  Intervention: Monitor and Manage Anemia  Recent Flowsheet Documentation  Taken 4/9/2025 0851 by Jose Juan Moreno RN  Safety Promotion/Fall Prevention:   assistive device/personal items within reach   safety round/check completed   nonskid shoes/slippers when out of bed   clutter free environment maintained     Problem: Comorbidity Management  Goal: Maintenance of COPD Symptom Control  Outcome: Progressing  Intervention: Maintain COPD Symptom Control  Recent Flowsheet Documentation  Taken 4/9/2025 0851 by Jose Juan Moreno RN  Medication Review/Management: medications reviewed     Goal Outcome Evaluation:  Pt is feeling well overall and denies pain. Hgb improved today. Up independently, refusing bed alarm.    Discharged to home with a friend at 12:45. IV removed and AVS discussed. All questions answered.    Jose Juan Moreno RN  [phone]

## 2025-04-09 NOTE — DISCHARGE SUMMARY
"[facility]  Hospitalist Discharge Summary      Date of Admission:  4/7/2025  Date of Discharge:  4/9/2025 12:52 PM  Discharging Provider: Vicki Bhatia MD  Discharge Service: Hospitalist Service    Discharge Diagnoses     Volvulus, resolved  COPD exacerbation  Metastatic colon cancer with new lung metastases  Anemia of chronic disease; received 1 unit PRBC  CKD  1 mm nonobstructing left renal stone  Chronic left ureteral stent  PAD  Hypertension  Hyperlipidemia    Clinically Significant Risk Factors     # Cachexia: Estimated body mass index is 17.17 kg/m  as calculated from the following:    Height as of this encounter: 1.575 m (5' 2\").    Weight as of this encounter: 42.6 kg (93 lb 14.4 oz).       Follow-ups Needed After Discharge   Follow-up Appointments       Follow Up      Follow up with Dr. Merrill this Friday        Hospital Follow-up with Existing Primary Care Provider (PCP)          Schedule Primary Care visit within: 30 Days               Discharge Disposition   Discharged to home  Condition at discharge: Stable    Hospital Course   Katya Butler is a 84 year old female with history of known advanced stage IV colon cancer coming into the hospital with volvulus, suspected from adhesion.  Developed acute worsening right buttocks pain into her right abdomen.  It was treated conservatively with bowel rest IV fluids with resolution.  Also had complained of subacute cough with CT scan showing new findings of lung metastases.  Given emphysema history started on steroid burst with improvement of symptoms.  Discharging to home in good condition to continue cancer care follow-up on Friday.  Detailed course below;     Volvulus  --- resolved  --- Suspect related to known underlying malignancy versus surgical adhesions  --- Lactate normal  -- Advance diet successfully after n.p.o. status without recurrence of sx  ---surgery signed off 4/8    Subacute cough  Metastatic colon cancer with lung " mets  Suspected COPD exacerbation  --- Failed 2 rounds of antibiotics with PCP per patient report  --- CT imaging shows emphysema, multiple solid nodules new in bilateral upper lobes.  Mild atelectasis without consolidation or pleural effusion.  --- low procalcitonin  -- continue prednisone course  --- Nebs as needed    Metastatic colon cancer  --- Follows with Dr. Merrill.  Recent oncology notes reviewed; has appointment on 4/11  --- rising CEA  ---considering hospice   ---patient has been clear on not wanting further cancer treatment in past  --- DNR status  ---passed PT eval    Normocytic anemia  --- acute worsening with IVF  ---Suspect anemia of chronic disease, anemia related to cancer  --- give 1 unit 4/8   ---consider starting iron as outpatient    PAD  --- Patient self discontinued Plavix.  Has been on due to stenting of left lower extremity vessels.        Consultations This Hospital Stay   CARE MANAGEMENT / SOCIAL WORK IP CONSULT  HEMATOLOGY & ONCOLOGY IP CONSULT  PHYSICAL THERAPY ADULT IP CONSULT    Code Status   Prior    Time Spent on this Encounter   I, Vicki Bhatia MD, personally saw the patient today and spent greater than 30 minutes discharging this patient.       Vicki Bhatia MD  [address] [address]  Phone: [phone]  Fax: [phone]  ______________________________________________________________________    Physical Exam   Vital Signs: Temp: 98.5  F (36.9  C) Temp src: Oral BP: 133/69 Pulse: 69   Resp: 18 SpO2: 97 % O2 Device: None (Room air)    Weight: 93 lbs 14.4 oz  General Appearance: Pleasant female, no apparent distress.  Respiratory: No significant wheezing  Cardiovascular: Regular rate and rhythm  GI: Soft and nontender without hepatosplenomegaly  Skin: Significant lower extremity edema.  Skin dry.  No open areas.  Other: Neurologically gross intact without focal deficits appreciated       Primary Care Physician   Dave BARNETT  S[place]    Discharge Orders      Reason for your hospital stay    volvulus     Activity    Your activity upon discharge: activity as tolerated     Tubes and Drains    Current Tubes and Drains:     CVC Line  Duration           Port A Cath Single Left Chest wall -- days              Follow Up    Follow up with Dr. Merrill this Friday     Diet    Follow this diet upon discharge: Current Diet:Orders Placed This Encounter      Regular Diet Adult     Hospital Follow-up with Existing Primary Care Provider (PCP)            Significant Results and Procedures   Most Recent 3 CBC's:  Recent Labs   Lab Test 04/09/25  0810 04/08/25  1643 04/08/25  1210 04/08/25  0559 04/07/25  0502 01/10/25  1342   WBC  --   --   --  9.4 10.3 8.8   HGB 9.3* 8.3* 7.4* 6.5* 7.8* 8.8*   MCV  --   --   --  78 79 82   PLT  --   --   --  373 431 370     Most Recent 3 BMP's:  Recent Labs   Lab Test 04/08/25  0559 04/07/25  0502 02/26/25  1550    138 137   POTASSIUM 4.3 4.6 5.1   CHLORIDE 108* 107 104   CO2 20* 20* 20*   BUN 22.1 23.4* 25.5*   CR 1.10* 1.18* 1.58*   ANIONGAP 12 11 13   SUDHIR 9.2 9.2 9.0   * 112* 124*     7-Day Micro Results       Collected Updated Procedure Result Status      04/07/2025 1050 04/07/2025 1139 Influenza A/B, RSV and SARS-CoV2 PCR (COVID-19) Nose [[id number]]    Swab from Nose    Final result Component Value   Influenza A PCR Negative   Influenza B PCR Negative   RSV PCR Negative   SARS CoV2 PCR Negative   NEGATIVE: SARS-CoV-2 (COVID-19) RNA not detected, presumed negative.            04/07/2025 0723 04/08/2025 0605 Urine Culture [[id number]]   Urine, Midstream    Final result Component Value   Culture <10,000 CFU/mL Urogenital jessica                   ,   Results for orders placed or performed during the hospital encounter of 04/07/25   CT Chest Abdomen Pelvis w/o Contrast    Narrative    EXAM: CT CHEST ABDOMEN PELVIS W/O CONTRAST  LOCATION: [facility]  DATE: 4/7/2025    INDICATION:  Right flank pain. History of stent on left, possible aspiration pneumonia. History of sigmoid colon cancer.  COMPARISON: CT abdomen pelvis 8/21/2024; CT chest abdomen pelvis 4/17/2024  TECHNIQUE: CT scan of the chest, abdomen, and pelvis was performed without IV contrast. Multiplanar reformats were obtained. Dose reduction techniques were used.   CONTRAST: None.    FINDINGS:   LUNGS AND PLEURA: Stable mild emphysema. Multiple solid nodules in the bilateral upper lobes are new, for example measuring 4 mm in the right upper lobe on series 4 image 70, 5 mm in the left upper lobe on image 66 and up to 4 mm in the left upper lobe   on image 60. Tiny calcified granulomas in the right lower lobe. Scattered mild atelectasis in the bilateral lung bases. No confluent consolidation, pleural effusion or pneumothorax.     MEDIASTINUM/AXILLAE: Mildly enlarged mediastinal and left supraclavicular lymph nodes are new from 4/17/2024, for example measuring 1.8 x 1.4 cm in the right paratracheal station on series 3 image 55. Heart size is normal without pericardial effusion. A   stent is seen in the proximal left subclavian artery. Left chest wall port catheter terminates in the SVC.    CORONARY ARTERY CALCIFICATION: Moderate.    HEPATOBILIARY: Liver is normal in size without surface nodularity. A small irregular calcification measuring 9 x 7 mm in the central liver is unchanged. Gallbladder is normal.    PANCREAS: Normal.    SPLEEN: Normal.    ADRENAL GLANDS: Normal.    KIDNEYS/BLADDER: Stable benign-appearing bilateral renal cysts are visually benign and do not require follow-up. A 1 mm nonobstructing stone seen in the superior pole of the left kidney, new. No right urinary stones. No right hydroureteronephrosis. A   left ureteral stent has been placed with near complete resolution of hydronephrosis on 8/21/2024, now with mild residual fullness of the left renal collecting system. Bladder is normal.    BOWEL: A sharp transition  point is seen in the mid/lower abdomen on series 3 image 215, with swelling of the adjacent mesenteric vessels. Small bowel proximal to the transition point is fluid-filled and not significantly distended. Asymmetric wall   thickening of the lateral wall of the right colon at the level of the ileocecal valve (series 3 images 219-230), with ill-defined adjacent fat stranding. No bowel obstruction. A right perineal hernia is again seen with a small portion of the urinary   bladder and a small portion of the rectum herniating through a defect in the right levator ani muscle. No free fluid or free air    LYMPH NODES: A retroperitoneal soft tissue encasing the stent the left ureter has increased in size, for example measuring 3.9 x 3.6 cm in the left para-aortic region on series 3 image 193, previously 3.4 x 2.3 cm. A few subcentimeter lymph nodes seen in   the proximal right colon near the area of mild colonic wall thickening (series 3 image 2:15    VASCULATURE: Advanced aortoiliac atherosclerotic calcifications. A 3.2 cm AP by 3.3 cm transverse infrarenal abdominal aortic aneurysm is unchanged.    PELVIC ORGANS: Status post hysterectomy. No abnormal adnexal masses.    MUSCULOSKELETAL: Minimal degenerative anterolisthesis of L3 on L4. No suspicious osseous lesions or acute fractures. A chronic fracture deformity again seen in the sternum.        Impression    IMPRESSION:    1.  Sharp transition point in the mid/lower abdominal small bowel with swirling morphology of adjacent mesenteric vessels, compatible with volvulus possibly from adhesion. However, the upstream small bowel does not appear significantly distended.    2.  Mildly asymmetric wall thickening of the right colon with subtle adjacent fat stranding and multiple small pericolonic lymph nodes, not well evaluated on this noncontrast enhanced study and nonspecific for colitis versus colonic malignancy. At   minimum, short-term follow-up CT should be performed for  monitoring, preferably with contrast if renal function permissible.    3.  Increased size of retroperitoneal soft tissue encasing the stented left ureter, compatible with progression of metastatic disease. Mild residual hydronephrosis in the left renal collecting system is significantly improved from most recent prior.    4.  Scattered new pulmonary nodules in the bilateral upper lobes with new mediastinal/left supraclavicular lymphadenopathy, suspicious for new metastatic disease.    5.  Stable indeterminate calcific focus in the central liver.    6.  New 1 mm nonobstructing left renal stone.    7.  Stable mild emphysema, 3.3 cm infrarenal abdominal aortic aneurysm and right perineal hernia containing small portions of the urinary bladder and rectum.   XR Gastrografin Challenge    Narrative    EXAM: XR GASTROGRAFIN CHALLENGE  LOCATION: [facility]  DATE: 4/8/2025    INDICATION: Small Bowel Obstruction  COMPARISON: None.  TECHNIQUE: Routine water soluble contrast follow-through challenge.      Impression    IMPRESSION:  1.  Water soluble contrast material IS identified within the colon 9 hours 20 minutes post administration.  2.  No dilated bowel loops.       Discharge Medications   Discharge Medication List as of 4/9/2025 11:19 AM        START taking these medications    Details   predniSONE (DELTASONE) 20 MG tablet Take 2 tablets (40 mg) by mouth daily for 2 days., Disp-4 tablet, R-0, E-Prescribe           CONTINUE these medications which have NOT CHANGED    Details   amLODIPine (NORVASC) 5 MG tablet Take 5 mg by mouth 2 times daily., Historical      HYDROcodone-acetaminophen (NORCO) 5-325 MG tablet Take 1-2 tablets by mouth every 4 hours as needed for severe pain., Disp-90 tablet, R-0, E-Prescribe      lidocaine-prilocaine (EMLA) 2.5-2.5 % external cream Apply topically as needed for moderate pain Apply to painful skinDisp-30 g, [id number]      lovastatin (MEVACOR) 40 MG tablet Take 40  "mg by mouth at bedtime., Historical      senna-docusate (SENOKOT-S/PERICOLACE) 8.6-50 MG tablet Take 2 tablets by mouth at bedtime., Historical           Allergies   Allergies   Allergen Reactions    Contrast Dye Hives, Itching and Swelling    Aspirin Other (See Comments)     PATIENT STATES SHE IS NOT ALLERGIC TO ASA    Other reaction(s): on plavix  Other reaction(s): on plavix    Cephalexin Hives    Colcrys [Colchicine] Hives    Other Drug Allergy (See Comments) Itching     Pt states \"all pain meds\" make her \"itchy\"    Telithromycin Visual Disturbance     "

## 2025-04-09 NOTE — PLAN OF CARE
Physical Therapy Discharge Summary    Reason for therapy discharge:    Discharged to home. With son to assist.    Progress towards therapy goal(s). See goals on Care Plan in [facility] electronic health record for goal details.  Goals met    Therapy recommendation(s):    Continue home exercise program.

## 2025-04-09 NOTE — PLAN OF CARE
No pain today. Complained of frequent loose stools. Last one about 1500. Up without any dizziness. Pt. Strongly advocated for no bed alarm and promised to be careful and call if needed. Appetite good. Reported difficulty getting to sleep and melatonin, chamomile tea and lavender oil provided.

## 2025-04-11 ENCOUNTER — ONCOLOGY VISIT (OUTPATIENT)
Dept: ONCOLOGY | Facility: HOSPITAL | Age: 85
End: 2025-04-11
Attending: INTERNAL MEDICINE
Payer: COMMERCIAL

## 2025-04-11 VITALS
HEART RATE: 82 BPM | OXYGEN SATURATION: 98 % | SYSTOLIC BLOOD PRESSURE: 136 MMHG | BODY MASS INDEX: 17.85 KG/M2 | WEIGHT: 97 LBS | DIASTOLIC BLOOD PRESSURE: 64 MMHG | TEMPERATURE: 97.6 F | HEIGHT: 62 IN | RESPIRATION RATE: 20 BRPM

## 2025-04-11 DIAGNOSIS — D64.9 NORMOCYTIC ANEMIA: ICD-10-CM

## 2025-04-11 DIAGNOSIS — C18.9 COLON ADENOCARCINOMA (H): ICD-10-CM

## 2025-04-11 DIAGNOSIS — G89.3 CANCER ASSOCIATED PAIN: Primary | ICD-10-CM

## 2025-04-11 PROCEDURE — G0463 HOSPITAL OUTPT CLINIC VISIT: HCPCS | Performed by: INTERNAL MEDICINE

## 2025-04-11 RX ORDER — OXYCODONE AND ACETAMINOPHEN 5; 325 MG/1; MG/1
2 TABLET ORAL EVERY 6 HOURS PRN
Qty: 30 TABLET | Refills: 0 | Status: SHIPPED | OUTPATIENT
Start: 2025-04-11 | End: 2025-04-16

## 2025-04-11 ASSESSMENT — PAIN SCALES - GENERAL: PAINLEVEL_OUTOF10: SEVERE PAIN (8)

## 2025-04-11 NOTE — PROGRESS NOTES
"Oncology Rooming Note    April 11, 2025 2:03 PM   Katya Butler is a 84 year old female who presents for:    Chief Complaint   Patient presents with    Oncology Clinic Visit     Follow up 2 month labs     Initial Vitals: There were no vitals taken for this visit. Estimated body mass index is 17.17 kg/m  as calculated from the following:    Height as of 4/7/25: 1.575 m (5' 2\").    Weight as of 4/8/25: 42.6 kg (93 lb 14.4 oz). There is no height or weight on file to calculate BSA.  Data Unavailable Comment: Data Unavailable   No LMP recorded. Patient has had a hysterectomy.  Allergies reviewed: Yes  Medications reviewed: Yes    Medications: Medication refills not needed today.  Pharmacy name entered into [facility]: [facility] [place] - [address]    Frailty Screening:   Is the patient here for a new oncology consult visit in cancer care? 2. No    PHQ9:  Did this patient require a PHQ9?: No      Clinical concerns: none       Susy Duran CMA            "

## 2025-04-11 NOTE — PROGRESS NOTES
[facility] Hematology and Oncology Progress Note    Patient: Katya Butler  MRN: [id number]  Date of Service: Apr 11, 2025        Assessment and Plan:    Cancer Staging  Malignant neoplasm of sigmoid colon (H)  Staging form: Colon And Rectum, AJCC 8th Edition  - Clinical: No stage assigned - Unsigned  - Pathologic stage from 11/24/2020: Stage IIIC (pT4b, pN2, cM0) - Signed by Walt Merrill MD on 11/24/2020     1.  Colon cancer, metastatic: She was recently hospitalized with abdominal pain and partial bowel obstruction.  She notes today that she still has abdominal discomfort.  Decreased appetite.  More fatigue.  We discussed the plan going forward.  The patient and her son are interested in meeting with hospice and getting them involved at this point.  I think that is the most appropriate plan.  She really is not a good candidate for any systemic therapy and has not wanted any to date.  Will continue to follow her but am not sure if she will return to clinic as I would anticipate she will sign onto hospice soon.  I did personally reviewed her CT scan images from April 7 I shared them with her son.  These show worsening abdominal adenopathy and pulmonary nodules.    2.  Ureteral obstruction: Left ureteral stent remains in place.     3.  Anemia: CBC from today reviewed and shows a hemoglobin of 9.9.  This is coming up nicely from April 8 when it was 6.5.    4.  Abdominal pain secondary to her cancer.  She has just been taking Tylenol at home.  I am going to give her a prescription for hydrocodone/acetaminophen to help her during the day and especially at night to try to help her sleep.  If this does not help we can go to oral Dilaudid.    ECOG Performance  0    Diagnosis:    1.  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  At initial surgery there was evidence of a small abscess and perforation of the colon.  Pathology showed a invasive moderately differentiated adenocarcinoma.  Constricting ulcerated measuring 35 x 25 x 13 mm.  4 of 18 lymph nodes were positive. Mismatch repair intact. There was some proximal colonic dilatation, consistent with obstruction.  Left ovary showed moderately differentiated adenocarcinoma, direct extension.  Focal positive margin at the paraovarian soft tissue margin.      Recurrence diagnosed in March 2023 from a left supraclavicular lymph node. MSI low. HER-2 negative. PET scan imaging shows uptake in left supraclavicular nodes and retroperitoneal lymph nodes.  NGS:  negative.    Treatment:    Surgical resection on October 31, 2020.  Adjuvant chemotherapy with FOLFOX was initiated on December 8, 2020.  25% initial dose reduction of all medications. Cycle 10 completed on April 20, 2021.    No treatment for the recurrence form 2023.    Interim History:    Katya returns today for follow-up visit.  She was recently in the hospital for anemia and bowel obstruction.  She comes in today for follow-up.  Still having some abdominal pain.  No nausea or vomiting.    Review of Systems:    As above in the history.     Review of Systems otherwise Negative for:  General: chills, fever or night sweats  Psychological: anxiety or depression  Ophthalmic: blurry vision, double vision or loss of vision, vision change  ENT: epistaxis, oral lesions, hearing changes  Hematological and Lymphatic: bleeding, bruising, jaundice, swollen lymph nodes  Endocrine: hot flashes, unexpected weight changes  Respiratory: cough, hemoptysis, orthopnea or shortness of breath/KLEIN  Cardiovascular: chest pain, edema, palpitations or PND  Gastrointestinal: blood in stools, change in bowel habits, constipation, diarrhea or nausea/vomiting  Genito-Urinary: change in urinary stream, incontinence, frequency/urgency  Musculoskeletal: joint pain, stiffness, swelling  Neurological: dizziness, headaches, numbness/tingling  Dermatological: lumps and rash    Past History:    Past Medical History:   Diagnosis Date  "   Diverticulitis of colon     Heart murmur     HLD (hyperlipidemia)     HTN (hypertension)     Inguinal hernia without mention of obstruction or gangrene, unilateral or unspecified, (not specified as recurrent) 08/20/2004    RIGHT    Malignant neoplasm of sigmoid colon (H) 11/24/2020    Primary osteoarthritis involving multiple joints 08/02/2019    PVD (peripheral vascular disease)     Renal disease     Stented coronary artery     Ureteral stent present     Ventral hernia, unspecified, without mention of obstruction or gangrene 08/15/2003    easily reduced     Physical Exam:    /64 (BP Location: Right arm, Patient Position: Sitting, Cuff Size: Adult Regular)   Pulse 82   Temp 97.6  F (36.4  C) (Oral)   Resp 20   Ht 1.575 m (5' 2\")   Wt 44 kg (97 lb)   SpO2 98%   BMI 17.74 kg/m      General: patient appears stated age of 81 year old. Nontoxic and in no distress.   HEENT: Head: atraumatic, normocephalic. Sclerae anicteric.  Chest:  Normal respiratory effort  Cardiac:  No edema.   Abdomen: abdomen is non-distended  Extremities: normal tone and muscle bulk.  Skin: no lesions or rash on visible skin.  CNS: alert and oriented. Grossly non-focal.   Psychiatric: normal mood and affect.     Lab Results:    Recent Results (from the past week)   Comprehensive metabolic panel   Result Value Ref Range    Sodium 138 135 - 145 mmol/L    Potassium 4.6 3.4 - 5.3 mmol/L    Carbon Dioxide (CO2) 20 (L) 22 - 29 mmol/L    Anion Gap 11 7 - 15 mmol/L    Urea Nitrogen 23.4 (H) 8.0 - 23.0 mg/dL    Creatinine 1.18 (H) 0.51 - 0.95 mg/dL    GFR Estimate 46 (L) >60 mL/min/1.73m2    Calcium 9.2 8.8 - 10.4 mg/dL    Chloride 107 98 - 107 mmol/L    Glucose 112 (H) 70 - 99 mg/dL    Alkaline Phosphatase 96 40 - 150 U/L    AST 15 0 - 45 U/L    ALT 6 0 - 50 U/L    Protein Total 7.2 6.4 - 8.3 g/dL    Albumin 3.8 3.5 - 5.2 g/dL    Bilirubin Total <0.2 <=1.2 mg/dL   CBC with platelets and differential   Result Value Ref Range    WBC Count " 10.3 4.0 - 11.0 10e3/uL    RBC Count 3.11 (L) 3.80 - 5.20 10e6/uL    Hemoglobin 7.8 (L) 11.7 - 15.7 g/dL    Hematocrit 24.6 (L) 35.0 - 47.0 %    MCV 79 78 - 100 fL    MCH 25.1 (L) 26.5 - 33.0 pg    MCHC 31.7 31.5 - 36.5 g/dL    RDW 17.2 (H) 10.0 - 15.0 %    Platelet Count 431 150 - 450 10e3/uL    % Neutrophils 48 %    % Lymphocytes 33 %    % Monocytes 10 %    % Eosinophils 9 %    % Basophils 0 %    % Immature Granulocytes 1 %    NRBCs per 100 WBC 0 <1 /100    Absolute Neutrophils 4.9 1.6 - 8.3 10e3/uL    Absolute Lymphocytes 3.4 0.8 - 5.3 10e3/uL    Absolute Monocytes 1.0 0.0 - 1.3 10e3/uL    Absolute Eosinophils 0.9 (H) 0.0 - 0.7 10e3/uL    Absolute Basophils 0.0 0.0 - 0.2 10e3/uL    Absolute Immature Granulocytes 0.1 <=0.4 10e3/uL    Absolute NRBCs 0.0 10e3/uL   CRP inflammation   Result Value Ref Range    CRP Inflammation 8.20 (H) <5.00 mg/L   CEA   Result Value Ref Range    CEA 34.0 ng/mL   Lactic Acid Whole Blood with 1X Repeat in 2 HR when >2   Result Value Ref Range    Lactic Acid, Initial 1.1 0.7 - 2.0 mmol/L   UA with Microscopic reflex to Culture    Specimen: Urine, Midstream   Result Value Ref Range    Color Urine Colorless Colorless, Straw, Light Yellow, Yellow    Appearance Urine Clear Clear    Glucose Urine Negative Negative mg/dL    Bilirubin Urine Negative Negative    Ketones Urine Negative Negative mg/dL    Specific Gravity Urine 1.009 1.001 - 1.030    Blood Urine 0.5 mg/dL (A) Negative    pH Urine 5.5 5.0 - 7.0    Protein Albumin Urine 10 (A) Negative mg/dL    Urobilinogen Urine Normal Normal mg/dL    Nitrite Urine Negative Negative    Leukocyte Esterase Urine 250 Pedro/uL (A) Negative    Bacteria Urine Few (A) None Seen /HPF    RBC Urine 81 (H) <=2 /HPF    WBC Urine 8 (H) <=5 /HPF    Squamous Epithelials Urine 1 <=1 /HPF   Urine Culture    Specimen: Urine, Midstream   Result Value Ref Range    Culture <10,000 CFU/mL Urogenital jessica    Influenza A/B, RSV and SARS-CoV2 PCR (COVID-19) Nose     Specimen: Nose; Swab   Result Value Ref Range    Influenza A PCR Negative Negative    Influenza B PCR Negative Negative    RSV PCR Negative Negative    SARS CoV2 PCR Negative Negative   Procalcitonin   Result Value Ref Range    Procalcitonin 0.08 <0.50 ng/mL   Basic metabolic panel   Result Value Ref Range    Sodium 140 135 - 145 mmol/L    Potassium 4.3 3.4 - 5.3 mmol/L    Chloride 108 (H) 98 - 107 mmol/L    Carbon Dioxide (CO2) 20 (L) 22 - 29 mmol/L    Anion Gap 12 7 - 15 mmol/L    Urea Nitrogen 22.1 8.0 - 23.0 mg/dL    Creatinine 1.10 (H) 0.51 - 0.95 mg/dL    GFR Estimate 50 (L) >60 mL/min/1.73m2    Calcium 9.2 8.8 - 10.4 mg/dL    Glucose 123 (H) 70 - 99 mg/dL   CBC with platelets   Result Value Ref Range    WBC Count 9.4 4.0 - 11.0 10e3/uL    RBC Count 2.66 (L) 3.80 - 5.20 10e6/uL    Hemoglobin 6.5 (LL) 11.7 - 15.7 g/dL    Hematocrit 20.7 (L) 35.0 - 47.0 %    MCV 78 78 - 100 fL    MCH 24.4 (L) 26.5 - 33.0 pg    MCHC 31.4 (L) 31.5 - 36.5 g/dL    RDW 17.0 (H) 10.0 - 15.0 %    Platelet Count 373 150 - 450 10e3/uL   Adult Type and Screen   Result Value Ref Range    ABO/RH(D) A POS     Antibody Screen Negative Negative    SPECIMEN EXPIRATION DATE 20250411235900    Prepare red blood cells (unit)   Result Value Ref Range    Blood Component Type Red Blood Cells     Product Code N3776F89     Unit Status Transfused     Unit Number [id number]     CROSSMATCH Compatible     CODING SYSTEM [id number]     ISSUE DATE AND TIME 54414908910859     UNIT ABO/RH A+     UNIT TYPE ISBT 6200    Hemoglobin   Result Value Ref Range    Hemoglobin 7.4 (L) 11.7 - 15.7 g/dL   Hemoglobin   Result Value Ref Range    Hemoglobin 8.3 (L) 11.7 - 15.7 g/dL   Hemoglobin   Result Value Ref Range    Hemoglobin 9.3 (L) 11.7 - 15.7 g/dL   Extra Green Top (Lithium Heparin) Tube   Result Value Ref Range    Hold Specimen [facility]    CBC with platelets and differential   Result Value Ref Range    WBC Count 13.9 (H) 4.0 - 11.0 10e3/uL    RBC Count 4.00 3.80 - 5.20  10e6/uL    Hemoglobin 9.9 (L) 11.7 - 15.7 g/dL    Hematocrit 32.7 (L) 35.0 - 47.0 %    MCV 82 78 - 100 fL    MCH 24.8 (L) 26.5 - 33.0 pg    MCHC 30.3 (L) 31.5 - 36.5 g/dL    RDW 17.3 (H) 10.0 - 15.0 %    Platelet Count 419 150 - 450 10e3/uL    % Neutrophils 85 %    % Lymphocytes 8 %    % Monocytes 4 %    % Eosinophils 0 %    % Basophils 0 %    % Immature Granulocytes 2 %    NRBCs per 100 WBC 0 <1 /100    Absolute Neutrophils 11.9 (H) 1.6 - 8.3 10e3/uL    Absolute Lymphocytes 1.2 0.8 - 5.3 10e3/uL    Absolute Monocytes 0.6 0.0 - 1.3 10e3/uL    Absolute Eosinophils 0.0 0.0 - 0.7 10e3/uL    Absolute Basophils 0.0 0.0 - 0.2 10e3/uL    Absolute Immature Granulocytes 0.3 <=0.4 10e3/uL    Absolute NRBCs 0.0 10e3/uL       Signed by: Walt Merrill MD

## 2025-04-11 NOTE — LETTER
"4/11/2025      Katya Butler  [address] [facility]  [place] [address]      Dear Colleague,    Thank you for referring your patient, Katya Butler, to the [facility]. Please see a copy of my visit note below.    Oncology Rooming Note    April 11, 2025 2:03 PM   Katya Butler is a 84 year old female who presents for:    Chief Complaint   Patient presents with     Oncology Clinic Visit     Follow up 2 month labs     Initial Vitals: There were no vitals taken for this visit. Estimated body mass index is 17.17 kg/m  as calculated from the following:    Height as of 4/7/25: 1.575 m (5' 2\").    Weight as of 4/8/25: 42.6 kg (93 lb 14.4 oz). There is no height or weight on file to calculate BSA.  Data Unavailable Comment: Data Unavailable   No LMP recorded. Patient has had a hysterectomy.  Allergies reviewed: Yes  Medications reviewed: Yes    Medications: Medication refills not needed today.  Pharmacy name entered into [employer]: [facility] [place] [place], [place] - [address]    Frailty Screening:   Is the patient here for a new oncology consult visit in cancer care? 2. No    PHQ9:  Did this patient require a PHQ9?: No      Clinical concerns: none       Susy Duran CMA              [facility] Hematology and Oncology Progress Note    Patient: Katya Butler  MRN: [id number]  Date of Service: Apr 11, 2025        Assessment and Plan:    Cancer Staging  Malignant neoplasm of sigmoid colon (H)  Staging form: Colon And Rectum, AJCC 8th Edition  - Clinical: No stage assigned - Unsigned  - Pathologic stage from 11/24/2020: Stage IIIC (pT4b, pN2, cM0) - Signed by Walt Merrill MD on 11/24/2020     1.  Colon cancer, metastatic: She was recently hospitalized with abdominal pain and partial bowel obstruction.  She notes today that she still has abdominal discomfort.  Decreased appetite.  More fatigue.  We discussed the plan going forward.  The patient and her son are interested " in meeting with hospice and getting them involved at this point.  I think that is the most appropriate plan.  She really is not a good candidate for any systemic therapy and has not wanted any to date.  Will continue to follow her but am not sure if she will return to clinic as I would anticipate she will sign onto hospice soon.  I did personally reviewed her CT scan images from April 7 I shared them with her son.  These show worsening abdominal adenopathy and pulmonary nodules.    2.  Ureteral obstruction: Left ureteral stent remains in place.     3.  Anemia: CBC from today reviewed and shows a hemoglobin of 9.9.  This is coming up nicely from April 8 when it was 6.5.    4.  Abdominal pain secondary to her cancer.  She has just been taking Tylenol at home.  I am going to give her a prescription for hydrocodone/acetaminophen to help her during the day and especially at night to try to help her sleep.  If this does not help we can go to oral Dilaudid.    ECOG Performance  0    Diagnosis:    1.  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  At initial surgery there was evidence of a small abscess and perforation of the colon.  Pathology showed a invasive moderately differentiated adenocarcinoma. Constricting ulcerated measuring 35 x 25 x 13 mm.  4 of 18 lymph nodes were positive. Mismatch repair intact. There was some proximal colonic dilatation, consistent with obstruction.  Left ovary showed moderately differentiated adenocarcinoma, direct extension.  Focal positive margin at the paraovarian soft tissue margin.      Recurrence diagnosed in March 2023 from a left supraclavicular lymph node. MSI low. HER-2 negative. PET scan imaging shows uptake in left supraclavicular nodes and retroperitoneal lymph nodes.  NGS:  negative.    Treatment:    Surgical resection on October 31, 2020.  Adjuvant chemotherapy with FOLFOX was initiated on December 8, 2020.  25% initial dose reduction of all medications.  Cycle 10 completed on April 20, 2021.    No treatment for the recurrence form 2023.    Interim History:    Katya returns today for follow-up visit.  She was recently in the hospital for anemia and bowel obstruction.  She comes in today for follow-up.  Still having some abdominal pain.  No nausea or vomiting.    Review of Systems:    As above in the history.     Review of Systems otherwise Negative for:  General: chills, fever or night sweats  Psychological: anxiety or depression  Ophthalmic: blurry vision, double vision or loss of vision, vision change  ENT: epistaxis, oral lesions, hearing changes  Hematological and Lymphatic: bleeding, bruising, jaundice, swollen lymph nodes  Endocrine: hot flashes, unexpected weight changes  Respiratory: cough, hemoptysis, orthopnea or shortness of breath/KLEIN  Cardiovascular: chest pain, edema, palpitations or PND  Gastrointestinal: blood in stools, change in bowel habits, constipation, diarrhea or nausea/vomiting  Genito-Urinary: change in urinary stream, incontinence, frequency/urgency  Musculoskeletal: joint pain, stiffness, swelling  Neurological: dizziness, headaches, numbness/tingling  Dermatological: lumps and rash    Past History:    Past Medical History:   Diagnosis Date     Diverticulitis of colon      Heart murmur      HLD (hyperlipidemia)      HTN (hypertension)      Inguinal hernia without mention of obstruction or gangrene, unilateral or unspecified, (not specified as recurrent) 08/20/2004    RIGHT     Malignant neoplasm of sigmoid colon (H) 11/24/2020     Primary osteoarthritis involving multiple joints 08/02/2019     PVD (peripheral vascular disease)      Renal disease      Stented coronary artery      Ureteral stent present      Ventral hernia, unspecified, without mention of obstruction or gangrene 08/15/2003    easily reduced     Physical Exam:    /64 (BP Location: Right arm, Patient Position: Sitting, Cuff Size: Adult Regular)   Pulse 82   Temp 97.6  F  "(36.4  C) (Oral)   Resp 20   Ht 1.575 m (5' 2\")   Wt 44 kg (97 lb)   SpO2 98%   BMI 17.74 kg/m      General: patient appears stated age of 81 year old. Nontoxic and in no distress.   HEENT: Head: atraumatic, normocephalic. Sclerae anicteric.  Chest:  Normal respiratory effort  Cardiac:  No edema.   Abdomen: abdomen is non-distended  Extremities: normal tone and muscle bulk.  Skin: no lesions or rash on visible skin.  CNS: alert and oriented. Grossly non-focal.   Psychiatric: normal mood and affect.     Lab Results:    Recent Results (from the past week)   Comprehensive metabolic panel   Result Value Ref Range    Sodium 138 135 - 145 mmol/L    Potassium 4.6 3.4 - 5.3 mmol/L    Carbon Dioxide (CO2) 20 (L) 22 - 29 mmol/L    Anion Gap 11 7 - 15 mmol/L    Urea Nitrogen 23.4 (H) 8.0 - 23.0 mg/dL    Creatinine 1.18 (H) 0.51 - 0.95 mg/dL    GFR Estimate 46 (L) >60 mL/min/1.73m2    Calcium 9.2 8.8 - 10.4 mg/dL    Chloride 107 98 - 107 mmol/L    Glucose 112 (H) 70 - 99 mg/dL    Alkaline Phosphatase 96 40 - 150 U/L    AST 15 0 - 45 U/L    ALT 6 0 - 50 U/L    Protein Total 7.2 6.4 - 8.3 g/dL    Albumin 3.8 3.5 - 5.2 g/dL    Bilirubin Total <0.2 <=1.2 mg/dL   CBC with platelets and differential   Result Value Ref Range    WBC Count 10.3 4.0 - 11.0 10e3/uL    RBC Count 3.11 (L) 3.80 - 5.20 10e6/uL    Hemoglobin 7.8 (L) 11.7 - 15.7 g/dL    Hematocrit 24.6 (L) 35.0 - 47.0 %    MCV 79 78 - 100 fL    MCH 25.1 (L) 26.5 - 33.0 pg    MCHC 31.7 31.5 - 36.5 g/dL    RDW 17.2 (H) 10.0 - 15.0 %    Platelet Count 431 150 - 450 10e3/uL    % Neutrophils 48 %    % Lymphocytes 33 %    % Monocytes 10 %    % Eosinophils 9 %    % Basophils 0 %    % Immature Granulocytes 1 %    NRBCs per 100 WBC 0 <1 /100    Absolute Neutrophils 4.9 1.6 - 8.3 10e3/uL    Absolute Lymphocytes 3.4 0.8 - 5.3 10e3/uL    Absolute Monocytes 1.0 0.0 - 1.3 10e3/uL    Absolute Eosinophils 0.9 (H) 0.0 - 0.7 10e3/uL    Absolute Basophils 0.0 0.0 - 0.2 10e3/uL    Absolute " Immature Granulocytes 0.1 <=0.4 10e3/uL    Absolute NRBCs 0.0 10e3/uL   CRP inflammation   Result Value Ref Range    CRP Inflammation 8.20 (H) <5.00 mg/L   CEA   Result Value Ref Range    CEA 34.0 ng/mL   Lactic Acid Whole Blood with 1X Repeat in 2 HR when >2   Result Value Ref Range    Lactic Acid, Initial 1.1 0.7 - 2.0 mmol/L   UA with Microscopic reflex to Culture    Specimen: Urine, Midstream   Result Value Ref Range    Color Urine Colorless Colorless, Straw, Light Yellow, Yellow    Appearance Urine Clear Clear    Glucose Urine Negative Negative mg/dL    Bilirubin Urine Negative Negative    Ketones Urine Negative Negative mg/dL    Specific Gravity Urine 1.009 1.001 - 1.030    Blood Urine 0.5 mg/dL (A) Negative    pH Urine 5.5 5.0 - 7.0    Protein Albumin Urine 10 (A) Negative mg/dL    Urobilinogen Urine Normal Normal mg/dL    Nitrite Urine Negative Negative    Leukocyte Esterase Urine 250 Pedro/uL (A) Negative    Bacteria Urine Few (A) None Seen /HPF    RBC Urine 81 (H) <=2 /HPF    WBC Urine 8 (H) <=5 /HPF    Squamous Epithelials Urine 1 <=1 /HPF   Urine Culture    Specimen: Urine, Midstream   Result Value Ref Range    Culture <10,000 CFU/mL Urogenital jessica    Influenza A/B, RSV and SARS-CoV2 PCR (COVID-19) Nose    Specimen: Nose; Swab   Result Value Ref Range    Influenza A PCR Negative Negative    Influenza B PCR Negative Negative    RSV PCR Negative Negative    SARS CoV2 PCR Negative Negative   Procalcitonin   Result Value Ref Range    Procalcitonin 0.08 <0.50 ng/mL   Basic metabolic panel   Result Value Ref Range    Sodium 140 135 - 145 mmol/L    Potassium 4.3 3.4 - 5.3 mmol/L    Chloride 108 (H) 98 - 107 mmol/L    Carbon Dioxide (CO2) 20 (L) 22 - 29 mmol/L    Anion Gap 12 7 - 15 mmol/L    Urea Nitrogen 22.1 8.0 - 23.0 mg/dL    Creatinine 1.10 (H) 0.51 - 0.95 mg/dL    GFR Estimate 50 (L) >60 mL/min/1.73m2    Calcium 9.2 8.8 - 10.4 mg/dL    Glucose 123 (H) 70 - 99 mg/dL   CBC with platelets   Result Value Ref  Range    WBC Count 9.4 4.0 - 11.0 10e3/uL    RBC Count 2.66 (L) 3.80 - 5.20 10e6/uL    Hemoglobin 6.5 (LL) 11.7 - 15.7 g/dL    Hematocrit 20.7 (L) 35.0 - 47.0 %    MCV 78 78 - 100 fL    MCH 24.4 (L) 26.5 - 33.0 pg    MCHC 31.4 (L) 31.5 - 36.5 g/dL    RDW 17.0 (H) 10.0 - 15.0 %    Platelet Count 373 150 - 450 10e3/uL   Adult Type and Screen   Result Value Ref Range    ABO/RH(D) A POS     Antibody Screen Negative Negative    SPECIMEN EXPIRATION DATE 20250411235900    Prepare red blood cells (unit)   Result Value Ref Range    Blood Component Type Red Blood Cells     Product Code [id number]     Unit Status Transfused     Unit Number [id number]     CROSSMATCH Compatible     CODING SYSTEM [id number]     ISSUE DATE AND TIME 57040134162060     UNIT ABO/RH A+     UNIT TYPE ISBT 6200    Hemoglobin   Result Value Ref Range    Hemoglobin 7.4 (L) 11.7 - 15.7 g/dL   Hemoglobin   Result Value Ref Range    Hemoglobin 8.3 (L) 11.7 - 15.7 g/dL   Hemoglobin   Result Value Ref Range    Hemoglobin 9.3 (L) 11.7 - 15.7 g/dL   Extra Green Top (Lithium Heparin) Tube   Result Value Ref Range    Hold Specimen [facility]    CBC with platelets and differential   Result Value Ref Range    WBC Count 13.9 (H) 4.0 - 11.0 10e3/uL    RBC Count 4.00 3.80 - 5.20 10e6/uL    Hemoglobin 9.9 (L) 11.7 - 15.7 g/dL    Hematocrit 32.7 (L) 35.0 - 47.0 %    MCV 82 78 - 100 fL    MCH 24.8 (L) 26.5 - 33.0 pg    MCHC 30.3 (L) 31.5 - 36.5 g/dL    RDW 17.3 (H) 10.0 - 15.0 %    Platelet Count 419 150 - 450 10e3/uL    % Neutrophils 85 %    % Lymphocytes 8 %    % Monocytes 4 %    % Eosinophils 0 %    % Basophils 0 %    % Immature Granulocytes 2 %    NRBCs per 100 WBC 0 <1 /100    Absolute Neutrophils 11.9 (H) 1.6 - 8.3 10e3/uL    Absolute Lymphocytes 1.2 0.8 - 5.3 10e3/uL    Absolute Monocytes 0.6 0.0 - 1.3 10e3/uL    Absolute Eosinophils 0.0 0.0 - 0.7 10e3/uL    Absolute Basophils 0.0 0.0 - 0.2 10e3/uL    Absolute Immature Granulocytes 0.3 <=0.4 10e3/uL    Absolute NRBCs 0.0  10e3/uL       Signed by: Walt Merrill MD      Again, thank you for allowing me to participate in the care of your patient.        Sincerely,        Walt Merrill MD    Electronically signed

## 2025-04-14 ENCOUNTER — PATIENT OUTREACH (OUTPATIENT)
Dept: ONCOLOGY | Facility: HOSPITAL | Age: 85
End: 2025-04-14
Payer: COMMERCIAL

## 2025-04-14 DIAGNOSIS — C18.9 COLON ADENOCARCINOMA (H): Primary | ICD-10-CM

## 2025-04-14 DIAGNOSIS — C18.9 COLON ADENOCARCINOMA (H): ICD-10-CM

## 2025-04-14 DIAGNOSIS — M54.50 CHRONIC MIDLINE LOW BACK PAIN WITHOUT SCIATICA: ICD-10-CM

## 2025-04-14 DIAGNOSIS — G89.29 CHRONIC MIDLINE LOW BACK PAIN WITHOUT SCIATICA: ICD-10-CM

## 2025-04-14 RX ORDER — HYDROCODONE BITARTRATE AND ACETAMINOPHEN 5; 325 MG/1; MG/1
2 TABLET ORAL EVERY 4 HOURS PRN
Qty: 90 TABLET | Refills: 0 | Status: SHIPPED | OUTPATIENT
Start: 2025-04-14

## 2025-04-14 NOTE — PROGRESS NOTES
[facility]: Cancer Care                                                                                            Situation: Patient chart reviewed by care coordinator.    Background: Patient was seen in clinic for a follow-up visit with Dr. Merrill on 4/11/2025 for the management of metastatic colon cancer.  Patient is currently not on any treatment.    Assessment: Patient no longer wants to pursue treatment and per Dr. Merrill's recommendation, patient will go on hospice services.    Plan/Recommendations: Once the hospice referral is placed, [facility] will reach out to the patient, and patient will have a hospice consult visit with [facility].    Signature:  Any Monzon RN

## 2025-04-16 ENCOUNTER — PATIENT OUTREACH (OUTPATIENT)
Dept: ONCOLOGY | Facility: HOSPITAL | Age: 85
End: 2025-04-16
Payer: COMMERCIAL

## 2025-04-16 DIAGNOSIS — G89.3 CANCER ASSOCIATED PAIN: ICD-10-CM

## 2025-04-16 RX ORDER — OXYCODONE AND ACETAMINOPHEN 5; 325 MG/1; MG/1
2 TABLET ORAL EVERY 6 HOURS PRN
Qty: 30 TABLET | Refills: 0 | Status: SHIPPED | OUTPATIENT
Start: 2025-04-16 | End: 2025-04-17

## 2025-04-16 NOTE — PROGRESS NOTES
"[facility]: Cancer Care                                                                                          Writer called [facility] at [phone] to check on the status of the patient's hospice referral. [facility] intake personnel stated that the patient has a hospice consultation visit on Thursday 4/17/2025. Writer thanked the intake personnel and ended the call. Writer then called patient to get clarification on the patient's [employer] messages regarding ordering a \"diluted prescription\". Patient clarified that the percocet prescription that Dr. Merrill had sent did not reach the [facility] pharmacy and is requesting that it get re-sent to the pharmacy. Writer stated that she would send over a refill request of the percocet for the patient to take. Writer reviewed the prescription instructions with the patient. Patient verbalized understanding.     Signature:  Any Monzon RN  "

## 2025-04-17 ENCOUNTER — TELEPHONE (OUTPATIENT)
Dept: ONCOLOGY | Facility: HOSPITAL | Age: 85
End: 2025-04-17
Payer: COMMERCIAL

## 2025-04-17 DIAGNOSIS — G89.3 CANCER ASSOCIATED PAIN: Primary | ICD-10-CM

## 2025-04-17 RX ORDER — HYDROMORPHONE HYDROCHLORIDE 2 MG/1
2 TABLET ORAL EVERY 6 HOURS PRN
Qty: 40 TABLET | Refills: 0 | Status: SHIPPED | OUTPATIENT
Start: 2025-04-17

## 2025-04-17 NOTE — TELEPHONE ENCOUNTER
I called Paulo back to let him know that Jody Tolentino CNP sent dilaudid into the pharmacy today. He was happy to hear this and has no other questions at this time.     Radha Mendoza RN on 4/17/2025 at 2:34 PM

## 2025-04-17 NOTE — TELEPHONE ENCOUNTER
I received a phone call today from patient's son, Paulo. He is calling to request some dilaudid for his Mom. He states when she saw Dr. Merrill on 4/11, Dr. Merrill sent percocet over to the pharmacy for Katya to try. She did  this prescription but it made her feel ill and it did not help with the pain so she stopped. She went back to taking hydrocodone because she can tolerate it. However, it is not helping with pain. He is requesting a prescription for dilaudid to take at night to help with pain control so she can get some rest. I let him know I would send this request to Dr. Merrill today. Secure chat sent to Dr. Merrill. Waiting to hear back.      Of note, Paulo said they are meeting with hospice today at 3pm for an informational meeting.     Radha Mendoza RN on 4/17/2025 at 2:16 PM

## 2025-04-18 ENCOUNTER — MEDICAL CORRESPONDENCE (OUTPATIENT)
Dept: HEALTH INFORMATION MANAGEMENT | Facility: CLINIC | Age: 85
End: 2025-04-18

## 2025-04-19 ENCOUNTER — HEALTH MAINTENANCE LETTER (OUTPATIENT)
Age: 85
End: 2025-04-19

## 2025-04-24 ENCOUNTER — PATIENT OUTREACH (OUTPATIENT)
Dept: ONCOLOGY | Facility: HOSPITAL | Age: 85
End: 2025-04-24
Payer: COMMERCIAL

## 2025-04-24 NOTE — PROGRESS NOTES
[facility]: Cancer Care                                                                                          Writer called [facility] to check on the status of the patient's hospice enrollment.  at [facility] confirmed that the patient has been enrolled in hospice with [facility].     Signature:  Any Monzon RN

## 2025-06-02 ENCOUNTER — TELEPHONE (OUTPATIENT)
Dept: ONCOLOGY | Facility: HOSPITAL | Age: 85
End: 2025-06-02
Payer: COMMERCIAL

## 2025-06-02 NOTE — TELEPHONE ENCOUNTER
Received a phone call today from Katya's son, Paulo. He is calling to see if Dr. Merrill would be willing to prescribe Hydromorphone and Lorazepam for his Mom because [facility] is suspending her care.     I called [facility] to check to see what is going on with the referral/hospice care. They will have a nurse call the clinic back.     I called Paulo back to get more information. He states a hospice nurse called the  on them in the middle of the night for an allegation. He was told that [facility] will no longer be coming to serve his Mom. I let Paulo know that we are waiting for a phone call back from [facility] and will be calling him back.     Radha Mendoza RN on 6/2/2025 at 4:30 PM

## 2025-06-03 ENCOUNTER — PATIENT OUTREACH (OUTPATIENT)
Dept: ONCOLOGY | Facility: HOSPITAL | Age: 85
End: 2025-06-03
Payer: COMMERCIAL

## 2025-06-03 NOTE — PROGRESS NOTES
[facility]: Cancer Care                                                                                          Writer called [facility] to request to speak with the care team taking care of the patient to discuss the patient's case. Writer had to leave a message and is awaiting their call back.     Signature:  Any Monzon RN

## 2025-06-03 NOTE — TELEPHONE ENCOUNTER
I received a phone call back from Vicki,  at [facility]. She is calling to say that they are actively working on facilitating next steps for Katya. [facility] is no longer providing care due to a vulnerable adult situation and drug diversion issues but they are working on locating a facility or hospice support for the patient. Vicki states nothing is needed from Dr. Merrill at this time (including medication refills). She will call back later with an update.     Radha Mendoza RN on 6/3/2025 at 11:04 AM

## 2025-06-04 ENCOUNTER — APPOINTMENT (OUTPATIENT)
Dept: CT IMAGING | Facility: HOSPITAL | Age: 85
End: 2025-06-04
Attending: EMERGENCY MEDICINE
Payer: COMMERCIAL

## 2025-06-04 ENCOUNTER — HOSPITAL ENCOUNTER (INPATIENT)
Facility: HOSPITAL | Age: 85
End: 2025-06-04
Attending: EMERGENCY MEDICINE | Admitting: INTERNAL MEDICINE
Payer: COMMERCIAL

## 2025-06-04 ENCOUNTER — PATIENT OUTREACH (OUTPATIENT)
Dept: ONCOLOGY | Facility: HOSPITAL | Age: 85
End: 2025-06-04
Payer: COMMERCIAL

## 2025-06-04 DIAGNOSIS — Z71.89 OTHER SPECIFIED COUNSELING: Chronic | ICD-10-CM

## 2025-06-04 DIAGNOSIS — K59.00 CONSTIPATION, UNSPECIFIED CONSTIPATION TYPE: ICD-10-CM

## 2025-06-04 DIAGNOSIS — C18.9 COLON ADENOCARCINOMA (H): Primary | ICD-10-CM

## 2025-06-04 DIAGNOSIS — K56.609 MECHANICAL OBSTRUCTION OF THE INTESTINE (H): ICD-10-CM

## 2025-06-04 LAB
ALBUMIN SERPL BCG-MCNC: 3.3 G/DL (ref 3.5–5.2)
ALP SERPL-CCNC: 65 U/L (ref 40–150)
ALT SERPL W P-5'-P-CCNC: 9 U/L (ref 0–50)
ANION GAP SERPL CALCULATED.3IONS-SCNC: 12 MMOL/L (ref 7–15)
AST SERPL W P-5'-P-CCNC: 22 U/L (ref 0–45)
BASOPHILS # BLD AUTO: 0 10E3/UL (ref 0–0.2)
BASOPHILS NFR BLD AUTO: 0 %
BILIRUB DIRECT SERPL-MCNC: <0.08 MG/DL (ref 0–0.3)
BILIRUB SERPL-MCNC: 0.2 MG/DL
BUN SERPL-MCNC: 19.7 MG/DL (ref 8–23)
CALCIUM SERPL-MCNC: 8.3 MG/DL (ref 8.8–10.4)
CHLORIDE SERPL-SCNC: 104 MMOL/L (ref 98–107)
CREAT SERPL-MCNC: 1.06 MG/DL (ref 0.51–0.95)
EGFRCR SERPLBLD CKD-EPI 2021: 52 ML/MIN/1.73M2
EOSINOPHIL # BLD AUTO: 0.1 10E3/UL (ref 0–0.7)
EOSINOPHIL NFR BLD AUTO: 2 %
ERYTHROCYTE [DISTWIDTH] IN BLOOD BY AUTOMATED COUNT: 18.6 % (ref 10–15)
GLUCOSE SERPL-MCNC: 72 MG/DL (ref 70–99)
HCO3 SERPL-SCNC: 22 MMOL/L (ref 22–29)
HCT VFR BLD AUTO: 29.6 % (ref 35–47)
HGB BLD-MCNC: 9 G/DL (ref 11.7–15.7)
HOLD SPECIMEN: NORMAL
IMM GRANULOCYTES # BLD: 0 10E3/UL
IMM GRANULOCYTES NFR BLD: 0 %
LIPASE SERPL-CCNC: 14 U/L (ref 13–60)
LYMPHOCYTES # BLD AUTO: 1.1 10E3/UL (ref 0.8–5.3)
LYMPHOCYTES NFR BLD AUTO: 17 %
MCH RBC QN AUTO: 25 PG (ref 26.5–33)
MCHC RBC AUTO-ENTMCNC: 30.4 G/DL (ref 31.5–36.5)
MCV RBC AUTO: 82 FL (ref 78–100)
MONOCYTES # BLD AUTO: 0.8 10E3/UL (ref 0–1.3)
MONOCYTES NFR BLD AUTO: 12 %
NEUTROPHILS # BLD AUTO: 4.7 10E3/UL (ref 1.6–8.3)
NEUTROPHILS NFR BLD AUTO: 69 %
NRBC # BLD AUTO: 0 10E3/UL
NRBC BLD AUTO-RTO: 0 /100
PLATELET # BLD AUTO: 342 10E3/UL (ref 150–450)
POTASSIUM SERPL-SCNC: 4.5 MMOL/L (ref 3.4–5.3)
PROT SERPL-MCNC: 6 G/DL (ref 6.4–8.3)
RBC # BLD AUTO: 3.6 10E6/UL (ref 3.8–5.2)
SODIUM SERPL-SCNC: 138 MMOL/L (ref 135–145)
WBC # BLD AUTO: 6.7 10E3/UL (ref 4–11)

## 2025-06-04 PROCEDURE — 99285 EMERGENCY DEPT VISIT HI MDM: CPT | Mod: 25

## 2025-06-04 PROCEDURE — 83690 ASSAY OF LIPASE: CPT | Performed by: EMERGENCY MEDICINE

## 2025-06-04 PROCEDURE — 120N000001 HC R&B MED SURG/OB

## 2025-06-04 PROCEDURE — 99223 1ST HOSP IP/OBS HIGH 75: CPT | Performed by: INTERNAL MEDICINE

## 2025-06-04 PROCEDURE — 96374 THER/PROPH/DIAG INJ IV PUSH: CPT

## 2025-06-04 PROCEDURE — 258N000003 HC RX IP 258 OP 636: Performed by: EMERGENCY MEDICINE

## 2025-06-04 PROCEDURE — 74176 CT ABD & PELVIS W/O CONTRAST: CPT

## 2025-06-04 PROCEDURE — 82248 BILIRUBIN DIRECT: CPT | Performed by: EMERGENCY MEDICINE

## 2025-06-04 PROCEDURE — 250N000013 HC RX MED GY IP 250 OP 250 PS 637: Performed by: SPECIALIST

## 2025-06-04 PROCEDURE — 85025 COMPLETE CBC W/AUTO DIFF WBC: CPT | Performed by: EMERGENCY MEDICINE

## 2025-06-04 PROCEDURE — 250N000013 HC RX MED GY IP 250 OP 250 PS 637: Performed by: INTERNAL MEDICINE

## 2025-06-04 PROCEDURE — 96361 HYDRATE IV INFUSION ADD-ON: CPT

## 2025-06-04 PROCEDURE — 36415 COLL VENOUS BLD VENIPUNCTURE: CPT | Performed by: EMERGENCY MEDICINE

## 2025-06-04 PROCEDURE — 250N000011 HC RX IP 250 OP 636: Performed by: EMERGENCY MEDICINE

## 2025-06-04 PROCEDURE — 99221 1ST HOSP IP/OBS SF/LOW 40: CPT | Performed by: SPECIALIST

## 2025-06-04 PROCEDURE — 80053 COMPREHEN METABOLIC PANEL: CPT | Performed by: EMERGENCY MEDICINE

## 2025-06-04 PROCEDURE — 258N000003 HC RX IP 258 OP 636: Performed by: INTERNAL MEDICINE

## 2025-06-04 RX ORDER — PROCHLORPERAZINE MALEATE 5 MG/1
5 TABLET ORAL EVERY 6 HOURS PRN
Status: DISCONTINUED | OUTPATIENT
Start: 2025-06-04 | End: 2025-06-11 | Stop reason: HOSPADM

## 2025-06-04 RX ORDER — HYDRALAZINE HYDROCHLORIDE 20 MG/ML
10 INJECTION INTRAMUSCULAR; INTRAVENOUS EVERY 4 HOURS PRN
Status: DISCONTINUED | OUTPATIENT
Start: 2025-06-04 | End: 2025-06-05

## 2025-06-04 RX ORDER — HYDROMORPHONE HCL IN WATER/PF 6 MG/30 ML
0.2 PATIENT CONTROLLED ANALGESIA SYRINGE INTRAVENOUS
Status: DISCONTINUED | OUTPATIENT
Start: 2025-06-04 | End: 2025-06-10

## 2025-06-04 RX ORDER — HYDROMORPHONE HYDROCHLORIDE 1 MG/ML
0.3 INJECTION, SOLUTION INTRAMUSCULAR; INTRAVENOUS; SUBCUTANEOUS
Refills: 0 | Status: DISCONTINUED | OUTPATIENT
Start: 2025-06-04 | End: 2025-06-05

## 2025-06-04 RX ORDER — CALCIUM CARBONATE 500 MG/1
1000 TABLET, CHEWABLE ORAL 4 TIMES DAILY PRN
Status: DISCONTINUED | OUTPATIENT
Start: 2025-06-04 | End: 2025-06-11 | Stop reason: HOSPADM

## 2025-06-04 RX ORDER — SODIUM CHLORIDE, SODIUM LACTATE, POTASSIUM CHLORIDE, CALCIUM CHLORIDE 600; 310; 30; 20 MG/100ML; MG/100ML; MG/100ML; MG/100ML
INJECTION, SOLUTION INTRAVENOUS CONTINUOUS
Status: DISCONTINUED | OUTPATIENT
Start: 2025-06-04 | End: 2025-06-05

## 2025-06-04 RX ORDER — ONDANSETRON 4 MG/1
4 TABLET, ORALLY DISINTEGRATING ORAL EVERY 6 HOURS PRN
Status: DISCONTINUED | OUTPATIENT
Start: 2025-06-04 | End: 2025-06-11 | Stop reason: HOSPADM

## 2025-06-04 RX ORDER — LIDOCAINE 40 MG/G
CREAM TOPICAL
Status: DISCONTINUED | OUTPATIENT
Start: 2025-06-04 | End: 2025-06-11 | Stop reason: HOSPADM

## 2025-06-04 RX ORDER — ONDANSETRON 2 MG/ML
4 INJECTION INTRAMUSCULAR; INTRAVENOUS ONCE
Status: COMPLETED | OUTPATIENT
Start: 2025-06-04 | End: 2025-06-04

## 2025-06-04 RX ORDER — LORAZEPAM 2 MG/ML
1 CONCENTRATE ORAL EVERY 4 HOURS PRN
Status: DISCONTINUED | OUTPATIENT
Start: 2025-06-04 | End: 2025-06-11 | Stop reason: HOSPADM

## 2025-06-04 RX ORDER — ONDANSETRON 2 MG/ML
4 INJECTION INTRAMUSCULAR; INTRAVENOUS EVERY 6 HOURS PRN
Status: DISCONTINUED | OUTPATIENT
Start: 2025-06-04 | End: 2025-06-11 | Stop reason: HOSPADM

## 2025-06-04 RX ORDER — HYDRALAZINE HYDROCHLORIDE 10 MG/1
10 TABLET, FILM COATED ORAL EVERY 4 HOURS PRN
Status: DISCONTINUED | OUTPATIENT
Start: 2025-06-04 | End: 2025-06-05

## 2025-06-04 RX ORDER — HYDROMORPHONE HYDROCHLORIDE 2 MG/1
2 TABLET ORAL EVERY 4 HOURS PRN
Refills: 0 | Status: DISCONTINUED | OUTPATIENT
Start: 2025-06-04 | End: 2025-06-10

## 2025-06-04 RX ORDER — BISACODYL 10 MG
10 SUPPOSITORY, RECTAL RECTAL ONCE
Status: COMPLETED | OUTPATIENT
Start: 2025-06-04 | End: 2025-06-04

## 2025-06-04 RX ORDER — MORPHINE SULFATE 2 MG/ML
2 INJECTION, SOLUTION INTRAMUSCULAR; INTRAVENOUS ONCE
Refills: 0 | Status: COMPLETED | OUTPATIENT
Start: 2025-06-04 | End: 2025-06-04

## 2025-06-04 RX ORDER — ACETAMINOPHEN 650 MG/1
650 SUPPOSITORY RECTAL EVERY 4 HOURS PRN
Status: DISCONTINUED | OUTPATIENT
Start: 2025-06-04 | End: 2025-06-10

## 2025-06-04 RX ORDER — HYDROMORPHONE HYDROCHLORIDE 4 MG/1
4 TABLET ORAL EVERY 4 HOURS PRN
Refills: 0 | Status: DISCONTINUED | OUTPATIENT
Start: 2025-06-04 | End: 2025-06-10

## 2025-06-04 RX ORDER — AMOXICILLIN 250 MG
2 CAPSULE ORAL 2 TIMES DAILY PRN
Status: DISCONTINUED | OUTPATIENT
Start: 2025-06-04 | End: 2025-06-05

## 2025-06-04 RX ORDER — ACETAMINOPHEN 325 MG/1
650 TABLET ORAL EVERY 4 HOURS PRN
Status: DISCONTINUED | OUTPATIENT
Start: 2025-06-04 | End: 2025-06-10

## 2025-06-04 RX ORDER — AMOXICILLIN 250 MG
1 CAPSULE ORAL 2 TIMES DAILY PRN
Status: DISCONTINUED | OUTPATIENT
Start: 2025-06-04 | End: 2025-06-05

## 2025-06-04 RX ADMIN — ACETAMINOPHEN 650 MG: 325 TABLET ORAL at 17:14

## 2025-06-04 RX ADMIN — SODIUM CHLORIDE, SODIUM LACTATE, POTASSIUM CHLORIDE, AND CALCIUM CHLORIDE: .6; .31; .03; .02 INJECTION, SOLUTION INTRAVENOUS at 15:49

## 2025-06-04 RX ADMIN — ONDANSETRON 4 MG: 2 INJECTION, SOLUTION INTRAMUSCULAR; INTRAVENOUS at 11:14

## 2025-06-04 RX ADMIN — MORPHINE SULFATE 2 MG: 2 INJECTION, SOLUTION INTRAMUSCULAR; INTRAVENOUS at 13:06

## 2025-06-04 RX ADMIN — SODIUM CHLORIDE 500 ML: 0.9 INJECTION, SOLUTION INTRAVENOUS at 11:13

## 2025-06-04 RX ADMIN — HYDROMORPHONE HYDROCHLORIDE 2 MG: 2 TABLET ORAL at 17:15

## 2025-06-04 RX ADMIN — BISACODYL 10 MG: 10 SUPPOSITORY RECTAL at 15:55

## 2025-06-04 ASSESSMENT — ACTIVITIES OF DAILY LIVING (ADL)
ADLS_ACUITY_SCORE: 58
ADLS_ACUITY_SCORE: 55
ADLS_ACUITY_SCORE: 58
ADLS_ACUITY_SCORE: 44
ADLS_ACUITY_SCORE: 58
ADLS_ACUITY_SCORE: 55
ADLS_ACUITY_SCORE: 55
ADLS_ACUITY_SCORE: 58
ADLS_ACUITY_SCORE: 55
ADLS_ACUITY_SCORE: 55
ADLS_ACUITY_SCORE: 58

## 2025-06-04 ASSESSMENT — COLUMBIA-SUICIDE SEVERITY RATING SCALE - C-SSRS
1. IN THE PAST MONTH, HAVE YOU WISHED YOU WERE DEAD OR WISHED YOU COULD GO TO SLEEP AND NOT WAKE UP?: NO
2. HAVE YOU ACTUALLY HAD ANY THOUGHTS OF KILLING YOURSELF IN THE PAST MONTH?: NO
6. HAVE YOU EVER DONE ANYTHING, STARTED TO DO ANYTHING, OR PREPARED TO DO ANYTHING TO END YOUR LIFE?: NO

## 2025-06-04 NOTE — PROGRESS NOTES
I met with BRYSON [facility], working with this patient and was asked the following questions:    Who is going to pay room and board at the assisted living facility: Family will be doing a spend down. (Family can be involved at the facility and can manage finances)    Which County are the charges coming from: [facility] exam required: No    Has a VA been entered with the county: yes, by [facility] and [facility]'s department    Please let me know if you have any additional questions.   Leanne Aguilar, [facility]  [phone]

## 2025-06-04 NOTE — PROGRESS NOTES
[facility]: Cancer Care                                                                                          Writer received a call from Dr. May from the [facility] ED to give a status update about the patient. Per Dr. May, the patient had been complaining of abdominal pain over the last three days and it was confirmed that she has a small bowel obstruction. Per Dr. May, the patient is receiving IV pain medication to manage the abdominal pain since patient is not able to take oral pills. Per Dr. May, the plan will be to admit patient overnight and facilitate a discharge to [facility] since patient agreed to staying at a hospice facility that would be free of charge. Writer thanked Dr. May for the status update.     Signature:  Any Monzon RN

## 2025-06-04 NOTE — ED PROVIDER NOTES
EMERGENCY DEPARTMENT ENCOUNTER      NAME: Katya Butler  AGE: 84 year old female  YOB: 1940  MRN: [id number]  EVALUATION DATE & TIME: No admission date for patient encounter.    PCP: Dave Faith    ED PROVIDER: Walt Garcia D.O.      Chief Complaint   Patient presents with    Abdominal Pain       FINAL IMPRESSION:  1. Mechanical obstruction of the intestine (H)        ED COURSE & MEDICAL DECISION MAKING:    10:37 AM I met with the patient to gather history and to perform my initial exam. I discussed the plan for care while in the Emergency Department.  12:32 PM I rechecked on and updated the patient.   12:53 PM I spoke to Dr. Villegas with Surgery. No NG tube. Plan on admission. Is unlikely to go to surgery but not sure yet.   1:01 PM I spoke to the admitting hospitalist, Dr. May.         Pertinent Labs & Imaging studies reviewed. (See chart for details)  84 year old female presents to the Emergency Department for evaluation of abdominal pain with some mild distention.  Known history of colon cancer with surgical resection.  She did have diffuse distention with right-sided tenderness on my exam.  Concern for diverticulitis versus colitis versus bowel obstruction versus volvulus versus mesenteric ischemia versus other acute process.  CT imaging does show evidence of a mechanical small bowel obstruction I consulted with general surgery.  General surgery recommended admission but did not recommend NG tube at this time.  Discussed with the hospitalist who agreed to the admission.    Medical Decision Making  I reviewed the EMR: Inpatient Record: Admission from 4/7-4/9/2025 at [facility] for volvulus.  Care impacted by Cancer/Chemotherapy and Hypertension  I discussed the care with another health care provider: Dr. Villegas with Surgery and Hospitalist, Dr. May.  Admit.    MIPS (CTPE, Dental pain, [place], Sinusitis, Asthma/COPD, Head Trauma): Not Applicable    SEPSIS:  None          At the conclusion of the encounter I discussed the results of all of the tests and the disposition. The questions were answered. The patient or family acknowledged understanding and was agreeable with the care plan.      HPI    Patient information was obtained from: Patient    Use of : N/A        Katya Butler is a 84 year old female who presents to the ED by EMS for evaluation of abdominal pain.      Patient with a history of colon cancer and resection surgery reports abdominal pain, abdominal distension, nausea, and loss of appetite. Due to these symptoms, EMS was called to bring the patient to the ED for further evaluation.     Denies any fevers, chills, vomiting, or diarrhea. No shortness of breath or chest pain.     Patient is otherwise in her normal state of health with no other concerns.     Per Chart Review: Patient was admitted from 4/7-4/9/2025 at [facility] for volvulus. Acute worsening right buttocks pain with radiation to her right abdomen was treated with bowel rest and IV fluids. CT scan showed new findings for lung metastases. Started on steroid burst with improvement. Patient was prescribed prednisone and discharged home in stable condition.       PAST MEDICAL HISTORY:  Past Medical History:   Diagnosis Date    Diverticulitis of colon     Heart murmur     HLD (hyperlipidemia)     HTN (hypertension)     Inguinal hernia without mention of obstruction or gangrene, unilateral or unspecified, (not specified as recurrent) 08/20/2004    RIGHT    Malignant neoplasm of sigmoid colon (H) 11/24/2020    Primary osteoarthritis involving multiple joints 08/02/2019    PVD (peripheral vascular disease)     Renal disease     Stented coronary artery     Ureteral stent present     Ventral hernia, unspecified, without mention of obstruction or gangrene 08/15/2003    easily reduced       PAST SURGICAL HISTORY:  Past Surgical History:   Procedure Laterality Date     COMBINED CYSTOSCOPY, RETROGRADES, EXCHANGE STENT URETER(S) Left 11/21/2024    Procedure: CYSTOSCOPY WITH LEFT URETERAL STENT EXCHANGE AND RETROGRADE PYELOGRAM;  Surgeon: Galo Perez MD;  Location: [facility] [place] OR    COMBINED CYSTOSCOPY, RETROGRADES, URETEROSCOPY, INSERT STENT Left 08/22/2024    Procedure: CYSTOSCOPY, WITH LEFT RETROGRADE PYELOGRAM AND LEFT STENT INSERTION;  Surgeon: Galo Perez MD;  Location: [facility] [place]    EYE SURGERY      GI SURGERY      HERNIA REPAIR      HERNIA REPAIR, INGUINAL RT/LT  02/01/2005    [facility]    HYSTERECTOMY      IR AORTIC ARCH 4 VESSEL ANGIOGRAM  10/13/2009    IR AORTIC ARCH 4 VESSEL ANGIOGRAM  03/15/2010    IR MISCELLANEOUS PROCEDURE  05/20/2008    IR MISCELLANEOUS PROCEDURE  01/30/2009    IR MISCELLANEOUS PROCEDURE  01/30/2009    IR MISCELLANEOUS PROCEDURE  01/30/2009    IR MISCELLANEOUS PROCEDURE  10/13/2009    IR MISCELLANEOUS PROCEDURE  10/13/2009    IR MISCELLANEOUS PROCEDURE  03/15/2010    LAPAROSCOPIC CYSTECTOMY OVARIAN (ONCOLOGY) Bilateral 06/27/2018    Procedure: DIAGNOSTIC LAPAROSCOPY, RIGHT SALPINGO OOPHORECTOMY, LYSIS OF ADHESIONS, AND PELVIC WASHINGS;  Surgeon: Nneka Arroyo DO;  Location: [facility];  Service:     WI INSJ PRPH CTR VAD W/SUBQ PORT AGE 5 YR/> N/A 12/02/2020    Procedure: Port Placement;  Surgeon: Viry Villegas MD;  Location: [facility];  Service: General    ZZC PART REMOVAL COLON W ANASTOMOSIS N/A 10/31/2020    Procedure: COLECTOMY, SIGMOID, OPEN;  Surgeon: Viry Villegas MD;  Location: [facility];  Service: General    ZZC REMOVAL OF OVARY(S) N/A 10/31/2020    Procedure: OOPHORECTOMY, OPEN;  Surgeon: Viry Villegas MD;  Location: [facility];  Service: General         CURRENT MEDICATIONS:    Current Facility-Administered Medications   Medication Dose Route Frequency Provider Last Rate Last Admin    morphine (PF) injection 2 mg  2 mg Intravenous Once Walt Garcia DO      "    Current Outpatient Medications   Medication Sig Dispense Refill    HYDROcodone-acetaminophen (NORCO) 5-325 MG tablet Take 2 tablets by mouth every 4 hours as needed for severe pain. 90 tablet 0    senna-docusate (SENOKOT-S/PERICOLACE) 8.6-50 MG tablet Take 2 tablets by mouth at bedtime.      lidocaine-prilocaine (EMLA) 2.5-2.5 % external cream Apply topically as needed for moderate pain Apply to painful skin 30 g 1         ALLERGIES:  Allergies   Allergen Reactions    Contrast Dye Hives, Itching and Swelling    Aspirin Other (See Comments)     PATIENT STATES SHE IS NOT ALLERGIC TO ASA    Other reaction(s): on plavix  Other reaction(s): on plavix    Cephalexin Hives    Colcrys [Colchicine] Hives    Other Drug Allergy (See Comments) Itching     Pt states \"all pain meds\" make her \"itchy\"    Telithromycin Visual Disturbance       FAMILY HISTORY:  Family History   Problem Relation Age of Onset    C.A.D. Mother     C.A.D. Sister     Breast Cancer Maternal Aunt     Breast Cancer Cousin     Breast Cancer Cousin     Breast Cancer Cousin     Uterine Cancer Mother 38.00    Cancer Mother     Lymphoma Son 53.00    Cancer Son        SOCIAL HISTORY:  Social History     Socioeconomic History    Marital status:    Tobacco Use    Smoking status: Former     Types: Cigarettes    Smokeless tobacco: Never    Tobacco comments:     2-3 cigarettes daily **as of 01/10/25**   Vaping Use    Vaping status: Never Used   Substance and Sexual Activity    Alcohol use: Not Currently    Drug use: No    Sexual activity: Not Currently   Social History Narrative    Lives with her son     Social Drivers of Health     Financial Resource Strain: Low Risk  (4/8/2025)    Financial Resource Strain     Within the past 12 months, have you or your family members you live with been unable to get utilities (heat, electricity) when it was really needed?: No   Food Insecurity: Low Risk  (4/8/2025)    Food Insecurity     Within the past 12 months, did you " worry that your food would run out before you got money to buy more?: No     Within the past 12 months, did the food you bought just not last and you didn t have money to get more?: No   Transportation Needs: Low Risk  (4/8/2025)    Transportation Needs     Within the past 12 months, has lack of transportation kept you from medical appointments, getting your medicines, non-medical meetings or appointments, work, or from getting things that you need?: No   Interpersonal Safety: Low Risk  (4/8/2025)    Interpersonal Safety     Do you feel physically and emotionally safe where you currently live?: Yes     Within the past 12 months, have you been hit, slapped, kicked or otherwise physically hurt by someone?: No     Within the past 12 months, have you been humiliated or emotionally abused in other ways by your partner or ex-partner?: No   Housing Stability: Low Risk  (4/8/2025)    Housing Stability     Do you have housing? : Yes     Are you worried about losing your housing?: No       VITALS:  Patient Vitals for the past 24 hrs:   BP Temp Temp src Pulse Resp SpO2   06/04/25 1300 (!) 152/66 -- -- 74 -- 96 %   06/04/25 1245 -- -- -- 72 -- 97 %   06/04/25 1230 (!) 151/65 -- -- 74 -- 96 %   06/04/25 1215 -- -- -- 70 -- 97 %   06/04/25 1200 (!) 165/72 -- -- 71 -- 95 %   06/04/25 1145 (!) 165/73 -- -- -- -- --   06/04/25 1130 (!) 158/70 -- -- 70 -- 95 %   06/04/25 1115 (!) 156/69 -- -- 70 -- 95 %   06/04/25 1025 (!) 169/86 98.3  F (36.8  C) Temporal 84 20 96 %       PHYSICAL EXAM    VITAL SIGNS: BP (!) 152/66   Pulse 74   Temp 98.3  F (36.8  C) (Temporal)   Resp 20   SpO2 96%     General Appearance: Well-appearing, well-nourished, no acute distress.  Head:  Normocephalic, without obvious abnormality, atraumatic  Eyes:  PERRL, conjunctiva/corneas clear, EOM's intact,  ENT:  Lips, mucosa, and tongue normal, membranes are moist without pallor  Neck:  Normal ROM, symmetrical, trachea midline    Cardio:  Regular rate and  rhythm, no murmur, rub or gallop, 2+ pulses symmetric in all extremities  Pulm:  Clear to auscultation bilaterally, respirations unlabored,  Abdomen:  Soft, RUQ and RLQ tenderness, no rebound or guarding.  Musculoskeletal: Full ROM, no edema, no cyanosis, good ROM of major joints  Integument:  Warm, Dry, No erythema, No rash.    Neurologic:  Alert & oriented.  No focal deficits appreciated.    Psychiatric:  Affect normal, Judgment normal, Mood normal.      LABS  Results for orders placed or performed during the hospital encounter of 06/04/25 (from the past 24 hours)   [place] Draw    Narrative    The following orders were created for panel order [place] Draw.  Procedure                               Abnormality         Status                     ---------                               -----------         ------                     Extra Green Top (Lithiu...[[id number]]                      Final result                 Please view results for these tests on the individual orders.   Extra Green Top (Lithium Heparin) ON ICE   Result Value Ref Range    Hold Specimen [facility]    CBC with platelets + differential    Narrative    The following orders were created for panel order CBC with platelets + differential.  Procedure                               Abnormality         Status                     ---------                               -----------         ------                     CBC with platelets and ...[[id number]]  Abnormal            Final result                 Please view results for these tests on the individual orders.   Basic metabolic panel   Result Value Ref Range    Sodium 138 135 - 145 mmol/L    Potassium 4.5 3.4 - 5.3 mmol/L    Chloride 104 98 - 107 mmol/L    Carbon Dioxide (CO2) 22 22 - 29 mmol/L    Anion Gap 12 7 - 15 mmol/L    Urea Nitrogen 19.7 8.0 - 23.0 mg/dL    Creatinine 1.06 (H) 0.51 - 0.95 mg/dL    GFR Estimate 52 (L) >60 mL/min/1.73m2    Calcium 8.3 (L) 8.8 - 10.4 mg/dL    Glucose 72 70 - 99 mg/dL    Hepatic function panel   Result Value Ref Range    Protein Total 6.0 (L) 6.4 - 8.3 g/dL    Albumin 3.3 (L) 3.5 - 5.2 g/dL    Bilirubin Total 0.2 <=1.2 mg/dL    Alkaline Phosphatase 65 40 - 150 U/L    AST 22 0 - 45 U/L    ALT 9 0 - 50 U/L    Bilirubin Direct <0.08 0.00 - 0.30 mg/dL   Lipase   Result Value Ref Range    Lipase 14 13 - 60 U/L   CBC with platelets and differential   Result Value Ref Range    WBC Count 6.7 4.0 - 11.0 10e3/uL    RBC Count 3.60 (L) 3.80 - 5.20 10e6/uL    Hemoglobin 9.0 (L) 11.7 - 15.7 g/dL    Hematocrit 29.6 (L) 35.0 - 47.0 %    MCV 82 78 - 100 fL    MCH 25.0 (L) 26.5 - 33.0 pg    MCHC 30.4 (L) 31.5 - 36.5 g/dL    RDW 18.6 (H) 10.0 - 15.0 %    Platelet Count 342 150 - 450 10e3/uL    % Neutrophils 69 %    % Lymphocytes 17 %    % Monocytes 12 %    % Eosinophils 2 %    % Basophils 0 %    % Immature Granulocytes 0 %    NRBCs per 100 WBC 0 <1 /100    Absolute Neutrophils 4.7 1.6 - 8.3 10e3/uL    Absolute Lymphocytes 1.1 0.8 - 5.3 10e3/uL    Absolute Monocytes 0.8 0.0 - 1.3 10e3/uL    Absolute Eosinophils 0.1 0.0 - 0.7 10e3/uL    Absolute Basophils 0.0 0.0 - 0.2 10e3/uL    Absolute Immature Granulocytes 0.0 <=0.4 10e3/uL    Absolute NRBCs 0.0 10e3/uL   CT Abdomen Pelvis w/o Contrast    Narrative    EXAM: CT ABDOMEN PELVIS W/O CONTRAST  LOCATION: [facility]  DATE: 6/4/2025    INDICATION: Right sided abdominal pain with history of colon resection due to colon cancer.  COMPARISON: CT exams 4/7/2025 and 8/21/2024  TECHNIQUE: CT scan of the abdomen and pelvis was performed without IV contrast. Multiplanar reformats were obtained. Dose reduction techniques were used.  CONTRAST: None.    FINDINGS:   LOWER CHEST: New small right pleural effusion with adjacent atelectasis. Left basilar scarring. Severe coronary artery calcifications. Aortic leaflet calcifications.    HEPATOBILIARY: Moderate to severe gallbladder distention. No associated wall thickening or surrounding edema.  Stable clustered coarse calcifications in the segment 8 region of the upper central liver.    PANCREAS: Normal.    SPLEEN: Normal.    ADRENAL GLANDS: Normal.    KIDNEYS/BLADDER: Stable satisfactory position in the left ureteral stent. Interval nearly resolved mild left hydronephrosis. Stable left para-aortic soft tissue encasing the left ureter. Normal bladder.    BOWEL: Moderate to high-grade small bowel obstruction with a transition in the lower mesentery for example image numbers 127-137 series 3. Mesenteric edema. No free air.    LYMPH NODES: Stable retroperitoneal lymphadenopathy and left para-aortic masslike soft tissue.    VASCULATURE: Severe aortoiliac atherosclerosis. 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 Partially obscured left common iliac stent.    PELVIC ORGANS: Hysterectomy. No pelvic free fluid.    MUSCULOSKELETAL: Osseous demineralization.      Impression    IMPRESSION:   1.  Moderate to high-grade small bowel obstruction with a transition in the lower mesentery perhaps secondary to adhesions. No convincing evidence for a volvulus on this exam.  2.  Stable left para-aortic masslike soft tissue encasing the stented left ureter. Improved minimal residual left hydronephrosis.  3.  New small right pleural effusion with adjacent atelectasis.  4.  Moderate to severe gallbladder distention favored to reflect recent fasting. Further evaluation with ultrasound may be helpful if the patient has symptoms concerning for cholecystitis.           RADIOLOGY  CT Abdomen Pelvis w/o Contrast   Final Result   IMPRESSION:    1.  Moderate to high-grade small bowel obstruction with a transition in the lower mesentery perhaps secondary to adhesions. No convincing evidence for a volvulus on this exam.   2.  Stable left para-aortic masslike soft tissue encasing the stented left ureter. Improved minimal residual left hydronephrosis.   3.  New small right pleural effusion with adjacent  atelectasis.   4.  Moderate to severe gallbladder distention favored to reflect recent fasting. Further evaluation with ultrasound may be helpful if the patient has symptoms concerning for cholecystitis.                  MEDICATIONS GIVEN IN THE EMERGENCY:  Medications   morphine (PF) injection 2 mg (has no administration in time range)   sodium chloride 0.9% BOLUS 500 mL (500 mLs Intravenous $New Bag 6/4/25 1113)   ondansetron (ZOFRAN) injection 4 mg (4 mg Intravenous $Given 6/4/25 1114)       NEW PRESCRIPTIONS STARTED AT TODAY'S ER VISIT  New Prescriptions    No medications on file        I, Beatrice Szymanski, am serving as a scribe to document services personally performed by Walt Garcia D.O., based on my observations and the provider's statements to me.  I, Walt Garcia D.O., attest that Beatrice Szymanski is acting in a scribe capacity, has observed my performance of the services and has documented them in accordance with my direction.     Walt Garcia D.O.  Emergency Medicine  [facility] EMERGENCY DEPARTMENT  [address] [place] [id number] [phone]  Dept: [phone]     Walt Garcia DO  06/04/25 [id number]

## 2025-06-04 NOTE — ED NOTES
[facility] ED Handoff Report    ED Chief Complaint: Abdominal pain    ED Diagnosis:  (K56.609) Mechanical obstruction of the intestine (H)  Comment: She has history of these due to her cancer.  Plan: At this time no NG according to surgery. See provider's notes.       PMH:    Past Medical History:   Diagnosis Date    Diverticulitis of colon     Heart murmur     HLD (hyperlipidemia)     HTN (hypertension)     Inguinal hernia without mention of obstruction or gangrene, unilateral or unspecified, (not specified as recurrent) 08/20/2004    RIGHT    Malignant neoplasm of sigmoid colon (H) 11/24/2020    Primary osteoarthritis involving multiple joints 08/02/2019    PVD (peripheral vascular disease)     Renal disease     Stented coronary artery     Ureteral stent present     Ventral hernia, unspecified, without mention of obstruction or gangrene 08/15/2003    easily reduced        Code Status:  No CPR- Do NOT Intubate     Falls Risk: Yes Band: Applied    Current Living Situation/Residence: lives with their son or daughter     Elimination Status: Continent: Yes     Activity Level: 2 assist She states she has not walked for two weeks.    Patients Preferred Language:  English     Needed: No    Vital Signs:  BP (!) 164/71   Pulse 79   Temp 98.3  F (36.8  C) (Temporal)   Resp 20   SpO2 94%      Cardiac Rhythm: NA    Pain Score: 0/10    Is the Patient Confused:  No    Last Food or Drink: 6/3/25    Focused Assessment:  She has colon cancer that has been causing bowel obstructions for her.Today she came in with abdominal pain and CT found another bowel obstruction. At this time surgery consult has indicated no need for NG. Her pain was releived with 2mg morphine. Currently pain free. However, she states she has not been out of bed for quite some time. She is also dizzy. Concern for her ambulation. Requires more than one person to trial her. She has not been out of be here in the ED. She can make her needs  known. She does know where she is and what day it is today.    Tests Performed: Done: Labs and Imaging    Treatments Provided:  See MAR    Family Dynamics/Concerns: Yes; please explain: There is concern over drug diversion by two of her three sons. She was on hospice and removed shortly ago. Hospice is working to get her back enrolled. Law enforcement has been looking to speak with two of the sons. Her chart in the ED has been marked private and not allowed to have any visitors unless vetted by our security.    Family Updated On Visitor Policy: No    Plan of Care Communicated to Family: No    Who Was Updated about Plan of Care: No one. See above comments.    Belongings Checklist Done and Signed by Patient: No    Medications sent with patient: Clothes    Covid: asymptomatic , Not tested.    Additional Information: None    Sylvester Dwyer RN 6/4/2025 4:15 PM

## 2025-06-04 NOTE — PHARMACY-ADMISSION MEDICATION HISTORY
Pharmacist Admission Medication History    Admission medication history is complete. The information provided in this note is only as accurate as the sources available at the time of the update.    Information Source(s): Patient via in-person    Pertinent Information: Patient stated her only two current medications are Hydrocodone-Acetaminophen and Senna-Docusate. Patient stopped Clopidogrel due to cancer diagnosis.     Changes made to PTA medication list:  Added: None  Deleted: Dexamethasone, Clopidogrel, Amlodipine 5 mg BID, Lovastatin, Hydromorphone  Changed: None    Allergies reviewed with patient and updates made in EHR: yes    Medication History Completed By: AKASH VALDES RPH 6/4/2025 12:51 PM    PTA Med List   Medication Sig Last Dose/Taking    HYDROcodone-acetaminophen (NORCO) 5-325 MG tablet Take 2 tablets by mouth every 4 hours as needed for severe pain. 6/4/2025 at  1:00 AM    senna-docusate (SENOKOT-S/PERICOLACE) 8.6-50 MG tablet Take 2 tablets by mouth at bedtime. 6/3/2025 Bedtime

## 2025-06-04 NOTE — PROGRESS NOTES
[facility]: Cancer Care                                                                                          Writer called [facility]'s  Vicki to check on a status update of the patient's situation. Per Vicki, she will be meeting with the patient in the patient's home and will be accompanied with a [facility]  to discuss the situation with the patient in further detail. Per Vicki, [facility] is actively working to secure a facility for her to transfer to if they are not able to get in contact with the patient's third son. Per Vicki, she would like for the cancer clinic to hold off on sending a hospice referral to another agency in fear of another similar situation with that new hospice agency and would like for the cancer clinic to hold off on sending any refills due to the drug diversion that has been happening in this situation. Vicki told writer that she would call the cancer clinic later today to give another status update. Writer verbalized understanding and thanked Vicki for the update.    Signature:  Any Monzon RN

## 2025-06-04 NOTE — CONSULTS
"Care Management Initial Consult    General Information  Assessment completed with: Patient, Care Team Member, Katya - I asked her questions and she is able to verify her name only. Not able to verify her address when I state it to her off the facesheet. Not able to tell me she is in a hospital. Leanne Aguilar [facility] Care Consultant Hospice and Palliative Care staff present to speak to me about this pt.  Type of CM/SW Visit: Initial Assessment    Primary Care Provider verified and updated as needed:  (unable to verify)   Readmission within the last 30 days: no previous admission in last 30 days      Reason for Consult: discharge planning, end of life/hospice, facility placement  Advance Care Planning: Advance Care Planning Reviewed:  (unable to verify)          Communication Assessment  Patient's communication style: spoken language (English or Bilingual)         Cognitive  Cognitive/Neuro/Behavioral:  I asked Katya questions and she is able to verify her name only. Not able to verify her address when I state it to her off the facesheet. Not able to tell me she is in a hospital. She keeps telling me, \"I think my son is upstairs and tell him to come down here because I would like to see him.\" I don't know her baseline cognitive status or her decision making ability.                     Living Environment:   People in home: child(candi), adult (I am told by Leanne, \"son Poncho was living with her to care for her\".)     Current living Arrangements: town home, other (see comments) (patient unable to verify her address on the facesheet, but likely was still living at this 1 level [facility].)      Able to return to prior arrangements: no (at this time without other caregivers likely will need placement in a facility to continue with hospice services.)       Family/Social Support:  Care provided by: self, child(candi) (per Leanne, \"her primary caregivers were her 3 sons\".)  Provides care for: no one, unable/limited " "ability to care for self     Support system:  (patient cannot tell me. Her facesheet has 3 sons and a friend listed. Her chart is confidential and I was told by ER staff that no family is to be told she is here or called on the phone. Police are involved from hospice staff calling them.)          Description of Support System:           Current Resources:   Patient receiving home care services: No        Community Resources: Hospice (Was signed into [facility], but signed off to come to the ER.)  Equipment currently used at home: commode chair, wheelchair, manual, other (see comments) (gait belt)  Supplies currently used at home: Incontinence Supplies, Other (\"She has a medication box from hospice for pain control meds and nausea meds\")    Employment/Financial:  Employment Status: retired (previous chart states retired)        Financial Concerns:     Referral to Financial Worker: No  Finance Comments: Patient has Blue Cross Blue Shield Medicare Advantage plan. Per [facility] Hospice staff, \"she needs placement in a facility. Our knowledge is that she has to get on a waiver eventually, but will need to do a spend down first.\" At this time I am told I cannot contact sons and patient unable to answer anything about her finances.    Does the patient's insurance plan have a 3 day qualifying hospital stay waiver?  No    Lifestyle & Psychosocial Needs:  Social Drivers of Health     Food Insecurity: Low Risk  (4/8/2025)    Food Insecurity     Within the past 12 months, did you worry that your food would run out before you got money to buy more?: No     Within the past 12 months, did the food you bought just not last and you didn t have money to get more?: No   Depression: Not at risk (4/11/2025)    PHQ-2     PHQ-2 Score: 2   Housing Stability: Low Risk  (4/8/2025)    Housing Stability     Do you have housing? : Yes     Are you worried about losing your housing?: No   Tobacco Use: Medium Risk (4/11/2025)    Patient " History     Smoking Tobacco Use: Former     Smokeless Tobacco Use: Never     Passive Exposure: Not on file   Financial Resource Strain: Low Risk  (4/8/2025)    Financial Resource Strain     Within the past 12 months, have you or your family members you live with been unable to get utilities (heat, electricity) when it was really needed?: No   Alcohol Use: Not on file   Transportation Needs: Low Risk  (4/8/2025)    Transportation Needs     Within the past 12 months, has lack of transportation kept you from medical appointments, getting your medicines, non-medical meetings or appointments, work, or from getting things that you need?: No   Physical Activity: Not on file   Interpersonal Safety: Low Risk  (4/8/2025)    Interpersonal Safety     Do you feel physically and emotionally safe where you currently live?: Yes     Within the past 12 months, have you been hit, slapped, kicked or otherwise physically hurt by someone?: No     Within the past 12 months, have you been humiliated or emotionally abused in other ways by your partner or ex-partner?: No   Stress: Not on file   Social Connections: Not on file   Health Literacy: Not on file       Functional Status:  Prior to admission patient needed assistance:   Dependent ADLs::  (Unknown mobility. Unknown level son is helping with ADLs.)  Dependent IADLs::  (Unknown level son is helping with IADLs.)  Assesssment of Functional Status:  (unknown at this time)    Mental Health Status:  Mental Health Status:  (unknown)       Chemical Dependency Status:  Chemical Dependency Status:  (unknown)             Values/Beliefs:  Spiritual, Cultural Beliefs, Synagogue Practices, Values that affect care:                 Discussed  Partnership in Safe Discharge Planning  document with patient/family: No    Additional Information:  I am only able to do a partial assessment for this patient's admission and discharge planning questions. Patient isn't able to answer most of my questions. I  "asked Katya questions and she is able to verify her name only. Not able to verify her address when I state it to her off the facesheet. Not able to tell me she is in a hospital. She keeps telling me, \"I think my son is upstairs and tell him to come down here because I would like to see him.\" I don't know her baseline cognitive status or her decision making ability.    Part of my assessment is also done from Leanne Aguilar [facility] Care Consultant Hospice and Palliative Care staff present here at the hospital to speak to me about this patient.    I have been notified by ER staff that patient is a Confidential Encounter and no family is to be called nor are we to answer questions or even tell them she is here if they call. ER staff told me security was also aware of this. So I cannot call her family to verify her history or planning needs or placement needs at this time.    From what I can gather Katya lives in a townhouse with her son Poncho. She was living on her own, but he moved in to help care for her now with her cancer diagnosis and when hospice became involved. She has 3 sons and a friend listed on her facesheet.    I am told by Leanne Aguilar from [facility] that they got police involved due to, \"concerns for abuse, concerns for sons caring for her intoxicated, and missing pain medications and possible diverting opiate medications by son or sons. Police are involved and from what police told us there are warrants out for arrests.\" Leanne stated, \"I don't know which police agency it is, maybe [place] police\". I know [place] doesn't have a separate police department so likely it is [facility]. Hospice staff and police filed a report with [facility].     I called the [facility] Sheriff Office non emergency number [phone] and I was able to talk to a dispatcher to verify that [facility] is [facility] who took the 911 call and are working " "on this case. She told me if needed they can have an officer get in touch with us tomorrow to talk about this case.    Ride: Medical:  Stretcher    Leanne Jeff [facility] Care Consultant Hospice and Palliative Care Direct: [phone]. Main: [phone]. Fax: [phone].    Next Steps:   Medical plan/delay: Medical clearance and surgery following. Facility placement needed and hospice re-starting.    Dispo: LTC placement needed with [facility] resumption. I was told by Leanne, \"Our plan from [facility] was to place her at [facility] that new Assisted Living in [place], but we didn't send a referral to them yet. From what we know she doesn't have a waiver yet and would need to do a spend down\". Currently I cannot contact family about finances so I am unsure how we will find out more about her financial ability to pay for the LTC bed at this time. It was too late to call admissions at [facility] and I don't see that they are in EPIC for me to do an [facility] referral. Referral will be needed tomorrow or at a later date to [facility] or some other facility for placement.    CM to do: financial, facility placement, coordinate with [facility].    Joanna Leroy RN    "

## 2025-06-04 NOTE — CONSULTS
Consultation - Surgery  Katya Butler,  1940, MRN [id number]    Admitting Dx: Mechanical obstruction of the intestine (H) [K56.609]    PCP: Dave Faith, [phone]   Code status:  Prior       Extended Emergency Contact Information  Primary Emergency Contact: Paulo Sweet III  Address: [address] [place] [address] [place] [address]  Mobile Phone: [phone]  Relation: Son  Secondary Emergency Contact: Idris Sweet  Address: [address] [place] [address] [facility]  Home Phone: [phone]  Relation: Son       Assessment and Plan   Impression:  Small bowel obstruction likely secondary to adhesions could be related to her known metastatic colon cancer.  Her stomach is not horribly dilated so I think we can avoid an NG tube for now.  Also of note, the patient has known metastatic colon cancer and is actually been in the process of getting hooked up with hospice.  So even if she fails conservative therapy, it is not clear to me that she would want to go through surgery.  She is clearly somewhat confused when I talked her today.      Plan:  I would just watch her for now.  Keep her n.p.o.  If she starts having nausea or vomiting then an NG tube should be placed.           Chief Complaint <principal problem not specified>       HPI    We have been requested by Dr. Garcia to evaluate Katya Butler for small bowel obstruction.  This is a 84 year old year old female with known metastatic colon cancer.  I did her sigmoid colectomy about 5 years ago.  She was actually recently admitted with a volvulus that resolved.  She comes in with abdominal pain.  No nausea or vomiting.  CT scan shows evidence of a small bowel obstruction.       Medical History  Patient Active Problem List   Diagnosis    Hyperlipemia    Tobacco use disorder    Malignant neoplasm of sigmoid colon (H)    Accelerated hypertension    Acute left-sided low back pain with left-sided sciatica    Allergic  rhinitis    Anxiety    Calcium pyrophosphate arthropathy of multiple sites    Drug-induced polyneuropathy    Chronic anemia    Colon adenocarcinoma (H)    Colonic diverticular abscess    Coronary atherosclerosis    Disorder of artery    Diverticulitis of colon    Essential hypertension    Idiopathic chronic gout with tophus, unspecified site    Intra-abdominal abscess (H)    Periarticular calcification    Postherpetic neuralgia    Postoperative ileus (H)    Primary osteoarthritis involving multiple joints    Unspecified visual loss    Raynaud phenomenon    Other chronic pain    Arm pain, right    PVD (peripheral vascular disease)    Volvulus of sigmoid colon (H)    Volvulus (H)    Nausea    Right flank pain    Mechanical obstruction of the intestine (H)       [unfilled] Surgical History  Past Surgical History:   Procedure Laterality Date    COMBINED CYSTOSCOPY, RETROGRADES, EXCHANGE STENT URETER(S) Left 11/21/2024    Procedure: CYSTOSCOPY WITH LEFT URETERAL STENT EXCHANGE AND RETROGRADE PYELOGRAM;  Surgeon: Galo Perez MD;  [place] [facility] [place] [place]    COMBINED CYSTOSCOPY, RETROGRADES, URETEROSCOPY, INSERT STENT Left 08/22/2024    Procedure: CYSTOSCOPY, WITH LEFT RETROGRADE PYELOGRAM AND LEFT STENT INSERTION;  Surgeon: Galo Perez MD;  [place] [facility] [place]    EYE SURGERY      GI SURGERY      HERNIA REPAIR      HERNIA REPAIR, INGUINAL RT/LT  02/01/2005    [facility]    HYSTERECTOMY      IR AORTIC ARCH 4 VESSEL ANGIOGRAM  10/13/2009    IR AORTIC ARCH 4 VESSEL ANGIOGRAM  03/15/2010    IR MISCELLANEOUS PROCEDURE  05/20/2008    IR MISCELLANEOUS PROCEDURE  01/30/2009    IR MISCELLANEOUS PROCEDURE  01/30/2009    IR MISCELLANEOUS PROCEDURE  01/30/2009    IR MISCELLANEOUS PROCEDURE  10/13/2009    IR MISCELLANEOUS PROCEDURE  10/13/2009    IR MISCELLANEOUS PROCEDURE  03/15/2010    LAPAROSCOPIC CYSTECTOMY OVARIAN (ONCOLOGY) Bilateral 06/27/2018    Procedure: DIAGNOSTIC LAPAROSCOPY, RIGHT SALPINGO  "OOPHORECTOMY, LYSIS OF ADHESIONS, AND PELVIC WASHINGS;  Surgeon: Nneka Arroyo DO;  Location: [facility];  Service:     NE INSJ PRPH CTR VAD W/SUBQ PORT AGE 5 YR/> N/A 12/02/2020    Procedure: Port Placement;  Surgeon: Viry Villegas MD;  Location: [facility];  Service: General    ZZC PART REMOVAL COLON W ANASTOMOSIS N/A 10/31/2020    Procedure: COLECTOMY, SIGMOID, OPEN;  Surgeon: Viry Villegas MD;  Location: [facility];  Service: General    ZZC REMOVAL OF OVARY(S) N/A 10/31/2020    Procedure: OOPHORECTOMY, OPEN;  Surgeon: Viry Villegas MD;  Location: [facility];  Service: General        Social History  Social History     Tobacco Use    Smoking status: Former     Types: Cigarettes    Smokeless tobacco: Never    Tobacco comments:     2-3 cigarettes daily **as of 01/10/25**   Vaping Use    Vaping status: Never Used   Substance Use Topics    Alcohol use: Not Currently    Drug use: No       Allergies  Allergies   Allergen Reactions    Contrast Dye Hives, Itching and Swelling    Aspirin Other (See Comments)     PATIENT STATES SHE IS NOT ALLERGIC TO ASA    Other reaction(s): on plavix  Other reaction(s): on plavix    Cephalexin Hives    Colcrys [Colchicine] Hives    Other Drug Allergy (See Comments) Itching     Pt states \"all pain meds\" make her \"itchy\"    Telithromycin Visual Disturbance    Family History  Reviewed, and family history includes Breast Cancer in her cousin, cousin, cousin, and maternal aunt; C.A.D. in her mother and sister; Cancer in her mother and son; Lymphoma (age of onset: 53.00) in her son; Uterine Cancer (age of onset: 38.00) in her mother.  The Family history is not pertinent to the patients chief complaint. Psychosocial Needs  Social History     Social History Narrative    Lives with her son     Additional psychosocial needs reviewed per nursing assessment.     Prior to Admission Medications   Current Outpatient Medications   Medication Instructions    " HYDROcodone-acetaminophen (NORCO) 5-325 MG tablet 2 tablets, Oral, EVERY 4 HOURS PRN    lidocaine-prilocaine (EMLA) 2.5-2.5 % external cream Topical, PRN, Apply to painful skin    senna-docusate (SENOKOT-S/PERICOLACE) 8.6-50 MG tablet 2 tablets, AT BEDTIME           Review of Systems:  Pertinent items are noted in HPI. Physical Exam:  Temp:  [98.3  F (36.8  C)] 98.3  F (36.8  C)  Pulse:  [70-84] 74  Resp:  [20] 20  BP: (151-169)/(65-86) 152/66  SpO2:  [95 %-97 %] 96 %    General appearance: alert, appears older than stated age, cachectic, and cooperative  Eyes: No scleral icterus  Lungs: Breathing comfortably.  No shortness of breath  Heart: Regular.  Not tachycardic  Abdomen: Somewhat distended abdomen particularly across the lower abdomen.  Mild tenderness but no guarding.  No peritoneal signs.    Neurologic: Grossly normal       Pertinent Labs  Lab Results: personally reviewed.   Lab Results   Component Value Date    WBC 6.7 06/04/2025    HGB 9.0 06/04/2025    HCT 29.6 06/04/2025    MCV 82 06/04/2025     06/04/2025        Pertinent Radiology  Radiology Results: Personally reviewed image/s and Personally reviewed impression/s  EKG Results: not reviewed.

## 2025-06-04 NOTE — H&P
[facility]    History and Physical - Hospitalist Service       Date of Admission:  6/4/2025    Assessment & Plan      Katya Butler is a 84 year old female with past medical history significant for colon cancer with metastases to the lung, recent volvulus 4/2025, COPD, anemia of chronic disease, CKD, chronic left ureteral stent, PAD, essential hypertension, and hyperlipidemia who was admitted on 6/4/2025 due to abdominal pain over the past 3 days and found to have a moderate to high-grade small bowel obstruction on CT. She is still wanting hospice care at discharge and would like inpatient placement.    Small bowel obstruction  -3 days of worsening abdominal pain on top chronic cancer related pain  -Conservative treatment for now  -Zofran, compazine, and lorazepam as needed for nausea  -Pain management with oral and IV Dilaudid as needed  -Surgery consulted, hold off on NG for now  -N.p.o. for now    Dehydration  -Has only been able to have some fluids in applesauce over the past 3 days  -Dry mucous membranes on exam  -Started on IV fluids with LR at 50 mL/hour    Colon cancer with metastases to the lung  Hospice care patient  -Recently discharged from outpatient hospice 6/3, [facility],  due to drug diversion at home by family, [facility] was in process of getting an inpatient hospice bed for her  -Will need inpatient hospice at discharge  -Case management consulted  -Lorazepam as needed for anxiety, nausea/vomiting        Diet: NPO for Medical/Clinical Reasons Except for: Meds, Ice Chips  DVT Prophylaxis: Pneumatic Compression Devices  Lima Catheter: Not present  Lines: PRESENT             Cardiac Monitoring: None  Code Status: No CPR- Do NOT Intubate    Clinically Significant Risk Factors Present on Admission           # Hypocalcemia: Lowest Ca = 8.3 mg/dL in last 2 days, will monitor and replace as appropriate     # Hypoalbuminemia: Lowest albumin = 3.3 g/dL at 6/4/2025 11:09 AM,  will monitor as appropriate     # Hypertension: Noted on problem list        # Anemia: based on hgb <11           # Financial/Environmental Concerns:           Disposition Plan     Medically Ready for Discharge: Anticipated in 2-4 Days           Cata May MD  Hospitalist Service  [facility]  Securely message with [employer] (more info)  Text page via pic5 Paging/Directory     ______________________________________________________________________    Chief Complaint   Abdominal pain    History is obtained from the patient, electronic health record, and emergency department physician    History of Present Illness   Katya Butler is a 84 year old female with past medical history significant for colon cancer with metastases to the lung, recent volvulus 4/2025, COPD, anemia of chronic disease, CKD, chronic left ureteral stent, PAD, essential hypertension, and hyperlipidemia who was admitted on 6/4/2025 due to abdominal pain over the past 3 days and found to have a moderate to high-grade small bowel obstruction on CT. she began to have increasing abdominal pain approximately 3 days prior to arrival with associated decreased appetite and dry mouth.  She has also not had a bowel movement in 3 days.  She is still wanting hospice care at discharge and would like inpatient placement.  Was discharged from hospice via [facility] outpatient on 6/3 due to a drug diverting issue.  They were working on getting inpatient placement for her at the time of admission.  She is not on any chronic medications other than for symptoms.    Past Medical History    Past Medical History:   Diagnosis Date    Diverticulitis of colon     Heart murmur     HLD (hyperlipidemia)     HTN (hypertension)     Inguinal hernia without mention of obstruction or gangrene, unilateral or unspecified, (not specified as recurrent) 08/20/2004    RIGHT    Malignant neoplasm of sigmoid colon (H) 11/24/2020    Primary osteoarthritis  involving multiple joints 08/02/2019    PVD (peripheral vascular disease)     Renal disease     Stented coronary artery     Ureteral stent present     Ventral hernia, unspecified, without mention of obstruction or gangrene 08/15/2003    easily reduced       Past Surgical History   Past Surgical History:   Procedure Laterality Date    COMBINED CYSTOSCOPY, RETROGRADES, EXCHANGE STENT URETER(S) Left 11/21/2024    Procedure: CYSTOSCOPY WITH LEFT URETERAL STENT EXCHANGE AND RETROGRADE PYELOGRAM;  Surgeon: Galo Perez MD;  Location: [facility]    COMBINED CYSTOSCOPY, RETROGRADES, URETEROSCOPY, INSERT STENT Left 08/22/2024    Procedure: CYSTOSCOPY, WITH LEFT RETROGRADE PYELOGRAM AND LEFT STENT INSERTION;  Surgeon: Galo Perez MD;  Location: [facility]    EYE SURGERY      GI SURGERY      HERNIA REPAIR      HERNIA REPAIR, INGUINAL RT/LT  02/01/2005    [facility]    HYSTERECTOMY      IR AORTIC ARCH 4 VESSEL ANGIOGRAM  10/13/2009    IR AORTIC ARCH 4 VESSEL ANGIOGRAM  03/15/2010    IR MISCELLANEOUS PROCEDURE  05/20/2008    IR MISCELLANEOUS PROCEDURE  01/30/2009    IR MISCELLANEOUS PROCEDURE  01/30/2009    IR MISCELLANEOUS PROCEDURE  01/30/2009    IR MISCELLANEOUS PROCEDURE  10/13/2009    IR MISCELLANEOUS PROCEDURE  10/13/2009    IR MISCELLANEOUS PROCEDURE  03/15/2010    LAPAROSCOPIC CYSTECTOMY OVARIAN (ONCOLOGY) Bilateral 06/27/2018    Procedure: DIAGNOSTIC LAPAROSCOPY, RIGHT SALPINGO OOPHORECTOMY, LYSIS OF ADHESIONS, AND PELVIC WASHINGS;  Surgeon: Nneka Arroyo DO;  Location: [facility];  Service:     PA INSJ PRPH CTR VAD W/SUBQ PORT AGE 5 YR/> N/A 12/02/2020    Procedure: Port Placement;  Surgeon: Viry Villegas MD;  Location: [facility];  Service: General    ZZC PART REMOVAL COLON W ANASTOMOSIS N/A 10/31/2020    Procedure: COLECTOMY, SIGMOID, OPEN;  Surgeon: Viry Villegas MD;  Location: [facility];  Service: General    ZZC REMOVAL OF OVARY(S) N/A 10/31/2020     Procedure: OOPHORECTOMY, OPEN;  Surgeon: Viry Villegas MD;  Location: [facility];  Service: General       Prior to Admission Medications   Prior to Admission Medications   Prescriptions Last Dose Informant Patient Reported? Taking?   HYDROcodone-acetaminophen (NORCO) 5-325 MG tablet 6/4/2025 at  1:00 AM  No Yes   Sig: Take 2 tablets by mouth every 4 hours as needed for severe pain.   lidocaine-prilocaine (EMLA) 2.5-2.5 % external cream   No No   Sig: Apply topically as needed for moderate pain Apply to painful skin   senna-docusate (SENOKOT-S/PERICOLACE) 8.6-50 MG tablet 6/3/2025 Bedtime  Yes Yes   Sig: Take 2 tablets by mouth at bedtime.      Facility-Administered Medications: None        Review of Systems    The 10 point Review of Systems is negative other than noted in the HPI or here.    Social History   I have reviewed this patient's social history and updated it with pertinent information if needed.  Social History     Tobacco Use    Smoking status: Former     Types: Cigarettes    Smokeless tobacco: Never    Tobacco comments:     2-3 cigarettes daily **as of 01/10/25**   Vaping Use    Vaping status: Never Used   Substance Use Topics    Alcohol use: Not Currently    Drug use: No         Family History   I have reviewed this patient's family history and updated it with pertinent information if needed.  Family History   Problem Relation Age of Onset    C.A.D. Mother     C.A.D. Sister     Breast Cancer Maternal Aunt     Breast Cancer Cousin     Breast Cancer Cousin     Breast Cancer Cousin     Uterine Cancer Mother 38.00    Cancer Mother     Lymphoma Son 53.00    Cancer Son          Allergies   Allergies   Allergen Reactions    Contrast Dye Hives, Itching and Swelling    Aspirin Other (See Comments)     PATIENT STATES SHE IS NOT ALLERGIC TO ASA    Other reaction(s): on plavix  Other reaction(s): on plavix    Cephalexin Hives    Colcrys [Colchicine] Hives    Other Drug Allergy (See Comments) Itching  "    Pt states \"all pain meds\" make her \"itchy\"    Telithromycin Visual Disturbance        Physical Exam   Vital Signs: Temp: 98.3  F (36.8  C) Temp src: Temporal BP: (!) 149/66 Pulse: 73   Resp: 20 SpO2: 94 % O2 Device: None (Room air)    Weight: 0 lbs 0 oz    General Appearance: Awake, alert, in no acute distress, frail  Respiratory: CTAB, no wheeze  Cardiovascular: RRR, no murmur noted  GI: nontender when I saw her but had received pain medication, mildly distended  Skin: no jaundice, no rash      Medical Decision Making       75 MINUTES SPENT BY ME on the date of service doing chart review, history, exam, documentation & further activities per the note.      Data     I have personally reviewed the following data over the past 24 hrs:    6.7  \   9.0 (L)   / 342     138 104 19.7 /  72   4.5 22 1.06 (H) \     ALT: 9 AST: 22 AP: 65 TBILI: 0.2   ALB: 3.3 (L) TOT PROTEIN: 6.0 (L) LIPASE: 14       Imaging results reviewed over the past 24 hrs:   Recent Results (from the past 24 hours)   CT Abdomen Pelvis w/o Contrast    Narrative    EXAM: CT ABDOMEN PELVIS W/O CONTRAST  LOCATION: [facility]  DATE: 6/4/2025    INDICATION: Right sided abdominal pain with history of colon resection due to colon cancer.  COMPARISON: CT exams 4/7/2025 and 8/21/2024  TECHNIQUE: CT scan of the abdomen and pelvis was performed without IV contrast. Multiplanar reformats were obtained. Dose reduction techniques were used.  CONTRAST: None.    FINDINGS:   LOWER CHEST: New small right pleural effusion with adjacent atelectasis. Left basilar scarring. Severe coronary artery calcifications. Aortic leaflet calcifications.    HEPATOBILIARY: Moderate to severe gallbladder distention. No associated wall thickening or surrounding edema. Stable clustered coarse calcifications in the segment 8 region of the upper central liver.    PANCREAS: Normal.    SPLEEN: Normal.    ADRENAL GLANDS: Normal.    KIDNEYS/BLADDER: Stable satisfactory " position in the left ureteral stent. Interval nearly resolved mild left hydronephrosis. Stable left para-aortic soft tissue encasing the left ureter. Normal bladder.    BOWEL: Moderate to high-grade small bowel obstruction with a transition in the lower mesentery for example image numbers 127-137 series 3. Mesenteric edema. No free air.    LYMPH NODES: Stable retroperitoneal lymphadenopathy and left para-aortic masslike soft tissue.    VASCULATURE: Severe aortoiliac atherosclerosis. 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 Partially obscured left common iliac stent.    PELVIC ORGANS: Hysterectomy. No pelvic free fluid.    MUSCULOSKELETAL: Osseous demineralization.      Impression    IMPRESSION:   1.  Moderate to high-grade small bowel obstruction with a transition in the lower mesentery perhaps secondary to adhesions. No convincing evidence for a volvulus on this exam.  2.  Stable left para-aortic masslike soft tissue encasing the stented left ureter. Improved minimal residual left hydronephrosis.  3.  New small right pleural effusion with adjacent atelectasis.  4.  Moderate to severe gallbladder distention favored to reflect recent fasting. Further evaluation with ultrasound may be helpful if the patient has symptoms concerning for cholecystitis.

## 2025-06-04 NOTE — TELEPHONE ENCOUNTER
"Pt s son, Paulo, called requesting an urgent hospice referral. He is also seeking immediate bridge refills for lorazepam and Dilaudid, as the patient has run out of both medications. He stated the family was \"not happy\" with the previous hospice provider, [facility].   Per Paulo, \"I need an answer in the next half hour because my mom is out of her pills.\"    The patient previously received services through [facility]; please refer to the message below for the reason services were discontinued.    [facility] informed Paulo that [facility] is currently coordinating to identify a new hospice provider. [facility] is aware of this. He also inquired about both in-home and center-based hospice options.    To Dr. Stiles's team: Please contact Paulo at [phone] for updates.    Tammi Arana RN/[facility] Nurse Advisor        "

## 2025-06-05 ENCOUNTER — DOCUMENTATION ONLY (OUTPATIENT)
Dept: OTHER | Facility: CLINIC | Age: 85
End: 2025-06-05
Payer: COMMERCIAL

## 2025-06-05 VITALS
HEART RATE: 77 BPM | RESPIRATION RATE: 18 BRPM | OXYGEN SATURATION: 92 % | TEMPERATURE: 98.5 F | DIASTOLIC BLOOD PRESSURE: 68 MMHG | SYSTOLIC BLOOD PRESSURE: 143 MMHG

## 2025-06-05 PROBLEM — N18.30 CKD (CHRONIC KIDNEY DISEASE) STAGE 3, GFR 30-59 ML/MIN (H): Status: ACTIVE | Noted: 2025-06-05

## 2025-06-05 PROBLEM — C18.7 MALIGNANT NEOPLASM OF SIGMOID COLON (H): Status: ACTIVE | Noted: 2020-11-24

## 2025-06-05 LAB
ALBUMIN UR-MCNC: 30 MG/DL
ANION GAP SERPL CALCULATED.3IONS-SCNC: 12 MMOL/L (ref 7–15)
APPEARANCE UR: ABNORMAL
BACTERIA #/AREA URNS HPF: ABNORMAL /HPF
BILIRUB UR QL STRIP: NEGATIVE
BUN SERPL-MCNC: 17.8 MG/DL (ref 8–23)
CALCIUM SERPL-MCNC: 8.6 MG/DL (ref 8.8–10.4)
CHLORIDE SERPL-SCNC: 109 MMOL/L (ref 98–107)
COLOR UR AUTO: YELLOW
CREAT SERPL-MCNC: 1.17 MG/DL (ref 0.51–0.95)
EGFRCR SERPLBLD CKD-EPI 2021: 46 ML/MIN/1.73M2
ERYTHROCYTE [DISTWIDTH] IN BLOOD BY AUTOMATED COUNT: 18.8 % (ref 10–15)
GLUCOSE SERPL-MCNC: 58 MG/DL (ref 70–99)
GLUCOSE UR STRIP-MCNC: NEGATIVE MG/DL
HCO3 SERPL-SCNC: 19 MMOL/L (ref 22–29)
HCT VFR BLD AUTO: 28.8 % (ref 35–47)
HGB BLD-MCNC: 8.8 G/DL (ref 11.7–15.7)
HGB UR QL STRIP: ABNORMAL
KETONES UR STRIP-MCNC: 40 MG/DL
LEUKOCYTE ESTERASE UR QL STRIP: ABNORMAL
MCH RBC QN AUTO: 25.6 PG (ref 26.5–33)
MCHC RBC AUTO-ENTMCNC: 30.6 G/DL (ref 31.5–36.5)
MCV RBC AUTO: 84 FL (ref 78–100)
NITRATE UR QL: NEGATIVE
PH UR STRIP: 5.5 [PH] (ref 5–7)
PLATELET # BLD AUTO: 334 10E3/UL (ref 150–450)
POTASSIUM SERPL-SCNC: 4.6 MMOL/L (ref 3.4–5.3)
RBC # BLD AUTO: 3.44 10E6/UL (ref 3.8–5.2)
RBC URINE: >182 /HPF
SODIUM SERPL-SCNC: 140 MMOL/L (ref 135–145)
SP GR UR STRIP: 1.02 (ref 1–1.03)
SQUAMOUS EPITHELIAL: 4 /HPF
UROBILINOGEN UR STRIP-MCNC: NORMAL MG/DL
WBC # BLD AUTO: 6.8 10E3/UL (ref 4–11)
WBC URINE: 156 /HPF

## 2025-06-05 PROCEDURE — 250N000013 HC RX MED GY IP 250 OP 250 PS 637: Performed by: INTERNAL MEDICINE

## 2025-06-05 PROCEDURE — 99231 SBSQ HOSP IP/OBS SF/LOW 25: CPT | Performed by: SPECIALIST

## 2025-06-05 PROCEDURE — 250N000013 HC RX MED GY IP 250 OP 250 PS 637: Performed by: HOSPITALIST

## 2025-06-05 PROCEDURE — 120N000001 HC R&B MED SURG/OB

## 2025-06-05 PROCEDURE — 99232 SBSQ HOSP IP/OBS MODERATE 35: CPT | Performed by: HOSPITALIST

## 2025-06-05 PROCEDURE — 258N000003 HC RX IP 258 OP 636: Performed by: INTERNAL MEDICINE

## 2025-06-05 PROCEDURE — 82310 ASSAY OF CALCIUM: CPT | Performed by: INTERNAL MEDICINE

## 2025-06-05 PROCEDURE — 81001 URINALYSIS AUTO W/SCOPE: CPT | Performed by: EMERGENCY MEDICINE

## 2025-06-05 PROCEDURE — 250N000011 HC RX IP 250 OP 636: Performed by: INTERNAL MEDICINE

## 2025-06-05 PROCEDURE — 36415 COLL VENOUS BLD VENIPUNCTURE: CPT | Performed by: INTERNAL MEDICINE

## 2025-06-05 PROCEDURE — 85018 HEMOGLOBIN: CPT | Performed by: INTERNAL MEDICINE

## 2025-06-05 PROCEDURE — 87086 URINE CULTURE/COLONY COUNT: CPT | Performed by: EMERGENCY MEDICINE

## 2025-06-05 RX ORDER — HYDRALAZINE HYDROCHLORIDE 20 MG/ML
10 INJECTION INTRAMUSCULAR; INTRAVENOUS EVERY 4 HOURS PRN
Status: DISCONTINUED | OUTPATIENT
Start: 2025-06-05 | End: 2025-06-11 | Stop reason: HOSPADM

## 2025-06-05 RX ORDER — LIDOCAINE 4 G/G
1 PATCH TOPICAL
Status: DISCONTINUED | OUTPATIENT
Start: 2025-06-05 | End: 2025-06-11 | Stop reason: HOSPADM

## 2025-06-05 RX ORDER — HYDRALAZINE HYDROCHLORIDE 10 MG/1
10 TABLET, FILM COATED ORAL EVERY 4 HOURS PRN
Status: DISCONTINUED | OUTPATIENT
Start: 2025-06-05 | End: 2025-06-11 | Stop reason: HOSPADM

## 2025-06-05 RX ADMIN — HYDROMORPHONE HYDROCHLORIDE 2 MG: 2 TABLET ORAL at 19:47

## 2025-06-05 RX ADMIN — HYDROMORPHONE HYDROCHLORIDE 0.2 MG: 0.2 INJECTION, SOLUTION INTRAMUSCULAR; INTRAVENOUS; SUBCUTANEOUS at 21:06

## 2025-06-05 RX ADMIN — SODIUM CHLORIDE, SODIUM LACTATE, POTASSIUM CHLORIDE, AND CALCIUM CHLORIDE: .6; .31; .03; .02 INJECTION, SOLUTION INTRAVENOUS at 09:14

## 2025-06-05 RX ADMIN — SENNOSIDES AND DOCUSATE SODIUM 2 TABLET: 50; 8.6 TABLET ORAL at 13:23

## 2025-06-05 RX ADMIN — LIDOCAINE 1 PATCH: 4 PATCH TOPICAL at 13:19

## 2025-06-05 RX ADMIN — HYDROMORPHONE HYDROCHLORIDE 0.2 MG: 0.2 INJECTION, SOLUTION INTRAMUSCULAR; INTRAVENOUS; SUBCUTANEOUS at 11:46

## 2025-06-05 ASSESSMENT — ACTIVITIES OF DAILY LIVING (ADL)
ADLS_ACUITY_SCORE: 51
ADLS_ACUITY_SCORE: 55
ADLS_ACUITY_SCORE: 51
ADLS_ACUITY_SCORE: 55
ADLS_ACUITY_SCORE: 51
ADLS_ACUITY_SCORE: 51
ADLS_ACUITY_SCORE: 55
ADLS_ACUITY_SCORE: 51
ADLS_ACUITY_SCORE: 51
ADLS_ACUITY_SCORE: 55
ADLS_ACUITY_SCORE: 55
ADLS_ACUITY_SCORE: 51
ADLS_ACUITY_SCORE: 55

## 2025-06-05 NOTE — PLAN OF CARE
Problem: Adult Inpatient Plan of Care  Goal: Optimal Comfort and Wellbeing  Outcome: Progressing  Intervention: Provide Person-Centered Care  Recent Flowsheet Documentation  Taken 6/5/2025 1600 by Loretta Klein RN  Trust Relationship/Rapport:   care explained   choices provided   emotional support provided   questions encouraged   questions answered   empathic listening provided   thoughts/feelings acknowledged   reassurance provided     Problem: Pain Acute  Goal: Optimal Pain Control and Function  Outcome: Progressing  Intervention: Optimize Psychosocial Wellbeing  Recent Flowsheet Documentation  Taken 6/5/2025 1600 by Loretta Klein RN  Diversional Activities: television  Intervention: Prevent or Manage Pain  Recent Flowsheet Documentation  Taken 6/5/2025 1600 by Loretta Klein RN  Sensory Stimulation Regulation:   care clustered   lighting decreased   quiet environment promoted   television on  Medication Review/Management: medications reviewed     Problem: Nausea and Vomiting  Goal: Nausea and Vomiting Relief  Outcome: Progressing   Pt denies pain/nausea at this time. Sacrum has blanchable redness, mepilex applied. Pt tolerating Clears. All phone calls/visitors to go through charge nurse and security per management. VSS on RA, continue to monitor.    Loretta Klein RN

## 2025-06-05 NOTE — PLAN OF CARE
Problem: Adult Inpatient Plan of Care  Goal: Plan of Care Review  Description: The Plan of Care Review/Shift note should be completed every shift.  The Outcome Evaluation is a brief statement about your assessment that the patient is improving, declining, or no change.  This information will be displayed automatically on your shift  note.  Outcome: Progressing  Flowsheets (Taken 6/5/2025 1435)  Plan of Care Reviewed With: patient   Goal Outcome Evaluation:      Plan of Care Reviewed With: patient        Patient reporting abdominal/back pain in the afternoon 9/10 dilaudid given x1 decreased to 5/10.  Warm pack and lidocaine patch added to manage pain.  Patient denied need of further interventions at this time.    Family/visitors screened through management and security.      Patient advanced to clear liquids.  Tolerating sips without nausea.

## 2025-06-05 NOTE — PROGRESS NOTES
CLINICAL NUTRITION SERVICES - ASSESSMENT NOTE    RECOMMENDATIONS FOR MDs/PROVIDERS TO ORDER:    Registered Dietitian Interventions:  Placed note for diet office pt can order Ensure PRN once appropriate diet in    Future/Additional Recommendations:     REASON FOR ASSESSMENT  MST 4 (reported: 24-33# wt loss [3], decreased appetite [1])    84 year old female with past medical history significant for colon cancer with metastases to the lung, recent volvulus 4/2025, COPD, anemia of chronic disease, CKD, chronic left ureteral stent, PAD, essential hypertension, and hyperlipidemia who was admitted on 6/4/2025 due to abdominal pain over the past 3 days and found to have a moderate to high-grade small bowel obstruction on CT. She is still wanting hospice care at discharge and would like inpatient placement.    -Has only been able to have some fluids in applesauce over the past 3 days    INFORMATION OBTAINED  Verifies very poor PO x3d PTA as above. Says she was eating OK prior to that.  Unsure of recent wt hx but she guessed she might be 95 lb right now.  Pt casually discussing wt loss as part of cancer which seemed to indicate acceptance with medical condition.  Drinks some chocolate Ensure at home sometimes, although not every day. Would not want strawb/vanilla. Offered to schedule for her pending diet advancement but she declined. She did accept offer to inform kitchen pt may order Ensure PRN pending appropriate diet order.    CURRENT NUTRITION ORDERS  Diet: CLD    CURRENT INTAKE/TOLERANCE  Date/Time  Intake (%)  06/05/25 0914  0%    LABS  Nutrition-relevant labs: Reviewed    MEDICATIONS  Nutrition-relevant medications: Reviewed    ANTHROPOMETRICS  Height: 157.5 cm  Admission Weight:     Most Recent Weight:    IBW: 50.1 kg  BMI: There is no height or weight on file to calculate BMI. If pt is correct about current wt 95 lb then BMI would be 17.3    Weight History: (unable to assess: most recent obj wt ~2m old)  06/05/25 :  Pt thinks she might be about 95 lb at this time, if true then no sig loss  04/08/25 : 42.6 kg (93 lb 14.4 oz)  02/10/25 : 44.8 kg (98 lb 11.2 oz)  10/30/24 : 46.4 kg (102 lb 6.4 oz)  08/24/24 : 46.4 kg (102 lb 4.8 oz)  04/30/24 : 46.3 kg (102 lb 1.6 oz)  01/31/24 : 47.5 kg (104 lb 12.8 oz)  11/01/23 : 47.9 kg (105 lb 11.2 oz)  07/19/23 : 50.1 kg (110 lb 8 oz)  05/22/23 : 49 kg (108 lb)    Dosing Weight: ideal wt given unclear actual wt    ASSESSED NUTRITION NEEDS  Estimated Energy Needs: 1373 kcals/day ([place]oy St Jeor*1.5)  Justification: Increased needs and Repletion  Estimated Protein Needs: 70 grams protein/day (kg*1.4)  Justification: Increased needs and Repletion  Estimated Fluid Needs: 1300 mL/day (1 mL/kcal)  Justification: Maintenance    SYSTEM AND PHYSICAL FINDINGS    GI symptoms: Reviewed  Skin/wounds: Reviewed    MALNUTRITION  % Intake: Decreased intake does not meet criteria  % Weight Loss: Unable to assess   Subcutaneous Fat Loss: defer: pt GOC = hospice, NFPE does not change anything about care plan and is therefore unnecessary  Muscle Loss: defer: pt GOC = hospice, NFPE does not change anything about care plan and is therefore unnecessary  Fluid Accumulation/Edema: None noted  Malnutrition Diagnosis: Unable to determine due to above  Malnutrition Present on Admission: Unable to assess    NUTRITION DIAGNOSIS  Increase nutrient needs r/t cancer AEB metastatic colon cancer    INTERVENTIONS  Placed note for diet office pt can order Ensure PRN once appropriate diet in    GOALS  PO as desired     MONITORING/EVALUATION  Progress toward goals will be monitored and evaluated per policy.

## 2025-06-05 NOTE — CONFIDENTIAL NOTE
"Writer was ANS on 6/5/25 [id number]    Writer received call from individual stating he was Walt Sweet this patients son. His tone in his phone call was very aggressive to which he said \"where is my mother, I cannot track her down and who is responsible for this\". Writer responded stating I have no information at this time. Son then asked when writer would be done with shift and when shift change ends. Then the individual stated right away that he knows his mom is on P2 and is P2 a floor at the hospital. Writer again stated I could not give information and would need to hang up at this time.   Writer then received call from P2 charge RN that this individual called the unit.   Unaware how this individual has ANS direct phone number and P2 nurses station.     Writer then notified Security of this incident.   Writer then called risk management x2 who did not answer.  Writer then called unit manager to give update and would be connecting with risk this AM.         "

## 2025-06-05 NOTE — PROGRESS NOTES
"Leanne Aguilar called me and told me that she was working with her management as well on this case. They wanted us to know that the \"3rd son Idris has not been involved at all in the concerns at home and he would be the one that you would want to reach out to for financial issues related to placement. And then we could plan on becoming re-involved when the patient is placed at a facility\".  "

## 2025-06-05 NOTE — PLAN OF CARE
Goal Outcome Evaluation:      Plan of Care Reviewed With: patient, caregiver          Outcome Evaluation: Needing placement in a care facility and hospice to follow.

## 2025-06-05 NOTE — PLAN OF CARE
Problem: Adult Inpatient Plan of Care  Goal: Plan of Care Review  Description: The Plan of Care Review/Shift note should be completed every shift.  The Outcome Evaluation is a brief statement about your assessment that the patient is improving, declining, or no change.  This information will be displayed automatically on your shift  note.  Outcome: Progressing  Goal: Optimal Comfort and Wellbeing  Outcome: Progressing     Problem: Delirium  Goal: Improved Sleep  Outcome: Progressing     Problem: Pain Acute  Goal: Optimal Pain Control and Function  Outcome: Progressing  Intervention: Prevent or Manage Pain  Recent Flowsheet Documentation  Taken 6/4/2025 2320 by Pardeep Delacruz RN  Medication Review/Management: medications reviewed     Problem: Nausea and Vomiting  Goal: Nausea and Vomiting Relief  Outcome: Progressing   Goal Outcome Evaluation:         A&O   VSS on RA  Denied pain/nausea  LR running at 50 mL/h  Incontinent of urine  Pt reported dizziness when sitting upright in bed  Remains NPO  Slept overnight  Able to make needs known

## 2025-06-05 NOTE — PROGRESS NOTES
[facility]    Medicine Progress Note - Hospitalist Service    Date of Admission:  6/4/2025    Assessment & Plan                Katya Butler is a 84 year old female with colon cancer with metastases to the lung, recent volvulus 4/2025, has been on Hospice but discharged from Hospice on 6/3 due to concerns about drug diversion by hr family, COPD, CKD, chronic left ureteral stent, PAD, hypertension who presented for abdominal pain over the past 3 days and found to have a moderate to high-grade small bowel obstruction on CT. She is still wanting hospice care at discharge and would like inpatient placement. Challenging placement due to the medicolegal issues involved with outpatient opioids for her currently. Hospital Day: 2      Small bowel obstruction  -3 days of worsening abdominal pain on top chronic cancer related pain. Presumably malignant obstruction  -Conservative treatment for now. No NG tube planned  -Zofran, compazine, and lorazepam as needed for nausea  -Pain management with oral and IV Dilaudid as needed. She has not been needing much pain meds  -Surgery team following. patient would not want open surgery  -had BM yesterday after suppository. Awaiting full ROBF. Diet advanced to clears today by surgery team     Dehydration  -Has only been able to have some fluids in applesauce over the past 3 days  -trial her off IVF now that diet advanced     Colon cancer with metastases to the lung  Hospice care patient  -Recently discharged from outpatient hospice 6/3, [facility], due to drug diversion at home by family, [facility] was in process of getting an inpatient hospice bed for her  -Will need inpatient hospice at discharge  -Case management consulted  -Lorazepam as needed for anxiety, nausea/vomiting  -has port    CKD 3  -Cr at her recently baseline  - No further workup    Normocytic anemia  - Suspect probably due to her colon cancer  - Hemoglobin overall stable without signs of  active bleeding  - No further monitoring indicated    Possible UTI  - UA with blood, LE, some squames  - Not currently on antibiotics  - Urine sent for culture          Diet: Clear Liquid Diet    DVT Prophylaxis: High risk. Pharmacologic prophylaxis contraindicated due to comfort focused care   Lima Catheter: Not present  Lines: PRESENT             Cardiac Monitoring: None  Code Status: No CPR- Do NOT Intubate      Clinically Significant Risk Factors          # Hyperchloremia: Highest Cl = 109 mmol/L in last 2 days, will monitor as appropriate      # Hypocalcemia: Lowest Ca = 8.3 mg/dL in last 2 days, will monitor and replace as appropriate     # Hypoalbuminemia: Lowest albumin = 3.3 g/dL at 6/4/2025 11:09 AM, will monitor as appropriate     # Hypertension: Noted on problem list                # Financial/Environmental Concerns:           Disposition Plan     Medically Ready for Discharge: Ready Now         Discharge barrier(s): placement  Care discussed with: patient, unit manager      Kellie Miller MD  Hospitalist Service  [facility]  Securely message with [employer] (more info)  Text page via [employer] Paging/Directory   ______________________________________________________________________      Physical Exam   Vital Signs: Temp: 98.7  F (37.1  C) Temp src: Oral BP: (!) 163/71 Pulse: 74   Resp: 18 SpO2: 95 % O2 Device: None (Room air)    Weight: 0 lbs 0 oz    General: in no apparent distress, non-toxic, and alert female lying in hospital bed oriented x3  HEENT: Head normocephalic atraumatic, oral mucosa moist. Sclerae anicteric  CV: Regular rhythm, normal rate, no murmurs  Resp: No wheezes, no rales or rhonchi, no focal consolidations  GI: Belly soft, nondistended, moderately tender to palpation diffusely, bowel sounds distant  Skin: greyish in color  Extremities: No peripheral edema  Psych: Normal affect, anxious mood  Neuro: Grossly normal      Medical Decision Making               Data    Recent Results (from the past 16 hours)   Basic metabolic panel    Collection Time: 06/05/25  6:50 AM   Result Value Ref Range    Sodium 140 135 - 145 mmol/L    Potassium 4.6 3.4 - 5.3 mmol/L    Chloride 109 (H) 98 - 107 mmol/L    Carbon Dioxide (CO2) 19 (L) 22 - 29 mmol/L    Anion Gap 12 7 - 15 mmol/L    Urea Nitrogen 17.8 8.0 - 23.0 mg/dL    Creatinine 1.17 (H) 0.51 - 0.95 mg/dL    GFR Estimate 46 (L) >60 mL/min/1.73m2    Calcium 8.6 (L) 8.8 - 10.4 mg/dL    Glucose 58 (L) 70 - 99 mg/dL   CBC with platelets    Collection Time: 06/05/25  6:50 AM   Result Value Ref Range    WBC Count 6.8 4.0 - 11.0 10e3/uL    RBC Count 3.44 (L) 3.80 - 5.20 10e6/uL    Hemoglobin 8.8 (L) 11.7 - 15.7 g/dL    Hematocrit 28.8 (L) 35.0 - 47.0 %    MCV 84 78 - 100 fL    MCH 25.6 (L) 26.5 - 33.0 pg    MCHC 30.6 (L) 31.5 - 36.5 g/dL    RDW 18.8 (H) 10.0 - 15.0 %    Platelet Count 334 150 - 450 10e3/uL   UA with Microscopic reflex to Culture    Collection Time: 06/05/25  9:15 AM    Specimen: Urine, Midstream   Result Value Ref Range    Color Urine Yellow Colorless, Straw, Light Yellow, Yellow    Appearance Urine Turbid (A) Clear    Glucose Urine Negative Negative mg/dL    Bilirubin Urine Negative Negative    Ketones Urine 40 (A) Negative mg/dL    Specific Gravity Urine 1.017 1.001 - 1.030    Blood Urine 1.0 mg/dL (A) Negative    pH Urine 5.5 5.0 - 7.0    Protein Albumin Urine 30 (A) Negative mg/dL    Urobilinogen Urine Normal Normal mg/dL    Nitrite Urine Negative Negative    Leukocyte Esterase Urine 500 Pedro/uL (A) Negative    Bacteria Urine Few (A) None Seen /HPF    RBC Urine >182 (H) <=2 /HPF    WBC Urine 156 (H) <=5 /HPF    Squamous Epithelials Urine 4 (H) <=1 /HPF       Interval History     Patient is quite energetic this morning, she is upset due to not being allowed any visitors currently due to the medico legal concerns brought forth by the hospice team. Reassured her they will get this sorted out soon. Patient reports still some  tenderness to palpation, but overall her abdominal pain has improved. She did have a bowel movement last night after suppository. States she has not passed gas in three days, however. Tolerating sips of clear liquids. She is interested in taking more. Dizzy when she gets up, she is wondering if this is due to her cancer or if this could be a recurrence of her Vertigo which she had four years ago. Not ready for discharge, discharge planning in progress.

## 2025-06-05 NOTE — PROGRESS NOTES
Feels better than yesterday.  Less pain.  Had a bowel movement yesterday.    Afebrile, vital signs stable.    Physical exam:  Looks tired and weak.  Abdomen is much softer than yesterday.  Still somewhat tender across the lower abdomen.    Laboratories:  White count 6.8  Hemoglobin 8.8    Impression: Small bowel obstruction secondary to metastatic colon cancer.  As stated yesterday, the patient is transitioning to hospice and wants to be in hospice at this point.  She states she would not want surgery and right now it looks like her bowel obstruction has resolved.  She had a yesterday.  I am getting let her have clear liquids.  We should get hospice involved now so that they can get things set up at home quicker.

## 2025-06-05 NOTE — PLAN OF CARE
Problem: Adult Inpatient Plan of Care  Goal: Optimal Comfort and Wellbeing  6/4/2025 2237 by Loretta Klein RN  Outcome: Progressing  6/4/2025 1810 by Loretta Klein RN  Outcome: Progressing  6/4/2025 1809 by Loretta Klein RN  Outcome: Progressing  Intervention: Monitor Pain and Promote Comfort  Recent Flowsheet Documentation  Taken 6/4/2025 1715 by Loretta Klein RN  Pain Management Interventions:   medication (see MAR)   emotional support   care clustered   quiet environment facilitated  Intervention: Provide Person-Centered Care  Recent Flowsheet Documentation  Taken 6/4/2025 1715 by Loretta Klein RN  Trust Relationship/Rapport:   care explained   choices provided   emotional support provided   questions encouraged   questions answered   empathic listening provided   thoughts/feelings acknowledged   reassurance provided     Problem: Delirium  Goal: Optimal Coping  6/4/2025 2237 by Loretta Klein RN  Outcome: Progressing  6/4/2025 1810 by Loretta Klein RN  Outcome: Progressing  Intervention: Optimize Psychosocial Adjustment to Delirium  Recent Flowsheet Documentation  Taken 6/4/2025 1715 by Loretta Klein RN  Family/Support System Care:   presence promoted   support provided  Goal: Improved Attention and Thought Clarity  Outcome: Progressing  Intervention: Maximize Cognitive Function  Recent Flowsheet Documentation  Taken 6/4/2025 1715 by Loretta Klein RN  Sensory Stimulation Regulation:   care clustered   lighting decreased   quiet environment promoted   television on  Reorientation Measures:   clock in view   calendar in view   familiar social contact encouraged     Problem: Pain Acute  Goal: Optimal Pain Control and Function  6/4/2025 2237 by Loretta Klein RN  Outcome: Progressing  6/4/2025 1810 by Loretta Klein RN  Outcome: Progressing  Intervention: Develop Pain Management Plan  Recent Flowsheet Documentation  Taken 6/4/2025 1715 by Loretta Klein RN  Pain  Management Interventions:   medication (see MAR)   emotional support   care clustered   quiet environment facilitated  Intervention: Prevent or Manage Pain  Recent Flowsheet Documentation  Taken 6/4/2025 1715 by Loretta Klein, RN  Sensory Stimulation Regulation:   care clustered   lighting decreased   quiet environment promoted   television on  Medication Review/Management: medications reviewed     Problem: Nausea and Vomiting  Goal: Nausea and Vomiting Relief  Outcome: Progressing   Pt disoriented to time. Dilaudid given x1 with good effect for 10/10 abdominal pain. Denies nausea. Per ANS pt's son Idris Sweet is the only family member to visit and access information to pt chart. LR running @ 50 ml/hr. VSS on RA, continue to monitor.    Loretta Klein, RN

## 2025-06-06 PROCEDURE — 250N000013 HC RX MED GY IP 250 OP 250 PS 637: Performed by: HOSPITALIST

## 2025-06-06 PROCEDURE — 99232 SBSQ HOSP IP/OBS MODERATE 35: CPT | Mod: FS | Performed by: SPECIALIST

## 2025-06-06 PROCEDURE — 99232 SBSQ HOSP IP/OBS MODERATE 35: CPT | Performed by: HOSPITALIST

## 2025-06-06 PROCEDURE — 120N000001 HC R&B MED SURG/OB

## 2025-06-06 PROCEDURE — 250N000013 HC RX MED GY IP 250 OP 250 PS 637: Performed by: INTERNAL MEDICINE

## 2025-06-06 PROCEDURE — 99207 PR APP CREDIT; MD BILLING SHARED VISIT: CPT

## 2025-06-06 RX ORDER — BISACODYL 10 MG
10 SUPPOSITORY, RECTAL RECTAL
Status: DISCONTINUED | OUTPATIENT
Start: 2025-06-06 | End: 2025-06-11 | Stop reason: HOSPADM

## 2025-06-06 RX ADMIN — MINERAL OIL 1 ENEMA: 100 ENEMA RECTAL at 11:00

## 2025-06-06 RX ADMIN — HYDROMORPHONE HYDROCHLORIDE 2 MG: 2 TABLET ORAL at 19:58

## 2025-06-06 ASSESSMENT — ACTIVITIES OF DAILY LIVING (ADL)
ADLS_ACUITY_SCORE: 55
ADLS_ACUITY_SCORE: 55
ADLS_ACUITY_SCORE: 56
ADLS_ACUITY_SCORE: 56
ADLS_ACUITY_SCORE: 55
ADLS_ACUITY_SCORE: 51
ADLS_ACUITY_SCORE: 56
ADLS_ACUITY_SCORE: 59
ADLS_ACUITY_SCORE: 55
ADLS_ACUITY_SCORE: 55
ADLS_ACUITY_SCORE: 59
ADLS_ACUITY_SCORE: 55
ADLS_ACUITY_SCORE: 51
ADLS_ACUITY_SCORE: 55
ADLS_ACUITY_SCORE: 56
ADLS_ACUITY_SCORE: 55
ADLS_ACUITY_SCORE: 51
ADLS_ACUITY_SCORE: 56
ADLS_ACUITY_SCORE: 55

## 2025-06-06 NOTE — PLAN OF CARE
Problem: Pain Acute  Goal: Optimal Pain Control and Function  Outcome: Not Progressing  Intervention: Develop Pain Management Plan  Recent Flowsheet Documentation  Taken 6/5/2025 2340 by Pardeep Delacruz RN  Pain Management Interventions: medication offered but refused  Intervention: Prevent or Manage Pain  Recent Flowsheet Documentation  Taken 6/5/2025 2340 by Pardeep Delacruz RN  Medication Review/Management: medications reviewed     Problem: Adult Inpatient Plan of Care  Goal: Plan of Care Review  Description: The Plan of Care Review/Shift note should be completed every shift.  The Outcome Evaluation is a brief statement about your assessment that the patient is improving, declining, or no change.  This information will be displayed automatically on your shift  note.  Outcome: Progressing  Goal: Optimal Comfort and Wellbeing  Outcome: Progressing  Intervention: Monitor Pain and Promote Comfort  Recent Flowsheet Documentation  Taken 6/5/2025 2340 by Pardeep Delacruz RN  Pain Management Interventions: medication offered but refused     Problem: Delirium  Goal: Improved Sleep  Outcome: Progressing     Problem: Nausea and Vomiting  Goal: Nausea and Vomiting Relief  Outcome: Progressing   Goal Outcome Evaluation:         A&O  VSS on RA  Pain in abdomen rated at a 10, pt declined medication intervention, pt stated the pain is from needing to produce a BM  Denied nausea  Tolerating clear liquids  Pt reports feeling dizzy w/ambulation, ambulating w/Ax1 and walker  Occasionally incontinent of urine  Slept overnight  Able to make needs known

## 2025-06-06 NOTE — PLAN OF CARE
Problem: Adult Inpatient Plan of Care  Goal: Plan of Care Review  Description: The Plan of Care Review/Shift note should be completed every shift.  The Outcome Evaluation is a brief statement about your assessment that the patient is improving, declining, or no change.  This information will be displayed automatically on your shift  note.  Outcome: Progressing   Goal Outcome Evaluation:       Pt disoriented to time. Endorsing abdominal pain. Abdomen is non distended and tender. Denies nausea or vomit. Tolerating clear liquid diet. She reports having a little flatus. She feels like if she had a bowel movement, she will feel better. She requested MiraLax.  On call MD page who declined due to SBO.

## 2025-06-06 NOTE — PLAN OF CARE
Problem: Adult Inpatient Plan of Care  Goal: Plan of Care Review  Description: The Plan of Care Review/Shift note should be completed every shift.  The Outcome Evaluation is a brief statement about your assessment that the patient is improving, declining, or no change.  This information will be displayed automatically on your shift  note.  Outcome: Progressing  Flowsheets (Taken 6/6/2025 5830)  Plan of Care Reviewed With: patient   Goal Outcome Evaluation:      Plan of Care Reviewed With: patient      Patient's family supportive at bedside.  Social work discussing options for discharge with family and patient.  Still deciding best options.      Patient reported mild abdominal discomfort related to constipation.  Enema given x1.  Patient had medium BM and reported feeling better.  Will continue to monitor.    Patient reported only minor abdominal discomfort.  Denied need of interventions throughout the shift.

## 2025-06-06 NOTE — PROGRESS NOTES
General Surgery Progress Note:    Hospital Day # 2    ASSESSMENT:   1. Mechanical obstruction of the intestine (H)    2. Other specified counseling      Katya Butler is a 84 year old female who presents with a SBO secondary to metastatic colon cancer. Passed a small amount of gas overnight and a marble size BM this morning but still feels bloated and constipated. Tolerating clear liquids with no N/V. No planned surgical intervention as patient is not interested and is pursuing hospice cares. General surgery will sign off and at this point would trial a little bit more food as much as she can tolerate.    PLAN:   - Full liquids, discussed going slow and trying as much as she can tolerate  - Encourage activity and ambulation  - Suppository added   - Medical management per primary team  - No planned surgical intervention and general surgery will sign off, please contact us with any questions or concerns.    SUBJECTIVE:   Katya Butler is okay this morning. States she passed a little bit of gas overnight and had a small marble sized BM this morning. Also still feels bloated. Tolerating clears with no N/V.  Is not interested in surgery and is pursuing hospice cares.    Patient Vitals for the past 24 hrs:   BP Temp Temp src Pulse Resp SpO2   06/06/25 0717 (!) 143/68 98.3  F (36.8  C) Oral 78 16 96 %   06/05/25 2344 (!) 143/68 98.5  F (36.9  C) Oral 77 18 92 %   06/1940 (!) 165/71 98.4  F (36.9  C) Oral -- 18 --   06/05/25 1530 (!) 176/77 98.3  F (36.8  C) Oral 70 16 96 %   06/05/25 1315 (!) 146/71 -- -- -- -- --     Physical Exam:  General: patient seen resting in bed, no acute distress  Resp: no respiratory distress, breathing comfortably on RA  Abdomen: distended, mild lower abdominal TTP, no rebound tenderness or guarding  Extremities: warm and well perfused    Admission on 06/04/2025   Component Date Value    Sodium 06/04/2025 138     Potassium 06/04/2025 4.5     Chloride 06/04/2025 104     Carbon Dioxide  (CO2) 06/04/2025 22     Anion Gap 06/04/2025 12     Urea Nitrogen 06/04/2025 19.7     Creatinine 06/04/2025 1.06 (H)     GFR Estimate 06/04/2025 52 (L)     Calcium 06/04/2025 8.3 (L)     Glucose 06/04/2025 72     Protein Total 06/04/2025 6.0 (L)     Albumin 06/04/2025 3.3 (L)     Bilirubin Total 06/04/2025 0.2     Alkaline Phosphatase 06/04/2025 65     AST 06/04/2025 22     ALT 06/04/2025 9     Bilirubin Direct 06/04/2025 <0.08     Lipase 06/04/2025 14     Color Urine 06/05/2025 Yellow     Appearance Urine 06/05/2025 Turbid (A)     Glucose Urine 06/05/2025 Negative     Bilirubin Urine 06/05/2025 Negative     Ketones Urine 06/05/2025 40 (A)     Specific Gravity Urine 06/05/2025 1.017     Blood Urine 06/05/2025 1.0 mg/dL (A)     pH Urine 06/05/2025 5.5     Protein Albumin Urine 06/05/2025 30 (A)     Urobilinogen Urine 06/05/2025 Normal     Nitrite Urine 06/05/2025 Negative     Leukocyte Esterase Urine 06/05/2025 500 Pedro/uL (A)     Bacteria Urine 06/05/2025 Few (A)     RBC Urine 06/05/2025 >182 (H)     WBC Urine 06/05/2025 156 (H)     Squamous Epithelials Uri* 06/05/2025 4 (H)     WBC Count 06/04/2025 6.7     RBC Count 06/04/2025 3.60 (L)     Hemoglobin 06/04/2025 9.0 (L)     Hematocrit 06/04/2025 29.6 (L)     MCV 06/04/2025 82     MCH 06/04/2025 25.0 (L)     MCHC 06/04/2025 30.4 (L)     RDW 06/04/2025 18.6 (H)     Platelet Count 06/04/2025 342     % Neutrophils 06/04/2025 69     % Lymphocytes 06/04/2025 17     % Monocytes 06/04/2025 12     % Eosinophils 06/04/2025 2     % Basophils 06/04/2025 0     % Immature Granulocytes 06/04/2025 0     NRBCs per 100 WBC 06/04/2025 0     Absolute Neutrophils 06/04/2025 4.7     Absolute Lymphocytes 06/04/2025 1.1     Absolute Monocytes 06/04/2025 0.8     Absolute Eosinophils 06/04/2025 0.1     Absolute Basophils 06/04/2025 0.0     Absolute Immature Granul* 06/04/2025 0.0     Absolute NRBCs 06/04/2025 0.0     Hold Specimen 06/04/2025 JIC     Sodium 06/05/2025 140     Potassium  06/05/2025 4.6     Chloride 06/05/2025 109 (H)     Carbon Dioxide (CO2) 06/05/2025 19 (L)     Anion Gap 06/05/2025 12     Urea Nitrogen 06/05/2025 17.8     Creatinine 06/05/2025 1.17 (H)     GFR Estimate 06/05/2025 46 (L)     Calcium 06/05/2025 8.6 (L)     Glucose 06/05/2025 58 (L)     WBC Count 06/05/2025 6.8     RBC Count 06/05/2025 3.44 (L)     Hemoglobin 06/05/2025 8.8 (L)     Hematocrit 06/05/2025 28.8 (L)     MCV 06/05/2025 84     MCH 06/05/2025 25.6 (L)     MCHC 06/05/2025 30.6 (L)     RDW 06/05/2025 18.8 (H)     Platelet Count 06/05/2025 334     Culture 06/05/2025 Culture in progress         Umu Gage PA-C  [facility]  [address] [place] [address] [address]  [place] [address]

## 2025-06-06 NOTE — PROGRESS NOTES
"Care Management Follow Up    Length of Stay (days): 2    Expected Discharge Date:  TBD     Concerns to be Addressed: discharge planning, financial/insurance, other (see comments) (hospice and placement needs)     Patient plan of care discussed at interdisciplinary rounds: Yes    Anticipated Discharge Disposition: Skilled Nursing Facility, Hospice      Anticipated Discharge Services: TBD  Anticipated Discharge DME:  TBD    Patient/family educated on Medicare website which has current facility and service quality ratings:  yes  Education Provided on the Discharge Plan:  yes  Patient/Family in Agreement with the Plan:  consider    Private pay costs discussed: transportation costs    Discussed  Partnership in Safe Discharge Planning  document with patient/family: No     Handoff Completed:  Yes    Additional Information:  SW met with patient and her son Hiren and daughter in law Liberty. They already received HCD forms from nurse manager and were working on completing that document and have notarized by hospital staff. Hiren and Liberty are very overwhelmed  and have a lot off questions that  can't answer due to active investigation. SW asked to focus on plans for future and patient wellbeing at discharge. Idris is not aware of patient's financial situation as his brother Micah was in charge of this. Of note:  asked Financial Counseling to check if patient has medical assistance, and received information that patient [place] not in [place]. So patient is not open to any State program at this time.    Patient engaged in conversation and states she wants to go home, she doesn't want to go to any facility and is not sure if she wants same hospice to be involved. Patient agrees to continue hospice care but maybe another agency. SW informed patient that from one of earlier notes find out that patient was open to LTC. Patient's states: \" well maybe when I was out of it, but not now. I want to go home and my " "boys will take care of me. My boys will never put me in the nursing home\".   Idris asked if it's possible for patient to return home with 24hrs care. BRYSON responded that is not safe for patient to go home and there is no program/service available that would provide 24hrs care. We discussed options including: inpatient hospice, TCU. Assisted living and long term care.   Family would like patient to go to TCU/LTC at least for a while so they can figure out next steps. BRYSON explained that patient would not qualify for TCU and hospice at the same time. We will have to have PT/OT recommendations for TCU to send out some referrals. LTC would require private pay or MA benefits.   BRYSON provided Idris and Liberty with list of facilities in their area for review. Zip code: [phone] [place] [address]. They wanted to look over the list and meet again in the afternoon, after signing HCD.    Next Steps: CM will continue to monitor progression of care, review team recommendations and provide discharge planning assist as needed.     Ramona Pathak, [facility]    Addendum:    BRYSON called [facility] that [facility] hospice mentioned as potential placement for patient. # [phone] spoke with Leydi,  and a nurse. Hospice  didn't made the referral. Patient is not in their system. Discussed potential placement. First 6 month private pay, and patient would be at about $4500.00 a month, after that they accept waiver payments. Have 1brm and 2 brm apartments available. $4500.00 includes 3 meals a day with snack, weekly housekeeping and 2 loads of laundry, utilities including cable. Staff ratio is 1:10 residents, 24hrs CNA staff and M-F nursing staff. Address: [address] [place] [address] [address] [place]. Fax# [phone].    BRYSON met with patient and her family again in patient room. Son Eladio present. Idris states they will plan to talk to rest of siblings and \"try to figure things out\". They hoping patient will be able to go to TCU/LTC for 21 days " "under insurance coverage and hopefully by then they will have better plan in place. BRYSON explained that if patient has no rehab needs, can't go to TCU. Her insurance would not pay for respite placement.  Asked if patient could stay at least temporarily with either Idris or Eladio? Idris states he \"rather not get into this\". Eladio states he was taking care of his wife till she passed away in January after losing her hicks with cancer. Eladio states this would be emotionally and physically too much right now, and he currently is homeless. If he could moved in with mom, he could take care of her. BRYSON informed that again, patient is not safe to return to her home at this time. SW asked Idris if he doesn't want to or can't to take his mom to his home. Idris responded : \"both\".    SW provided information about [facility] and explained MA eligibility. Again, either Idris or Eladio are not aware of patient's financial situation. BRYSON mentioned that our financial counseling could help file MA application, but would need to have some information about patient's estate and proofs including monthly income and financial assets.  Idris will have meeting with rest of siblings and try to get the information. He will try to get the POA paperwork too. SW also mentioned that family could consider private pay home care services that would include night hours, but would probably not be able to provide this care in patient home. SW also mentioned option of selling patient's home and use the money for care and services. Idris states there is second mortgage on the property and he doesn't know what is the equity right now, if any.   Family wanted to know if patient can return to her home. SW was asked to be delicate with an answer as patient doesn't know about the investigation. Further more, patient engaged in conversation and was quite upset why her \"other boys can't come to visit\" her, and she wants to go home.   SW explained that can't really " answer this question. We need directions from investigating department and county. Family understood that SW was talking about Police and Adult Protection.    Current plan is: Idris and Liberty will try to find out about patient finances and review medicare.gov placement list. Liberty asked to make referral to [facility] and [facility] to start. They will be coming to visit patient over the weekend and will connect with . Ramona Pathak, [facility]

## 2025-06-06 NOTE — PROGRESS NOTES
[facility]    Medicine Progress Note - Hospitalist Service    Date of Admission:  6/4/2025    Assessment & Plan                Katya Butler is a 84 year old female with colon cancer with metastases to the lung, recent volvulus 4/2025, has been on Hospice but discharged from Hospice on 6/1 due to concerns about drug diversion by her family, COPD, CKD, chronic left ureteral stent, PAD, hypertension who presented for abdominal pain over the prior 3 days and found to have a moderate to high-grade small bowel obstruction on CT. She is still wanting hospice care at discharge and would like inpatient placement. Challenging placement due to the medicolegal issues involved with outpatient opioids for her currently. Hospital Day: 3      History of recent opioid Rx fills:  4/11 (4 day supply) lasted 3 days  4/14 (8 day supply) lasted 3 days  4/17 (8 day supply) lasted 1 day  4/18 (15 day supply) lasted 11 days  4/29 (6 day supply) lasted 7 days  5/5 (15 day supply) lasted 15 days  5/20 (15 day supply) lasted 11 days  6/1 - care terminated by [facility]      Malignant bowel obstruction  -3 days of worsening abdominal pain on top of chronic cancer related pain. Presumably malignant obstruction  -Conservative treatment for now. No NG tube planned  -Zofran, compazine, and lorazepam as needed for nausea  -Pain management with oral and IV Dilaudid as needed. She has not been needing much pain meds.  She utilized 3 doses of pain meds all day yesterday, similar to the prior day.  -Surgery team signing off. patient would not want open surgery  - Has been passing small hard bowel movements, trial enema today  -Avoid oral laxatives until she is stooling adequately  -Okay for regular diet per surgical team     Colon cancer with metastases to the lung  Hospice care patient  -Recently discharged from outpatient hospice 6/1, [facility]. As listed above, on 5 of the last 7 opioid fills, refill was requested  earlier than expected, leading to concerns about possible drug diversion by caregiver which are being investigated by [place] police.  [facility] was in process of getting an inpatient hospice bed for her, discharging to home is not felt to be a safe plan at this time.  -Case management consulted  -Lorazepam as needed for anxiety, nausea/vomiting  -has port but likely not usable per nursing    Dehydration  -Has only been able to have some fluids in applesauce over the past 3 days  -got some IVF, now off     CKD 3  -Cr at her recently baseline  - No further monitoring planned    Normocytic anemia  - Suspect probably due to her colon cancer  - Hemoglobin overall stable without signs of active bleeding  - No further monitoring indicated    Possible UTI  - UA with blood, LE, some squames  - Not currently on antibiotics  - Urine sent for culture, NGTD          Diet: Full Liquid Diet    DVT Prophylaxis: High risk. Pharmacologic prophylaxis contraindicated due to comfort focused care   Lima Catheter: Not present  Lines: PRESENT             Cardiac Monitoring: None  Code Status: No CPR- Do NOT Intubate. No valid Advance Directive. [employer] department noted son Paulo has submitted the same (non-notarized and therefore invalid) Advance Directive 3 times, with different witness signatures each time.      Clinically Significant Risk Factors          # Hyperchloremia: Highest Cl = 109 mmol/L in last 2 days, will monitor as appropriate          # Hypoalbuminemia: Lowest albumin = 3.3 g/dL at 6/4/2025 11:09 AM, will monitor as appropriate     # Hypertension: Noted on problem list                # Financial/Environmental Concerns:           Disposition Plan     Medically Ready for Discharge: Ready Now         Discharge barrier(s): placement  Care discussed with: patient      Kellie Miller MD  Hospitalist Service  [facility]  Securely message with [employer] (more info)  Text page via [employer]  [employer]iliano[employer]/y   ______________________________________________________________________      Physical Exam   Vital Signs: Temp: 98.3  F (36.8  C) Temp src: Oral BP: (!) 143/68 Pulse: 78   Resp: 16 SpO2: 96 % O2 Device: None (Room air)    Weight: 0 lbs 0 oz    General: in no apparent distress, non-toxic, and alert female sitting on edge of bed oriented x3  HEENT: Head normocephalic atraumatic, oral mucosa moist. Sclerae anicteric  Skin: greyish in color  Extremities: No peripheral edema  Psych: Normal affect, mood euthymic  Neuro: Grossly normal      Medical Decision Making               Data   No results found for this or any previous visit (from the past 16 hours).      Interval History     Patient complains feels constipated, strong urge to have bowel movement, but not much coming out. We will try mineral oil enema. Discharge planning in progress. Pain otherwise well controlled.

## 2025-06-07 LAB
BACTERIA UR CULT: ABNORMAL
BACTERIA UR CULT: ABNORMAL

## 2025-06-07 PROCEDURE — 120N000001 HC R&B MED SURG/OB

## 2025-06-07 PROCEDURE — 99232 SBSQ HOSP IP/OBS MODERATE 35: CPT | Performed by: HOSPITALIST

## 2025-06-07 PROCEDURE — 250N000013 HC RX MED GY IP 250 OP 250 PS 637: Performed by: HOSPITALIST

## 2025-06-07 RX ORDER — AMOXICILLIN 250 MG
1 CAPSULE ORAL 2 TIMES DAILY
Status: DISCONTINUED | OUTPATIENT
Start: 2025-06-07 | End: 2025-06-11 | Stop reason: HOSPADM

## 2025-06-07 RX ORDER — SIMETHICONE 80 MG
80 TABLET,CHEWABLE ORAL EVERY 6 HOURS PRN
Status: DISCONTINUED | OUTPATIENT
Start: 2025-06-07 | End: 2025-06-11 | Stop reason: HOSPADM

## 2025-06-07 RX ADMIN — SENNOSIDES AND DOCUSATE SODIUM 1 TABLET: 50; 8.6 TABLET ORAL at 20:37

## 2025-06-07 RX ADMIN — SIMETHICONE 80 MG: 80 TABLET, CHEWABLE ORAL at 13:25

## 2025-06-07 ASSESSMENT — ACTIVITIES OF DAILY LIVING (ADL)
ADLS_ACUITY_SCORE: 51

## 2025-06-07 NOTE — PROGRESS NOTES
Care Management Follow Up    Length of Stay (days): 3    Expected Discharge Date: 06/07/2025     Concerns to be Addressed: discharge planning, financial/insurance, other (see comments) (hospice and placement needs)     Patient plan of care discussed at interdisciplinary rounds: No    Anticipated Discharge Disposition: Skilled Nursing Facility, Hospice      Anticipated Discharge Services: None  Anticipated Discharge DME:  (unknown)    Patient/family educated on Medicare website which has current facility and service quality ratings:  yes  Education Provided on the Discharge Plan:  yes  Patient/Family in Agreement with the Plan:  yes    Referrals Placed by CM/SW:  TCU referrals  Private pay costs discussed: transportation costs    Discussed  Partnership in Safe Discharge Planning  document with patient/family: No     Handoff Completed: Yes, [facility] PCP: Internal handoff referral completed    Additional Information:  Per yesterday conversation with son Idris and his spouse, SW faxed Initial SNF referral to [facility] after calling admissions to find out if they have female beds available. Admission available today till 3PM. Will review referral and let us know. Awaiting call back.    Next Steps:  CM will continue to monitor progression of care, review team recommendations and provide discharge planning assist as needed. Collaborate with family and involved parties.    Ramona Pathak, LSW

## 2025-06-07 NOTE — PROGRESS NOTES
[facility]    Medicine Progress Note - Hospitalist Service    Date of Admission:  6/4/2025    Assessment & Plan                Katya Butler is a 84 year old female with colon cancer with metastases to the lung, recent volvulus 4/2025, has been on Hospice but discharged from Hospice on 6/1 due to concerns about drug diversion by her family, COPD, CKD, chronic left ureteral stent, PAD, hypertension who presented for abdominal pain over the prior 3 days and found to have a moderate to high-grade small bowel obstruction on CT. She is still wanting hospice care at discharge and would like inpatient placement. Challenging placement due to the medicolegal issues involved with outpatient opioids for her currently. Hospital Day: 4      History of recent opioid Rx fills:  4/11 (4 day supply) lasted 3 days  4/14 (8 day supply) lasted 3 days  4/17 (8 day supply) lasted 1 day  4/18 (15 day supply) lasted 11 days  4/29 (6 day supply) lasted 7 days  5/5 (15 day supply) lasted 15 days  5/20 (15 day supply) lasted 11 days  6/1 - [facility]      #Malignant partial bowel obstruction vs constipation  -3 days of worsening abdominal pain on top of chronic cancer related pain. CT showing moderate to high grade SBO, presumably malignant.   -improved with conservative mgmt and mineral oil enema. Possibly was just constipated  -Zofran, compazine, and lorazepam as needed for nausea  -Pain management with oral and IV Dilaudid as needed. She has not been needing much pain meds.  She utilized 1 dose of pain meds all day yesterday.  -Surgery team signed off. patient would not want open surgery  - Senna BID to maintain stool output  -Advance diet to mechanical soft today     #Colon cancer with metastases to the lung  #Hospice care patient  -Recently discharged from outpatient hospice 6/1, [facility]. As listed above, on 5 of the last 7 opioid fills, refill was requested earlier than expected, leading  to concerns about possible drug diversion by caregiver which are being investigated by [place] police.  She is needing significantly less opioids here than is implied by her fill history. [facility] was in process of getting an inpatient hospice bed for her, discharging to home is not felt to be a safe plan at this time.  -Case management consulted  -Lorazepam as needed for anxiety, nausea/vomiting  -has port but likely not usable per nursing    #Dehydration  -got IVF, now taking fair amount of PO     #CKD 3  -Cr at her recently baseline  - No further monitoring planned    #Normocytic anemia  - Suspect probably due to her colon cancer  - Hemoglobin overall stable without signs of active bleeding  - No further monitoring indicated    #UTI ruled out  - UA with blood, LE, some squames  - Urine culture NGTD          Diet: Combination Diet Easy to Chew (level 7); Thin Liquids (level 0)    DVT Prophylaxis: High risk. Pharmacologic prophylaxis contraindicated due to comfort focused care   Lima Catheter: Not present  Lines: PRESENT      Port A Cath Single Left Chest wall-Site Assessment: Other (Comment) (not accessed no longer maintained by patient.)      Cardiac Monitoring: None  Code Status: No CPR- Do NOT Intubate. No valid Advance Directive. [employer] department noted son Paulo has submitted the same (non-notarized and therefore invalid) Advance Directive 3 times, with different witness signatures each time.      Clinically Significant Risk Factors               # Hypoalbuminemia: Lowest albumin = 3.3 g/dL at 6/4/2025 11:09 AM, will monitor as appropriate     # Hypertension: Noted on problem list                # Financial/Environmental Concerns:           Disposition Plan     Medically Ready for Discharge: Ready Now         Discharge barrier(s): placement  Care discussed with: patient, RN      Kellie Miller MD  Hospitalist Service  [facility]  Securely message with [employer]  info)  Text page via [facility] Paging/Directory   ______________________________________________________________________      Physical Exam   Vital Signs: Temp: 98  F (36.7  C) Temp src: Oral BP: 122/58 Pulse: 79   Resp: 16 SpO2: 97 % O2 Device: None (Room air)    Weight: 0 lbs 0 oz    General: in no apparent distress, non-toxic, and alert female sitting on edge of bed oriented x3  HEENT: Head normocephalic atraumatic, oral mucosa moist. Sclerae anicteric  Skin: greyish in color  Extremities: No peripheral edema  Psych: Normal affect, mood euthymic  Neuro: Grossly normal      Medical Decision Making               Data   No results found for this or any previous visit (from the past 16 hours).      Interval History     Patient is doing well today. She had multiple bowel movements after enema yesterday and is now feeling much better. Tolerating liquid diet, will advance her to mechanical soft. Psychosocial issues being sorted out. Medically she is ready for discharge but placement plan has not been determined.

## 2025-06-07 NOTE — PLAN OF CARE
Problem: Adult Inpatient Plan of Care  Goal: Plan of Care Review  Description: The Plan of Care Review/Shift note should be completed every shift.  The Outcome Evaluation is a brief statement about your assessment that the patient is improving, declining, or no change.  This information will be displayed automatically on your shift  note.  Outcome: Progressing  Flowsheets (Taken 6/7/2025 1240)  Plan of Care Reviewed With: patient   Goal Outcome Evaluation:      Plan of Care Reviewed With: patient      Patient reporting some gas discomfort.  Freely passing stool.  Encouraged patient to walk.  Patient denied pain throughout the shift.      Family supportive at bedside.  Safe plan for discharge being worked on with social work, family and patient.

## 2025-06-07 NOTE — PLAN OF CARE
Problem: Intestinal Obstruction  Goal: Optimal Pain Control and Function  Outcome: Progressing     Problem: Intestinal Obstruction  Goal: Optimize Nutrition Status  Outcome: Progressing     Problem: Intestinal Obstruction  Goal: Absence of Infection Signs and Symptoms  Outcome: Progressing   Goal Outcome Evaluation:      Plan of Care Reviewed With: patient    Overall Patient Progress: no changeOverall Patient Progress: no change     Pt A and Ox4. Assist of 1 with walker. VSS on RA. PRN dilaudid given for 6/10 pain in abdomen. Proves effective per pt. Will continue to monitor.

## 2025-06-08 ENCOUNTER — APPOINTMENT (OUTPATIENT)
Dept: OCCUPATIONAL THERAPY | Facility: HOSPITAL | Age: 85
End: 2025-06-08
Attending: HOSPITALIST
Payer: COMMERCIAL

## 2025-06-08 PROCEDURE — 120N000001 HC R&B MED SURG/OB

## 2025-06-08 PROCEDURE — 97165 OT EVAL LOW COMPLEX 30 MIN: CPT | Mod: GO

## 2025-06-08 PROCEDURE — 250N000013 HC RX MED GY IP 250 OP 250 PS 637: Performed by: HOSPITALIST

## 2025-06-08 PROCEDURE — 99232 SBSQ HOSP IP/OBS MODERATE 35: CPT | Performed by: HOSPITALIST

## 2025-06-08 PROCEDURE — 97530 THERAPEUTIC ACTIVITIES: CPT | Mod: GO

## 2025-06-08 PROCEDURE — 250N000013 HC RX MED GY IP 250 OP 250 PS 637: Performed by: INTERNAL MEDICINE

## 2025-06-08 RX ORDER — MECLIZINE HCL 12.5 MG 12.5 MG/1
12.5 TABLET ORAL 3 TIMES DAILY PRN
Status: DISCONTINUED | OUTPATIENT
Start: 2025-06-08 | End: 2025-06-11 | Stop reason: HOSPADM

## 2025-06-08 RX ADMIN — SENNOSIDES AND DOCUSATE SODIUM 1 TABLET: 50; 8.6 TABLET ORAL at 09:47

## 2025-06-08 RX ADMIN — SIMETHICONE 80 MG: 80 TABLET, CHEWABLE ORAL at 09:46

## 2025-06-08 RX ADMIN — HYDROMORPHONE HYDROCHLORIDE 4 MG: 4 TABLET ORAL at 22:15

## 2025-06-08 RX ADMIN — HYDROMORPHONE HYDROCHLORIDE 4 MG: 4 TABLET ORAL at 00:37

## 2025-06-08 RX ADMIN — Medication 3 MG: at 17:19

## 2025-06-08 RX ADMIN — SENNOSIDES AND DOCUSATE SODIUM 1 TABLET: 50; 8.6 TABLET ORAL at 20:12

## 2025-06-08 ASSESSMENT — ACTIVITIES OF DAILY LIVING (ADL)
ADLS_ACUITY_SCORE: 54
ADLS_ACUITY_SCORE: 51
ADLS_ACUITY_SCORE: 54
ADLS_ACUITY_SCORE: 51
ADLS_ACUITY_SCORE: 54
ADLS_ACUITY_SCORE: 51
ADLS_ACUITY_SCORE: 54
ADLS_ACUITY_SCORE: 51
ADLS_ACUITY_SCORE: 54
ADLS_ACUITY_SCORE: 51
ADLS_ACUITY_SCORE: 54
ADLS_ACUITY_SCORE: 51
ADLS_ACUITY_SCORE: 54

## 2025-06-08 NOTE — PLAN OF CARE
Problem: Adult Inpatient Plan of Care  Goal: Optimal Comfort and Wellbeing  Outcome: Progressing  Intervention: Provide Person-Centered Care  Recent Flowsheet Documentation  Taken 6/7/2025 1600 by Isis Cordoba, RN  Trust Relationship/Rapport:   care explained   choices provided   Goal Outcome Evaluation:Patient is alert and oriented. Made comfortable in bed. Reassurance given.
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
## 2025-06-08 NOTE — PLAN OF CARE
Goal Outcome Evaluation:      Patient is A/O, VSS, complained of abd pain and PRN pain meds given, ambulated to the Bathroom twice, uneventful shift.                      Problem: Adult Inpatient Plan of Care  Goal: Absence of Hospital-Acquired Illness or Injury  Intervention: Identify and Manage Fall Risk  Recent Flowsheet Documentation  Taken 6/8/2025 0037 by Yanick Partida RN  Safety Promotion/Fall Prevention:   activity supervised   assistive device/personal items within reach   clutter free environment maintained   lighting adjusted   nonskid shoes/slippers when out of bed   patient and family education   room door open   room near nurse's station   room organization consistent   safety round/check completed   supervised activity  Intervention: Prevent Infection  Recent Flowsheet Documentation  Taken 6/8/2025 0037 by Yanick Partida RN  Infection Prevention:   single patient room provided   rest/sleep promoted  Goal: Optimal Comfort and Wellbeing  Intervention: Provide Person-Centered Care  Recent Flowsheet Documentation  Taken 6/8/2025 0037 by Yanick Partida RN  Trust Relationship/Rapport:   care explained   choices provided

## 2025-06-08 NOTE — PLAN OF CARE
Occupational Therapy Discharge Summary    Reason for therapy discharge:    All goals and outcomes met, no further needs identified.  No further expectations of functional progress.    Progress towards therapy goal(s). See goals on Care Plan in [facility] electronic health record for goal details.  Goals met    Therapy recommendation(s):    No further therapy is recommended.    Romelia Thurman, OTR/L 6/8/25
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
## 2025-06-08 NOTE — PLAN OF CARE
Problem: Adult Inpatient Plan of Care  Goal: Plan of Care Review  Description: The Plan of Care Review/Shift note should be completed every shift.  The Outcome Evaluation is a brief statement about your assessment that the patient is improving, declining, or no change.  This information will be displayed automatically on your shift  note.  Outcome: Progressing   Goal Outcome Evaluation:     Patient stable, denied pain.  Family at bedside supportive.    Waiting placement.

## 2025-06-09 ENCOUNTER — APPOINTMENT (OUTPATIENT)
Dept: PHYSICAL THERAPY | Facility: HOSPITAL | Age: 85
End: 2025-06-09
Attending: HOSPITALIST
Payer: COMMERCIAL

## 2025-06-09 ENCOUNTER — DOCUMENTATION ONLY (OUTPATIENT)
Dept: OTHER | Facility: CLINIC | Age: 85
End: 2025-06-09
Payer: COMMERCIAL

## 2025-06-09 PROCEDURE — 250N000011 HC RX IP 250 OP 636: Performed by: INTERNAL MEDICINE

## 2025-06-09 PROCEDURE — 250N000013 HC RX MED GY IP 250 OP 250 PS 637: Performed by: HOSPITALIST

## 2025-06-09 PROCEDURE — 97161 PT EVAL LOW COMPLEX 20 MIN: CPT | Mod: GP

## 2025-06-09 PROCEDURE — 99232 SBSQ HOSP IP/OBS MODERATE 35: CPT | Performed by: HOSPITALIST

## 2025-06-09 PROCEDURE — 97116 GAIT TRAINING THERAPY: CPT | Mod: GP

## 2025-06-09 PROCEDURE — 120N000001 HC R&B MED SURG/OB

## 2025-06-09 PROCEDURE — 250N000013 HC RX MED GY IP 250 OP 250 PS 637: Performed by: INTERNAL MEDICINE

## 2025-06-09 RX ADMIN — HYDROMORPHONE HYDROCHLORIDE 4 MG: 4 TABLET ORAL at 14:25

## 2025-06-09 RX ADMIN — SENNOSIDES AND DOCUSATE SODIUM 1 TABLET: 50; 8.6 TABLET ORAL at 20:34

## 2025-06-09 RX ADMIN — HYDROMORPHONE HYDROCHLORIDE 0.2 MG: 0.2 INJECTION, SOLUTION INTRAMUSCULAR; INTRAVENOUS; SUBCUTANEOUS at 12:29

## 2025-06-09 RX ADMIN — SIMETHICONE 80 MG: 80 TABLET, CHEWABLE ORAL at 14:24

## 2025-06-09 RX ADMIN — SENNOSIDES AND DOCUSATE SODIUM 1 TABLET: 50; 8.6 TABLET ORAL at 09:20

## 2025-06-09 RX ADMIN — Medication 3 MG: at 17:29

## 2025-06-09 RX ADMIN — HYDROMORPHONE HYDROCHLORIDE 4 MG: 4 TABLET ORAL at 20:34

## 2025-06-09 ASSESSMENT — ACTIVITIES OF DAILY LIVING (ADL)
ADLS_ACUITY_SCORE: 54
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
## 2025-06-09 NOTE — PLAN OF CARE
Problem: Adult Inpatient Plan of Care  Goal: Optimal Comfort and Wellbeing  Outcome: Progressing  Intervention: Provide Person-Centered Care  Recent Flowsheet Documentation  Taken 6/8/2025 1600 by Isis Cordoba, RN  Trust Relationship/Rapport:   care explained   choices provided   emotional support provided   Goal Outcome Evaluation:Patient is alert and oriented. Made comfortable throughout the shift. Reassurance given to allay anxiety.

## 2025-06-09 NOTE — PROGRESS NOTES
"   06/09/25 0900   Appointment Info   Signing Clinician's Name / Credentials (PT) Shilpa Sutton, PT, DPT   Living Environment   People in Home child(candi), adult  (Son)   Current Living Arrangements house  ([facility])   Living Environment Comments Patient is unable to return to her [facility].   Self-Care   Usual Activity Tolerance good   Current Activity Tolerance good   Equipment Currently Used at Home none   Fall history within last six months no   Activity/Exercise/Self-Care Comment Patient is IND with functional mobility at baseline.   General Information   Onset of Illness/Injury or Date of Surgery 06/04/25   Referring Physician Kellie Miller MD   Patient/Family Therapy Goals Statement (PT) Return home   Pertinent History of Current Problem (include personal factors and/or comorbidities that impact the POC) Per chart, \"Katya Butelr is a 84 year old female with colon cancer with metastases to the lung, recent volvulus 4/2025, has been on Hospice but discharged from Hospice on 6/1 due to concerns about drug diversion by her family, COPD, CKD, chronic left ureteral stent, PAD, hypertension who presented for abdominal pain over the prior 3 days and found to have a moderate to high-grade small bowel obstruction on CT. She is still wanting hospice care at discharge and would like inpatient placement. Challenging placement due to the medicolegal issues involved with outpatient opioids for her currently.\"   Existing Precautions/Restrictions no known precautions/restrictions   Range of Motion (ROM)   Range of Motion ROM is WNL   Strength (Manual Muscle Testing)   Strength (Manual Muscle Testing) strength is WNL   Bed Mobility   Bed Mobility no deficits identified   Transfers   Transfers no deficits identified   Gait/Stairs (Locomotion)   [place] Level (Gait) contact guard   Assistive Device (Gait) other (see comments)  (None)   Distance in Feet (Gait) 50'   Pattern (Gait) step-through   Deviations/Abnormal " Patterns (Gait) base of support, narrow   Clinical Impression   Criteria for Skilled Therapeutic Intervention Yes, treatment indicated   PT Diagnosis (PT) Impaired gait   Influenced by the following impairments Generalized weakness, impaired balance   Functional limitations due to impairments Gait, stairs   Clinical Presentation (PT Evaluation Complexity) stable   Clinical Presentation Rationale Patient presents as medically diagnosed.   Clinical Decision Making (Complexity) low complexity   Planned Therapy Interventions (PT) balance training;gait training;patient/family education;stair training   Risk & Benefits of therapy have been explained evaluation/treatment results reviewed;care plan/treatment goals reviewed;patient   PT Total Evaluation Time   PT Eval, Low Complexity Minutes (23975) 10   Physical Therapy Goals   PT Frequency One time eval and treatment only   PT Predicted Duration/Target Date for Goal Attainment 06/09/25   PT Goals Gait;Stairs   PT: Gait Supervision/stand-by assist;Greater than 200 feet;Goal Met   PT: Stairs Supervision/stand-by assist;3 stairs;Rail on left;Goal Met   Interventions   Interventions Quick Adds Gait Training   Gait Training   Gait Training Minutes (72938) 12   Symptoms Noted During/After Treatment (Gait Training) none   Treatment Detail/Skilled Intervention Patient ambulated in hallway without AD: SBA without any LOB. Patient ambulated a significant distance without any fatigue. Patient agreed to use FWW when ambulating with family or nursing staff as needed.   Distance in Feet 500'   [place] Level (Gait Training) stand-by assist   Physical Assistance Level (Gait Training) supervision   Weight Bearing (Gait Training) full weight-bearing   Assistive Device (Gait Training) other (see comments)  (None)   Stair Railings present on left side   Physical Assist/Nonphysical Assist (Stairs) supervision   Level of [place] (Stairs) stand-by assist   PT Discharge Planning   PT  Plan Discharge PT. All goals met.   PT Discharge Recommendation (DC Rec) other (see comments)  (NIKKI, LTC, or respite care)   PT Rationale for DC Rec Patient has no functional mobility deficits. Per OT, patient will require supervision due to impaired cognition.   PT Brief overview of current status Patient is able to ambulate and navigate stairs safely with SBA. No further skilled PT needs identified.   PT Total Distance Amb During Session (feet) 550   Physical Therapy Time and Intention   Timed Code Treatment Minutes 12   Total Session Time (sum of timed and untimed services) 22     Physical Therapy Discharge Summary    Reason for therapy discharge:    All goals and outcomes met, no further needs identified.    Progress towards therapy goal(s). See goals on Care Plan in [facility] electronic health record for goal details.  Goals met.    Therapy recommendation(s):    No further therapy is recommended. Encourage ambulation 3x/day with supervision to prevent deconditioning.

## 2025-06-09 NOTE — PLAN OF CARE
Problem: Adult Inpatient Plan of Care  Goal: Absence of Hospital-Acquired Illness or Injury  Outcome: Progressing  Intervention: Identify and Manage Fall Risk  Recent Flowsheet Documentation  Taken 6/9/2025 0240 by Hiren Dudley RN  Safety Promotion/Fall Prevention: safety round/check completed  Taken 6/8/2025 2345 by Hiren Dudley RN  Safety Promotion/Fall Prevention: safety round/check completed  Intervention: Prevent Skin Injury  Recent Flowsheet Documentation  Taken 6/9/2025 0240 by Hiren Dudley RN  Body Position: position changed independently  Taken 6/8/2025 2345 by Hiren Dudley RN  Body Position: position changed independently  Goal: Optimal Comfort and Wellbeing  Outcome: Progressing  Intervention: Monitor Pain and Promote Comfort  Recent Flowsheet Documentation  Taken 6/8/2025 2345 by Hiren Dudley RN  Pain Management Interventions:   rest   repositioned   relaxation techniques promoted     Problem: Delirium  Goal: Optimal Coping  Outcome: Progressing  Goal: Improved Behavioral Control  Outcome: Progressing  Goal: Improved Attention and Thought Clarity  Outcome: Progressing  Goal: Improved Sleep  Outcome: Progressing     Problem: Pain Acute  Goal: Optimal Pain Control and Function  Outcome: Progressing  Intervention: Develop Pain Management Plan  Recent Flowsheet Documentation  Taken 6/8/2025 2345 by Hiren Dudley RN  Pain Management Interventions:   rest   repositioned   relaxation techniques promoted     Problem: Nausea and Vomiting  Goal: Nausea and Vomiting Relief  Outcome: Progressing     Problem: Intestinal Obstruction  Goal: Optimal Bowel Function  Outcome: Progressing  Intervention: Promote Bowel Function  Recent Flowsheet Documentation  Taken 6/9/2025 0240 by Hiren Dudley RN  Body Position: position changed independently  Head of Bed (HOB) Positioning: HOB at 20-30 degrees  Taken 6/8/2025 2345 by Hiren Dudley RN  Body Position: position changed  independently  Head of Bed (HOB) Positioning: HOB at 20-30 degrees  Goal: Fluid and Electrolyte Balance  Outcome: Progressing  Goal: Absence of Infection Signs and Symptoms  Outcome: Progressing  Goal: Optimal Pain Control and Function  Outcome: Progressing  Intervention: Prevent or Manage Pain  Recent Flowsheet Documentation  Taken 6/8/2025 0972 by Hiren Dudley RN  Pain Management Interventions:   rest   repositioned   relaxation techniques promoted   Goal Outcome Evaluation: pt is alert and oriented. C/o pain to abd nut managed pain with repositioning after receiving prn before bed. Independent in room.       Plan of Care Reviewed With: patient    Overall Patient Progress: improvingOverall Patient Progress: improving    Outcome Evaluation: pt pain managed with repositioning and prn.

## 2025-06-10 PROCEDURE — 120N000001 HC R&B MED SURG/OB

## 2025-06-10 PROCEDURE — 250N000013 HC RX MED GY IP 250 OP 250 PS 637: Performed by: INTERNAL MEDICINE

## 2025-06-10 PROCEDURE — 99231 SBSQ HOSP IP/OBS SF/LOW 25: CPT | Performed by: INTERNAL MEDICINE

## 2025-06-10 PROCEDURE — 250N000013 HC RX MED GY IP 250 OP 250 PS 637: Performed by: HOSPITALIST

## 2025-06-10 RX ORDER — HYDROMORPHONE HCL IN WATER/PF 6 MG/30 ML
0.2 PATIENT CONTROLLED ANALGESIA SYRINGE INTRAVENOUS EVERY 4 HOURS PRN
Status: DISCONTINUED | OUTPATIENT
Start: 2025-06-10 | End: 2025-06-10

## 2025-06-10 RX ORDER — NALOXONE HYDROCHLORIDE 0.4 MG/ML
0.2 INJECTION, SOLUTION INTRAMUSCULAR; INTRAVENOUS; SUBCUTANEOUS
Status: DISCONTINUED | OUTPATIENT
Start: 2025-06-10 | End: 2025-06-11 | Stop reason: HOSPADM

## 2025-06-10 RX ORDER — HYDROMORPHONE HYDROCHLORIDE 2 MG/1
2 TABLET ORAL EVERY 6 HOURS PRN
Refills: 0 | Status: DISCONTINUED | OUTPATIENT
Start: 2025-06-10 | End: 2025-06-11 | Stop reason: HOSPADM

## 2025-06-10 RX ORDER — NALOXONE HYDROCHLORIDE 0.4 MG/ML
0.4 INJECTION, SOLUTION INTRAMUSCULAR; INTRAVENOUS; SUBCUTANEOUS
Status: DISCONTINUED | OUTPATIENT
Start: 2025-06-10 | End: 2025-06-11 | Stop reason: HOSPADM

## 2025-06-10 RX ORDER — ACETAMINOPHEN 325 MG/1
650 TABLET ORAL EVERY 6 HOURS PRN
Status: DISCONTINUED | OUTPATIENT
Start: 2025-06-10 | End: 2025-06-11 | Stop reason: HOSPADM

## 2025-06-10 RX ORDER — ACETAMINOPHEN 500 MG
500 TABLET ORAL 3 TIMES DAILY
Status: DISCONTINUED | OUTPATIENT
Start: 2025-06-10 | End: 2025-06-11 | Stop reason: HOSPADM

## 2025-06-10 RX ORDER — HYDROMORPHONE HYDROCHLORIDE 4 MG/1
4 TABLET ORAL EVERY 6 HOURS PRN
Refills: 0 | Status: DISCONTINUED | OUTPATIENT
Start: 2025-06-10 | End: 2025-06-11 | Stop reason: HOSPADM

## 2025-06-10 RX ORDER — POLYETHYLENE GLYCOL 3350 17 G/17G
17 POWDER, FOR SOLUTION ORAL 2 TIMES DAILY PRN
Status: DISCONTINUED | OUTPATIENT
Start: 2025-06-10 | End: 2025-06-11 | Stop reason: HOSPADM

## 2025-06-10 RX ADMIN — HYDROMORPHONE HYDROCHLORIDE 4 MG: 4 TABLET ORAL at 04:07

## 2025-06-10 RX ADMIN — HYDROMORPHONE HYDROCHLORIDE 4 MG: 4 TABLET ORAL at 14:34

## 2025-06-10 RX ADMIN — Medication 3 MG: at 17:03

## 2025-06-10 RX ADMIN — ACETAMINOPHEN 500 MG: 500 TABLET ORAL at 20:28

## 2025-06-10 RX ADMIN — HYDROMORPHONE HYDROCHLORIDE 2 MG: 2 TABLET ORAL at 20:27

## 2025-06-10 ASSESSMENT — ACTIVITIES OF DAILY LIVING (ADL)
ADLS_ACUITY_SCORE: 54

## 2025-06-10 NOTE — PLAN OF CARE
Problem: Pain Acute  Goal: Optimal Pain Control and Function  Outcome: Progressing  Intervention: Develop Pain Management Plan  Recent Flowsheet Documentation  Taken 6/10/2025 0357 by Lauren Neal RN  Pain Management Interventions: medication (see MAR)  Intervention: Prevent or Manage Pain  Recent Flowsheet Documentation  Taken 6/9/2025 6800 by Lauren Neal RN  Sensory Stimulation Regulation:   care clustered   lighting decreased  Medication Review/Management: medications reviewed     Problem: Intestinal Obstruction  Goal: Optimize Nutrition Status  Outcome: Progressing         Goal Outcome Evaluation:    Patient is aox3. Disoriented to time. VSS. On room air. Patient c/o 8/10 abdominal and back pain. Pain managed with prn oral Dilaudid x1. Patient is SBA to bathroom. Has a walker. Refused bed alarm. Had stool x1. Call-light within reach.

## 2025-06-10 NOTE — PROGRESS NOTES
"Care Management Follow Up    Length of Stay (days): 6    Expected Discharge Date: 06/11/2025    Anticipated Discharge Plan:  Skilled Nursing Facility, Hospice    Transportation: TBD    PT Recommendations: other (see comments) (NIKKI, LTC, or respite care)  OT Recommendations:  Long term care facility     Barriers to Discharge: placement    Prior Living Situation: town home, other (see comments) (patient unable to verify her address on the facesheet, but likely was still living at this 1 level [facility] [address] [facility] with child(candi), adult (I am told by Leanne, \"son Poncho was living with her to care for her\".)    Discussed  Partnership in Safe Discharge Planning  document with patient/family: No     Handoff Completed: Yes, [facility] PCP: Internal handoff referral completed    Patient/Spokesperson Updated: Yes. Who? Pt's son Idris    Additional Information:  CM met with Idris and he provided CM with additional LTC choices. CM sent referrals.    Next Steps: placement, CHENCHO Duarte, JULIAN      "

## 2025-06-10 NOTE — PLAN OF CARE
Problem: Pain Acute  Goal: Optimal Pain Control and Function  Outcome: Progressing  Intervention: Optimize Psychosocial Wellbeing  Recent Flowsheet Documentation  Taken 6/10/2025 1613 by Jose Juan Moreno RN  Diversional Activities: television  Intervention: Develop Pain Management Plan  Recent Flowsheet Documentation  Taken 6/10/2025 1605 by Jose Juan Moreno RN  Pain Management Interventions: declines  Intervention: Prevent or Manage Pain  Recent Flowsheet Documentation  Taken 6/10/2025 1613 by Jose Juan Moreno RN  Medication Review/Management: medications reviewed     Problem: Nausea and Vomiting  Goal: Nausea and Vomiting Relief  Outcome: Progressing     Problem: Intestinal Obstruction  Goal: Absence of Infection Signs and Symptoms  Outcome: Progressing     Goal Outcome Evaluation:  Pt reporting 3/10 back pain, states this is tolerable for her. Denies N/V. Passing gas and had BM earlier today. Up independently in room and calls appropriately. Friend supportive at bedside. Discharge pending placement.    Jose Juan Moreno RN  [phone]

## 2025-06-10 NOTE — PROGRESS NOTES
[facility]    Medicine Progress Note - Hospitalist Service    Date of Admission:  6/4/2025    Assessment & Plan   Katya Butler is a 84 year old female with colon cancer with metastases to the lung, recent volvulus 4/2025, has been on Hospice but discharged from Hospice on 6/1 due to concerns about drug diversion by her family, COPD, CKD, chronic left ureteral stent, PAD, hypertension who presented for abdominal pain over the prior 3 days and found to have a moderate to high-grade small bowel obstruction on CT.   Small bowel obstruction improved with conservative management.  Patient is still wanting hospice care at discharge and would like inpatient placement.   Hospital Day: 7    History of recent opioid Rx fills:  4/11 (4 day supply) lasted 3 days  4/14 (8 day supply) lasted 3 days  4/17 (8 day supply) lasted 1 day  4/18 (15 day supply) lasted 11 days  4/29 (6 day supply) lasted 7 days  5/5 (15 day supply) lasted 15 days  5/20 (15 day supply) lasted 11 days  6/1 - care terminated by [facility]      #Malignant partial bowel obstruction vs constipation  -3 days of worsening abdominal pain on top of chronic cancer related pain. CT showing moderate to high grade SBO, presumably malignant.   -improved with conservative mgmt and mineral oil enema. Possibly was just constipated    -Surgery team signed off. Patient would not want open surgery  - Senna BID to maintain stool output  - Tolerating mechanical soft diet for last 2 days, patient requesting for regular diet.     #Colon cancer with metastases to the lung  #Hospice care patient  -Recently discharged from outpatient hospice 6/1, [facility]. As listed above, on 5 of the last 7 opioid fills, refill was requested earlier than expected, leading to concerns about possible drug diversion by caregiver which are being investigated by "[employer] police.  She is needing significantly less opioids here than is implied by her fill history.  Discharging to home is not felt to be a safe plan at this time.  -Case management consulted, appreciated input  -Lorazepam as needed for anxiety, nausea/vomiting  -has port but likely not usable per nursing    #Probable cancer related pain, back pain;  -Pain management with oral Dilaudid as needed. She has not been needing much pain meds.  She has been using just 1-3 doses of pain meds per day here.  Try to taper as able given bowel obstruction  - Continue lidocaine patch on her back    #History of vertigo  -meclizine PRN    #CKD 3  -Cr failure at baseline    #Normocytic anemia  - Suspect probably due to her colon cancer  - Hemoglobin overall stable without signs of active bleeding    #UTI ruled out  - UA with blood, LE, some squames  - Urine culture NGTD          Diet: Regular Diet Adult    DVT Prophylaxis: Patient on comfort care  [place] Catheter: Not present  Lines: PRESENT             Cardiac Monitoring: None  Code Status: No CPR- Do NOT Intubate      Clinically Significant Risk Factors               # Hypoalbuminemia: Lowest albumin = 3.3 g/dL at 6/4/2025 11:09 AM, will monitor as appropriate     # Hypertension: Noted on problem list                # Financial/Environmental Concerns:           Social Drivers of Health    Tobacco Use: Medium Risk (4/11/2025)    Patient History     Smoking Tobacco Use: Former     Smokeless Tobacco Use: Never          Disposition Plan     Medically Ready for Discharge: Ready Now             Franco CORTEZ MD  Hospitalist Service  [facility]  Securely message with [employer] (more info)  Text page via [facility] Paging/Directory   ______________________________________________________________________    Interval History   Patient is new to me.  Patient seen and examined.  Patient reported some lower abdominal pain and back pain and reported not new.  Denied nausea or vomiting.  Reported having bowel movements.  Denied short of breath or chest pain.  Discussed with nursing  staff.  Discussed with care manager/    Physical Exam   Vital Signs: Temp: 98.1  F (36.7  C) Temp src: Oral BP: 128/61 Pulse: 69   Resp: 16 SpO2: 98 % O2 Device: None (Room air)    Weight: 89 lbs 8 oz      General: Not in obvious distress.  Thinly built  HEENT: Normocephalic, supple neck  Chest: Clear to auscultation bilateral anteriorly, no wheezing  Heart: S1S2 normal, regular  Abdomen: Soft.  Mild distention, no significant tenderness appreciated  Neuro: alert and awake, follows simple commands, moves all extremity        Medical Decision Making             Data         Imaging results reviewed over the past 24 hrs:   No results found for this or any previous visit (from the past 24 hours).

## 2025-06-10 NOTE — PLAN OF CARE
Problem: Adult Inpatient Plan of Care  Goal: Plan of Care Review  Description: The Plan of Care Review/Shift note should be completed every shift.  The Outcome Evaluation is a brief statement about your assessment that the patient is improving, declining, or no change.  This information will be displayed automatically on your shift  note.  Outcome: Progressing  Flowsheets (Taken 6/10/2025 6561)  Plan of Care Reviewed With: patient   Goal Outcome Evaluation:      Plan of Care Reviewed With: patient      Patient reporting minimal pain throughout the shift.  Tolerating po intake well.  Family at bedside supportive.  Patient tearful at times she needs placement and frustrated by visitor restrictions.  No significant changes in presentation.  Patient waiting placement.

## 2025-06-10 NOTE — PLAN OF CARE
Problem: Adult Inpatient Plan of Care  Goal: Optimal Comfort and Wellbeing  Outcome: Progressing  Intervention: Monitor Pain and Promote Comfort  Recent Flowsheet Documentation  Taken 6/9/2025 2034 by Loretta Klein RN  Pain Management Interventions:   medication (see MAR)   emotional support   quiet environment facilitated     Problem: Delirium  Goal: Optimal Coping  Outcome: Progressing     Problem: Pain Acute  Goal: Optimal Pain Control and Function  Outcome: Progressing  Intervention: Optimize Psychosocial Wellbeing  Recent Flowsheet Documentation  Taken 6/9/2025 1800 by Loretta Klein RN  Diversional Activities: television  Intervention: Develop Pain Management Plan  Recent Flowsheet Documentation  Taken 6/9/2025 2034 by Loretta Klein RN  Pain Management Interventions:   medication (see MAR)   emotional support   quiet environment facilitated  Intervention: Prevent or Manage Pain  Recent Flowsheet Documentation  Taken 6/9/2025 1800 by Loretta Klein RN  Sensory Stimulation Regulation:   care clustered   lighting decreased   quiet environment promoted   television on  Medication Review/Management: medications reviewed     Problem: Nausea and Vomiting  Goal: Nausea and Vomiting Relief  Outcome: Progressing  Intervention: Prevent and Manage Nausea and Vomiting  Recent Flowsheet Documentation  Taken 6/9/2025 1800 by Loretta Klein RN  Fluid/Electrolyte Management: fluids provided     Problem: Intestinal Obstruction  Goal: Optimal Bowel Function  Outcome: Progressing  Intervention: Promote Bowel Function  Recent Flowsheet Documentation  Taken 6/9/2025 1800 by Loretta Klein RN  Body Position: position changed independently  Head of Bed (HOB) Positioning: HOB at 20-30 degrees  Positioning/Transfer Devices:   pillows   in use   Dilaudid given x1 with good effect for 9/10 abdominal and back pain. Denies nausea. Pt expressed frustration about diet change stating she does not struggle with  chewing/eating and has had upper dentures for multiple years. Pt did not have bm this shift. Refused to go for a walk stating she feels depressed and that her life is being taken from her such as family, diet, living situation, etc. VSS on RA, resting comfortably at this time. Continue to monitor.    Loretta Klein RN

## 2025-06-10 NOTE — SIGNIFICANT EVENT
Patient frustrated with limited diet.  Tolerating mechanical soft diet for 2 days.  Bowel function WNL stooling daily with no bowel symptoms reported.  Monitoring intake and output.  Dr. CORTEZ updated with patient request.  See new order.

## 2025-06-11 ENCOUNTER — DOCUMENTATION ONLY (OUTPATIENT)
Dept: GERIATRICS | Facility: CLINIC | Age: 85
End: 2025-06-11
Payer: COMMERCIAL

## 2025-06-11 ENCOUNTER — HOSPITAL ENCOUNTER (OUTPATIENT)
Facility: HOSPITAL | Age: 85
End: 2025-06-11
Attending: EMERGENCY MEDICINE
Payer: COMMERCIAL

## 2025-06-11 ENCOUNTER — PATIENT OUTREACH (OUTPATIENT)
Dept: ONCOLOGY | Facility: HOSPITAL | Age: 85
End: 2025-06-11
Payer: COMMERCIAL

## 2025-06-11 ENCOUNTER — HOSPITAL ENCOUNTER (EMERGENCY)
Facility: HOSPITAL | Age: 85
Discharge: HOME | End: 2025-06-11
Attending: EMERGENCY MEDICINE | Admitting: EMERGENCY MEDICINE
Payer: COMMERCIAL

## 2025-06-11 VITALS
RESPIRATION RATE: 20 BRPM | TEMPERATURE: 98.9 F | HEART RATE: 72 BPM | OXYGEN SATURATION: 97 % | DIASTOLIC BLOOD PRESSURE: 71 MMHG | SYSTOLIC BLOOD PRESSURE: 131 MMHG

## 2025-06-11 VITALS
WEIGHT: 89.5 LBS | DIASTOLIC BLOOD PRESSURE: 60 MMHG | HEART RATE: 70 BPM | TEMPERATURE: 98.9 F | SYSTOLIC BLOOD PRESSURE: 128 MMHG | BODY MASS INDEX: 16.37 KG/M2 | OXYGEN SATURATION: 95 % | RESPIRATION RATE: 16 BRPM

## 2025-06-11 DIAGNOSIS — C78.00 COLON CANCER METASTASIZED TO LUNG (H): Primary | ICD-10-CM

## 2025-06-11 DIAGNOSIS — C18.9 COLON CANCER METASTASIZED TO LUNG (H): Primary | ICD-10-CM

## 2025-06-11 DIAGNOSIS — R10.9 ABDOMINAL PAIN, UNSPECIFIED ABDOMINAL LOCATION: ICD-10-CM

## 2025-06-11 DIAGNOSIS — C18.9 COLON ADENOCARCINOMA (H): ICD-10-CM

## 2025-06-11 PROBLEM — N18.32 STAGE 3B CHRONIC KIDNEY DISEASE (CKD) (H): Status: ACTIVE | Noted: 2025-06-11

## 2025-06-11 PROBLEM — M54.42 LUMBAGO WITH SCIATICA, LEFT SIDE: Status: ACTIVE | Noted: 2025-06-11

## 2025-06-11 PROBLEM — G89.3 CANCER RELATED PAIN: Status: ACTIVE | Noted: 2025-06-11

## 2025-06-11 PROBLEM — R64 CANCER CACHEXIA: Status: ACTIVE | Noted: 2025-06-11

## 2025-06-11 PROBLEM — G57.92 NEUROPATHY OF LEFT LOWER EXTREMITY: Status: ACTIVE | Noted: 2025-06-11

## 2025-06-11 PROBLEM — I73.9 PVD (PERIPHERAL VASCULAR DISEASE): Status: ACTIVE | Noted: 2024-08-21

## 2025-06-11 PROBLEM — H54.50 LOW VISION OF RIGHT EYE WITH NORMAL VISION IN CONTRALATERAL EYE: Status: ACTIVE | Noted: 2025-06-11

## 2025-06-11 PROBLEM — C77.2 SECONDARY AND UNSPECIFIED MALIGNANT NEOPLASM OF INTRA-ABDOMINAL LYMPH NODES (H): Status: ACTIVE | Noted: 2025-06-11

## 2025-06-11 PROBLEM — Z51.5 TERMINAL CARE: Status: ACTIVE | Noted: 2025-06-11

## 2025-06-11 PROBLEM — M51.369 DDD (DEGENERATIVE DISC DISEASE), LUMBAR: Status: ACTIVE | Noted: 2025-06-11

## 2025-06-11 PROBLEM — I73.9 PERIPHERAL ARTERIAL DISEASE: Status: ACTIVE | Noted: 2025-06-11

## 2025-06-11 PROBLEM — Z96.0 URETERAL STENT PRESENT: Status: ACTIVE | Noted: 2025-06-11

## 2025-06-11 PROBLEM — Z85.038 HISTORY OF COLON CANCER: Status: ACTIVE | Noted: 2025-06-11

## 2025-06-11 PROBLEM — Z66 DNR (DO NOT RESUSCITATE): Status: ACTIVE | Noted: 2025-06-11

## 2025-06-11 PROBLEM — J44.9 COPD (CHRONIC OBSTRUCTIVE PULMONARY DISEASE) (H): Status: ACTIVE | Noted: 2025-06-11

## 2025-06-11 PROBLEM — R93.89 ABNORMAL MRI: Status: ACTIVE | Noted: 2025-06-11

## 2025-06-11 PROCEDURE — 99207 PR NO BILLABLE SERVICE THIS VISIT: CPT | Performed by: HOSPITALIST

## 2025-06-11 PROCEDURE — 99283 EMERGENCY DEPT VISIT LOW MDM: CPT

## 2025-06-11 PROCEDURE — 250N000013 HC RX MED GY IP 250 OP 250 PS 637: Performed by: EMERGENCY MEDICINE

## 2025-06-11 PROCEDURE — 99239 HOSP IP/OBS DSCHRG MGMT >30: CPT | Performed by: INTERNAL MEDICINE

## 2025-06-11 PROCEDURE — 250N000013 HC RX MED GY IP 250 OP 250 PS 637: Performed by: INTERNAL MEDICINE

## 2025-06-11 PROCEDURE — 250N000013 HC RX MED GY IP 250 OP 250 PS 637: Performed by: HOSPITALIST

## 2025-06-11 PROCEDURE — 101N000002 HC CUSTODIAL CARE DAILY

## 2025-06-11 RX ORDER — NALOXONE HYDROCHLORIDE 0.4 MG/ML
0.2 INJECTION, SOLUTION INTRAMUSCULAR; INTRAVENOUS; SUBCUTANEOUS
Status: DISCONTINUED | OUTPATIENT
Start: 2025-06-11 | End: 2025-06-13 | Stop reason: HOSPADM

## 2025-06-11 RX ORDER — AMOXICILLIN 250 MG
1 CAPSULE ORAL 2 TIMES DAILY
Status: ON HOLD | DISCHARGE
Start: 2025-06-11 | End: 2025-06-13

## 2025-06-11 RX ORDER — LIDOCAINE 4 G/G
1 PATCH TOPICAL EVERY 24 HOURS
Status: DISCONTINUED | OUTPATIENT
Start: 2025-06-11 | End: 2025-06-13 | Stop reason: HOSPADM

## 2025-06-11 RX ORDER — SIMETHICONE 80 MG
80 TABLET,CHEWABLE ORAL EVERY 6 HOURS PRN
Status: DISCONTINUED | OUTPATIENT
Start: 2025-06-11 | End: 2025-06-13 | Stop reason: HOSPADM

## 2025-06-11 RX ORDER — POLYETHYLENE GLYCOL 3350 17 G/17G
17 POWDER, FOR SOLUTION ORAL DAILY PRN
Status: ON HOLD | DISCHARGE
Start: 2025-06-11 | End: 2025-06-13

## 2025-06-11 RX ORDER — POLYETHYLENE GLYCOL 3350 17 G/17G
17 POWDER, FOR SOLUTION ORAL DAILY PRN
Status: DISCONTINUED | OUTPATIENT
Start: 2025-06-11 | End: 2025-06-13 | Stop reason: HOSPADM

## 2025-06-11 RX ORDER — HYDROMORPHONE HYDROCHLORIDE 2 MG/1
2-4 TABLET ORAL EVERY 12 HOURS PRN
Qty: 10 TABLET | Refills: 0 | Status: ON HOLD | OUTPATIENT
Start: 2025-06-11 | End: 2025-06-13

## 2025-06-11 RX ORDER — LIDOCAINE 4 G/G
1 PATCH TOPICAL EVERY 24 HOURS
Status: ON HOLD | DISCHARGE
Start: 2025-06-11 | End: 2025-06-13

## 2025-06-11 RX ORDER — NALOXONE HYDROCHLORIDE 0.4 MG/ML
0.4 INJECTION, SOLUTION INTRAMUSCULAR; INTRAVENOUS; SUBCUTANEOUS
Status: DISCONTINUED | OUTPATIENT
Start: 2025-06-11 | End: 2025-06-13 | Stop reason: HOSPADM

## 2025-06-11 RX ORDER — HYDROMORPHONE HYDROCHLORIDE 2 MG/1
2-4 TABLET ORAL
Refills: 0 | Status: DISCONTINUED | OUTPATIENT
Start: 2025-06-11 | End: 2025-06-12

## 2025-06-11 RX ORDER — ACETAMINOPHEN 500 MG
500 TABLET ORAL 3 TIMES DAILY
Status: DISCONTINUED | OUTPATIENT
Start: 2025-06-12 | End: 2025-06-12

## 2025-06-11 RX ORDER — ACETAMINOPHEN 500 MG
500 TABLET ORAL 3 TIMES DAILY
Status: ON HOLD | DISCHARGE
Start: 2025-06-11 | End: 2025-06-13

## 2025-06-11 RX ORDER — HYDROMORPHONE HYDROCHLORIDE 4 MG/1
4 TABLET ORAL ONCE
Refills: 0 | Status: COMPLETED | OUTPATIENT
Start: 2025-06-11 | End: 2025-06-11

## 2025-06-11 RX ORDER — AMOXICILLIN 250 MG
1 CAPSULE ORAL 2 TIMES DAILY
Status: DISCONTINUED | OUTPATIENT
Start: 2025-06-11 | End: 2025-06-13

## 2025-06-11 RX ORDER — HYDROMORPHONE HYDROCHLORIDE 2 MG/1
2-4 TABLET ORAL EVERY 8 HOURS PRN
Qty: 10 TABLET | Refills: 0 | Status: SHIPPED | OUTPATIENT
Start: 2025-06-11 | End: 2025-06-11

## 2025-06-11 RX ORDER — SIMETHICONE 80 MG
80 TABLET,CHEWABLE ORAL EVERY 6 HOURS PRN
Status: ON HOLD | DISCHARGE
Start: 2025-06-11 | End: 2025-06-13

## 2025-06-11 RX ORDER — AMOXICILLIN 250 MG
2 CAPSULE ORAL 2 TIMES DAILY
DISCHARGE
Start: 2025-06-11 | End: 2025-06-11

## 2025-06-11 RX ORDER — HYDROMORPHONE HYDROCHLORIDE 2 MG/1
2-4 TABLET ORAL EVERY 12 HOURS PRN
Status: DISCONTINUED | OUTPATIENT
Start: 2025-06-11 | End: 2025-06-11

## 2025-06-11 RX ADMIN — ACETAMINOPHEN 500 MG: 500 TABLET ORAL at 09:36

## 2025-06-11 RX ADMIN — ACETAMINOPHEN 650 MG: 325 TABLET ORAL at 03:51

## 2025-06-11 RX ADMIN — HYDROMORPHONE HYDROCHLORIDE 4 MG: 4 TABLET ORAL at 01:53

## 2025-06-11 RX ADMIN — HYDROMORPHONE HYDROCHLORIDE 4 MG: 4 TABLET ORAL at 20:57

## 2025-06-11 RX ADMIN — SENNOSIDES AND DOCUSATE SODIUM 1 TABLET: 50; 8.6 TABLET ORAL at 09:36

## 2025-06-11 RX ADMIN — SIMETHICONE 80 MG: 80 TABLET, CHEWABLE ORAL at 03:51

## 2025-06-11 ASSESSMENT — COLUMBIA-SUICIDE SEVERITY RATING SCALE - C-SSRS
4. HAVE YOU HAD THESE THOUGHTS AND HAD SOME INTENTION OF ACTING ON THEM?: YES
1. IN THE PAST MONTH, HAVE YOU WISHED YOU WERE DEAD OR WISHED YOU COULD GO TO SLEEP AND NOT WAKE UP?: NO
5. HAVE YOU STARTED TO WORK OUT OR WORKED OUT THE DETAILS OF HOW TO KILL YOURSELF? DO YOU INTEND TO CARRY OUT THIS PLAN?: NO
2. HAVE YOU ACTUALLY HAD ANY THOUGHTS OF KILLING YOURSELF IN THE PAST MONTH?: YES
1. IN THE PAST MONTH, HAVE YOU WISHED YOU WERE DEAD OR WISHED YOU COULD GO TO SLEEP AND NOT WAKE UP?: YES
6. HAVE YOU EVER DONE ANYTHING, STARTED TO DO ANYTHING, OR PREPARED TO DO ANYTHING TO END YOUR LIFE?: NO
2. HAVE YOU ACTUALLY HAD ANY THOUGHTS OF KILLING YOURSELF IN THE PAST MONTH?: YES
3. HAVE YOU BEEN THINKING ABOUT HOW YOU MIGHT KILL YOURSELF?: YES
3. HAVE YOU BEEN THINKING ABOUT HOW YOU MIGHT KILL YOURSELF?: NO
5. HAVE YOU STARTED TO WORK OUT OR WORKED OUT THE DETAILS OF HOW TO KILL YOURSELF? DO YOU INTEND TO CARRY OUT THIS PLAN?: NO
4. HAVE YOU HAD THESE THOUGHTS AND HAD SOME INTENTION OF ACTING ON THEM?: YES
6. HAVE YOU EVER DONE ANYTHING, STARTED TO DO ANYTHING, OR PREPARED TO DO ANYTHING TO END YOUR LIFE?: NO

## 2025-06-11 ASSESSMENT — ACTIVITIES OF DAILY LIVING (ADL)
ADLS_ACUITY_SCORE: 54
ADLS_ACUITY_SCORE: 49
ADLS_ACUITY_SCORE: 54
ADLS_ACUITY_SCORE: 54
ADLS_ACUITY_SCORE: 55
ADLS_ACUITY_SCORE: 54
ADLS_ACUITY_SCORE: 49
ADLS_ACUITY_SCORE: 54
ADLS_ACUITY_SCORE: 54
ADLS_ACUITY_SCORE: 55
ADLS_ACUITY_SCORE: 54
ADLS_ACUITY_SCORE: 55
ADLS_ACUITY_SCORE: 49
ADLS_ACUITY_SCORE: 54
ADLS_ACUITY_SCORE: 49

## 2025-06-11 NOTE — PLAN OF CARE
Problem: Adult Inpatient Plan of Care  Goal: Optimal Comfort and Wellbeing  Outcome: Progressing  Intervention: Provide Person-Centered Care  Recent Flowsheet Documentation  Taken 6/11/2025 0155 by Milagros Quinones RN  Trust Relationship/Rapport: care explained  Taken 6/10/2025 2025 by Milagros Quinones RN  Trust Relationship/Rapport: care explained     Problem: Delirium  Goal: Improved Attention and Thought Clarity  Outcome: Progressing     Problem: Nausea and Vomiting  Goal: Nausea and Vomiting Relief  Outcome: Progressing     Problem: Intestinal Obstruction  Goal: Optimal Pain Control and Function  Outcome: Progressing  Intervention: Prevent or Manage Pain  Recent Flowsheet Documentation  Taken 6/11/2025 0155 by Milagros Quinones RN  Diversional Activities: television  Taken 6/10/2025 2025 by Milagros Quinones RN  Diversional Activities: television   Goal Outcome Evaluation:       Pt is A/O x4. C/O ABD and back pain 10/10. PRN dilaudid x2, APAP x1 and Juli x1 administer and effective. Pt refused HS senna. VSS continue to monitor. Milagros Quinones RN
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
## 2025-06-11 NOTE — PROGRESS NOTES
"1605: Call received from [facility] stating that patient is returning to [facility] as family and patient very upset and unhappy with the facility. Per admissions: \"they were thinking patient is getting her own apartment, not potentially shared room\". [place] apparently offered to work with patient and offer private room, but family requested patient to be sent back to our hospital.  SW called ED [facility] with an update. Left update for care management manager.Ramona Pathak, CARRINGTONW    "

## 2025-06-11 NOTE — PROGRESS NOTES
Discharge Note    Patient discharged to [facility] via EMS/BLS accompanied by no family/friend .  IV: Discontinued  Prescriptions printed and sent in package with EMS  Belongings reviewed and sent with patient. Family, Liberty, took belongings home and patient only took her blanket and her flowers  Home medications returned to patient: NA  Equipment sent with: patient, N/A.   AVS given to EMS to bring to facility. Patient left unit in stable condition via stretcher in stable condition escorted out by EMS transport.

## 2025-06-11 NOTE — PLAN OF CARE
Problem: Adult Inpatient Plan of Care  Goal: Optimal Comfort and Wellbeing  Outcome: Met  Intervention: Provide Person-Centered Care  Recent Flowsheet Documentation  Taken 6/11/2025 0937 by Ivana Szymanski, RN  Trust Relationship/Rapport:   care explained   choices provided   questions encouraged   questions answered     Problem: Adult Inpatient Plan of Care  Goal: Readiness for Transition of Care  Outcome: Met   Goal Outcome Evaluation:

## 2025-06-11 NOTE — PROGRESS NOTES
Care Management Follow Up    Length of Stay (days): 7    Expected Discharge Date: 06/11/2025     Concerns to be Addressed: discharge planning, financial/insurance, other (see comments) (hospice and placement needs)     Patient plan of care discussed at interdisciplinary rounds: Yes    Anticipated Discharge Disposition: Skilled Nursing Facility [facility] , Hospice - [facility]      Anticipated Discharge Services: None  Anticipated Discharge DME:  (unknown)    Patient/family educated on Medicare website which has current facility and service quality ratings:  yes  Education Provided on the Discharge Plan:  yes  Patient/Family in Agreement with the Plan:  yes    Referrals Placed by CM/SW:  TCU/hospice  Private pay costs discussed: private room/amenity fees    Discussed  Partnership in Safe Discharge Planning  document with patient/family: No     Handoff Completed: Yes, CHARLI PCP: Internal handoff referral completed    Additional Information:  Patient accepted and agreed to [facility] admissions. Family accepted private pay with $7000.00 down deposit. [place] and [facility]. BRYSON provided brochure on both. Patient and DIL Liberty are choosing [facility].   sent the referral and called to find out if they can see patient today. They could do late admission at 6PM   SW called [facility] transportation and there is no wheelchair ride till 8PM. Tonight. Scheduled wheelchair ride for : [phone]. BRYSON reached out to manager to see if we could get sooner ride for patient. Awaiting information.    Next Steps: CM will continue to monitor progression of discharge and provide assist as needed.       Ramona Pathak, LSW
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
## 2025-06-11 NOTE — PROGRESS NOTES
Care Management Discharge Note    Discharge Date: 06/11/2025       Discharge Disposition: Skilled Nursing Facility - [facility], Hospice - [facility]    Discharge Services: None    Discharge DME:  None     Discharge Transportation: agency    Private pay costs discussed: private room/amenity fees    Does the patient's insurance plan have a 3 day qualifying hospital stay waiver?  No    PAS Confirmation Code:  [id number]  Patient/family educated on Medicare website which has current facility and service quality ratings:  yes    Education Provided on the Discharge Plan:  yes  Persons Notified of Discharge Plans: patient, family, provider, Unit, accepting facility  Patient/Family in Agreement with the Plan:  yes    Handoff Referral Completed: Yes, [facility] PCP: Internal handoff referral completed    Additional Information:  Patient accented to [facility] long term, private pay. Stretcher ride, as wheelchair not available will pick patient up at 1340PM. Facility notified. Referral was made to [facility] hospice per patient and family choice and they will see patient for admission today at 6PM at the facility.  PAS completed and provided to [facility]. Family updated regarding financial /down payment.  SW asked admissions per family request, if they have idea how they will manage restrictions with family visits. Delicia states she has no idea as she didn't hear anything, and she is at [facility]. [facility] admission person reviewed and accepted patient. She will try to point this out, so would not be missed by facility.     Ramona Pathak, SIENNA

## 2025-06-12 VITALS
RESPIRATION RATE: 20 BRPM | OXYGEN SATURATION: 96 % | TEMPERATURE: 98.6 F | SYSTOLIC BLOOD PRESSURE: 158 MMHG | DIASTOLIC BLOOD PRESSURE: 69 MMHG | HEART RATE: 74 BPM

## 2025-06-12 PROCEDURE — 99207 PR NO BILLABLE SERVICE THIS VISIT: CPT | Performed by: INTERNAL MEDICINE

## 2025-06-12 PROCEDURE — 250N000013 HC RX MED GY IP 250 OP 250 PS 637: Performed by: HOSPITALIST

## 2025-06-12 PROCEDURE — 250N000013 HC RX MED GY IP 250 OP 250 PS 637: Performed by: INTERNAL MEDICINE

## 2025-06-12 PROCEDURE — 101N000002 HC CUSTODIAL CARE DAILY

## 2025-06-12 RX ORDER — ACETAMINOPHEN 500 MG
1000 TABLET ORAL 2 TIMES DAILY
Status: DISCONTINUED | OUTPATIENT
Start: 2025-06-12 | End: 2025-06-13 | Stop reason: HOSPADM

## 2025-06-12 RX ORDER — HYDROMORPHONE HYDROCHLORIDE 2 MG/1
2 TABLET ORAL EVERY 6 HOURS PRN
Refills: 0 | Status: DISCONTINUED | OUTPATIENT
Start: 2025-06-12 | End: 2025-06-13

## 2025-06-12 RX ORDER — ACETAMINOPHEN 500 MG
1000 TABLET ORAL 3 TIMES DAILY
Status: DISCONTINUED | OUTPATIENT
Start: 2025-06-12 | End: 2025-06-12

## 2025-06-12 RX ORDER — HYDROMORPHONE HYDROCHLORIDE 2 MG/1
2-4 TABLET ORAL EVERY 6 HOURS PRN
Refills: 0 | Status: DISCONTINUED | OUTPATIENT
Start: 2025-06-12 | End: 2025-06-12

## 2025-06-12 RX ORDER — ACETAMINOPHEN 500 MG
500 TABLET ORAL EVERY 6 HOURS PRN
Status: DISCONTINUED | OUTPATIENT
Start: 2025-06-12 | End: 2025-06-13 | Stop reason: HOSPADM

## 2025-06-12 RX ADMIN — ACETAMINOPHEN 1000 MG: 500 TABLET ORAL at 20:03

## 2025-06-12 RX ADMIN — LIDOCAINE 1 PATCH: 4 PATCH TOPICAL at 08:04

## 2025-06-12 RX ADMIN — ACETAMINOPHEN 500 MG: 500 TABLET ORAL at 14:25

## 2025-06-12 RX ADMIN — SENNOSIDES AND DOCUSATE SODIUM 1 TABLET: 50; 8.6 TABLET ORAL at 00:05

## 2025-06-12 RX ADMIN — HYDROMORPHONE HYDROCHLORIDE 2 MG: 2 TABLET ORAL at 00:04

## 2025-06-12 RX ADMIN — ACETAMINOPHEN 500 MG: 500 TABLET ORAL at 08:03

## 2025-06-12 RX ADMIN — SENNOSIDES AND DOCUSATE SODIUM 1 TABLET: 50; 8.6 TABLET ORAL at 08:03

## 2025-06-12 ASSESSMENT — ACTIVITIES OF DAILY LIVING (ADL)
DEPENDENT_IADLS:: CLEANING;COOKING;LAUNDRY;SHOPPING;MEAL PREPARATION;MEDICATION MANAGEMENT;MONEY MANAGEMENT;TRANSPORTATION
ADLS_ACUITY_SCORE: 46
ADLS_ACUITY_SCORE: 41
ADLS_ACUITY_SCORE: 41
FALL_HISTORY_WITHIN_LAST_SIX_MONTHS: NO
ADLS_ACUITY_SCORE: 45
ADLS_ACUITY_SCORE: 46
ADLS_ACUITY_SCORE: 46
ADLS_ACUITY_SCORE: 45
ADLS_ACUITY_SCORE: 47
ADLS_ACUITY_SCORE: 47
ADLS_ACUITY_SCORE: 41
ADLS_ACUITY_SCORE: 41
ADLS_ACUITY_SCORE: 47
ADLS_ACUITY_SCORE: 41
ADLS_ACUITY_SCORE: 47
ADLS_ACUITY_SCORE: 41
ADLS_ACUITY_SCORE: 46
ADLS_ACUITY_SCORE: 45
ADLS_ACUITY_SCORE: 41
ADLS_ACUITY_SCORE: 46
ADLS_ACUITY_SCORE: 41
ADLS_ACUITY_SCORE: 46
WEAR_GLASSES_OR_BLIND: YES

## 2025-06-12 NOTE — PLAN OF CARE
Problem: Delirium  Goal: Improved Behavioral Control  Outcome: Progressing  Intervention: Minimize Safety Risk  Recent Flowsheet Documentation  Taken 6/12/2025 1556 by Jose Juan Moreno RN  Enhanced Safety Measures: pain management     Problem: Pain Acute  Goal: Optimal Pain Control and Function  Outcome: Progressing  Intervention: Prevent or Manage Pain  Recent Flowsheet Documentation  Taken 6/12/2025 1556 by Jose Juan Moreno, RN  Medication Review/Management: medications reviewed     Goal Outcome Evaluation:  Pt denies pain. Up independently in room. No concerns expressed this shift. Hoping to discharge home with hospice tomorrow morning, family to transport.    Jose Juan Moreno, DIGNA  [phone]

## 2025-06-12 NOTE — H&P
[facility]    History and Physical - Hospitalist Service       Date of Admission:  6/11/2025    Assessment & Plan      Katya Butler is a 84 year old female admitted on 6/11/2025.     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  Referred from ED for [facility] care.  Previous CT reports 4/25- Increased size of retroperitoneal soft tissue encasing the stented left ureter, compatible with progression of metastatic disease.   Scattered new pulmonary nodules in the bilateral upper lobes with new mediastinal/left supraclavicular lymphadenopathy, suspicious for new metastatic disease.  Stable indeterminate calcific focus in the central liver.  Stable mild emphysema, 3.3 cm infrarenal abdominal aortic aneurysm and right perineal hernia containing small portions of the urinary bladder and rectum.    #Colon cancer with metastases to the lung on recent CT with progressive metastatic disease in the abdomen and lymph nodes  Cancer related pain and cancer cachexia.  #Hospice care patient  -pt. had been on Hospice but discharged from Hospice on 6/1 due to concerns about drug diversion by her family,Recently discharged from outpatient  hospice 6/1, [facility]. As listed above, on 5 of the last 7 opioid fills, refill was requested earlier than expected, leading to concerns about possible drug diversion by caregiver .  Patient was therefore discharged to long-term care with hospice .  Social work reported patient and family choosing [facility] hospice, hospice referral placed on discharge.  -pt was discharged from the floor today to go to nursing home and when she got to nursing home, had a roommate and became anxious and stated she would not stay there.    Please refer below for detail hospital course      Patient tolerating regular diet and having regular bowel movements.  Patient reports intermittent lower abdominal pain and low back pain are chronic.  Currently stable with current pain regiment which includes scheduled Tylenol, lidocaine patch on her back and as needed simethicone and Dilaudid.  - Senna BID to maintain stool output. Prn Miralax.       -   #Normocytic anemia,  Suspect probably due to her colon cancer. Hemoglobin overall stable without signs of active bleeding      Initial orders were placed.  Workup and follow-up labs has been placed.   Current problem list also has been updated.  Request consultation toCM-appreciated assistance and recommendations.      More than 50% of time spent in Counselling & coordination of care.  Disposition:   Pending on further clinical progression, further evaluation findings and recommendations.          Diet: Combination Diet Regular Diet    DVT Prophylaxis: VTE Prophylaxis contraindicated due to hospice/comfort plans only  Lima Catheter: Not present  Lines: PRESENT             Cardiac Monitoring: None  Code Status: No CPR- Do NOT Intubate      Clinically Significant Risk Factors Present on Admission                   # Hypertension: Noted on problem list               # Financial/Environmental Concerns:           Disposition Plan     Medically Ready for Discharge: Anticipated Tomorrow            Matthieu Goldsmith MD  Hospitalist Service  [facility]  Securely message with [employer] (more info)  Text page via [facility] Paging/Directory     ______________________________________________________________________    Chief Complaint   Abd pain/social issues    History is obtained from the patient, electronic health record, and emergency department physician    History of Present Illness   Katya Butler is a 84 year old female with colon cancer with metastases to the lung, hx of recent volvulus 4/2025 and bowel obstruction, discharged today to hospice LTC, history of emphysema/COPD, CKD, chronic left ureteral stent, PAD, hypertension who presented back from facility for disposition issues.  Referred from ED for [facility] care.    Past Medical History    Past Medical History:   Diagnosis Date    Diverticulitis of colon     Heart murmur     HLD (hyperlipidemia)     HTN (hypertension)     Inguinal hernia without mention of obstruction or gangrene, unilateral or unspecified, (not specified as recurrent) 08/20/2004    RIGHT    Malignant neoplasm of sigmoid colon (H) 11/24/2020    Primary osteoarthritis involving multiple joints 08/02/2019    PVD (peripheral vascular disease)     Renal disease     Stented coronary artery     Ureteral stent present     Ventral hernia, unspecified, without mention of obstruction or gangrene 08/15/2003    easily reduced       Past Surgical History   Past Surgical History:   Procedure Laterality Date    COMBINED CYSTOSCOPY, RETROGRADES, EXCHANGE STENT URETER(S) Left 11/21/2024    Procedure: CYSTOSCOPY WITH LEFT URETERAL STENT EXCHANGE AND RETROGRADE PYELOGRAM;  Surgeon: Galo Perez MD;  Location: [facility] [place] [place] OR    COMBINED CYSTOSCOPY, RETROGRADES, URETEROSCOPY, INSERT STENT Left 08/22/2024    Procedure: CYSTOSCOPY, WITH LEFT RETROGRADE PYELOGRAM AND LEFT STENT INSERTION;  Surgeon: Galo Perez MD;  Location: [facility] [place] [place] OR    EYE SURGERY      GI  SURGERY      HERNIA REPAIR      HERNIA REPAIR, INGUINAL RT/LT  02/01/2005    [facility]    HYSTERECTOMY      IR AORTIC ARCH 4 VESSEL ANGIOGRAM  10/13/2009    IR AORTIC ARCH 4 VESSEL ANGIOGRAM  03/15/2010    IR MISCELLANEOUS PROCEDURE  05/20/2008    IR MISCELLANEOUS PROCEDURE  01/30/2009    IR MISCELLANEOUS PROCEDURE  01/30/2009    IR MISCELLANEOUS PROCEDURE  01/30/2009    IR MISCELLANEOUS PROCEDURE  10/13/2009    IR MISCELLANEOUS PROCEDURE  10/13/2009    IR MISCELLANEOUS PROCEDURE  03/15/2010    LAPAROSCOPIC CYSTECTOMY OVARIAN (ONCOLOGY) Bilateral 06/27/2018    Procedure: DIAGNOSTIC LAPAROSCOPY, RIGHT SALPINGO OOPHORECTOMY, LYSIS OF ADHESIONS, AND PELVIC WASHINGS;  Surgeon: Nneka Arroyo DO;  Location: [facility];  Service:     [facility] AGE [facility]R/> N/A 12/02/2020    Procedure: Port Placement;  Surgeon: Viry Villegas MD;  Location: [facility] OR;  Service: General    ZZC PART REMOVAL COLON W ANASTOMOSIS N/A 10/31/2020    Procedure: COLECTOMY, SIGMOID, OPEN;  Surgeon: Viry Villegas MD;  Location: [facility];  Service: General    ZZC REMOVAL OF OVARY(S) N/A 10/31/2020    Procedure: OOPHORECTOMY, OPEN;  Surgeon: Viry Villegas MD;  Location: [facility];  Service: General       Prior to Admission Medications   Prior to Admission Medications   Prescriptions Last Dose Informant Patient Reported? Taking?   HYDROmorphone (DILAUDID) 2 MG tablet 6/11/2025 Morning  No Yes   Sig: Take 1-2 tablets (2-4 mg) by mouth every 12 hours as needed (IF pain not managed with non-pharmacological and non-opioid interventions). 1 tablet for moderate pain with pain scale up to 6, 2 tablets for severe pain with pain scale 7 and above   Lidocaine (LIDOCARE) 4 % Patch Past Week  No Yes   Sig: Place 1 patch over 12 hours onto the skin every 24 hours. Low back area on intact skin.  To prevent lidocaine toxicity, patient should be patch free for 12 hrs daily.   acetaminophen  (TYLENOL) 500 MG tablet 6/11/2025 Morning  No Yes   Sig: Take 1 tablet (500 mg) by mouth 3 times daily.   lidocaine-prilocaine (EMLA) 2.5-2.5 % external cream   No Yes   Sig: Apply topically as needed for moderate pain Apply to painful skin   melatonin 3 MG tablet 6/10/2025 Evening  No Yes   Sig: Take 1 tablet (3 mg) by mouth daily (with dinner).   polyethylene glycol (MIRALAX) 17 g packet   No Yes   Sig: Take 17 g by mouth daily as needed for constipation.   senna-docusate (SENOKOT-S/PERICOLACE) 8.6-50 MG tablet 6/11/2025 Morning  No Yes   Sig: Take 1 tablet by mouth 2 times daily.   simethicone (MYLICON) 80 MG chewable tablet 6/11/2025 Morning  No Yes   Sig: Take 1 tablet (80 mg) by mouth every 6 hours as needed for cramping or flatulence.      Facility-Administered Medications: None        Review of Systems    The 5 point Review of Systems is negative other than noted in the HPI or here.  Comfortable denies any active pain.    Social History   I have reviewed this patient's social history and updated it with pertinent information if needed.  Social History     Tobacco Use    Smoking status: Former     Types: Cigarettes    Smokeless tobacco: Never    Tobacco comments:     2-3 cigarettes daily **as of 01/10/25**   Vaping Use    Vaping status: Never Used   Substance Use Topics    Alcohol use: Not Currently    Drug use: No         Family History   I have reviewed this patient's family history and updated it with pertinent information if needed.  Family History   Problem Relation Age of Onset    C.A.D. Mother     C.A.D. Sister     Breast Cancer Maternal Aunt     Breast Cancer Cousin     Breast Cancer Cousin     Breast Cancer Cousin     Uterine Cancer Mother 38.00    Cancer Mother     Lymphoma Son 53.00    Cancer Son          Allergies   Allergies   Allergen Reactions    Contrast Dye Hives, Itching and Swelling    Aspirin Other (See Comments)     Not allergic to ASA - on plavix    Cephalexin Hives    Colcrys  "[Colchicine] Hives    Other Drug Allergy (See Comments) Itching     Pt states \"all pain meds\" make her \"itchy\"    Sulfa Antibiotics Other (See Comments)     Sores in mouth    Telithromycin Visual Disturbance        Physical Exam   Vital Signs: Temp: 98.4  F (36.9  C) Temp src: Oral BP: (!) 163/75 Pulse: 83   Resp: 20 SpO2: 98 % O2 Device: None (Room air)    Weight: 0 lbs 0 oz    Alert awake grossly cachectic female-appears comfortable  Vision Baseline  Neck supple  Oral mucosa moist  bilateral air entry heard,  S1-S2 normal  Abdomen is soft   Extremities are fully mobile and there is no visible swelling noted  No skin cyanosis  Neurologically- no new Gross deficits from baseline-Moving all 4 extremities  Psych-mood okay and appropriate to circumstances      Medical Decision Making       75 MINUTES SPENT BY ME on the date of service doing chart review, history, exam, documentation & further activities per the note.      Data         Imaging results reviewed over the past 24 hrs:   No results found for this or any previous visit (from the past 24 hours).  "

## 2025-06-12 NOTE — ED TRIAGE NOTES
Pt was discharged and made skilled nursing care for placement and pain control     Triage Assessment (Adult)       Row Name 06/11/25 6074          Triage Assessment    Airway WDL WDL
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
## 2025-06-12 NOTE — PROGRESS NOTES
Care Management Follow Up    Length of Stay (days): 0    Expected Discharge Date: 06/12/2025     Concerns to be Addressed:  discharge planing  Patient plan of care discussed at interdisciplinary rounds: Yes    Anticipated Discharge Disposition:  TBD      Anticipated Discharge Services:  TBD  Anticipated Discharge DME:  None    Handoff Completed: No, handoff not indicated or clinically appropriate    Additional Information:  BRYSON called patient son Idris. Wife Liberty answered the phone stating they would like to talk to  when they arrive to hospital. They are 45 minute away. Liberty also mentioned that they received call from [facility] and they were told that patient can return to her home with 24hrs care. They want to talk more about this.  BRYSON also received call from [facility] Intake nurse, stating patient's son Paulo called and wanted to initiated services through their company. SW informed Intake that will let patient's family know, who it's on a way to the hospital right now. [facility]ake line: [phone].    Next Steps: CM will continue to monitor progression of care, review team recommendations and provide discharge planning assist as needed.     Ramona Pathak, LSW

## 2025-06-12 NOTE — PLAN OF CARE
"PRIMARY DIAGNOSIS: \"GENERIC\" NURSING  OUTPATIENT/OBSERVATION GOALS TO BE MET BEFORE DISCHARGE:  ADLs back to baseline: Yes    Activity and level of assistance: Up with standby assistance, ind in room    Pain status: Improved-controlled with oral pain medications.    Return to near baseline physical activity: Yes     Discharge Planner Nurse   Safe discharge environment identified: No  Barriers to discharge: Yes       Entered by: Ivana Szymanski RN 06/12/2025 2:48 PM     Please review provider order for any additional goals.   Nurse to notify provider when observation goals have been met and patient is ready for discharge.Goal Outcome Evaluation:           A/Ox4, can answer all orientation questions appropriately, has some intermittent confusion and frustration  VSS ex HTN. On RA  C/o 7/10 back pain, taking scheduled tylenol, effective  Up SBA in halls and ind in room  Regular diet  No PIV access  Up to BR, voiding adequately this shift.   BM x3 per pt report  Sleeping in between cares.   Calm and cooperative this shift  Nursing to continue to monitor.                    "

## 2025-06-12 NOTE — PHARMACY-ADMISSION MEDICATION HISTORY
Pharmacist Admission Medication History    Admission medication history is complete. The information provided in this note is only as accurate as the sources available at the time of the update.    Information Source(s): Hospital records via EHR    Pertinent Information: patient discharged to TCU this afternoon, no prescriptions issued/sent with patient. Last doses per MAR.    Changes made to PTA medication list:  Added: None  Deleted: None  Changed: None    Allergies reviewed with patient and updates made in EHR: yes    Medication History Completed By: Nicollette McMann, RPH 6/11/2025 11:24 PM    PTA Med List   Medication Sig Last Dose/Taking    acetaminophen (TYLENOL) 500 MG tablet Take 1 tablet (500 mg) by mouth 3 times daily. 6/11/2025 Morning    HYDROmorphone (DILAUDID) 2 MG tablet Take 1-2 tablets (2-4 mg) by mouth every 12 hours as needed (IF pain not managed with non-pharmacological and non-opioid interventions). 1 tablet for moderate pain with pain scale up to 6, 2 tablets for severe pain with pain scale 7 and above 6/11/2025 Morning    Lidocaine (LIDOCARE) 4 % Patch Place 1 patch over 12 hours onto the skin every 24 hours. Low back area on intact skin.  To prevent lidocaine toxicity, patient should be patch free for 12 hrs daily. Past Week    lidocaine-prilocaine (EMLA) 2.5-2.5 % external cream Apply topically as needed for moderate pain Apply to painful skin Taking As Needed    melatonin 3 MG tablet Take 1 tablet (3 mg) by mouth daily (with dinner). 6/10/2025 Evening    polyethylene glycol (MIRALAX) 17 g packet Take 17 g by mouth daily as needed for constipation. Taking As Needed    senna-docusate (SENOKOT-S/PERICOLACE) 8.6-50 MG tablet Take 1 tablet by mouth 2 times daily. 6/11/2025 Morning    simethicone (MYLICON) 80 MG chewable tablet Take 1 tablet (80 mg) by mouth every 6 hours as needed for cramping or flatulence. 6/11/2025 Morning

## 2025-06-12 NOTE — ED NOTES
Pt is to be made long-term care but care manager is no longer here. Talked with admitting and they stated they needed care manager to talk with patient and have them sign forms. Charge nurse notified. Lights dimmed for comfort

## 2025-06-12 NOTE — PROGRESS NOTES
"PRIMARY DIAGNOSIS: \"GENERIC\" NURSING  OUTPATIENT/OBSERVATION GOALS TO BE MET BEFORE DISCHARGE:  ADLs back to baseline: Yes     Activity and level of assistance: Up with standby assistance, ind in room     Pain status: Improved-controlled with oral pain medications.     Return to near baseline physical activity: Yes          Discharge Planner Nurse  Safe discharge environment identified: No  Barriers to discharge: Yes  "

## 2025-06-12 NOTE — ED PROVIDER NOTES
Patient was admitted to an outpatient under correction care.  Please see initial presenting ER provider note on previous encounter on same date.     Greg Esquivel MD  06/11/25 [id number]

## 2025-06-12 NOTE — PROGRESS NOTES
PRIMARY DIAGNOSIS: ACUTE PAIN  OUTPATIENT/OBSERVATION GOALS TO BE MET BEFORE DISCHARGE:  1. Pain Status: Improved-controlled with oral pain medications.    2. Return to near baseline physical activity: Yes    3. Cleared for discharge by consultants (if involved): Yes    Discharge Planner Nurse   Safe discharge environment identified: Yes  Barriers to discharge: Yes       Entered by: Milagros Quinones RN 06/12/2025 6:16 AM     Please review provider order for any additional goals.   Nurse to notify provider when observation goals have been met and patient is ready for discharge.    Pt arrived on units at 2300 from ED via bed. Pt was admitted for [facility] care. A/O x 4. C/O back pain 7/10. PRN dilaudid administer and effective. VSS, continue to monitor. Milagros Quinones RN

## 2025-06-12 NOTE — ED NOTES
"Pt wheeled to rm 4 and able to get self in bed. Denies being suicidal when here in hospital but states \"if you send me back to the nursing home, I will break every window in the place and that will be the end of me.\" 2 family members with patient and at the bedside. Pt c/o pain all over and is due for her pain med. Denies n/v or dizziness. Is a/o. Pt did have a sandwich and water in the WR. Has talked to nursing supervisor and so has the 2 family members that are with her  "
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
## 2025-06-12 NOTE — ED PROVIDER NOTES
EMERGENCY DEPARTMENT ENCOUNTER      NAME: Katya Butler  AGE: 84 year old female  YOB: 1940  MRN: [id number]  EVALUATION DATE & TIME: No admission date for patient encounter.    PCP: Margie Arboleda    ED PROVIDER: Greg Esquivel M.D.      Chief Complaint   Patient presents with    placement         FINAL IMPRESSION:  1. Abdominal pain, unspecified abdominal location          ED COURSE & MEDICAL DECISION MAKING:    Pertinent Labs & Imaging studies reviewed below.  All EKGs below represent my independent interpretation.      Patient is an 84-year-old woman with history of colon cancer, formally on hospice who presents due to difficulty with stable outpatient living situation.  She was recently admitted to the hospital for 11 days for mechanical obstruction of the intestine, discharged earlier today.  The disposition was complicated by the fact that she was disenrolled from hospice due to allegations of opiate diversion from her sons that lived with her at her house.    She is currently here with her other son and daughter-in-law who live separately and are hoping to take her home, but do not have any medicines to help her with her symptoms.  They came back here from the care facility that they were just discharged to, because after seeing it she became extremely distressed, depressed, and refuses to stay there.    The ANS, risk-management were involved and discussing options for this patient to either go home with her family with medications to help with her symptoms, and await outpatient hospice reenrollment, or being brought to the hospital under skilled nursing care seeing as there are no acute medical issues since discharge.    After discussion with the patient and family, we will bring her in the hospital under skilled nursing care status.  They are aware of possible cost being incurred.  The goal for bringing in tonight is to allow time to make sure patient can legally be discharged back to her other son's  "house, and if so to arrange care at that location.    I spoke to Dr. Goldsmith, the hospitalist who will assume care in place halfway care orders.    Additional ED Course timestamps entered by medical scribe:  7:39 PM I met with the patient, obtained history, performed an initial exam, and discussed options and plan for diagnostics and treatment here in the ED.    Medical Decision Making  I obtained history from Family Member/Significant Other  Care impacted by Chronic Pain      MIPS (CTPE, Dental pain, [place], Sinusitis, Asthma/COPD, Head Trauma): Not Applicable    SEPSIS: None        MEDICATIONS GIVEN IN THE EMERGENCY:  Medications   HYDROmorphone (DILAUDID) tablet 4 mg (4 mg Oral $Given 6/11/25 2057)         NEW PRESCRIPTIONS STARTED AT TODAY'S ER VISIT  Discharge Medication List as of 6/11/2025 10:16 PM             =================================================================    HPI    Katya Butler is a 84 year old female who presents to this ED for evaluation of placement.    Patient has been under adult protective services for the past week (06/04/2025) due to allegations of drug diversion by her family. She was looking at an assisted living facility today with one of her sons and her daughter in law when her \"anxiety went through the roof\". The assisted living facility administration called 911 and sent her to the ED today. [facility] told the patient's family that they could not take her home with them due to the allegations against her sons. She was taken off of hospice during her hospital admission and is now looking to get back on hospice. Patient has also experienced the onset of diarrhea in the past hour during her time at the waiting room.     Per chart review, patient was admitted to [facility] from 06/04/2025-06/11/2025 for abdominal pain. She was found to have a high-grade small bowel obstruction on CT. Medication changes included starting 500mg acetaminophen, 2mg " hydromorphone, lidocaine patch, 3mg melatonin, 17g polyethylene glycol, and 80mg simethicone, and stopping 5-325mg hydrocodone-acetaminophen.    VITALS:  /71   Pulse 72   Temp 98.9  F (37.2  C) (Tympanic)   Resp 20   SpO2 97%     PHYSICAL EXAM    Constitutional: Well developed, well nourished. Uncomfortable appearing.   HENT: Normocephalic, atraumatic, mucous membranes moist, nose normal  Eyes: PERRL, EOMI, conjunctiva normal, no discharge.  Neck- Supple, gross ROM intact.   Respiratory: Normal work of breathing, normal rate, speaks in full sentences  Cardiovascular: Normal heart rate  Musculoskeletal: Moving all 4 extremities intentionally and without pain.  Neurologic: Alert & oriented x 3, cranial nerves grossly intact. Normal gross coordination  Psychiatric: Affect normal, cooperative.          I, Nneka Aquino am serving as a scribe to document services personally performed by Dr. Greg Esquivel based on my observation and the provider's statements to me. I, Greg Esquivel MD attest that Nneka Aquino is acting in a scribe capacity, has observed my performance of the services and has documented them in accordance with my direction.    Greg Esquivel M.D.  Emergency Medicine  [facility] EMERGENCY DEPARTMENT  [address] [place] [id number]  [phone]  Dept: [phone]       Greg Esquivel MD  06/11/25 9759

## 2025-06-12 NOTE — PROGRESS NOTES
Care Management Follow Up    Length of Stay (days): 0    Expected Discharge Date: 06/12/2025     Concerns to be Addressed:   discharge planing  Patient plan of care discussed at interdisciplinary rounds: Yes    Anticipated Discharge Disposition:  Home with hospice      Anticipated Discharge Services:  TBD  Anticipated Discharge DME:  TBD    Private pay costs discussed: private room/amenity fees    Discussed  Partnership in Safe Discharge Planning  document with patient/family: No     Handoff Completed: No, handoff not indicated or clinically appropriate    Additional Information:  Family decided to keep patient overnight under care home care since [facility] can't see patient till 6/13/25 at 9:30AM. Family will transport patient to her home tomorrow morning.    Next Steps: CM will continue to follow and assist with discharge needs.    Ramona Pathak, LSW

## 2025-06-12 NOTE — PROGRESS NOTES
Dual Skin Assessment Note:    Patient admitted from ED from to P2.     Comprehensive skin inspection completed by myself and Divina Guardado RN.     Abnormal skin assessment findings: No    LDA Initiated for skin breakdown/non-blanchable redness: No    Provider notified: No    If yes, WOC Consult order obtained: Pallavi Quinones RN 12:33 AM

## 2025-06-12 NOTE — CONSULTS
Care Management Initial Consult    General Information  Assessment completed with: Patient, Family, Liberty Steinberg  Type of CM/SW Visit: Offer D/C Planning    Primary Care Provider verified and updated as needed: No   Readmission within the last 30 days: previous discharge plan unsuccessful      Reason for Consult: discharge planning  Advance Care Planning: Advance Care Planning Reviewed: present on chart          Communication Assessment  Patient's communication style: spoken language (English or Bilingual)    Hearing Difficulty or Deaf: yes   Wear Glasses or Blind: yes (vision)    Cognitive  Cognitive/Neuro/Behavioral: WDL                      Living Environment:   People in home: child(candi), adult  Paulo Isaac  Current living Arrangements: [facility] home      Able to return to prior arrangements: other (see comments)  Living Arrangement Comments: Under review    Family/Social Support:  Care provided by: child(candi), other (see comments) (Hospice)  Provides care for: no one  Marital Status: Single  Support system: Significant Other, Children          Description of Support System: Supportive, Involved    Support Assessment: Caregiver difficulty providing physical care/safety    Current Resources:   Patient receiving home care services: Yes  Skilled Home Care Services:  (Hospice)     Community Resources: Hospice  Equipment currently used at home: cane, straight, shower chair, walker, rolling  Supplies currently used at home: Incontinence Supplies    Employment/Financial:  Employment Status: retired        Financial Concerns: none   Referral to Financial Worker: No       Does the patient's insurance plan have a 3 day qualifying hospital stay waiver?  No    Lifestyle & Psychosocial Needs:  Social Drivers of Health     Food Insecurity: Low Risk  (6/12/2025)    Food Insecurity     Within the past 12 months, did you worry that your food would run out before you got money to buy more?: No     Within the past 12 months, did the  food you bought just not last and you didn t have money to get more?: No   Depression: Not at risk (4/11/2025)    PHQ-2     PHQ-2 Score: 2   Housing Stability: Low Risk  (6/12/2025)    Housing Stability     Do you have housing? : Yes     Are you worried about losing your housing?: No   Tobacco Use: Medium Risk (4/11/2025)    Patient History     Smoking Tobacco Use: Former     Smokeless Tobacco Use: Never     Passive Exposure: Not on file   Financial Resource Strain: Low Risk  (6/12/2025)    Financial Resource Strain     Within the past 12 months, have you or your family members you live with been unable to get utilities (heat, electricity) when it was really needed?: No   Alcohol Use: Not on file   Transportation Needs: Low Risk  (6/12/2025)    Transportation Needs     Within the past 12 months, has lack of transportation kept you from medical appointments, getting your medicines, non-medical meetings or appointments, work, or from getting things that you need?: No   Physical Activity: Not on file   Interpersonal Safety: Low Risk  (6/12/2025)    Interpersonal Safety     Do you feel physically and emotionally safe where you currently live?: Yes     Within the past 12 months, have you been hit, slapped, kicked or otherwise physically hurt by someone?: No     Within the past 12 months, have you been humiliated or emotionally abused in other ways by your partner or ex-partner?: No   Stress: Not on file   Social Connections: Not on file   Health Literacy: Not on file       Functional Status:  Prior to admission patient needed assistance:   Dependent ADLs:: Ambulation-walker  Dependent IADLs:: Cleaning, Cooking, Laundry, Shopping, Meal Preparation, Medication Management, Money Management, Transportation  Assesssment of Functional Status: Has complex medical needs, requires placement in a facility    Chemical Dependency Status: N/A                Values/Beliefs:  Spiritual, Cultural Beliefs, Advent Practices, Values  "that affect care:                 Discussed  Partnership in Safe Discharge Planning  document with patient/family: Yes:     Additional Information:  Patient discharged yesterday to [facility] and she and family didn't accept the room.  Per admissions report, patient and family \"were very upset that patient wasn't getting apartment, only shared room\". SW met today with patient and her son Idris and BRANDON Koenig again. Patient reports today, that she \"would stop eating and try to hurt herself if she was forced to stay there\". Son Idris also reports he was very upset how \"bad the room looked like with noisy freeway and potential roommate in the future\". BRANDON Koenig reports: \"my  rarely cries and they both were crying yesterday with his mom\".   Patient is currently Outpatient skilled nursing status.  Patient wants to go to her home. \"I want to spend the last few days in happiness in my own house, I know that I don't have much left\". BRYSON apologized for their so poor experience with facilities that they selected themselves, and explained that we need to start working on new discharge plan as patient now is under Outpatient skilled nursing status, and Liberty says they got this explained yesterday in the emergency room.    BRYSON reached out to unit and care management supervisors for support. BRYSON called [facility] Department and left message asking for update on patient's case.    Next Steps: CM will continue to monitor progression of care, review team recommendations and provide discharge planning assist as needed.     Ramona Pathak, W    ADDENDUM:   Call received from [facility] that patient's son Paulo as the POA reached out to them to initiate services. BRANDON Liberty states they received call from [facility] office informing them that patient can return to her home with 24hrs care.   BRYSON talked to Idris to confirm that he is aware of Paulo initiating [facility] for patient's " care. Idris says he is aware of this and agreeable. BRYSON asked Idris if he will be willing to take responsibility for managing patient's mediations at home in a locked medbox with hospice. He agreed, but wants to have his wife Liberty to be authorized too.  Per established preliminary discharge plan, patient could discharge to home with hospice care services and additional home care agency to provide oversight and monitoring. Idris as HCD agent will be responsible for monitoring patient's medications with hospice staff.   BRYSON called [facility] to initiate the contact [phone].  Spoke with Shoshana at Intake. They can't see patient today, but could set up admission for tomorrow morning.  SW explained that there is allegation of narcotics misuse and asked if they can provide locked medbox and facility can. Also inquired about additional private pay home care agency if they are working with one.  They work with [facility] agency from [place] [phone].   SW called [facility] ( Myrna), they have staff available to provide even 24hrs care and overnight. The rates are: $69.00/hr which decreases with more hours scheduled, 2-3 hrs $50.00/hr and more than 4 hrs of consecutive care is $43.00/hr. SW presented that information to Idris, patient and Liberty. They are all agreeable that would like to try this, as agency works with the hospice company and was recommended.  BRYSON informed [facility] and they will schedule [facility] assessment at the same time as they admission to hospice.  BRYSON asked Shoshana about the locked medbox and she will have her staff deliver to the Unit tonight. Patient's provider updated.     Son Idris with provide transportation tomorrow morning. Hospice scheduled to meet at patient home with Ilir at 10AM on Friday June 13th.  Initially hospice DME:  hospital bed, locked medbox, wheelchair and incontinence products. Additional needs will be reviewed and discussed during  admissions visit. Ruy IdrisLiberty  and hospice staff will only be allowed to open patient's medbox.      Care management manager updated and will follow up with hospital legal team to see if this is meeting safe discharge criteria. Ramona Pathak, SIENNA

## 2025-06-12 NOTE — ED NOTES
Bed: JNED-04  Expected date:   Expected time:   Means of arrival:   Comments:  DARBY from lobby when clean

## 2025-06-13 VITALS
SYSTOLIC BLOOD PRESSURE: 146 MMHG | DIASTOLIC BLOOD PRESSURE: 67 MMHG | RESPIRATION RATE: 16 BRPM | HEART RATE: 58 BPM | TEMPERATURE: 98.1 F | OXYGEN SATURATION: 95 %

## 2025-06-13 PROCEDURE — 250N000013 HC RX MED GY IP 250 OP 250 PS 637: Performed by: HOSPITALIST

## 2025-06-13 PROCEDURE — 250N000013 HC RX MED GY IP 250 OP 250 PS 637: Performed by: INTERNAL MEDICINE

## 2025-06-13 PROCEDURE — 99207 PR NO BILLABLE SERVICE THIS VISIT: CPT | Performed by: INTERNAL MEDICINE

## 2025-06-13 RX ORDER — HYDROMORPHONE HYDROCHLORIDE 2 MG/1
2 TABLET ORAL EVERY 12 HOURS PRN
Refills: 0 | Status: DISCONTINUED | OUTPATIENT
Start: 2025-06-13 | End: 2025-06-13 | Stop reason: HOSPADM

## 2025-06-13 RX ORDER — AMOXICILLIN 250 MG
1 CAPSULE ORAL 2 TIMES DAILY PRN
COMMUNITY
Start: 2025-06-13

## 2025-06-13 RX ORDER — HYDROMORPHONE HYDROCHLORIDE 2 MG/1
2 TABLET ORAL EVERY 12 HOURS PRN
Qty: 4 TABLET | Refills: 0 | Status: SHIPPED | OUTPATIENT
Start: 2025-06-13

## 2025-06-13 RX ORDER — ACETAMINOPHEN 500 MG
1000 TABLET ORAL 2 TIMES DAILY
COMMUNITY
Start: 2025-06-13

## 2025-06-13 RX ORDER — SIMETHICONE 80 MG
80 TABLET,CHEWABLE ORAL 3 TIMES DAILY PRN
Qty: 6 TABLET | Refills: 0 | Status: SHIPPED | OUTPATIENT
Start: 2025-06-13

## 2025-06-13 RX ORDER — LIDOCAINE 4 G/G
1 PATCH TOPICAL EVERY 24 HOURS
Qty: 3 PATCH | Refills: 0 | Status: SHIPPED | OUTPATIENT
Start: 2025-06-14

## 2025-06-13 RX ORDER — AMOXICILLIN 250 MG
1 CAPSULE ORAL 2 TIMES DAILY PRN
Status: DISCONTINUED | OUTPATIENT
Start: 2025-06-13 | End: 2025-06-13 | Stop reason: HOSPADM

## 2025-06-13 RX ADMIN — LIDOCAINE 1 PATCH: 4 PATCH TOPICAL at 08:51

## 2025-06-13 RX ADMIN — HYDROMORPHONE HYDROCHLORIDE 2 MG: 2 TABLET ORAL at 00:30

## 2025-06-13 RX ADMIN — ACETAMINOPHEN 1000 MG: 500 TABLET ORAL at 08:51

## 2025-06-13 ASSESSMENT — ACTIVITIES OF DAILY LIVING (ADL)
ADLS_ACUITY_SCORE: 46

## 2025-06-13 NOTE — DISCHARGE SUMMARY
[facility] MEDICINE  DISCHARGE SUMMARY     Primary Care Physician: Margie Arboleda  Admission Date: 6/11/2025   Discharge Provider: Franco CORTEZ MD Discharge Date: 6/13/2025   Diet:   Active Diet and Nourishment Order   Procedures    Combination Diet Regular Diet    Diet       Code Status: No CPR- Do NOT Intubate   Activity: DCACTIVITY: Activity as tolerated        Condition at Discharge: Stable     REASON FOR PRESENTATION(See Admission Note for Details)   Colon cancer please refer to H&P for details    PRINCIPAL & ACTIVE DISCHARGE DIAGNOSES     Principal Problem:    Malignant neoplasm of sigmoid colon (H)  Active Problems:    Chronic anemia    Coronary atherosclerosis    PVD (peripheral vascular disease)    CKD (chronic kidney disease) stage 3, GFR 30-59 ml/min (H)    Neuropathy of left lower extremity    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              PROCEDURES ( this hospitalization only)          RECOMMENDATIONS TO OUTPATIENT PROVIDER FOR F/U VISIT     Follow-up Appointments       Hospital Follow-up with Existing Primary Care Provider (PCP)          Schedule Primary Care visit within: 30 Days               DISPOSITION     Home with Hospice    SUMMARY OF HOSPITAL COURSE:      Katya Butler is a 84 year old female with past medical history of colon cancer with metastases to the lung, recent volvulus 4/2025, has been on Hospice but discharged from Hospice on 6/1 due to concerns about drug diversion by her family, hospitalized from 6/4 to 6/11 for abdominal pain with CT reported moderate to high-grade small bowel obstruction. Patient improved  with conservative management and discharged to long-term care with hospice. However, patient brought back to ED as reported patient was extremely anxious, distressed after arriving to the facility.  Patient refuses to stay there and also made a statement of self-harm if she is returned to care facility.  Patient admitted under [facility] care status.     #Colon cancer with metastases to the lung;  #Hospice care patient;  #Cancer-related pain;  Per oncology team, likely life expectancy likely 1 to 3 months.  -Recently discharged from outpatient hospice 6/1, [facility], due to concerns about possible drug diversion by caregiver which are being investigated by [employer] [facility].    -Scheduled Tylenol, as needed simethicone. Lidocaine patch on her low back.  -As needed Dilaudid for severe pain.  Discussed with hospice care team, narcotic will be managed on a locked box by hospice care team.     #Recent small bowel obstruction, improved with conservative management  - Reported having loose stool but no other symptoms.  Scheduled bowel medication changed to as needed.         Discharge Medications with Med changes:     Discharge Medication List as of 6/13/2025  9:38 AM        CONTINUE these medications which have CHANGED    Details   acetaminophen (TYLENOL) 500 MG tablet Take 2 tablets (1,000 mg) by mouth 2 times daily., OTC      HYDROmorphone (DILAUDID) 2 MG tablet Take 1 tablet (2 mg) by mouth every 12 hours as needed for severe pain., Disp-4 tablet, R-0, E-Prescribe      Lidocaine (LIDOCARE) 4 % Patch Place 1 patch over 12 hours onto the skin every 24 hours. Apply on mid low back area. To prevent lidocaine toxicity, patient should be patch free for 12 hrs daily.Disp-3 patch, [id number]      senna-docusate (SENOKOT-S/PERICOLACE) 8.6-50 MG tablet Take 1 tablet by mouth 2 times daily as needed for constipation., OTC      simethicone (MYLICON) 80 MG chewable tablet Take 1 tablet (80 mg) by mouth 3 times daily as needed  "for cramping or flatulence., Disp-6 tablet, R-0, E-Prescribe           CONTINUE these medications which have NOT CHANGED    Details   lidocaine-prilocaine (EMLA) 2.5-2.5 % external cream Apply topically as needed for moderate pain Apply to painful skinDisp-30 g, [id number]      melatonin 3 MG tablet Take 1 tablet (3 mg) by mouth daily (with dinner)., Transitional           STOP taking these medications       polyethylene glycol (MIRALAX) 17 g packet Comments:   Reason for Stopping:                     Rationale for medication changes:      Refer to hospital course        Consults       CARE MANAGEMENT / SOCIAL WORK IP CONSULT  PHARMACY IP CONSULT  CARE MANAGEMENT / SOCIAL WORK IP CONSULT  PHARMACY IP CONSULT    Immunizations given this encounter     Most Recent Immunizations   Administered Date(s) Administered    COVID-19 MONOVALENT 12+ (Pfizer) 02/15/2022    Influenza (High Dose) Trivalent,PF (Fluzone) 11/05/2019    Influenza (IIV3) PF 10/28/2013    Influenza (prior to 2024) 10/07/2009    Influenza Vaccine 65+ (Fluzone HD) 11/05/2020    Influenza Vaccine, 6+MO IM (QUADRIVALENT W/PRESERVATIVES) 02/28/2017    Pneumo Conj 13-V (2010&after) 10/28/2014    Pneumococcal 23 valent 10/07/2009    Zoster recombinant adjuvanted (Shingrix) 10/19/2022           Anticoagulation Information      Recent INR results: No results for input(s): \"INR\" in the last 168 hours.        SIGNIFICANT IMAGING FINDINGS     No results found for this visit on 06/11/25.    SIGNIFICANT LABORATORY FINDINGS       Discharge Orders        Hospice Referral      Reason for your hospital stay    Patient admitted on long term care.  Patient with a history of colon cancer     Activity    Your activity upon discharge: activity as tolerated     Diet    Follow this diet upon discharge: Current Diet:Orders Placed This Encounter      Combination Diet Regular Diet     Hospital Follow-up with Existing Primary Care Provider (PCP)            Examination   Physical " Exam   Temp:  [97.5  F (36.4  C)-98.1  F (36.7  C)] 98.1  F (36.7  C)  Pulse:  [58-66] 58  Resp:  [16-20] 16  BP: (146-162)/(67-72) 146/67  SpO2:  [95 %-98 %] 95 %  Wt Readings from Last 1 Encounters:   06/10/25 40.6 kg (89 lb 8 oz)       Patient seen and examined.  Patient denied abdomen pain, nausea, vomiting, 1 loose bowel movement this morning, reported fairly good appetite.    Reported ongoing intermittent low back pain but denied worsening and also attributes to hospital bed.    Patient denied feeling depressed or thoughts of harming herself.  Patient feels safe going to her home.    Discussed with patient's son, Hiren and daughter-in-law, Liberty at bedside.  Reported they are also going to hire private pay home care.  Discussed with care manager/ at bedside  Discussed with hospice care team, they requested few days of supply of as needed Dilaudid for the weekend through our pharmacy and they will be coming to pick medication lockbox and will be managing medications.  Also informed hospice care team that patient not requiring much narcotic pain medications at this time  Discussed with clinical manager who discussed with risk-management team and they agree with current disposition plan      Please see EMR for more detailed significant labs, imaging, consultant notes etc.    Franco MARIE MD, personally saw the patient today and spent greater than 30 minutes discharging this patient.    Franco CORTEZ MD  [facility]    CC:Stone, Margie

## 2025-06-13 NOTE — PLAN OF CARE
Problem: Adult Inpatient Plan of Care  Goal: Optimal Comfort and Wellbeing  Outcome: Progressing     Problem: Pain Acute  Goal: Optimal Pain Control and Function  Outcome: Progressing  Intervention: Prevent or Manage Pain  Recent Flowsheet Documentation  Taken 6/13/2025 0030 by Milagros Quinones, RN  Medication Review/Management: medications reviewed   Goal Outcome Evaluation:       Pt is A/O x4. C/O back pain 7/10, prn dilaudid administer and effective.VSS continue to monitor. Milagros Quinones RN

## 2025-06-13 NOTE — PROGRESS NOTES
Care Management Discharge Note    Discharge Date: 06/13/2025       Discharge Disposition: Hospice    Discharge Services: Home Care    Discharge DME: None    Discharge Transportation: family or friend will provide    Private pay costs discussed: transportation costs    Does the patient's insurance plan have a 3 day qualifying hospital stay waiver?  No    Education Provided on the Discharge Plan: Yes  Persons Notified of Discharge Plans: patient, family, Hospice [facility], [facility], [facility], Unit.  Patient/Family in Agreement with the Plan: yes    Handoff Referral Completed: Yes, [facility] PCP: Internal handoff referral completed    Additional Information:  Patient is discharging back to her home with acknowledgment from [facility] and Adult Protection Services. Patient is enrolling in [facility] with supportive home care services to be provided by [facility] [place].   The admission appointment is scheduled for today at 10AM at patient's home. Hours and type of services and needed equipment will be discussed at the admission meeting today. [facility] hours will be private pay services and family was provided with their quotes yesterday.     Patient's narcotic pain medications (4 tablets of Dilaudid) will be filed by hospital pharmacy and locked in hospice delivered to the Unit locked box which will be picked up later today by hospice staff and delivered to patient's home. Patient will be transported to her home by son Idris and BRANDON Koenig.   provided Idris and Liberty with list of inpatient hospice facilities as a resource for their future needs.  Patient and family had no further questions or concerns.     Ramona Pathak, SIENNA

## 2025-06-13 NOTE — PLAN OF CARE
Problem: Pain Acute  Goal: Optimal Pain Control and Function  Outcome: Progressing     Problem: Delirium  Goal: Optimal Coping  Outcome: Progressing     Problem: Adult Inpatient Plan of Care  Goal: Plan of Care Review  Description: The Plan of Care Review/Shift note should be completed every shift.  The Outcome Evaluation is a brief statement about your assessment that the patient is improving, declining, or no change.  This information will be displayed automatically on your shift  note.  Outcome: Progressing   Goal Outcome Evaluation:      Scheduled Tylenol given for pain 3/10, Independent in room, no PIV access, gave warm blanket and ice water, no concerns this shift, slept between cares.

## 2025-06-13 NOTE — PLAN OF CARE
Goal Outcome Evaluation:    Pt discharged to home with her son and daughter in law. Hospice will be coming to  her discharged medications and lock box from [facility].

## 2025-06-13 NOTE — PROGRESS NOTES
Care Management Follow Up    Length of Stay (days): 0    Expected Discharge Date: 06/13/2025     Concerns to be Addressed: discharge planning  home vs LTC  Patient plan of care discussed at interdisciplinary rounds: Yes    Anticipated Discharge Disposition: Hospice      Anticipated Discharge Services: Home Care  Anticipated Discharge DME: None    Patient/family educated on Medicare website which has current facility and service quality ratings: yes  Education Provided on the Discharge Plan: Yes  Patient/Family in Agreement with the Plan: yes    Referrals Placed by CM/SW: Hospice  Private pay costs discussed: transportation costs    Discussed  Partnership in Safe Discharge Planning  document with patient/family: No     Handoff Completed: No, handoff not indicated or clinically appropriate    Additional Information:  Call to [facility] Adult [facility] services. Spoke with Intake person who states they have reports on patient, but case wasn't open, or activated as patient was in safe environment in the hospital. BRYSON informed Intake patient is discharging today back to her home with [facility] and private pay home care through [facility]. [facility] is encouraging caregivers to call report with any concerns.  BRYSON shared this information with [facility] and they will pass this on to [facility] Caregiving agency.    Next Steps: CM will continue to assist with patient's discharge needs.    Ramona Pathak, LSW

## (undated) DEVICE — CUSTOM PACK CYSTO PREFERRED SOT5BCYHEA

## (undated) DEVICE — SOLUTION IRRIG 2B7127 .9NS 3000ML BAG

## (undated) DEVICE — GLOVE BIOGEL PI ULTRATOUCH G SZ 8.0 42180

## (undated) DEVICE — SOL WATER IRRIG 1000ML BOTTLE 2F7114

## (undated) DEVICE — PREP DYNA-HEX 4% CHG SCRUB 4OZ BOTTLE MDS098710

## (undated) DEVICE — TUBING SUCTION MEDI-VAC 1/4"X20' N620A

## (undated) DEVICE — GUIDEWIRE BENTSON FLEX TIP 0.035"X150CM M0066201250

## (undated) DEVICE — JUMPSUIT CYSTO DISP SD-100

## (undated) DEVICE — MAT FLOOR WATERPROOF BACKSHEET FMBP30

## (undated) DEVICE — WIRE GUIDE 0.035"X145CM AMPLATZ SUPER STIFF STR M001465230

## (undated) DEVICE — TUBING IRRIG TUR Y TYPE 96" LF 6543-01

## (undated) DEVICE — SPONGE RAY-TEC 4X8" 7318

## (undated) DEVICE — GLOVE BIOGEL PI INDICATOR 8.0 LF 41680

## (undated) DEVICE — GOWN IMPERVIOUS BREATHABLE SMART XLG 89045

## (undated) DEVICE — KIT ENDO FIRST STEP DISINFECTANT 200ML W/POUCH EP-4

## (undated) DEVICE — DRAPE UNDER BUTTOCK 89415

## (undated) DEVICE — SUCTION MANIFOLD NEPTUNE 2 SYS 1 PORT 702-025-000

## (undated) DEVICE — MAT FLOOR SURGICAL 40X38 0702140238

## (undated) RX ORDER — EPHEDRINE SULFATE 50 MG/ML
INJECTION, SOLUTION INTRAMUSCULAR; INTRAVENOUS; SUBCUTANEOUS
Status: DISPENSED
Start: 2024-11-21

## (undated) RX ORDER — FENTANYL CITRATE 50 UG/ML
INJECTION, SOLUTION INTRAMUSCULAR; INTRAVENOUS
Status: DISPENSED
Start: 2024-08-22

## (undated) RX ORDER — FENTANYL CITRATE-0.9 % NACL/PF 10 MCG/ML
PLASTIC BAG, INJECTION (ML) INTRAVENOUS
Status: DISPENSED
Start: 2024-08-22

## (undated) RX ORDER — FENTANYL CITRATE 50 UG/ML
INJECTION, SOLUTION INTRAMUSCULAR; INTRAVENOUS
Status: DISPENSED
Start: 2024-11-21